# Patient Record
Sex: FEMALE | Race: BLACK OR AFRICAN AMERICAN | NOT HISPANIC OR LATINO | Employment: UNEMPLOYED | URBAN - METROPOLITAN AREA
[De-identification: names, ages, dates, MRNs, and addresses within clinical notes are randomized per-mention and may not be internally consistent; named-entity substitution may affect disease eponyms.]

---

## 2021-06-11 ENCOUNTER — HISTORICAL (OUTPATIENT)
Dept: ADMINISTRATIVE | Facility: HOSPITAL | Age: 81
End: 2021-06-11

## 2021-06-11 LAB
ABS NEUT (OLG): 2.46 X10(3)/MCL (ref 2.1–9.2)
ALBUMIN SERPL-MCNC: 3 GM/DL (ref 3.4–4.8)
ALBUMIN/GLOB SERPL: 0.8 RATIO (ref 1.1–2)
ALP SERPL-CCNC: 140 UNIT/L (ref 40–150)
ALT SERPL-CCNC: 14 UNIT/L (ref 0–55)
ANISOCYTOSIS BLD QL SMEAR: 1
AST SERPL-CCNC: 24 UNIT/L (ref 5–34)
BASOPHILS # BLD AUTO: 0.1 X10(3)/MCL (ref 0–0.2)
BASOPHILS NFR BLD AUTO: 2 %
BILIRUB SERPL-MCNC: 0.2 MG/DL
BILIRUBIN DIRECT+TOT PNL SERPL-MCNC: 0.1 MG/DL (ref 0–0.5)
BILIRUBIN DIRECT+TOT PNL SERPL-MCNC: 0.1 MG/DL (ref 0–0.8)
BUN SERPL-MCNC: 14 MG/DL (ref 9.8–20.1)
BURR CELLS BLD QL SMEAR: 1 (ref 0–0)
CALCIUM SERPL-MCNC: 8.7 MG/DL (ref 8.4–10.2)
CHLORIDE SERPL-SCNC: 106 MMOL/L (ref 98–107)
CHOLEST SERPL-MCNC: 205 MG/DL
CHOLEST/HDLC SERPL: 5 {RATIO} (ref 0–5)
CO2 SERPL-SCNC: 25 MMOL/L (ref 23–31)
CREAT SERPL-MCNC: 0.96 MG/DL (ref 0.55–1.02)
EOSINOPHIL # BLD AUTO: 0.5 X10(3)/MCL (ref 0–0.9)
EOSINOPHIL NFR BLD AUTO: 10 %
ERYTHROCYTE [DISTWIDTH] IN BLOOD BY AUTOMATED COUNT: 16.3 % (ref 11.5–17)
EST. AVERAGE GLUCOSE BLD GHB EST-MCNC: 93.9 MG/DL
GLOBULIN SER-MCNC: 3.8 GM/DL (ref 2.4–3.5)
GLUCOSE SERPL-MCNC: 80 MG/DL (ref 82–115)
HBA1C MFR BLD: 4.9 %
HCT VFR BLD AUTO: 37.1 % (ref 37–47)
HDLC SERPL-MCNC: 45 MG/DL (ref 35–60)
HGB BLD-MCNC: 11 GM/DL (ref 12–16)
LDLC SERPL CALC-MCNC: 125 MG/DL (ref 50–140)
LYMPHOCYTES # BLD AUTO: 1.4 X10(3)/MCL (ref 0.6–4.6)
LYMPHOCYTES NFR BLD AUTO: 28 %
MACROCYTES BLD QL SMEAR: 1 /MCL
MCH RBC QN AUTO: 29.4 PG (ref 27–31)
MCHC RBC AUTO-ENTMCNC: 29.6 GM/DL (ref 33–36)
MCV RBC AUTO: 99.2 FL (ref 80–94)
MONOCYTES # BLD AUTO: 0.5 X10(3)/MCL (ref 0.1–1.3)
MONOCYTES NFR BLD AUTO: 10 %
NEUTROPHILS # BLD AUTO: 2.46 X10(3)/MCL (ref 2.1–9.2)
NEUTROPHILS NFR BLD AUTO: 50 %
PHENYTOIN SERPL-MCNC: <1.8 UG/ML (ref 10–20)
PLATELET # BLD AUTO: 257 X10(3)/MCL (ref 130–400)
PLATELET # BLD EST: NORMAL 10*3/UL
PMV BLD AUTO: 12.2 FL (ref 7.4–10.4)
POIKILOCYTOSIS BLD QL SMEAR: 1
POTASSIUM SERPL-SCNC: 3.2 MMOL/L (ref 3.5–5.1)
PROT SERPL-MCNC: 6.8 GM/DL (ref 5.8–7.6)
RBC # BLD AUTO: 3.74 X10(6)/MCL (ref 4.2–5.4)
RBC MORPH BLD: ABNORMAL
SODIUM SERPL-SCNC: 141 MMOL/L (ref 136–145)
TRIGL SERPL-MCNC: 176 MG/DL (ref 37–140)
TSH SERPL-ACNC: 1.67 UIU/ML (ref 0.35–4.94)
VLDLC SERPL CALC-MCNC: 35 MG/DL
WBC # SPEC AUTO: 4.9 X10(3)/MCL (ref 4.5–11.5)

## 2021-07-14 ENCOUNTER — HISTORICAL (OUTPATIENT)
Dept: ADMINISTRATIVE | Facility: HOSPITAL | Age: 81
End: 2021-07-14

## 2021-07-14 LAB
APPEARANCE, UA: CLEAR
BACTERIA #/AREA URNS AUTO: ABNORMAL /HPF
BILIRUB UR QL STRIP: NEGATIVE
COLOR UR: YELLOW
GLUCOSE (UA): NEGATIVE
HGB UR QL STRIP: NEGATIVE
KETONES UR QL STRIP: NEGATIVE
LEUKOCYTE ESTERASE UR QL STRIP: NEGATIVE
NITRITE UR QL STRIP.AUTO: NEGATIVE
PH UR STRIP: 7.5 [PH] (ref 5–9)
PROT UR QL STRIP: ABNORMAL
RBC #/AREA URNS HPF: ABNORMAL /[HPF]
SP GR UR STRIP: 1.01 (ref 1–1.03)
SQUAMOUS EPITHELIAL, UA: ABNORMAL /HPF (ref 0–4)
UROBILINOGEN UR STRIP-ACNC: 0.2
WBC #/AREA URNS AUTO: ABNORMAL /HPF (ref 0–3)

## 2021-07-17 LAB — FINAL CULTURE: NO GROWTH

## 2021-10-15 ENCOUNTER — HISTORICAL (OUTPATIENT)
Dept: ADMINISTRATIVE | Facility: HOSPITAL | Age: 81
End: 2021-10-15

## 2021-10-15 LAB
ABS NEUT (OLG): 2.78 X10(3)/MCL (ref 2.1–9.2)
ALBUMIN SERPL-MCNC: 3.1 GM/DL (ref 3.4–4.8)
ALBUMIN/GLOB SERPL: 0.6 RATIO (ref 1.1–2)
ALP SERPL-CCNC: 132 UNIT/L (ref 40–150)
ALT SERPL-CCNC: 13 UNIT/L (ref 0–55)
AST SERPL-CCNC: 23 UNIT/L (ref 5–34)
BASOPHILS # BLD AUTO: 0.1 X10(3)/MCL (ref 0–0.2)
BASOPHILS NFR BLD AUTO: 2 %
BILIRUB SERPL-MCNC: 0.2 MG/DL
BILIRUBIN DIRECT+TOT PNL SERPL-MCNC: 0.1 MG/DL (ref 0–0.5)
BILIRUBIN DIRECT+TOT PNL SERPL-MCNC: 0.1 MG/DL (ref 0–0.8)
BUN SERPL-MCNC: 9.7 MG/DL (ref 9.8–20.1)
CALCIUM SERPL-MCNC: 9.3 MG/DL (ref 8.4–10.2)
CHLORIDE SERPL-SCNC: 108 MMOL/L (ref 98–107)
CO2 SERPL-SCNC: 24 MMOL/L (ref 23–31)
CREAT SERPL-MCNC: 1.05 MG/DL (ref 0.55–1.02)
EOSINOPHIL # BLD AUTO: 0.2 X10(3)/MCL (ref 0–0.9)
EOSINOPHIL NFR BLD AUTO: 4 %
ERYTHROCYTE [DISTWIDTH] IN BLOOD BY AUTOMATED COUNT: 15.7 % (ref 11.5–17)
GLOBULIN SER-MCNC: 4.8 GM/DL (ref 2.4–3.5)
GLUCOSE SERPL-MCNC: 88 MG/DL (ref 82–115)
HCT VFR BLD AUTO: 40.1 % (ref 37–47)
HGB BLD-MCNC: 12.4 GM/DL (ref 12–16)
LYMPHOCYTES # BLD AUTO: 1.6 X10(3)/MCL (ref 0.6–4.6)
LYMPHOCYTES NFR BLD AUTO: 30 %
MCH RBC QN AUTO: 28.6 PG (ref 27–31)
MCHC RBC AUTO-ENTMCNC: 30.9 GM/DL (ref 33–36)
MCV RBC AUTO: 92.6 FL (ref 80–94)
MONOCYTES # BLD AUTO: 0.6 X10(3)/MCL (ref 0.1–1.3)
MONOCYTES NFR BLD AUTO: 10 %
NEUTROPHILS # BLD AUTO: 2.78 X10(3)/MCL (ref 2.1–9.2)
NEUTROPHILS NFR BLD AUTO: 53 %
PLATELET # BLD AUTO: 322 X10(3)/MCL (ref 130–400)
PMV BLD AUTO: 10.7 FL (ref 9.4–12.4)
POTASSIUM SERPL-SCNC: 4.6 MMOL/L (ref 3.5–5.1)
PROT SERPL-MCNC: 7.9 GM/DL (ref 5.8–7.6)
RBC # BLD AUTO: 4.33 X10(6)/MCL (ref 4.2–5.4)
SODIUM SERPL-SCNC: 139 MMOL/L (ref 136–145)
WBC # SPEC AUTO: 5.3 X10(3)/MCL (ref 4.5–11.5)

## 2021-10-26 ENCOUNTER — HISTORICAL (OUTPATIENT)
Dept: ADMINISTRATIVE | Facility: HOSPITAL | Age: 81
End: 2021-10-26

## 2021-12-21 ENCOUNTER — HISTORICAL (OUTPATIENT)
Dept: ADMINISTRATIVE | Facility: HOSPITAL | Age: 81
End: 2021-12-21

## 2022-01-03 ENCOUNTER — HISTORICAL (OUTPATIENT)
Dept: RADIOLOGY | Facility: HOSPITAL | Age: 82
End: 2022-01-03

## 2022-04-07 ENCOUNTER — HISTORICAL (OUTPATIENT)
Dept: ADMINISTRATIVE | Facility: HOSPITAL | Age: 82
End: 2022-04-07

## 2022-04-07 LAB
APPEARANCE, UA: NORMAL
BACTERIA SPEC CULT: NORMAL
BILIRUB UR QL STRIP: NEGATIVE
COLOR UR: YELLOW
GLUCOSE (UA): NEGATIVE
HGB UR QL STRIP: NORMAL
KETONES UR QL STRIP: NEGATIVE
LEUKOCYTE ESTERASE UR QL STRIP: NORMAL
NITRITE UR QL STRIP: NEGATIVE
PH UR STRIP: 7 [PH] (ref 5–9)
PROT UR QL STRIP: NORMAL
RBC #/AREA URNS HPF: NORMAL /[HPF] (ref 0–2)
SP GR UR STRIP: 1.01 (ref 1–1.03)
SQUAMOUS EPITHELIAL, UA: NORMAL (ref 0–4)
UA WBC MAN: >200 (ref 0–2)
UROBILINOGEN UR STRIP-ACNC: 0.2

## 2022-04-10 ENCOUNTER — HISTORICAL (OUTPATIENT)
Dept: ADMINISTRATIVE | Facility: HOSPITAL | Age: 82
End: 2022-04-10
Payer: MEDICARE

## 2022-04-24 VITALS
DIASTOLIC BLOOD PRESSURE: 70 MMHG | OXYGEN SATURATION: 98 % | WEIGHT: 83.31 LBS | HEIGHT: 62 IN | SYSTOLIC BLOOD PRESSURE: 103 MMHG | BODY MASS INDEX: 15.33 KG/M2

## 2022-04-30 NOTE — OP NOTE
Patient:   Lisseth Guajardo            MRN: [id number]            FIN: [id number]               Age:   81 years     Sex:  Female     :  1940   Associated Diagnoses:   None   Author:   Wade Acuna MD            DATE OF SURGERY:   10/26/21    SURGEON:  Wade Acuna MD    PREOPERATIVE DIAGNOSIS:  Symptomatic Cholelithiasis    POST OPERATIVE DIAGNOSIS:  Same.    PROCEDURE:  Laparoscopic cholecystectomy.    ANESTHESIA:  General endotracheal anesthesia.    ESTIMATED BLOOD LOSS:  Approximately 20 cc.   Blood administered, none.    LAP AND INSTRUMENT COUNT:  Correct X2 at the end of the case.    SPECIMENS:  Gallbladder.    INDICATION AND SIGNIFICANT HISTORY:  Patient is a 81-year-old female complaining of post prandial right upper quadrant pain associated with fatty meals.  Patient was worked up clinically and found to have symptomatic cholelithiasis.  Risks and benefits of surgery was discussed with the patient who voiced understanding of risks / benefits and elected to proceed with surgery.        PROCEDURE IN DETAIL:  Once informed consents were obtained, patient was taken to the operating room and placed supine on the operating table.  After general endotracheal anesthesia was induced, the abdomen was prepped and draped in the standard sterile surgical fashion.  Periumbilical incision was made with the scalpel and the Veress needle was used to enter the abdominal cavity.  Pneumoperitoneum was then created with insufflation.  After adequate insufflation was obtained, 11 millimeter trocar port were placed through this wound.  Two additional 5 millimeter ports were placed in the right upper quadrant followed by 5 millimeter trocar placed subxiphoid.  The gallbladder was then visualized appeared to have chronic inflammatory changes and retracted both superiorly and laterally to achieve the critical view.  Dissection was carried out in lateral to medial fashion.  The cystic duct were first  visualized followed by cystic artery appropriate.  Two clips were placed twice proximally on each structure and one distally and resected.  The gallbladder was then removed from the liver bed using the hook cautery and passed off the table as specimen through the periumbilical trocar port site.  Attention was then redirected to the gallbladder fossa, no active bleeding was noted.  Good hemostasis was visualized.  All ports were then removed under direct visualization with no active bleeding noted.  Skin was then closed with 4-0 Vicryl suture in interrupted fashion.  Skin was  cleaned and dry sterile dressing was placed on the wound.  Lap and instrument  counts were correct X2 at the end of the case.  Patient tolerated the procedure well and was transferred to recovery room in good condition.

## 2022-05-02 ENCOUNTER — TELEPHONE (OUTPATIENT)
Dept: FAMILY MEDICINE | Facility: CLINIC | Age: 82
End: 2022-05-02
Payer: MEDICARE

## 2022-05-02 NOTE — TELEPHONE ENCOUNTER
----- Message from Jolie Ruvalcaba sent at 5/2/2022  1:34 PM CDT -----  Regarding: Medical Advice  Type:  Needs Medical Advice    Who Called: patient  Symptoms (please be specific): Patient was seen at [facility] Saturday 4/30 for pain and wheezing. She was prescribed medication that is a type of steroid. She is legally blind and does not know what medication but she is out of it.   How long has patient had these symptoms:  na  Ph[facility] and phone #:  [facility]  Would the patient rather a call back or a response via [facility]? call  Best Call Back Number: [phone]  Additional Information: Please call back with information on what she should do regarding this medication.

## 2022-05-03 ENCOUNTER — TELEPHONE (OUTPATIENT)
Dept: FAMILY MEDICINE | Facility: CLINIC | Age: 82
End: 2022-05-03

## 2022-05-03 NOTE — TELEPHONE ENCOUNTER
----- Message from Jolie Ruvalcaba sent at 5/2/2022  1:34 PM CDT -----  Regarding: Medical Advice  Type:  Needs Medical Advice    Who Called: patient  Symptoms (please be specific): Patient was seen at [facility] Saturday 4/30 for pain and wheezing. She was prescribed medication that is a type of steroid. She is legally blind and does not know what medication but she is out of it.   How long has patient had these symptoms:  na  Pharm[facility] and phone #:  [facility]  Would the patient rather a call back or a response via [facility]? call  Best Call Back Number: [phone]  Additional Information: Please call back with information on what she should do regarding this medication.

## 2022-05-06 ENCOUNTER — TELEPHONE (OUTPATIENT)
Dept: FAMILY MEDICINE | Facility: CLINIC | Age: 82
End: 2022-05-06
Payer: MEDICARE

## 2022-05-06 NOTE — TELEPHONE ENCOUNTER
Spoke with De[facility] O2 was 91. Complaining of wheezing  COVID booster last week on 4/30/22  Went to ER was given Prednisone for 5 days, tomorrow is last day. Feels a little better but not too much better.       Patient was instructed to go to ED. Refused ED said she will call if her oxygen drops anymore.   Just wanted to make us aware of situation

## 2022-05-06 NOTE — TELEPHONE ENCOUNTER
Thanks for the update. I agree with recommendation to return to ER for evaluation and treatment for wheezing.

## 2022-05-06 NOTE — TELEPHONE ENCOUNTER
----- Message from Vinny Chatterjee sent at 5/6/2022 10:10 AM CDT -----  .Type:  Needs Medical Advice    Who Called: Deyanira from [facility]  Symptoms (please be specific):    How long has patient had these symptoms:    Pharmacy name and phone #:    Would the patient rather a call back or a response via [facility]?   Best Call Back Number: [phone] or office # [phone]  Additional Information: Need to update the nurse on Ms. Montanez's condition.

## 2022-06-03 RX ORDER — DOXYLAMINE SUCCINATE 25 MG
1 TABLET ORAL 2 TIMES DAILY
Qty: 28 G | Refills: 3 | Status: SHIPPED | OUTPATIENT
Start: 2022-06-03

## 2022-06-03 RX ORDER — DOXYLAMINE SUCCINATE 25 MG
1 TABLET ORAL 2 TIMES DAILY
COMMUNITY
End: 2022-06-03 | Stop reason: SDUPTHER

## 2022-06-03 NOTE — TELEPHONE ENCOUNTER
----- Message from Melody Ren MD sent at 6/3/2022 10:03 AM CDT -----  Regarding: RE: Medical Adivce  Please send refill request or have pharmacy send. Thanks.   ----- Message -----  From: Murtaza Sandoval MA  Sent: 6/3/2022   9:56 AM CDT  To: Melody Ren MD  Subject: FW: Medical Adivce                               Please advise   ----- Message -----  From: Wilbert Longoria  Sent: 6/3/2022   9:56 AM CDT  To: Mikal VAZQUEZ Staff  Subject: Medical Adivce                                   Type:  Needs Medical Advice    Who Called: pt   Symptoms (please be specific):  Private      Would the patient rather a call back or a response via [facility]? Call back   Best Call Back Number: [phone]  Additional Information: pt stated a request was sent for some antifungal cream .. needs it Sent at [place], [place]

## 2022-06-09 ENCOUNTER — TELEPHONE (OUTPATIENT)
Dept: FAMILY MEDICINE | Facility: CLINIC | Age: 82
End: 2022-06-09
Payer: MEDICARE

## 2022-06-09 DIAGNOSIS — Z23 NEED FOR SHINGLES VACCINE: Primary | ICD-10-CM

## 2022-06-09 RX ORDER — ZOSTER VACCINE RECOMBINANT, ADJUVANTED 50 MCG/0.5
0.5 KIT INTRAMUSCULAR ONCE
Qty: 1 EACH | Refills: 1 | Status: SHIPPED | OUTPATIENT
Start: 2022-06-09 | End: 2022-06-09

## 2022-06-09 NOTE — TELEPHONE ENCOUNTER
Can new shingles order be placed for patient to have done with home health. Shingles rx was left out by staff and a new one is needed for home health to give to patient. Please send to [facility]

## 2022-06-09 NOTE — TELEPHONE ENCOUNTER
----- Message from Vinny Chatterjee sent at 6/9/2022 10:28 AM CDT -----  .Type:  Needs Medical Advice    Who Called: Lisseth  Symptoms (please be specific):    How long has patient had these symptoms:    Pharmacy name and phone #:    Would the patient rather a call back or a response via [facility]?   Best Call Back Number: # [phone]  Additional Information: She is calling to speak with nurse about a shingle shot she told me.

## 2022-06-20 RX ORDER — NITROFURANTOIN 25; 75 MG/1; MG/1
100 CAPSULE ORAL DAILY
COMMUNITY
Start: 2022-01-20 | End: 2022-07-28 | Stop reason: SINTOL

## 2022-06-20 RX ORDER — PHENYTOIN SODIUM 100 MG/1
300 CAPSULE, EXTENDED RELEASE ORAL NIGHTLY
COMMUNITY
Start: 2021-07-14 | End: 2022-11-07 | Stop reason: SDUPTHER

## 2022-06-20 RX ORDER — LATANOPROST 0.005 %
1 DROPS OPHTHALMIC (EYE) NIGHTLY
COMMUNITY
Start: 2022-04-25

## 2022-06-20 RX ORDER — PHENOBARBITAL 32.4 MG/1
32.4 TABLET ORAL NIGHTLY
COMMUNITY
Start: 2021-08-11 | End: 2022-11-07 | Stop reason: SDUPTHER

## 2022-06-20 RX ORDER — DORZOLAMIDE HYDROCHLORIDE AND TIMOLOL MALEATE 20; 5 MG/ML; MG/ML
1 SOLUTION/ DROPS OPHTHALMIC 2 TIMES DAILY
COMMUNITY
Start: 2022-04-25

## 2022-06-20 RX ORDER — BRIMONIDINE TARTRATE 1.5 MG/ML
1 SOLUTION/ DROPS OPHTHALMIC 3 TIMES DAILY
COMMUNITY
Start: 2022-04-25

## 2022-06-20 RX ORDER — CARBAMAZEPINE 100 MG/1
100 CAPSULE, EXTENDED RELEASE ORAL 2 TIMES DAILY
COMMUNITY
Start: 2021-07-14 | End: 2022-11-08 | Stop reason: SDUPTHER

## 2022-06-20 RX ORDER — ASPIRIN 81 MG/1
81 TABLET ORAL DAILY
COMMUNITY
Start: 2021-07-14

## 2022-06-20 RX ORDER — GABAPENTIN 100 MG/1
100 CAPSULE ORAL 2 TIMES DAILY PRN
COMMUNITY
Start: 2021-12-01 | End: 2022-08-29

## 2022-06-20 RX ORDER — BRIMONIDINE TARTRATE 2 MG/ML
1 SOLUTION/ DROPS OPHTHALMIC 3 TIMES DAILY
COMMUNITY
Start: 2022-04-25

## 2022-06-21 ENCOUNTER — OFFICE VISIT (OUTPATIENT)
Dept: FAMILY MEDICINE | Facility: CLINIC | Age: 82
End: 2022-06-21
Payer: MEDICARE

## 2022-06-21 VITALS
RESPIRATION RATE: 16 BRPM | HEIGHT: 61 IN | TEMPERATURE: 98 F | BODY MASS INDEX: 18.88 KG/M2 | HEART RATE: 71 BPM | SYSTOLIC BLOOD PRESSURE: 124 MMHG | WEIGHT: 100 LBS | OXYGEN SATURATION: 97 % | DIASTOLIC BLOOD PRESSURE: 76 MMHG

## 2022-06-21 DIAGNOSIS — E78.49 ESSENTIAL FAMILIAL HYPERLIPIDEMIA: ICD-10-CM

## 2022-06-21 DIAGNOSIS — M54.2 CERVICALGIA: ICD-10-CM

## 2022-06-21 DIAGNOSIS — Z23 NEED FOR 23-POLYVALENT PNEUMOCOCCAL POLYSACCHARIDE VACCINE: ICD-10-CM

## 2022-06-21 DIAGNOSIS — R56.9 SEIZURES: ICD-10-CM

## 2022-06-21 DIAGNOSIS — M47.22 OSTEOARTHRITIS OF SPINE WITH RADICULOPATHY, CERVICAL REGION: ICD-10-CM

## 2022-06-21 DIAGNOSIS — J30.2 SEASONAL ALLERGIES: ICD-10-CM

## 2022-06-21 DIAGNOSIS — Z00.00 MEDICARE ANNUAL WELLNESS VISIT, SUBSEQUENT: Primary | ICD-10-CM

## 2022-06-21 PROCEDURE — 90732 PPSV23 VACC 2 YRS+ SUBQ/IM: CPT | Mod: ,,, | Performed by: FAMILY MEDICINE

## 2022-06-21 PROCEDURE — 90732 PR PNEUMOCOCCAL VACCINE,23-VALENT,ADULT: ICD-10-PCS | Mod: ,,, | Performed by: FAMILY MEDICINE

## 2022-06-21 PROCEDURE — G0009 ADMIN PNEUMOCOCCAL VACCINE: HCPCS | Mod: ,,, | Performed by: FAMILY MEDICINE

## 2022-06-21 PROCEDURE — G0439 PPPS, SUBSEQ VISIT: HCPCS | Mod: ,,, | Performed by: FAMILY MEDICINE

## 2022-06-21 PROCEDURE — G0009 PR ADMIN PNEUMOCOCCAL VACCINE: ICD-10-PCS | Mod: ,,, | Performed by: FAMILY MEDICINE

## 2022-06-21 PROCEDURE — 99213 PR OFFICE/OUTPT VISIT, EST, LEVL III, 20-29 MIN: ICD-10-PCS | Mod: 25,,, | Performed by: FAMILY MEDICINE

## 2022-06-21 PROCEDURE — G0439 PR MEDICARE ANNUAL WELLNESS SUBSEQUENT VISIT: ICD-10-PCS | Mod: ,,, | Performed by: FAMILY MEDICINE

## 2022-06-21 PROCEDURE — 99213 OFFICE O/P EST LOW 20 MIN: CPT | Mod: 25,,, | Performed by: FAMILY MEDICINE

## 2022-06-21 RX ORDER — ATORVASTATIN CALCIUM 40 MG/1
40 TABLET, FILM COATED ORAL DAILY
COMMUNITY
End: 2022-11-08 | Stop reason: SDUPTHER

## 2022-06-21 RX ORDER — LORATADINE 10 MG/1
10 TABLET ORAL DAILY
Qty: 90 TABLET | Refills: 3 | Status: SHIPPED | OUTPATIENT
Start: 2022-06-21 | End: 2022-11-08 | Stop reason: SDUPTHER

## 2022-06-21 NOTE — PROGRESS NOTES
Patient ID: [id number]     Chief Complaint: Medicare AWV        HPI:     Lisseth Guajardo is a 81 y.o. female here today for a Medicare Wellness.   Well Adult History   81 y.o. AAF with history of HTN, epilepsy, and depression here for annual medicare wellness. The patient presents for well adult exam. The patient's general health status is described as fair. The patient's diet is described as balanced. Exercise: occasional. Associated symptoms consist of denies weight loss, denies weight gain, denies fatigue, denies headache, denies hearing loss and denies vision changes. Additional pertinent history: last dental exam: UTD, she refuses MMG, risks of refusal discussed with patient and patient voices understanding, last eye exam: she has macular degeneration and she has losing her eye sight, Ophthalmology- Dr. Medellin in 12/21/2021, seat belt use, daily caffeine use (Dr. Pepper), tobacco use: sometimes, but she doesn't have access to cigarettes, she misses her cigarettes, risks of refusal discussed with patient and patient voices understanding, occasional alcohol use (wine) and She is due for annual labs today. She needs Pneumovax 23 today, but she is UTD on vaccines. She has a history of epilepsy, controlled with Rx, she denies a seizure in the past 5 years, does followup with Neurology (Dr. Richardson) as scheduled. She has a history of CAD/HTN/hypercholesterolemia, controlled with Rx, no side effects, she is s/p 4 stents, asymptomatic, she does see Cardiology (Dr. Martinez). She has osteoporosis, not on calcium with D, cannot tolerate any medications for osteoporosis treatment and she refuses treatment, last DEXA scan was 01/04/2022. She reports recurrent UTIs, she wears adult diapers, she takes Macrobid for prophylaxis and she denies side effects. 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 She does stay in an assisted  living facility.   - She has a history of cervical DJD with chronic neck pain x several years, was seeing a doctor in [place], but hasn't had MRI in over 7 years, she reports pain radiates to her jaw.   - Patient is without any other complaints today.    A separate E/M code has been provided to evaluate additional complaints that the patient would like addressed during the dedicated Medicare Wellness Exam.    Patient has no opioid prescriptions on file and screening for potential substance use disorders is negative.    Past Medical History:  Coronary arteriosclerosis  Depression  Epilepsy, unspecified, not intractable, with status epilepticus  HTN (hypertension)  Hypokalemia  Osteoporosis     Past Surgical History:   Procedure Laterality Date    APPENDECTOMY      BACK SURGERY      cardiac stents      CARPAL TUNNEL RELEASE      CATARACT EXTRACTION Left     CHOLECYSTECTOMY      crainotomy      FOOT SURGERY Left     HYSTERECTOMY      LUNG REMOVAL, PARTIAL Right     LYMPH NODE DISSECTION         Review of patient's allergies indicates:   Allergen Reactions    Nitrofurantoin monohyd/m-cryst Hives    Penicillin Hives       Outpatient Medications Marked as Taking for the 6/21/22 encounter (Office Visit) with Melody Ren MD   Medication Sig Dispense Refill    ALPHAGAN P 0.15 % ophthalmic solution Place 1 drop into both eyes 3 (three) times daily.      aspirin (ECOTRIN) 81 MG EC tablet Take 81 mg by mouth once daily.      atorvastatin (LIPITOR) 40 MG tablet Take 40 mg by mouth once daily.      brimonidine 0.2% (ALPHAGAN) 0.2 % Drop Place 1 drop into both eyes 3 (three) times daily.      calcium carbonate/vitamin D3 (CALTRATE 600 + D ORAL) Take 1 tablet by mouth 2 (two) times a day.      carBAMazepine (CARBATROL) 100 MG CM12 Take 100 mg by mouth 2 (two) times a day.      COSOPT 22.3-6.8 mg/mL ophthalmic solution Place 1 drop into both eyes 2 (two) times daily.      gabapentin (NEURONTIN) 100  MG capsule Take 100 mg by mouth 2 (two) times daily as needed.      metoprolol succinate 25 mg CSpX Take 25 mg by mouth once daily.      miconazole (MICOTIN) 2 % cream Apply 1 g topically 2 (two) times daily. 28 g 3    nitrofurantoin, macrocrystal-monohydrate, (MACROBID) 100 MG capsule Take 100 mg by mouth once daily.      PHENobarbitaL (LUMINAL) 32.4 MG tablet Take 32.4 mg by mouth every evening.      phenytoin (DILANTIN) 100 MG ER capsule Take 300 mg by mouth every evening.      XALATAN 0.005 % ophthalmic solution Place 1 drop into both eyes nightly.       Current Facility-Administered Medications for the 6/21/22 encounter (Office Visit) with Melody Ren MD   Medication Dose Route Frequency Provider Last Rate Last Admin    [COMPLETED] pneumococcal vaccine (PNU-IMMUNE 23) injection 0.5 mL  0.5 mL Intramuscular 1 time in Clinic/HOD Melody Ren MD   0.5 mL at 06/21/22 1051       Social History     Socioeconomic History    Marital status:    Tobacco Use    Smoking status: Former Smoker    Smokeless tobacco: Never Used   Substance and Sexual Activity    Alcohol use: Never    Drug use: Never    Sexual activity: Not Currently        Family History   Family history unknown: Yes        Subjective:     ROS      See HPI for details    Constitutional: Denies Change in appetite. Denies Chills. Denies Fever. Denies Night sweats.  Eye: Denies Blurred vision. Denies Discharge. Denies Eye pain.  ENT: Denies Decreased hearing. Denies Sore throat. Denies Swollen glands.  Respiratory: Denies Cough. Denies Shortness of breath. Denies Shortness of breath with exertion. Denies Wheezing.  Cardiovascular: Denies Chest pain at rest. Denies Chest pain with exertion. Denies Irregular heartbeat. Denies Palpitations.  Gastrointestinal: Denies Abdominal pain. Denies Diarrhea. Denies Nausea. Denies Vomiting. Denies Hematemesis or Hematochezia.  Genitourinary: Denies Dysuria. Denies Urinary frequency.  "Denies Urinary urgency. Denies Blood in urine.  Endocrine: Denies Cold intolerance. Denies Excessive thirst. Denies Heat intolerance. Denies Weight loss. Denies Weight gain.  Musculoskeletal: Painful joints. Denies Weakness.  Integumentary: Denies Rash. Denies Itching. Denies Dry skin.  Neurologic: Denies Dizziness. Denies Fainting. Denies Headache.  Psychiatric: Denies Depression. Denies Anxiety. Denies Suicidal/Homicidal ideations.    All Other ROS: Negative except as stated in HPI.       Objective:     /76 (BP Location: Right arm, Patient Position: Sitting, BP Method: Medium (Manual))   Pulse 71   Temp 97.9 °F (36.6 °C) (Oral)   Resp 16   Ht 5' 1" (1.549 m)   Wt 45.4 kg (100 lb)   SpO2 97%   BMI 18.89 kg/m²     Physical Exam    General: Alert and oriented, No acute distress.  Head: Normocephalic, Atraumatic.  Eye: Pupils are equal, round and reactive to light, Extraocular movements are intact, Sclera non-icteric.  Ears/Nose/Throat: Bilateral TMs with clear fluid behind TMs, NL light reflex, no erythema, intact.   Neck/Thyroid: Supple, Non-tender, No carotid bruit, No palpable thyromegaly or thyroid nodule, No lymphadenopathy, No JVD, Full range of motion.  Respiratory: Clear to auscultation bilaterally; No wheezes, rales or rhonchi,Non-labored respirations, Symmetrical chest wall expansion.  Cardiovascular: Regular rate and rhythm, S1/S2 normal, No murmurs, rubs or gallops.  Gastrointestinal: Soft, Non-tender, Non-distended, Normal bowel sounds, No palpable organomegaly.  Musculoskeletal: Normal range of motion. Neck with mild TTP, no erythema, FROM, no edema.   Integumentary: Warm, Dry, Intact, No suspicious lesions or rashes.  Extremities: Wheelchair bound. No clubbing, cyanosis or edema  Neurologic: No focal deficits, Cranial Nerves II-XII are grossly intact, Motor strength normal upper and lower extremities, Sensory exam intact.  Psychiatric: Normal interaction, Coherent speech, Euthymic mood, " Appropriate affect       Assessment:       ICD-10-CM ICD-9-CM   1. Medicare annual wellness visit, subsequent  Z00.00 V70.0   2. Need for 23-polyvalent pneumococcal polysaccharide vaccine  Z23 V03.82   3. Seizures  R56.9 780.39   4. Essential familial hyperlipidemia  E78.49 272.2   5. Osteoarthritis of spine with radiculopathy, cervical region  M47.22 721.0   6. Cervicalgia  M54.2 723.1   7. Seasonal allergies  J30.2 477.9        Plan:       Medicare Annual Wellness and Personalized Prevention Plan:     Fall Risk + Home Safety + Hearing Impairment + Depression Screen + Cognitive Impairment Screen + Health Risk Assessment all reviewed.     No flowsheet data found.  Depression Screeening PHQ2 6/21/2022   Over the last two weeks how often have you been bothered by little interest or pleasure in doing things 0   Over the last two weeks how often have you been bothered by feeling down, depressed or hopeless 0   PHQ-2 Total Score 0     Fall Risk Assessment - Outpatient 6/21/2022   Mobility Status Ambulatory w/ assistance   Number of falls 0   Identified as fall risk 0           What is your age?: 80 or older  Are you male or female?: Female  During the past four weeks, how much have you been bothered by emotional problems such as feeling anxious, depressed, irritable, sad, or downhearted and blue?: Slightly  During the past five weeks, has your physical and/or emotional health limited your social activities with family, friends, neighbors, or groups?: Not at all  During the past four weeks, how much bodily pain have you generally had?: Mild pain  During the past four weeks, was someone available to help if you needed and wanted help?: Yes, as much as I wanted  During the past four weeks, what was the hardest physical activity you could do for at least two minutes?: Moderate  Can you get to places out of walking distance without help?  (For example, can you travel alone on buses or taxis, or drive your own car?): No  Can  you go shopping for groceries or clothes without someone's help?: No  Can you prepare your own meals?: No  Can you do your own housework without help?: No  Because of any health problems, do you need the help of another person with your personal care needs such as eating, bathing, dressing, or getting around the house?: Yes  Can you handle your own money without help?: No  During the past four weeks, how would you rate your health in general?: Good  How have things been going for you during the past four weeks?: Pretty well  Are you having difficulties driving your car?: Not applicable, I do not use a car  Do you always fasten your seat belt when you are in a car?: Yes, usually  How often in the past four weeks have you been bothered by falling or dizzy when standing up?: Never  How often in the past four weeks have you been bothered by sexual problems?: Never  How often in the past four weeks have you been bothered by trouble eating well?: Never  How often in the past four weeks have you been bothered by teeth or denture problems?: Never  How often in the past four weeks have you been bothered with problems using the telephone?: Never  How often in the past four weeks have you been bothered by tiredness or fatigue?: Never  Have you fallen two or more times in the past year?: No  Are you afraid of falling?: No  Are you a smoker?: No  During the past four weeks, how many drinks of wine, beer, or other alcoholic beverages did you have?: No alcohol at all  Do you exercise for about 20 minutes three or more days a week?: No, I usually do not exercise this much  Have you been given any information to help you with hazards in your house that might hurt you?: Yes  Have you been given any information to help you with keeping track of your medications?: Yes  How often do you have trouble taking medicines the way you've been told to take them?: I always take them as prescribed  How confident are you that you can control and  manage most of your health problems?: Very confident  What is your race? (Check all that apply.):                  Alcohol/Tobacco Use - Stressed importance of smoking cessation and limiting alcohol intake.  CVD Risk Factors - Reviewed  Obesity/Physical Activity - Normal BMI. Encouraged daily 30 minute physical activity x 5 days per week.      Health Maintenance Topics with due status: Not Due       Topic Last Completion Date    Lipid Panel 06/11/2021    Influenza Vaccine Not Due        Vaccinations -   Immunization History   Administered Date(s) Administered    COVID-19, MRNA, LN-S, PF (Pfizer) (Gray Cap) 04/25/2022    COVID-19, MRNA, LN-S, PF (Pfizer) (Purple Cap) 01/24/2021, 02/14/2021, 10/29/2021    Pneumococcal Conjugate - 13 Valent 06/11/2021    Pneumococcal Polysaccharide - 23 Valent 06/21/2022        1. Medicare annual wellness visit, subsequent  - CBC Auto Differential; Future  - Comprehensive Metabolic Panel; Future  - Lipid Panel; Future  - TSH; Future  - Urinalysis, Reflex to Urine Culture Urine, Clean Catch; Future  - Will treat pending lab results. Monthly breast self exam encouraged. Health maintenance reviewed and plan discussed with patient. Diet, exercise, and 10% weight loss encouraged. Keep appointment for dental exams x q6 months as scheduled. Keep appointment for annual eye exam as scheduled. Continue followup with specialists as scheduled. Continue PT as scheduled. Notify M.D. or ER if temp greater than 100.4, or any acute illness.     2. Need for 23-polyvalent pneumococcal polysaccharide vaccine  - pneumococcal vaccine (PNU-IMMUNE 23) injection 0.5 mL    3. Seizures  - Phenytoin Level, Total; Future  - Phenobarbital Level; Future  - Continue current treatment plan and seizure precautions. Continue followup with Neurology as scheduled.                4. Essential familial hyperlipidemia  - CBC Auto Differential; Future  - CK; Future  - Lipid Panel; Future  - Continue  current treatment plan for now. Will titrate Rx pending results. Continue followup with Cardiology as scheduled.   Continue  Stressed importance of dietary modifications. Follow a low cholesterol, low saturated fat diet with less that 200mg of cholesterol a day.  Avoid fried foods and high saturated fats (high saturated fats less than 7% of calories).  Add Flax Seed/Fish Oil supplements to diet. Increase dietary fiber.  Regular exercise can reduce LDL and raise HDL. Stressed importance of physical activity 5 times per week for 30 minutes per day.     5. Osteoarthritis of spine with radiculopathy, cervical region  - MRI Cervical Spine Without Contrast; Future  - Will update MRI C-spine and send referral to Neurosurgery versus Spine Ortho pending results.     6. Cervicalgia  - MRI Cervical Spine Without Contrast; Future  - Same as #5.     7. Seasonal allergies  - Rx trial of Claritin. Increase fluid intake. Will proceed with ENT referral if no improvement. Notify M.D. or ER if symptoms persist or worsen, temp >100.4, or any acute illness.       Medication List with Changes/Refills   New Medications    LORATADINE (CLARITIN) 10 MG TABLET    Take 1 tablet (10 mg total) by mouth once daily. For allergies/fluid in ears       Start Date: 6/21/2022 End Date: 6/21/2023   Current Medications    ALPHAGAN P 0.15 % OPHTHALMIC SOLUTION    Place 1 drop into both eyes 3 (three) times daily.       Start Date: 4/25/2022 End Date: --    ASPIRIN (ECOTRIN) 81 MG EC TABLET    Take 81 mg by mouth once daily.       Start Date: 7/14/2021 End Date: --    ATORVASTATIN (LIPITOR) 40 MG TABLET    Take 40 mg by mouth once daily.       Start Date: --        End Date: --    BRIMONIDINE 0.2% (ALPHAGAN) 0.2 % DROP    Place 1 drop into both eyes 3 (three) times daily.       Start Date: 4/25/2022 End Date: --    CALCIUM CARBONATE/VITAMIN D3 (CALTRATE 600 + D ORAL)    Take 1 tablet by mouth 2 (two) times a day.       Start Date: 1/4/2022  End Date: --     CARBAMAZEPINE (CARBATROL) 100 MG CM12    Take 100 mg by mouth 2 (two) times a day.       Start Date: 7/14/2021 End Date: --    COSOPT 22.3-6.8 MG/ML OPHTHALMIC SOLUTION    Place 1 drop into both eyes 2 (two) times daily.       Start Date: 4/25/2022 End Date: --    GABAPENTIN (NEURONTIN) 100 MG CAPSULE    Take 100 mg by mouth 2 (two) times daily as needed.       Start Date: 12/1/2021 End Date: --    METOPROLOL SUCCINATE 25 MG CSPX    Take 25 mg by mouth once daily.       Start Date: 7/14/2021 End Date: --    MICONAZOLE (MICOTIN) 2 % CREAM    Apply 1 g topically 2 (two) times daily.       Start Date: 6/3/2022  End Date: --    NITROFURANTOIN, MACROCRYSTAL-MONOHYDRATE, (MACROBID) 100 MG CAPSULE    Take 100 mg by mouth once daily.       Start Date: 1/20/2022 End Date: --    PHENOBARBITAL (LUMINAL) 32.4 MG TABLET    Take 32.4 mg by mouth every evening.       Start Date: 8/11/2021 End Date: --    PHENYTOIN (DILANTIN) 100 MG ER CAPSULE    Take 300 mg by mouth every evening.       Start Date: 7/14/2021 End Date: --    XALATAN 0.005 % OPHTHALMIC SOLUTION    Place 1 drop into both eyes nightly.       Start Date: 4/25/2022 End Date: --          The patient's Health Maintenance was reviewed and the following appears to be due at this time:   Health Maintenance Due   Topic Date Due    TETANUS VACCINE  Never done    DEXA Scan  Never done    Shingles Vaccine (1 of 2) Never done         Follow up in about 6 months (around 12/21/2022) for Cholesterol Followup- 30 minute appointment.

## 2022-06-21 NOTE — PROGRESS NOTES
"TIME UP & GO (TUG)  Test begins with patient sitting back in standard arm chair.   When "Go" is said, the patient stands up and walks 10 feet at a normal pace before turning, walking back and sitting down.    Observe the patients postural stability, gait, stride length, and sway.  Check all that apply:  ? [] Slow tentative pace  ? [] Loss of balance  ? [] Short strides  ? [] Little or no arm swing  ? [] Steadying self on walls  ? [] Shuffling  ? [] En bloc turning  ? [] Not using assistive device properly    Time in seconds: < 12 Seconds  (Older adults who takes = or > 12 seconds to complete TUG is at risk for falling.      WHISPER TEST  Test begins with patient standing arms length away (2 feet), facing away from examiner.  Patient covers the ear that is NOT being tested with one finger over the tragus.  Whisper a number-letter-number combination.  If a patient gets 3 total letters and/or numbers correct after a second attempt, it is considered a pass.    Right Ear: passed    [] 8-M-3   [] K-5-R   [] 2-K-7   [] S-4-G  Left Ear: passed       [] 8-M-3   [] K-5-R   [] 2-K-7   [] S-4-G      VISION SCREENING  unable to measure      MINI-COGNITIVE  Three Word Registration   []Version 1 []Version 2 []Version 3 []Version 4 []Version 5 []Version 6   [facility] Captain Daughter   [employer] Season Kitchen Nation Garden Heaven   Chair Table Baby Finger Picture Moutain     Word Recall 2 points  Clock Drawing 0      HOME SAFETY QUESTIONNAIRE  Are emergency numbers kept by the phone and regularly updated? Yes  Are all household members aware of the dangers of smoking, especially in bed? Yes  Are working smoke alarm(s) and fire extinguisher(s) available for use? Yes  Do all household members know how to use them? Yes  Are firearms stored unloaded and securely locked? Yes  Have throw rugs been removed or fastened down? Yes  Are non-slip mats in all bathtubs and showers?  Yes  Do all stairways have a railing or " banister?  Yes  Are sidewalks and all outdoor steps clear of tools, toys and other articles?  Yes  Are doorways, halls, and stairs free of clutter?  Yes  Are all electrical cords in working order, easily seen, and not run under rug/carpets or wrapped around nails? Yes

## 2022-06-22 ENCOUNTER — TELEPHONE (OUTPATIENT)
Dept: FAMILY MEDICINE | Facility: CLINIC | Age: 82
End: 2022-06-22
Payer: MEDICARE

## 2022-06-22 DIAGNOSIS — H61.20 IMPACTED CERUMEN, UNSPECIFIED LATERALITY: Primary | ICD-10-CM

## 2022-06-22 NOTE — TELEPHONE ENCOUNTER
----- Message from Vinny Chatterjee sent at 6/22/2022  2:58 PM CDT -----  .Type:  Needs Medical Advice    Who Called: [facility]  Symptoms (please be specific):    How long has patient had these symptoms:    Pharmacy name and phone #:    Would the patient rather a call back or a response via [facility]?   Best Call Back Number: [phone]  Additional Information: She needed to speak with nurse directly.

## 2022-06-23 ENCOUNTER — TELEPHONE (OUTPATIENT)
Dept: ADMINISTRATIVE | Facility: HOSPITAL | Age: 82
End: 2022-06-23
Payer: MEDICARE

## 2022-06-23 NOTE — TELEPHONE ENCOUNTER
Type:  Patient Returning Call    Who Call self   Who Left Message for Patient:kajal  Does the patient know what this is regarding?:no  Would the patient rather a call back or a response via [facility]? Call back   Best Call Back Number:[phone]  Additional Information:

## 2022-06-28 ENCOUNTER — TELEPHONE (OUTPATIENT)
Dept: FAMILY MEDICINE | Facility: CLINIC | Age: 82
End: 2022-06-28
Payer: MEDICARE

## 2022-06-28 NOTE — TELEPHONE ENCOUNTER
----- Message from Jolie Ruvalcaba sent at 6/28/2022  9:16 AM CDT -----  Regarding: Advice  Type:  Needs Medical Advice    Who Called: Patient  Symptoms (please be specific):    How long has patient had these symptoms:    Pharmacy name and phone #:    Would the patient rather a call back or a response via [facility]?   Best Call Back Number: [phone]  Additional Information: The home health nurse could not get her labs this morning because of her rolling viens and he left the urine with her in the fridge when he goes back her other labs he will bring that to the lab as well. She just needs confirmation if these are fasting labs.

## 2022-06-29 ENCOUNTER — DOCUMENTATION ONLY (OUTPATIENT)
Dept: FAMILY MEDICINE | Facility: CLINIC | Age: 82
End: 2022-06-29
Payer: MEDICARE

## 2022-07-05 ENCOUNTER — TELEPHONE (OUTPATIENT)
Dept: FAMILY MEDICINE | Facility: CLINIC | Age: 82
End: 2022-07-05
Payer: MEDICARE

## 2022-07-05 NOTE — TELEPHONE ENCOUNTER
----- Message from Vinny Chatterjee sent at 7/5/2022 11:07 AM CDT -----  .Type:  Needs Medical Advice    Who Called: [facility]  Symptoms (please be specific):    How long has patient had these symptoms:    Pharmacy name and phone #:    Would the patient rather a call back or a response via [facility]?   Best Call Back Number: [phone]  Additional Information: She would like to speak with a nurse.

## 2022-07-06 ENCOUNTER — DOCUMENTATION ONLY (OUTPATIENT)
Dept: FAMILY MEDICINE | Facility: CLINIC | Age: 82
End: 2022-07-06
Payer: MEDICARE

## 2022-07-06 DIAGNOSIS — N28.9 MILD RENAL INSUFFICIENCY: Primary | ICD-10-CM

## 2022-07-12 ENCOUNTER — TELEPHONE (OUTPATIENT)
Dept: ADMINISTRATIVE | Facility: HOSPITAL | Age: 82
End: 2022-07-12
Payer: MEDICARE

## 2022-07-12 NOTE — TELEPHONE ENCOUNTER
Type:  Test Results    Who Called: self  Name of Test (Lab/Mammo/Etc): lab  Date of Test: na  Ordering Provider: na  Where the test was performed: na  Would the patient rather a call back or a response via [facility]? callvback  Best Call Back Number: [phone]  Additional Information:

## 2022-07-14 ENCOUNTER — DOCUMENTATION ONLY (OUTPATIENT)
Dept: FAMILY MEDICINE | Facility: CLINIC | Age: 82
End: 2022-07-14
Payer: MEDICARE

## 2022-07-15 ENCOUNTER — DOCUMENTATION ONLY (OUTPATIENT)
Dept: FAMILY MEDICINE | Facility: CLINIC | Age: 82
End: 2022-07-15
Payer: MEDICARE

## 2022-07-18 ENCOUNTER — APPOINTMENT (OUTPATIENT)
Dept: RADIOLOGY | Facility: HOSPITAL | Age: 82
End: 2022-07-18
Attending: FAMILY MEDICINE
Payer: MEDICARE

## 2022-07-18 DIAGNOSIS — M47.22 OSTEOARTHRITIS OF SPINE WITH RADICULOPATHY, CERVICAL REGION: Primary | ICD-10-CM

## 2022-07-18 DIAGNOSIS — M54.2 CERVICALGIA: ICD-10-CM

## 2022-07-18 DIAGNOSIS — M47.22 OSTEOARTHRITIS OF SPINE WITH RADICULOPATHY, CERVICAL REGION: ICD-10-CM

## 2022-07-18 PROCEDURE — 72141 MRI NECK SPINE W/O DYE: CPT | Mod: TC

## 2022-07-19 ENCOUNTER — DOCUMENTATION ONLY (OUTPATIENT)
Dept: FAMILY MEDICINE | Facility: CLINIC | Age: 82
End: 2022-07-19
Payer: MEDICARE

## 2022-07-28 ENCOUNTER — TELEPHONE (OUTPATIENT)
Dept: FAMILY MEDICINE | Facility: CLINIC | Age: 82
End: 2022-07-28
Payer: MEDICARE

## 2022-07-28 DIAGNOSIS — N39.0 RECURRENT UTI: Primary | ICD-10-CM

## 2022-07-28 NOTE — TELEPHONE ENCOUNTER
----- Message from Murtaza Sandoval MA sent at 7/28/2022 10:17 AM CDT -----  Regarding: FW: medical advice  Yes, takes medication with food  ----- Message -----  From: Melody Ren MD  Sent: 7/28/2022  10:14 AM CDT  To: Murtaza Sandoval MA  Subject: RE: medical advice                               That is a potential side effects, does she take the Rx with food? Please advise. Thanks.   ----- Message -----  From: Murtaza Sandoval MA  Sent: 7/28/2022  10:10 AM CDT  To: Melody Ren MD  Subject: FW: medical advice                                 ----- Message -----  From: Wilbert Longoria  Sent: 7/28/2022  10:08 AM CDT  To: Mikal VAZQUEZ Staff  Subject: medical advice                                   Type:  Needs Medical Advice    Who Called: pt   Symptoms (please be specific): side effects from medication   How long has patient had these symptoms:  couple days  [facility] and [facility] [address]  Would the patient rather a call back or a response via [facility]? Call back   Best Call Back Number: [phone]  Additional Information: patient requesting a call from nurse .. was taking microbid pills and started throwing up afterwards, wants to know if that's a possible side effect

## 2022-08-01 ENCOUNTER — TELEPHONE (OUTPATIENT)
Dept: FAMILY MEDICINE | Facility: CLINIC | Age: 82
End: 2022-08-01
Payer: MEDICARE

## 2022-08-01 NOTE — TELEPHONE ENCOUNTER
----- Message from Vinny Chatterjee sent at 7/29/2022 10:12 AM CDT -----  .Type:  Needs Medical Advice    Who Called: [facility]   Symptoms (please be specific):    How long has patient had these symptoms:    Pharmacy name and phone #:    Would the patient rather a call back or a response via [facility]?   Best Call Back Number: [phone]  Additional Information:  She needs to have patient's labs faxed over # [phone], she will need all urinine analysis and cultures on file.

## 2022-08-16 ENCOUNTER — TELEPHONE (OUTPATIENT)
Dept: FAMILY MEDICINE | Facility: CLINIC | Age: 82
End: 2022-08-16
Payer: MEDICARE

## 2022-08-16 RX ORDER — DOCUSATE SODIUM 100 MG/1
1 CAPSULE, LIQUID FILLED ORAL DAILY
COMMUNITY
Start: 2021-08-08 | End: 2022-08-16 | Stop reason: SDUPTHER

## 2022-08-16 RX ORDER — DOCUSATE SODIUM 100 MG/1
100 CAPSULE, LIQUID FILLED ORAL DAILY
Qty: 90 CAPSULE | Refills: 3 | Status: SHIPPED | OUTPATIENT
Start: 2022-08-16 | End: 2022-11-08 | Stop reason: SDUPTHER

## 2022-08-16 NOTE — TELEPHONE ENCOUNTER
Type:  RX Refill Request    Who Called: self  Refill or New Rx:refill  RX Name and Strength:colace 100mg  How is the patient currently taking it? (ex. 1XDay):1xday  Is this a 30 day or 90 day RX:30day  Preferred Pharmacy with phone number:[facility]  Local or Mail Order:local  Ordering Provider:na  Would the patient rather a call back or a response via [facility]? Call back  Best Call Back Number:[phone]  Additional Information:

## 2022-08-16 NOTE — TELEPHONE ENCOUNTER
Spoke to Priya, no complaints just patient's weight was 116 and there was a note to call if over 110

## 2022-08-16 NOTE — TELEPHONE ENCOUNTER
----- Message from Vinny Chatterjee sent at 8/16/2022 12:48 PM CDT -----  .Type:  Needs Medical Advice    Who Called: [facility]  Symptoms (please be specific):    How long has patient had these symptoms:    Pharmacy name and phone #:    Would the patient rather a call back or a response via [facility]?   Best Call Back Number: [phone]  Additional Information: Her weight was 116 pounds no shortness of breath, no chest pain and no  edema.

## 2022-08-17 ENCOUNTER — TELEPHONE (OUTPATIENT)
Dept: FAMILY MEDICINE | Facility: CLINIC | Age: 82
End: 2022-08-17
Payer: MEDICARE

## 2022-08-17 NOTE — TELEPHONE ENCOUNTER
Spoke to patient, advised patient to call pharmacy or speak to manage at assisted living facility

## 2022-08-17 NOTE — TELEPHONE ENCOUNTER
"----- Message from Judith Sauceda sent at 8/17/2022  3:07 PM CDT -----  Regarding: call back  .Type:  Needs Medical Advice    Who Called: pt   Symptoms (please be specific):    How long has patient had these symptoms:    Pharmacy name and phone #:    Would the patient rather a call back or a response via [facility]? Call back  Best Call Back Number: [phone]  Additional Information: pt states that her medication was delivered to the  of her assisted living home, but the card she had on file was "not authorized" she wants to know what she's supposed to do to be able to get her medication        "

## 2022-08-29 ENCOUNTER — OFFICE VISIT (OUTPATIENT)
Dept: ORTHOPEDICS | Facility: CLINIC | Age: 82
End: 2022-08-29
Payer: MEDICARE

## 2022-08-29 VITALS
DIASTOLIC BLOOD PRESSURE: 86 MMHG | SYSTOLIC BLOOD PRESSURE: 118 MMHG | HEART RATE: 85 BPM | WEIGHT: 100 LBS | BODY MASS INDEX: 18.88 KG/M2 | HEIGHT: 61 IN

## 2022-08-29 DIAGNOSIS — M48.02 CERVICAL SPINAL STENOSIS: ICD-10-CM

## 2022-08-29 DIAGNOSIS — M54.12 CERVICAL RADICULITIS: Primary | ICD-10-CM

## 2022-08-29 DIAGNOSIS — M50.30 DDD (DEGENERATIVE DISC DISEASE), CERVICAL: ICD-10-CM

## 2022-08-29 DIAGNOSIS — M47.22 OSTEOARTHRITIS OF SPINE WITH RADICULOPATHY, CERVICAL REGION: ICD-10-CM

## 2022-08-29 DIAGNOSIS — M54.2 CERVICALGIA: ICD-10-CM

## 2022-08-29 PROCEDURE — 99204 PR OFFICE/OUTPT VISIT, NEW, LEVL IV, 45-59 MIN: ICD-10-PCS | Mod: ,,, | Performed by: ANESTHESIOLOGY

## 2022-08-29 PROCEDURE — 99204 OFFICE O/P NEW MOD 45 MIN: CPT | Mod: ,,, | Performed by: ANESTHESIOLOGY

## 2022-08-29 NOTE — PROGRESS NOTES
"   Whit Fink MD        PATIENT NAME: Lisseth Guajardo  : 1940  DATE: 22  MRN: [id number]      Billing Provider: Whit Fink MD  Level of Service:   Patient PCP Information       Provider PCP Type    Melody Ren MD General            Reason for Visit / Chief Complaint: Neck Pain (NP, referred by Dr Faith for pain. States having intermittent pain in her neck and jaw area. Reports hearing "cracking" when moving head side to side. Denies sx on her neck before. Does report having a back surgery in the past. Denies trying injections in her neck. Tried OT in past with increasing pain.)       Update PCP  Update Chief Complaint         History of Present Illness / Problem Focused Workflow     Lisseth Guajardo presents to the clinic with Neck Pain (NP, referred by Dr Faith for pain. States having intermittent pain in her neck and jaw area. Reports hearing "cracking" when moving head side to side. Denies sx on her neck before. Does report having a back surgery in the past. Denies trying injections in her neck. Tried OT in past with increasing pain.)     This is an 82-year-old female who presents to clinic today for her initial consultation as a referral from her primary care physician.  She complains of pain in the right side of her jaw that radiates up her face and into her neck and right shoulder.  She states that it started a few months ago.  She reports that she has been taking Tegretol for the jaw pain.  She did some occupational therapy a few weeks ago to help with her mobility, but has not undergone any physical therapy for her neck.  She has not undergone any injections or surgery on her cervical spine.  She currently does not have any pain, but it gets "higher than 10" at worst.      Neck Pain   Associated symptoms include headaches and weakness.     Review of Systems     Review of Systems   Eyes:  Positive for visual disturbance.   Musculoskeletal:  Positive for neck pain and " neck stiffness.   Neurological:  Positive for weakness and headaches.        Paralysis of LUE (birth defect)   All other systems reviewed and are negative.     Medical / Social / Family History     Past Medical History:   Diagnosis Date    Coronary arteriosclerosis     Depression     Epilepsy, unspecified, not intractable, with status epilepticus     High cholesterol     Hypokalemia     Myocardial infarction     Osteoporosis        Past Surgical History:   Procedure Laterality Date    APPENDECTOMY      BACK SURGERY      cardiac stents      CARPAL TUNNEL RELEASE      CATARACT EXTRACTION Left     CHOLECYSTECTOMY      crainotomy      FOOT SURGERY Left     HYSTERECTOMY      LUNG REMOVAL, PARTIAL Right     LYMPH NODE DISSECTION         Social History  Ms. Guajardo  reports that she has quit smoking. Her smoking use included cigarettes. She has never used smokeless tobacco. She reports that she does not drink alcohol and does not use drugs.    Family History  Ms.'s Guajardo Family history is unknown by patient.    Medications and Allergies     Medications  Outpatient Medications Marked as Taking for the 8/29/22 encounter (Office Visit) with Whit Fink MD   Medication Sig Dispense Refill    ALPHAGAN P 0.15 % ophthalmic solution Place 1 drop into both eyes 3 (three) times daily.      aspirin (ECOTRIN) 81 MG EC tablet Take 81 mg by mouth once daily.      atorvastatin (LIPITOR) 40 MG tablet Take 40 mg by mouth once daily.      brimonidine 0.2% (ALPHAGAN) 0.2 % Drop Place 1 drop into both eyes 3 (three) times daily.      calcium carbonate/vitamin D3 (CALTRATE 600 + D ORAL) Take 1 tablet by mouth 2 (two) times a day.      carBAMazepine (CARBATROL) 100 MG CM12 Take 100 mg by mouth 2 (two) times a day.      COSOPT 22.3-6.8 mg/mL ophthalmic solution Place 1 drop into both eyes 2 (two) times daily.      docusate sodium (COLACE) 100 MG capsule Take 1 capsule (100 mg total) by mouth once daily. 90 capsule 3    loratadine (CLARITIN)  10 mg tablet Take 1 tablet (10 mg total) by mouth once daily. For allergies/fluid in ears 90 tablet 3    metoprolol succinate 25 mg CSpX Take 25 mg by mouth once daily.      miconazole (MICOTIN) 2 % cream Apply 1 g topically 2 (two) times daily. 28 g 3    PHENobarbitaL (LUMINAL) 32.4 MG tablet Take 32.4 mg by mouth every evening.      phenytoin (DILANTIN) 100 MG ER capsule Take 300 mg by mouth every evening.      XALATAN 0.005 % ophthalmic solution Place 1 drop into both eyes nightly.      [DISCONTINUED] gabapentin (NEURONTIN) 100 MG capsule Take 100 mg by mouth 2 (two) times daily as needed.         Allergies  Review of patient's allergies indicates:   Allergen Reactions    Nitrofurantoin monohyd/m-cryst Hives    Penicillin Hives       Physical Examination     Vitals:    08/29/22 0822   BP: 118/86   Pulse: 85     Spine Musculoskeletal Exam    Gait    Assistive device: wheelchair    Palpation    Cervical Spine    Tenderness: present      Paraspinous: right      Trapezius: right      Periscapular: right    Range of Motion    Cervical Spine        Cervical spine range of motion additional comments: Restricted range of motion in the neck secondary to pain    Strength    Cervical Spine    Cervical spine strength additional comments: Good  strength on the right hand.  Weakness and loss of motion throughout the left upper extremity     Assessment and Plan (including Health Maintenance)      Problem List  Smart Sets  Document Outside HM   :    Plan:   Cervical radiculitis  -     Ambulatory referral/consult to Home Health; Future; Expected date: 08/30/2022    Osteoarthritis of spine with radiculopathy, cervical region  -     Ambulatory referral/consult to Pain Clinic  -     Ambulatory referral/consult to Home Health; Future; Expected date: 08/30/2022    Cervicalgia  -     Ambulatory referral/consult to Home Health; Future; Expected date: 08/30/2022    DDD (degenerative disc disease), cervical    Cervical spinal  stenosis     We discussed the results of her MRI today.  I discussed injections with her, but she complains of a lot of jaw pain, which I explained to the injections would not help with.  She does not wish to undergo injections or surgery anyway.  She also does not wish to take any additional medications.  I am going to place a referral to [facility] for her to have home physical therapy.  The plan was discussed with the patient and she wishes to proceed.    Problem List Items Addressed This Visit          Orthopedic Problems    DDD (degenerative disc disease), cervical       Other    Cervical radiculitis - Primary    Relevant Orders    Ambulatory referral/consult to Home Health    Cervicalgia    Relevant Orders    Ambulatory referral/consult to Home Health    Cervical spinal stenosis     Other Visit Diagnoses       Osteoarthritis of spine with radiculopathy, cervical region        Relevant Orders    Ambulatory referral/consult to Home Health              Future Appointments   Date Time Provider Department Center   1/5/2023  8:30 AM Melody Ren MD [facility] [place]    1/9/2023  8:00 AM [facility] [place]        There are no Patient Instructions on file for this visit.  No follow-ups on file.     Signature:  Whit Fink MD      Date of encounter: 8/29/22

## 2022-11-07 ENCOUNTER — TELEPHONE (OUTPATIENT)
Dept: FAMILY MEDICINE | Facility: CLINIC | Age: 82
End: 2022-11-07
Payer: MEDICARE

## 2022-11-07 RX ORDER — PHENOBARBITAL 32.4 MG/1
32.4 TABLET ORAL NIGHTLY
Qty: 30 TABLET | Refills: 5 | Status: CANCELLED | OUTPATIENT
Start: 2022-11-07

## 2022-11-07 RX ORDER — PHENOBARBITAL 32.4 MG/1
32.4 TABLET ORAL NIGHTLY
Qty: 30 TABLET | Refills: 5 | Status: SHIPPED | OUTPATIENT
Start: 2022-11-07 | End: 2022-11-08 | Stop reason: SDUPTHER

## 2022-11-07 RX ORDER — PHENYTOIN SODIUM 100 MG/1
300 CAPSULE, EXTENDED RELEASE ORAL NIGHTLY
Qty: 90 CAPSULE | Refills: 5 | Status: SHIPPED | OUTPATIENT
Start: 2022-11-07 | End: 2022-11-07 | Stop reason: SDUPTHER

## 2022-11-07 RX ORDER — PHENYTOIN SODIUM 100 MG/1
300 CAPSULE, EXTENDED RELEASE ORAL NIGHTLY
Qty: 90 CAPSULE | Refills: 5 | Status: SHIPPED | OUTPATIENT
Start: 2022-11-07 | End: 2022-11-08 | Stop reason: SDUPTHER

## 2022-11-07 NOTE — TELEPHONE ENCOUNTER
Spoke with patient voiced understanding, will call Dr Richardson for an appointment. Spoke to pharmacy, Rx was verbally given by another nurse in office, cancelled that and it has to stay to original order until she sees Dr Richardson.    Called [facility] to give an update on this, spoke with Demetrius voiced understanding on this.

## 2022-11-07 NOTE — TELEPHONE ENCOUNTER
----- Message from Melody Ren MD sent at 11/7/2022  1:45 PM CST -----  She can schedule a followup appointment with their office and take her medications as prescribed until she sees them. Thanks.   ----- Message -----  From: Murtaza Sandoval MA  Sent: 11/7/2022   1:35 PM CST  To: Melody Ren MD    Pt has not been seen since April 2022 for meds, please advise   ----- Message -----  From: Vinny Chatterjee  Sent: 11/7/2022   1:23 PM CST  To: Mikal VAZQUEZ Staff    .Type:  Needs Medical Advice    Who Called: Aliza from Dr. Moore's office   Symptoms (please be specific):    How long has patient had these symptoms:    Pharmacy name and phone #:    Would the patient rather a call back or a response via [facility]?   Best Call Back Number: [phone]  Additional Information: She wanted to let the nurse know that the doctor had not seen the patient since April 11, 2022.

## 2022-11-07 NOTE — TELEPHONE ENCOUNTER
----- Message from Vinny Chatterjee sent at 11/7/2022  1:13 PM CST -----  .Type:  Needs Medical Advice    Who Called: Lisseth  Symptoms (please be specific):    How long has patient had these symptoms:    Pharmacy name and phone #:  [facility] in [place]  Would the patient rather a call back or a response via [facility]?   Best Call Back Number: [phone]  Additional Information: The patient needs her medication. PHENobarbitaL 32.4 MG tablet, I did tell her that the pharmacy has confirm receipt this morning for 1 tablet however she told me she needs two tablets a day, Please contact pharmacy to make change.

## 2022-11-07 NOTE — TELEPHONE ENCOUNTER
----- Message from Leilani Sher sent at 11/7/2022  3:56 PM CST -----  Regarding: refill transfer  Type:  Needs Medical Advice    Who Called: patient  Symptoms (please be specific):    How long has patient had these symptoms:    Pharmacy name and phone #:    Would the patient rather a call back or a response via [facility]?   Best Call Back Number: [phone]  Additional Information: Please send patient's phenytoin (DILANTIN) 100 MG ER capsule to [facility] no later than Thursday, please. Please call patient back to confirm rx was resent.

## 2022-11-07 NOTE — TELEPHONE ENCOUNTER
----- Message from Melody Ren MD sent at 11/7/2022 11:56 AM CST -----  Regarding: RE: cakll back  She can check with her Neurologist (Dr. Richardson) first, if he agrees, she can decrease her dose. Thanks.   ----- Message -----  From: Murtaza Sandoval MA  Sent: 11/7/2022  11:01 AM CST  To: Melody Ren MD  Subject: FW: cakll back                                     ----- Message -----  From: Chau Hong  Sent: 11/7/2022  10:53 AM CST  To: Mikal VAZQUEZ Staff  Subject: cakll back                                       .Type:  Needs Medical Advice    Who Called: Yoli  Pharmacy [facility] and phone #:  [facility]  Would the patient rather a call back or a response via [facility]?   Best Call Back Number: [phone]  Additional Information: Pt would like a call back from the nurse. phenytoin (DILANTIN) 100 MG she wants to take it 2 x day instead of 3. Please call back pt.

## 2022-11-08 RX ORDER — LORATADINE 10 MG/1
10 TABLET ORAL DAILY
Qty: 90 TABLET | Refills: 3 | Status: SHIPPED | OUTPATIENT
Start: 2022-11-08 | End: 2023-10-16

## 2022-11-08 RX ORDER — PHENYTOIN SODIUM 100 MG/1
300 CAPSULE, EXTENDED RELEASE ORAL NIGHTLY
Qty: 90 CAPSULE | Refills: 5 | Status: SHIPPED | OUTPATIENT
Start: 2022-11-08 | End: 2022-11-11 | Stop reason: SDUPTHER

## 2022-11-08 RX ORDER — CARBAMAZEPINE 100 MG/1
100 CAPSULE, EXTENDED RELEASE ORAL 2 TIMES DAILY
Qty: 180 CAPSULE | Refills: 3 | Status: SHIPPED | OUTPATIENT
Start: 2022-11-08

## 2022-11-08 RX ORDER — DOCUSATE SODIUM 100 MG/1
100 CAPSULE, LIQUID FILLED ORAL DAILY
Qty: 90 CAPSULE | Refills: 3 | Status: SHIPPED | OUTPATIENT
Start: 2022-11-08 | End: 2023-11-08

## 2022-11-08 RX ORDER — PHENOBARBITAL 32.4 MG/1
32.4 TABLET ORAL NIGHTLY
Qty: 30 TABLET | Refills: 5 | Status: SHIPPED | OUTPATIENT
Start: 2022-11-08 | End: 2023-01-09 | Stop reason: SDUPTHER

## 2022-11-08 RX ORDER — ATORVASTATIN CALCIUM 40 MG/1
40 TABLET, FILM COATED ORAL DAILY
Qty: 90 TABLET | Refills: 3 | Status: SHIPPED | OUTPATIENT
Start: 2022-11-08 | End: 2023-10-11

## 2022-11-08 NOTE — TELEPHONE ENCOUNTER
----- Message from Vinny Chatterjee sent at 11/8/2022  2:09 PM CST -----  ,.Type:  Needs Medical Advice    Who Called: Lisseth  Symptoms (please be specific):    How long has patient had these symptoms:    Pharmacy name and phone #:    Would the patient rather a call back or a response via [facility]?   Best Call Back Number: [phone]  Additional Information: She wanted to let the doctor know to be on the lookout for script from [facility] the doctor will need to approve the medication as soon as possible and give her a call back. She wanted to also let the doctor know that she is cancelling her account with [facility].

## 2022-11-11 RX ORDER — PHENYTOIN SODIUM 100 MG/1
300 CAPSULE, EXTENDED RELEASE ORAL NIGHTLY
Qty: 90 CAPSULE | Refills: 5 | Status: SHIPPED | OUTPATIENT
Start: 2022-11-11 | End: 2022-11-14 | Stop reason: SDUPTHER

## 2022-11-14 ENCOUNTER — TELEPHONE (OUTPATIENT)
Dept: FAMILY MEDICINE | Facility: CLINIC | Age: 82
End: 2022-11-14
Payer: MEDICARE

## 2022-11-14 RX ORDER — PHENYTOIN SODIUM 100 MG/1
300 CAPSULE, EXTENDED RELEASE ORAL NIGHTLY
Qty: 270 CAPSULE | Refills: 3 | Status: SHIPPED | OUTPATIENT
Start: 2022-11-14 | End: 2022-11-15 | Stop reason: SDUPTHER

## 2022-11-14 RX ORDER — PHENYTOIN SODIUM 100 MG/1
300 CAPSULE, EXTENDED RELEASE ORAL NIGHTLY
Qty: 90 CAPSULE | Refills: 5 | Status: SHIPPED | OUTPATIENT
Start: 2022-11-14 | End: 2022-11-14 | Stop reason: SDUPTHER

## 2022-11-14 NOTE — TELEPHONE ENCOUNTER
----- Message from Wilbert Von sent at 11/14/2022  3:54 PM CST -----  Regarding: Patient Call  Type:  Patient Returning Call    Who Called: patient   Who Left Message for Patient: Dr. Ren  Does the patient know what this is regarding?: yes  Would the patient rather a call back or a response via [facility]?  mobile  Best Call Back Number: [phone]  Additional Information: patient requesting a call back from Dr. Ren .. wants to know if she can get some phenytoin (DILANTIN) 100 MG ER capsule sent to [facility] in [place] until [facility] get her the medicine she ordered via home delivery -- also requesting a pill you put under your tongue for nausea

## 2022-11-15 RX ORDER — PHENYTOIN SODIUM 100 MG/1
300 CAPSULE, EXTENDED RELEASE ORAL NIGHTLY
Qty: 270 CAPSULE | Refills: 3 | Status: SHIPPED | OUTPATIENT
Start: 2022-11-15 | End: 2023-01-09

## 2022-12-14 RX ORDER — BRIMONIDINE TARTRATE 2 MG/ML
1 SOLUTION/ DROPS OPHTHALMIC 3 TIMES DAILY
Qty: 5 ML | Refills: 0 | OUTPATIENT
Start: 2022-12-14

## 2022-12-14 RX ORDER — LATANOPROST 0.005 %
1 DROPS OPHTHALMIC (EYE) NIGHTLY
Qty: 1 ML | Refills: 0 | OUTPATIENT
Start: 2022-12-14

## 2022-12-14 RX ORDER — DORZOLAMIDE HYDROCHLORIDE AND TIMOLOL MALEATE 20; 5 MG/ML; MG/ML
1 SOLUTION/ DROPS OPHTHALMIC 2 TIMES DAILY
Qty: 10 ML | Refills: 0 | OUTPATIENT
Start: 2022-12-14

## 2022-12-14 RX ORDER — BRIMONIDINE TARTRATE 1.5 MG/ML
1 SOLUTION/ DROPS OPHTHALMIC 3 TIMES DAILY
Qty: 5 ML | Refills: 0 | OUTPATIENT
Start: 2022-12-14

## 2022-12-21 ENCOUNTER — TELEPHONE (OUTPATIENT)
Dept: FAMILY MEDICINE | Facility: CLINIC | Age: 82
End: 2022-12-21
Payer: MEDICARE

## 2022-12-21 NOTE — TELEPHONE ENCOUNTER
----- Message from Armando Bob sent at 12/21/2022  2:51 PM CST -----  .Type:  Needs Medical Advice    [facility]  Would the patient rather a call back or a response via [facility]? Call back  Best Call Back Number: [phone]  Additional Information: calling to discuss medication for pt is taking incorrectly, PHENobarbitaL 32.4 MG tablet, phenytoin (DILANTIN) 100 MG ER capsule, carBAMazepine (CARBATROL) 100 MG CM12, ABL NA

## 2022-12-21 NOTE — TELEPHONE ENCOUNTER
----- Message from Armando Bob sent at 12/21/2022  2:51 PM CST -----  .Type:  Needs Medical Advice    Who Called: [facility] Nurse Priya  Would the patient rather a call back or a response via [facility]? Call back  Best Call Back Number: [phone]  Additional Information: calling to discuss medication for pt is taking incorrectly, PHENobarbitaL 32.4 MG tablet, phenytoin (DILANTIN) 100 MG ER capsule, carBAMazepine (CARBATROL) 100 MG CM12, ABL NA

## 2022-12-21 NOTE — TELEPHONE ENCOUNTER
Talked to Priya, Pt is refusing to take her medication properly. Pt is taking Phenobarbital 1 in the am not evening. Dilantin only 2 in the am not 3 at night. Carbatrol 1 a day instead of 2 times a day. Wanted to let us know they are about to discharge her for lack of no change.    Thanks.

## 2022-12-22 ENCOUNTER — TELEPHONE (OUTPATIENT)
Dept: FAMILY MEDICINE | Facility: CLINIC | Age: 82
End: 2022-12-22
Payer: MEDICARE

## 2022-12-22 NOTE — TELEPHONE ENCOUNTER
----- Message from Vinny Chatterjee sent at 12/20/2022  9:39 AM CST -----  .Type:  RX Refill Request    Who Called: [facility]   Refill or New Rx: Refill   RX Name and Strength: Vitamin D 3 25 micrograms ( 1000 units ) also zofran 4 mg. 1 tab x 4 hours as needed for nausea   How is the patient currently taking it? (ex. 1XDay):  Is this a 30 day or 90 day RX:  Preferred Pharmacy with phone number: [facility]   Local or Mail Order: Mail order   Ordering Provider: Mikal   Would the patient rather a call back or a response via [facility]?   Best Call Back Number: [phone]  Additional Information: Please call her back with any questions.

## 2022-12-28 ENCOUNTER — TELEPHONE (OUTPATIENT)
Dept: PRIMARY CARE CLINIC | Facility: CLINIC | Age: 82
End: 2022-12-28
Payer: MEDICARE

## 2022-12-28 NOTE — TELEPHONE ENCOUNTER
----- Message from Vinny Chatterjee sent at 12/28/2022  9:14 AM CST -----  .Type:  Needs Medical Advice    Who Called: Lisseth  Symptoms (please be specific):    How long has patient had these symptoms:    Pharmacy name and phone #:  [facility] then possible [facility] as a second option.    Would the patient rather a call back or a response via [facility]?   Best Call Back Number: # [phone]  Additional Information: She need to speak with nurse about nausea medicine. She told me she would check with her PCP.

## 2023-01-03 ENCOUNTER — TELEPHONE (OUTPATIENT)
Dept: FAMILY MEDICINE | Facility: CLINIC | Age: 83
End: 2023-01-03
Payer: MEDICARE

## 2023-01-03 DIAGNOSIS — R39.9 UTI SYMPTOMS: Primary | ICD-10-CM

## 2023-01-03 NOTE — TELEPHONE ENCOUNTER
Pt is wanting Labs done for a Urinalysis before her appointment 1/5/23. States she thinks she might have a UTI. Please advise. Thanks.

## 2023-01-03 NOTE — TELEPHONE ENCOUNTER
----- Message from Alize Adan sent at 1/3/2023  9:05 AM CST -----  Regarding: Call back  .Type:  Needs Medical Advice    Who Called: Pt's brother  Symptoms (please be specific):    How long has patient had these symptoms:    Pharmacy name and phone #:    Would the patient rather a call back or a response via [facility]? Call back  Best Call Back Number: [phone]  Additional Information: Pt's brother called in regards to pt, stating she left him a vm stating something about UTI and appt. Pt's brother would like someone from clinic to f/u with pt so that Ms Guajardo can explain into detail what she's needing.

## 2023-01-03 NOTE — TELEPHONE ENCOUNTER
----- Message from Thierry Richter LPN sent at 1/3/2023 11:17 AM CST -----  Regarding: FW: canceled no ride  Pt is not associated with this clinic/provider.      ----- Message -----  From: Ping Gu MA  Sent: 1/3/2023   9:28 AM CST  To: [facility] Staff  Subject: canceled no ride                                 .Type:  Needs Medical Advice    Who Called: pt  Symptoms (please be specific): uti    How long has patient had these symptoms:  last week  Pharmacy name and phone #:  [facility]   Best Call Back Number: [phone]  Additional Information: pt canceled appointment she has no ride but the home health nurse thinks she has a UTI they collected the urine she wonders if they can bring it to the lab since pt has no ride this week is the last time home health will be there then they go every 2 weeks 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

## 2023-01-03 NOTE — TELEPHONE ENCOUNTER
----- Message from Vinny Chatterjee sent at 12/30/2022 10:52 AM CST -----  .Type:  Needs Medical Advice    Who Called: Lisseth  Symptoms (please be specific):    How long has patient had these symptoms:    Pharmacy name and phone #:  [facility] [address] at [address]   Would the patient rather a call back or a response via [facility]?   Best Call Back Number: [phone]  Additional Information: She was calling back about some nausea medicine she needed, she also told me she is seeing the doctor on next Thursday.

## 2023-01-05 ENCOUNTER — TELEPHONE (OUTPATIENT)
Dept: FAMILY MEDICINE | Facility: CLINIC | Age: 83
End: 2023-01-05

## 2023-01-05 NOTE — TELEPHONE ENCOUNTER
----- Message from Vinny Chatterjee sent at 1/5/2023 11:00 AM CST -----  .Type:  Needs Medical Advice    Who Called: Lisseth   Symptoms (please be specific):    How long has patient had these symptoms:    Pharmacy name and phone #:  [facility] on [address]   Would the patient rather a call back or a response via [facility]?   Best Call Back Number: [phone]  Additional Information: She was checking on the status of her nausea medication she needed.

## 2023-01-05 NOTE — TELEPHONE ENCOUNTER
----- Message from Vinny Chatterjee sent at 1/5/2023 11:00 AM CST -----  .Type:  Needs Medical Advice    Who Called: Lisseth   Symptoms (please be specific):    How long has patient had these symptoms:    Pharmacy name and phone #:  [facility] on [address]   Would the patient rather a call back or a response via [facility]?   Best Call Back Number: [phone]  Additional Information: She was checking on the status of her nausea medication she needed.

## 2023-01-09 ENCOUNTER — TELEPHONE (OUTPATIENT)
Dept: PRIMARY CARE CLINIC | Facility: CLINIC | Age: 83
End: 2023-01-09
Payer: MEDICARE

## 2023-01-09 ENCOUNTER — TELEPHONE (OUTPATIENT)
Dept: FAMILY MEDICINE | Facility: CLINIC | Age: 83
End: 2023-01-09
Payer: MEDICARE

## 2023-01-09 ENCOUNTER — DOCUMENTATION ONLY (OUTPATIENT)
Dept: FAMILY MEDICINE | Facility: CLINIC | Age: 83
End: 2023-01-09
Payer: MEDICARE

## 2023-01-09 DIAGNOSIS — R56.9 SEIZURES: Primary | ICD-10-CM

## 2023-01-09 RX ORDER — PHENOBARBITAL 32.4 MG/1
32.4 TABLET ORAL NIGHTLY
Qty: 90 TABLET | Refills: 3 | Status: SHIPPED | OUTPATIENT
Start: 2023-01-09 | End: 2023-05-23 | Stop reason: SDUPTHER

## 2023-01-09 RX ORDER — PHENYTOIN SODIUM 100 MG/1
100 CAPSULE, EXTENDED RELEASE ORAL 2 TIMES DAILY
Qty: 180 CAPSULE | Refills: 3 | Status: SHIPPED | OUTPATIENT
Start: 2023-01-09 | End: 2023-05-23 | Stop reason: SDUPTHER

## 2023-01-09 NOTE — TELEPHONE ENCOUNTER
----- Message from Melody Ren MD sent at 1/9/2023  3:09 PM CST -----  UA is clear, no sign of bacterial urinary tract infection. Thanks.   ----- Message -----  From: Reena Taylor MA  Sent: 1/9/2023   2:51 PM CST  To: Melody Ren MD

## 2023-01-09 NOTE — TELEPHONE ENCOUNTER
----- Message from Melody Ren MD sent at 1/9/2023 11:54 AM CST -----  Please update medication list and send Rx proposal for Rx that are due. Thanks.   ----- Message -----  From: Rosa Horowitz LPN  Sent: 1/9/2023  11:45 AM CST  To: Melody Ren MD    Patient states she is taking phenobarbital 32.4mg BID medication order is for Daily and she is taking Phenytoin 100mg BID and the order is for 3 capsules 300 mg total it doesn't seen her dosage or frequency has changed per Renu (2021) patient does see Dr Bernabe but she states that you refill these meds she has an appt 1/30 please advise   ----- Message -----  From: Vinny Chatterjee  Sent: 1/9/2023  11:05 AM CST  To: Mikal VAZQUEZ Staff    .Type:  Needs Medical Advice    Who Called: Lisseth  Symptoms (please be specific):    How long has patient had these symptoms:    Pharmacy name and phone #:    Would the patient rather a call back or a response via [facility]?   Best Call Back Number: [phone]  Additional Information: She would like a call back from nurse she told me she never gets a call back from the nurse, please give you a call back. Re: her medication

## 2023-01-31 ENCOUNTER — OFFICE VISIT (OUTPATIENT)
Dept: FAMILY MEDICINE | Facility: CLINIC | Age: 83
End: 2023-01-31
Payer: MEDICARE

## 2023-01-31 VITALS
OXYGEN SATURATION: 98 % | TEMPERATURE: 98 F | HEIGHT: 61 IN | RESPIRATION RATE: 17 BRPM | WEIGHT: 100 LBS | SYSTOLIC BLOOD PRESSURE: 118 MMHG | HEART RATE: 87 BPM | BODY MASS INDEX: 18.88 KG/M2 | DIASTOLIC BLOOD PRESSURE: 80 MMHG

## 2023-01-31 DIAGNOSIS — N39.0 RECURRENT UTI: ICD-10-CM

## 2023-01-31 DIAGNOSIS — E78.2 MIXED HYPERLIPIDEMIA: ICD-10-CM

## 2023-01-31 DIAGNOSIS — I25.10 CORONARY ARTERY DISEASE, UNSPECIFIED VESSEL OR LESION TYPE, UNSPECIFIED WHETHER ANGINA PRESENT, UNSPECIFIED WHETHER NATIVE OR TRANSPLANTED HEART: ICD-10-CM

## 2023-01-31 DIAGNOSIS — G40.901 NONINTRACTABLE EPILEPSY WITH STATUS EPILEPTICUS, UNSPECIFIED EPILEPSY TYPE: ICD-10-CM

## 2023-01-31 DIAGNOSIS — I10 PRIMARY HYPERTENSION: Primary | ICD-10-CM

## 2023-01-31 PROCEDURE — 99214 PR OFFICE/OUTPT VISIT, EST, LEVL IV, 30-39 MIN: ICD-10-PCS | Mod: ,,, | Performed by: NURSE PRACTITIONER

## 2023-01-31 PROCEDURE — 99214 OFFICE O/P EST MOD 30 MIN: CPT | Mod: ,,, | Performed by: NURSE PRACTITIONER

## 2023-01-31 NOTE — PROGRESS NOTES
Subjective:      Patient ID: Lisseth Guajardo is a 82 y.o. Black or  female      Chief Complaint: Follow-up       Past Medical History:   Diagnosis Date    Cervical radiculitis 08/29/2022    Cervical spinal stenosis 08/29/2022    Cervicalgia 08/29/2022    Coronary arteriosclerosis     DDD (degenerative disc disease), cervical 08/29/2022    Depression     Epilepsy, unspecified, not intractable, with status epilepticus     High cholesterol     Hypokalemia     Mild renal insufficiency 07/06/2022    Myocardial infarction     Osteoporosis     Primary hypertension 1/31/2023    Recurrent UTI     Unspecified macular degeneration         HPI  Presents to clinic for follow up.    Hx of CAD/HTN/hypercholesterolemia, controlled with Rx, no side effects, she is s/p 4 stents; Cardiologist is (Dr. Martinez).     Hx Osteoporosis, not on calcium with D, cannot tolerate any medications for osteoporosis treatment and she refuses treatment, last DEXA scan was 01/04/2022.     Hx recurrent UTIs; She wears adult diapers, previously taking Macrobid for prophylaxis , but discontinued due to ADR of GI upset.  Recent U/A negative for UTI.    Hx Seizures; stable; no seizures x 3 years; compliant with medications; Neurologist is Dr. Richardson    She is in a wheel chair and has left sided paralysis and deconditioning, she does PT with Home Health for fall precautions, and balance training. She does stay in an assisted living facility.   - She has a history of cervical DJD with chronic neck pain x several years, was seeing a doctor in [place], but hasn't had MRI in over 7 years, she reports pain radiates to her jaw.   - Patient is without any other complaints today.    Review of Systems   Constitutional: Negative.  Negative for appetite change, chills and fever.   HENT: Negative.     Eyes: Negative.  Negative for discharge and redness.   Respiratory: Negative.  Negative for shortness of breath.    Cardiovascular:  Negative.  Negative for chest pain.   Gastrointestinal: Negative.    Endocrine: Negative.    Genitourinary: Negative.    Integumentary:  Negative.   Allergic/Immunologic: Negative.    Neurological: Negative.    Psychiatric/Behavioral: Negative.     All other systems reviewed and are negative.       Objective:      Vitals:    01/31/23 0827   BP: 118/80   Pulse: 87   Resp: 17   Temp: 97.8 °F (36.6 °C)      Body mass index is 18.89 kg/m².     Physical Exam  Vitals reviewed.   Constitutional:       Appearance: Normal appearance.   HENT:      Head: Normocephalic.      Mouth/Throat:      Mouth: Mucous membranes are moist.   Eyes:      Conjunctiva/sclera: Conjunctivae normal.      Pupils: Pupils are equal, round, and reactive to light.   Cardiovascular:      Rate and Rhythm: Normal rate and regular rhythm.   Pulmonary:      Effort: Pulmonary effort is normal. No respiratory distress.      Breath sounds: Normal breath sounds.   Musculoskeletal:         General: Normal range of motion.      Cervical back: Normal range of motion and neck supple.      Comments: Wheelchair bound   Skin:     General: Skin is warm and dry.   Neurological:      Mental Status: She is alert and oriented to person, place, and time.   Psychiatric:         Mood and Affect: Mood normal.          Current Outpatient Medications:     ALPHAGAN P 0.15 % ophthalmic solution, Place 1 drop into both eyes 3 (three) times daily., Disp: , Rfl:     aspirin (ECOTRIN) 81 MG EC tablet, Take 81 mg by mouth once daily., Disp: , Rfl:     atorvastatin (LIPITOR) 40 MG tablet, Take 1 tablet (40 mg total) by mouth once daily., Disp: 90 tablet, Rfl: 3    brimonidine 0.2% (ALPHAGAN) 0.2 % Drop, Place 1 drop into both eyes 3 (three) times daily., Disp: , Rfl:     calcium carbonate/vitamin D3 (CALTRATE 600 + D ORAL), Take 1 tablet by mouth 2 (two) times a day., Disp: , Rfl:     carBAMazepine (CARBATROL) 100 MG CM12, Take 1 capsule (100 mg total) by mouth 2 (two) times a  day., Disp: 180 capsule, Rfl: 3    COSOPT 22.3-6.8 mg/mL ophthalmic solution, Place 1 drop into both eyes 2 (two) times daily., Disp: , Rfl:     docusate sodium (COLACE) 100 MG capsule, Take 1 capsule (100 mg total) by mouth once daily., Disp: 90 capsule, Rfl: 3    loratadine (CLARITIN) 10 mg tablet, Take 1 tablet (10 mg total) by mouth once daily. For allergies/fluid in ears, Disp: 90 tablet, Rfl: 3    metoprolol succinate 25 mg CSpX, Take 25 mg by mouth once daily., Disp: 90 capsule, Rfl: 3    miconazole (MICOTIN) 2 % cream, Apply 1 g topically 2 (two) times daily., Disp: 28 g, Rfl: 3    PHENobarbitaL 32.4 MG tablet, Take 1 tablet (32.4 mg total) by mouth every evening., Disp: 90 tablet, Rfl: 3    phenytoin (DILANTIN) 100 MG ER capsule, Take 1 capsule (100 mg total) by mouth 2 (two) times daily., Disp: 180 capsule, Rfl: 3    XALATAN 0.005 % ophthalmic solution, Place 1 drop into both eyes nightly., Disp: , Rfl:     Assessment & Plan:     Problem List Items Addressed This Visit          Neuro    Epilepsy, unspecified, not intractable, with status epilepticus     Stable  Continue meds as prescribed  Keep appt with Dr. Richardson for follow up            Cardiac/Vascular    Primary hypertension - Primary     BP at goal  Continue current medication  Keep appt with Cardiologist for follow up  Daily BP check; bring log all clinic visits  Instructed to report to ED for any BP greater than 200/100, dizziness, syncope, CP, or SOB  Keep appt. With PCP for follow up  Patient is agreeable to plan and verbalizes understanding             Mixed hyperlipidemia     Stable  Continue Statin         Coronary artery disease     Stable  Continue current medication  Instructed to report to ED for any BP greater than 200/100, dizziness, syncope, CP, or SOB  Keep appt with Cardiologist for follow up  Keep appt. With PCP for follow up  Patient is agreeable to plan and verbalizes understanding                Renal/    Recurrent UTI      Stable  Recent U/A negative for UTI  Denies any urinary symptoms               Prior to the patient's arrival on the same day, I spent (15) minutes reviewing chart. Once in the exam room with the patient, I spent (16 ) minutes in the room with the member performing a history and exam as well as reviewing the test results and recommendations with the patient. After leaving the exam room, I spent an additional ( ) minutes completing the electronic health record. The total time spent that day caring for the member is (31 ) minutes, and this time - including the breakdown - is documented in the medical record.

## 2023-01-31 NOTE — ASSESSMENT & PLAN NOTE
Stable  Continue current medication  Instructed to report to ED for any BP greater than 200/100, dizziness, syncope, CP, or SOB  Keep appt with Cardiologist for follow up  Keep appt. With PCP for follow up  Patient is agreeable to plan and verbalizes understanding

## 2023-01-31 NOTE — ASSESSMENT & PLAN NOTE
BP at goal  Continue current medication  Keep appt with Cardiologist for follow up  Daily BP check; bring log all clinic visits  Instructed to report to ED for any BP greater than 200/100, dizziness, syncope, CP, or SOB  Keep appt. With PCP for follow up  Patient is agreeable to plan and verbalizes understanding

## 2023-02-16 ENCOUNTER — TELEPHONE (OUTPATIENT)
Dept: FAMILY MEDICINE | Facility: CLINIC | Age: 83
End: 2023-02-16
Payer: MEDICARE

## 2023-02-16 NOTE — TELEPHONE ENCOUNTER
----- Message from Margarita Gu sent at 2/16/2023 12:31 PM CST -----  .Type:  Needs Medical Advice    Who Called: pt    Would the patient rather a call back or a response via [facility]? Call back  Best Call Back Number:[phone]  Additional Information: pt calling asking if someone can out to home to do her medicure/pedicure at her home

## 2023-04-24 ENCOUNTER — TELEPHONE (OUTPATIENT)
Dept: FAMILY MEDICINE | Facility: CLINIC | Age: 83
End: 2023-04-24
Payer: MEDICARE

## 2023-04-24 DIAGNOSIS — M54.2 NECK PAIN: Primary | ICD-10-CM

## 2023-04-24 NOTE — TELEPHONE ENCOUNTER
----- Message from Chau Hong sent at 4/24/2023 10:28 AM CDT -----  Regarding: call back  .Type:  Needs Medical Advice    Who Called: soila  Would the patient rather a call back or a response via [facility]?   Best Call Back Number: [phone]  Additional Information: pt states she needs a order for physical therapy & home health pls call back pt

## 2023-05-01 ENCOUNTER — TELEPHONE (OUTPATIENT)
Dept: FAMILY MEDICINE | Facility: CLINIC | Age: 83
End: 2023-05-01
Payer: MEDICARE

## 2023-05-01 NOTE — TELEPHONE ENCOUNTER
----- Message from Yuko Garcia sent at 4/28/2023 11:26 AM CDT -----  .Type:  Needs Medical Advice    Who Called: pt      Would the patient rather a call back or a response via [facility]? Call back   Best Call Back Number: [phone]  Additional Information: pt is wanting a referral for pt  to [facility] [phone].. please call

## 2023-05-02 ENCOUNTER — TELEPHONE (OUTPATIENT)
Dept: FAMILY MEDICINE | Facility: CLINIC | Age: 83
End: 2023-05-02
Payer: MEDICARE

## 2023-05-02 NOTE — TELEPHONE ENCOUNTER
----- Message from Vinny Chatterjee sent at 5/2/2023 10:37 AM CDT -----  .Type:  Needs Medical Advice    Who Called: [facility]    Symptoms (please be specific):    How long has patient had these symptoms:    Pharmacy name and phone #:    Would the patient rather a call back or a response via [facility]?   Best Call Back Number: [phone]  Additional Information: She needed to speak with Ms. Valles about a referral she sent over yesterday.

## 2023-05-08 ENCOUNTER — TELEPHONE (OUTPATIENT)
Dept: FAMILY MEDICINE | Facility: CLINIC | Age: 83
End: 2023-05-08
Payer: MEDICARE

## 2023-05-08 DIAGNOSIS — R39.9 UTI SYMPTOMS: Primary | ICD-10-CM

## 2023-05-08 PROBLEM — N39.0 RECURRENT UTI: Status: RESOLVED | Noted: 2023-01-31 | Resolved: 2023-05-08

## 2023-05-08 NOTE — TELEPHONE ENCOUNTER
Spoke with pt about the UA and she will either come by the office or go to [facility] to leave a urine specimen

## 2023-05-08 NOTE — TELEPHONE ENCOUNTER
Pt called stating she thinks she has a UTI. I did mention to her that we would need a UA. She states it is hard for her to get a ride and she is no longer with Home Health. Please Advise. Thanks

## 2023-05-08 NOTE — TELEPHONE ENCOUNTER
----- Message from Vinny Chatterjee sent at 5/8/2023  2:08 PM CDT -----  .Type:  Needs Medical Advice    Who Called: Lisseth  Symptoms (please be specific):    How long has patient had these symptoms:    Pharmacy name and phone #:    Would the patient rather a call back or a response via [facility]?   Best Call Back Number: [phone]  Additional Information: Patient wants a call back from the nurse, she told me she spoke with someone there this morning and the office was suppose to call her back re: some lab orders she will need.

## 2023-05-08 NOTE — TELEPHONE ENCOUNTER
----- Message from Armando Rojas sent at 5/8/2023  9:24 AM CDT -----  .Type:  Needs Medical Advice    Who Called: pt  Symptoms (please be specific): UTI   How long has patient had these symptoms:    Pharmacy name and phone #:    Would the patient rather a call back or a response via [facility]? Call back  Best Call Back Number: [phone]   Additional Information:

## 2023-05-09 ENCOUNTER — TELEPHONE (OUTPATIENT)
Dept: FAMILY MEDICINE | Facility: CLINIC | Age: 83
End: 2023-05-09
Payer: MEDICARE

## 2023-05-09 NOTE — TELEPHONE ENCOUNTER
Called patient in regards to her wanting for someone to come  her urine sample. Informed patient that we do not  labs at patients' houses. Patient voiced understanding.

## 2023-05-11 ENCOUNTER — TELEPHONE (OUTPATIENT)
Dept: FAMILY MEDICINE | Facility: CLINIC | Age: 83
End: 2023-05-11

## 2023-05-11 ENCOUNTER — TELEPHONE (OUTPATIENT)
Dept: NEUROSURGERY | Facility: CLINIC | Age: 83
End: 2023-05-11
Payer: MEDICARE

## 2023-05-11 ENCOUNTER — OFFICE VISIT (OUTPATIENT)
Dept: FAMILY MEDICINE | Facility: CLINIC | Age: 83
End: 2023-05-11
Payer: MEDICARE

## 2023-05-11 VITALS
HEART RATE: 86 BPM | WEIGHT: 119 LBS | DIASTOLIC BLOOD PRESSURE: 73 MMHG | SYSTOLIC BLOOD PRESSURE: 104 MMHG | OXYGEN SATURATION: 99 % | BODY MASS INDEX: 22.48 KG/M2

## 2023-05-11 DIAGNOSIS — H54.40 BLINDNESS OF LEFT EYE, UNSPECIFIED RIGHT EYE VISUAL IMPAIRMENT CATEGORY: ICD-10-CM

## 2023-05-11 DIAGNOSIS — L29.9 PRURITUS, UNSPECIFIED: ICD-10-CM

## 2023-05-11 DIAGNOSIS — R31.21 ASYMPTOMATIC MICROSCOPIC HEMATURIA: ICD-10-CM

## 2023-05-11 DIAGNOSIS — R44.0 AUDITORY HALLUCINATIONS: Primary | ICD-10-CM

## 2023-05-11 DIAGNOSIS — M47.22 OSTEOARTHRITIS OF SPINE WITH RADICULOPATHY, CERVICAL REGION: ICD-10-CM

## 2023-05-11 DIAGNOSIS — J44.1 CHRONIC OBSTRUCTIVE PULMONARY DISEASE WITH (ACUTE) EXACERBATION: ICD-10-CM

## 2023-05-11 DIAGNOSIS — H35.3231 EXUDATIVE AGE-RELATED MACULAR DEGENERATION, BILATERAL, WITH ACTIVE CHOROIDAL NEOVASCULARIZATION: ICD-10-CM

## 2023-05-11 DIAGNOSIS — F33.9 RECURRENT MAJOR DEPRESSIVE DISORDER, REMISSION STATUS UNSPECIFIED: ICD-10-CM

## 2023-05-11 DIAGNOSIS — I50.9 HEART FAILURE, UNSPECIFIED HF CHRONICITY, UNSPECIFIED HEART FAILURE TYPE: ICD-10-CM

## 2023-05-11 DIAGNOSIS — Z72.0 TOBACCO USE: ICD-10-CM

## 2023-05-11 DIAGNOSIS — M47.22 OSTEOARTHRITIS OF SPINE WITH RADICULOPATHY, CERVICAL REGION: Primary | ICD-10-CM

## 2023-05-11 DIAGNOSIS — I83.93 ASYMPTOMATIC VARICOSE VEINS OF BOTH LOWER EXTREMITIES: ICD-10-CM

## 2023-05-11 DIAGNOSIS — Z12.2 ENCOUNTER FOR SCREENING FOR LUNG CANCER: ICD-10-CM

## 2023-05-11 DIAGNOSIS — I69.854 HEMIPLEGIA AND HEMIPARESIS FOLLOWING OTHER CEREBROVASCULAR DISEASE AFFECTING LEFT NON-DOMINANT SIDE: ICD-10-CM

## 2023-05-11 DIAGNOSIS — L30.9 DERMATITIS: ICD-10-CM

## 2023-05-11 PROCEDURE — 99214 PR OFFICE/OUTPT VISIT, EST, LEVL IV, 30-39 MIN: ICD-10-PCS | Mod: ,,, | Performed by: FAMILY MEDICINE

## 2023-05-11 PROCEDURE — 36415 PR COLLECTION VENOUS BLOOD,VENIPUNCTURE: ICD-10-PCS | Mod: ,,, | Performed by: FAMILY MEDICINE

## 2023-05-11 PROCEDURE — 36415 COLL VENOUS BLD VENIPUNCTURE: CPT | Mod: ,,, | Performed by: FAMILY MEDICINE

## 2023-05-11 PROCEDURE — 99214 OFFICE O/P EST MOD 30 MIN: CPT | Mod: ,,, | Performed by: FAMILY MEDICINE

## 2023-05-11 RX ORDER — BETAMETHASONE VALERATE 1.2 MG/G
CREAM TOPICAL 2 TIMES DAILY PRN
Qty: 45 G | Refills: 1 | Status: SHIPPED | OUTPATIENT
Start: 2023-05-11 | End: 2023-05-25

## 2023-05-11 NOTE — PROGRESS NOTES
Patient ID: [id number]     Chief Complaint: Bumps on Legs        HPI:     Lisseth Guajardo is a 82 y.o. female here today for a follow up.   - She reports auditory hallucinations x several months. She also has major depression, not on meds, not seeing Psych. 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 She was doing well with home health and needs their help with managing her medication and she has transportation issues because she cannot drive and relies on her family for transportation, was with [employer].  - Hx of CAD/HTN/hypercholesterolemia, controlled with Rx, no side effects, she is s/p 4 stents; Cardiologist is (Dr. Martinez).   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   - Hx Seizures; stable; no seizures x 3 years; compliant with medications; Neurologist is Dr. Richardson.  - Patient complains of pruritic rash on bilateral legs x 2-3 weeks. She denies pain or purulent drainage. She is not trying any Rx for resolution of symptoms.  - Patient reports that she smoking 1ppd since she was 16 years old, quit 2 years ago, has COPD, asymptomatic, hasn't had LDCT lung cancer screening in over 6 years, would like scheduled.  - She is in a wheel chair and has left sided paralysis and deconditioning, she does PT with Home Health for fall precautions, and balance training. She does stay in an assisted living facility.  - Patient is without any other complaints today.         ----------------------------  Cervical radiculitis  Cervical spinal stenosis  Cervicalgia  Coronary arteriosclerosis  DDD (degenerative disc disease), cervical  Depression  Epilepsy, unspecified, not intractable, with status epilepticus  High cholesterol  Hypokalemia  Mild renal insufficiency  Myocardial infarction  Osteoporosis  Primary hypertension  Recurrent UTI  Unspecified macular  degeneration     Past Surgical History:   Procedure Laterality Date    APPENDECTOMY      BACK SURGERY      cardiac stents      CARPAL TUNNEL RELEASE      CATARACT EXTRACTION Left     CHOLECYSTECTOMY      crainotomy      FOOT SURGERY Left     HYSTERECTOMY      LUNG REMOVAL, PARTIAL Right     LYMPH NODE DISSECTION         Review of patient's allergies indicates:   Allergen Reactions    Nitrofurantoin monohyd/m-cryst Hives    Penicillin Hives       Outpatient Medications Marked as Taking for the 5/11/23 encounter (Office Visit) with Melody Ren MD   Medication Sig Dispense Refill    ALPHAGAN P 0.15 % ophthalmic solution Place 1 drop into both eyes 3 (three) times daily.      aspirin (ECOTRIN) 81 MG EC tablet Take 81 mg by mouth once daily.      atorvastatin (LIPITOR) 40 MG tablet Take 1 tablet (40 mg total) by mouth once daily. 90 tablet 3    brimonidine 0.2% (ALPHAGAN) 0.2 % Drop Place 1 drop into both eyes 3 (three) times daily.      calcium carbonate/vitamin D3 (CALTRATE 600 + D ORAL) Take 1 tablet by mouth 2 (two) times a day.      carBAMazepine (CARBATROL) 100 MG CM12 Take 1 capsule (100 mg total) by mouth 2 (two) times a day. 180 capsule 3    COSOPT 22.3-6.8 mg/mL ophthalmic solution Place 1 drop into both eyes 2 (two) times daily.      docusate sodium (COLACE) 100 MG capsule Take 1 capsule (100 mg total) by mouth once daily. 90 capsule 3    loratadine (CLARITIN) 10 mg tablet Take 1 tablet (10 mg total) by mouth once daily. For allergies/fluid in ears 90 tablet 3    metoprolol succinate 25 mg CSpX Take 25 mg by mouth once daily. 90 capsule 3    miconazole (MICOTIN) 2 % cream Apply 1 g topically 2 (two) times daily. 28 g 3    PHENobarbitaL 32.4 MG tablet Take 1 tablet (32.4 mg total) by mouth every evening. 90 tablet 3    phenytoin (DILANTIN) 100 MG ER capsule Take 1 capsule (100 mg total) by mouth 2 (two) times daily. 180 capsule 3    XALATAN 0.005 % ophthalmic solution Place 1 drop into both eyes  nightly.         Social History     Socioeconomic History    Marital status:    Tobacco Use    Smoking status: Former     Types: Cigarettes     Passive exposure: Past    Smokeless tobacco: Never   Substance and Sexual Activity    Alcohol use: Never    Drug use: Never    Sexual activity: Not Currently        Family History   Family history unknown: Yes        Subjective:       Review of Systems:    See HPI for details    Constitutional: Denies Change in appetite. Denies Chills. Denies Fever. Denies Night sweats.  Eye: Denies Blurred vision. Denies Discharge. Denies Eye pain.  ENT: Denies Decreased hearing. Denies Sore throat. Denies Swollen glands.  Respiratory: Denies Cough. Denies Shortness of breath. Denies Shortness of breath with exertion. Denies Wheezing.  Cardiovascular: Denies Chest pain at rest. Denies Chest pain with exertion. Denies Irregular heartbeat. Denies Palpitations.  Gastrointestinal: Denies Abdominal pain. Denies Diarrhea. Denies Nausea. Denies Vomiting. Denies Hematemesis or Hematochezia.  Genitourinary: Denies Dysuria. Denies Urinary frequency. Denies Urinary urgency. Denies Blood in urine.  Endocrine: Denies Cold intolerance. Denies Excessive thirst. Denies Heat intolerance. Denies Weight loss. Denies Weight gain.  Musculoskeletal: Reports Painful joints. Denies Weakness.  Integumentary: Reports Rash. Reports Itching. Denies Dry skin.  Neurologic: Denies Dizziness. Denies Fainting. Denies Headache.  Psychiatric: As per HPI. Denies Depression. Denies Anxiety. Denies Suicidal/Homicidal ideations.    All Other ROS: Negative except as stated in HPI.       Objective:     /73 (BP Location: Right arm, Patient Position: Sitting, BP Method: Medium (Automatic))   Pulse 86   Wt 54 kg (119 lb)   SpO2 99%   BMI 22.48 kg/m²     Physical Exam    General: Alert and oriented, No acute distress.  Head: Normocephalic, Atraumatic.  Eye: Blindness. Pupils are equal, round and reactive to light,  Extraocular movements are intact, Sclera non-icteric.  Ears/Nose/Throat: Bilateral TMs with clear fluid behind TMs, NL light reflex, no erythema, intact.   Neck/Thyroid: Supple, Non-tender, No carotid bruit, No palpable thyromegaly or thyroid nodule, No lymphadenopathy, No JVD, Full range of motion.  Respiratory: Clear to auscultation bilaterally; No wheezes, rales or rhonchi, Non-labored respirations, Symmetrical chest wall expansion.  Cardiovascular: Regular rate and rhythm, S1/S2 normal, No murmurs, rubs or gallops. Bilateral LE with 1+ dp's.  Gastrointestinal: Soft, Non-tender, Non-distended, Normal bowel sounds, No palpable organomegaly.  Musculoskeletal: Normal range of motion. Neck with mild TTP, no erythema, FROM, no edema.   Integumentary: Warm, Dry, Intact, Bilateral LE with maculopapular rash with evidence of excoriation, mildly erythematous, non-tender, no warmth, no fluctuance, no purulent drainage, consistent with dermatitis.   Extremities: Wheelchair bound. No clubbing, cyanosis or edema.   Neurologic: No focal deficits, Cranial Nerves II-XII are grossly intact, Motor strength normal upper and lower extremities, Sensory exam intact.  Psychiatric: Normal interaction, Coherent speech, Euthymic mood, Appropriate affect         Assessment:       ICD-10-CM ICD-9-CM   1. Auditory hallucinations  R44.0 780.1   2. Asymptomatic microscopic hematuria  R31.21 599.72   3. Blindness of left eye, unspecified right eye visual impairment category  H54.40 369.60   4. Recurrent major depressive disorder, remission status unspecified  F33.9 296.30   5. Heart failure, unspecified HF chronicity, unspecified heart failure type  I50.9 428.9   6. 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 Chronic obstructive pulmonary disease with (acute) exacerbation  J44.1 491.21   8. Exudative age-related macular degeneration, bilateral, with active choroidal  neovascularization  H35.3231 362.52     362.16   9. Dermatitis  L30.9 692.9   10. Asymptomatic varicose veins of both lower extremities  I83.93 454.9   11. Encounter for screening for lung cancer  Z12.2 V76.0   12. Osteoarthritis of spine with radiculopathy, cervical region  M47.22 721.0   13. Pruritus, unspecified  L29.9 698.9   14. 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    Relevant Orders    Ambulatory referral/consult to Outpatient Case Management       Psychiatric    Recurrent major depressive disorder, remission status unspecified       Ophtho    Exudative age-related macular degeneration, bilateral, with active choroidal neovascularization       Pulmonary    Chronic obstructive pulmonary disease with (acute) exacerbation    Relevant Orders    CT Chest With Contrast       Cardiac/Vascular    Heart failure, unspecified HF chronicity, unspecified heart failure type     Other Visit Diagnoses       Auditory hallucinations    -  Primary    Relevant Orders    Ambulatory referral/consult to Home Health    CBC Auto Differential    Comprehensive Metabolic Panel    TSH    Ambulatory referral/consult to Psychiatry    CT Head Without Contrast    Phenytoin Level, Total    Carbamazepine Level, Total    Ambulatory referral/consult to Outpatient Case Management    Asymptomatic microscopic hematuria        Relevant Orders    Urine Culture High Risk    Blindness of left eye, unspecified right eye visual impairment category        Relevant Orders    Ambulatory referral/consult to Home Health    Ambulatory referral/consult to Outpatient Case Management    Dermatitis        Relevant Medications    betamethasone valerate 0.1% (VALISONE) 0.1 % Crea    Asymptomatic varicose veins of both lower extremities        Relevant Orders    Ambulatory referral/consult to Vascular  Surgery    Encounter for screening for lung cancer        Osteoarthritis of spine with radiculopathy, cervical region        Relevant Orders    Ambulatory referral/consult to Neurosurgery    Pruritus, unspecified        Relevant Orders    TSH    Tobacco use        Relevant Orders    CT Chest With Contrast         1. Auditory hallucinations  - Ambulatory referral/consult to Home Health; Future for skilled nursing for medication management and due to transportation issues  - CBC Auto Differential; Future  - Comprehensive Metabolic Panel; Future  - TSH; Future  - Ambulatory referral/consult to Psychiatry; Future for evaluation and treatment  - CT Head Without Contrast; Future  - Phenytoin Level, Total; Future  - Carbamazepine Level, Total; Future  - Ambulatory referral/consult to Outpatient Case Management for assistance with chronic conditions and due to transportation issues  - Notify M.D. or ER if symptoms persist or worsen, SI/HI, AH/VH, temp >100.4, or any acute illness.      2. Asymptomatic microscopic hematuria  - Urine Culture High Risk; Future for evaluation and treatment; patient is established with Urology (Dr. Gonzales), continue followup as scheduled.     3. Blindness of left eye, unspecified right eye visual impairment category  - Ambulatory referral/consult to Home Health; Future  - Ambulatory referral/consult to Outpatient Case Management  - Continue followup with Ophthalmology (Dr. Santos) as scheduled.     4. Recurrent major depressive disorder, remission status unspecified  - Same as #1.     5. Heart failure, unspecified HF chronicity, unspecified heart failure type  - Asymptomatic, controlled with Rx, continue followup with Cardiology as scheduled.     6. 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  Fall precautions encouraged. Continue PT with home health.    7. Chronic obstructive pulmonary disease with (acute) exacerbation  - CT Chest With Contrast; Future  - Asymptomatic, continue current treatment plan.      8. Exudative age-related macular degeneration, bilateral, with active choroidal neovascularization  - Same as #3.     9. Dermatitis  - Rx trial of betamethasone valerate 0.1% (VALISONE) 0.1 % Crea; Apply topically 2 (two) times daily as needed (rash).  Dispense: 45 g; Refill: 1  - Keep skin moisturized. Avoid contact with irritant.Notify M.D. or ER if symptoms persist or worsen, temp >100.4, or any acute illness.      10. Asymptomatic varicose veins of both lower extremities  - Ambulatory referral/consult to Vascular Surgery; Future for vascular evaluation  - Same as #9.     11. Encounter for screening for lung cancer  - CT Chest With Contrast; Future  - Continue to encourage smoking cessation. Discussed with patient low dose CT chest for lung cancer screening including risks of CT (radiation exposure, false alarms/overdiagnosis, additional testing, etc) and patient would like to proceed with low dose CT chest for lung cancer screening.      12. Osteoarthritis of spine with radiculopathy, cervical region  - Ambulatory referral/consult to Neurosurgery; Future for evaluation and treatment    13. Pruritus, unspecified  - TSH; Future    14. Tobacco use  - CT Chest With Contrast; Future        Lisseth was seen today for bumps on legs.    Diagnoses and all orders for this visit:    Auditory hallucinations  -     Ambulatory referral/consult to Home Health; Future  -     CBC Auto Differential; Future  -     Comprehensive Metabolic Panel; Future  -     TSH; Future  -     Ambulatory referral/consult to Psychiatry; Future  -     CT Head Without Contrast; Future  -     Phenytoin Level, Total; Future  -     Carbamazepine Level, Total; Future  -     Ambulatory referral/consult to Outpatient Case  Management    Asymptomatic microscopic hematuria  -     Urine Culture High Risk; Future    Blindness of left eye, unspecified right eye visual impairment category  -     Ambulatory referral/consult to Home Health; Future  -     Ambulatory referral/consult to Outpatient Case Management    Recurrent major depressive disorder, remission status unspecified    Heart failure, unspecified HF chronicity, unspecified heart failure type    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    Asymptomatic varicose veins of both lower extremities  -     Ambulatory referral/consult to Vascular Surgery; Future    Encounter for screening for lung cancer    Osteoarthritis of spine with radiculopathy, cervical region  -     Ambulatory referral/consult to Neurosurgery; Future    Pruritus, unspecified  -     TSH; Future    Tobacco use  -     CT Chest With Contrast; Future          Medication List with Changes/Refills   New Medications    BETAMETHASONE VALERATE 0.1% (VALISONE) 0.1 % CREA    Apply topically 2 (two) times daily as needed (rash).       Start Date: 5/11/2023 End Date: 5/25/2023   Current Medications    ALPHAGAN P 0.15 % OPHTHALMIC SOLUTION    Place 1 drop into both eyes 3 (three) times daily.       Start Date: 4/25/2022 End Date: --    ASPIRIN (ECOTRIN) 81 MG EC TABLET    Take 81 mg by mouth once daily.       Start Date: 7/14/2021 End Date: --    ATORVASTATIN (LIPITOR) 40 MG TABLET    Take 1 tablet (40 mg total) by mouth once daily.       Start Date: 11/8/2022 End Date: --     BRIMONIDINE 0.2% (ALPHAGAN) 0.2 % DROP    Place 1 drop into both eyes 3 (three) times daily.       Start Date: 4/25/2022 End Date: --    CALCIUM CARBONATE/VITAMIN D3 (CALTRATE 600 + D ORAL)    Take 1 tablet by mouth 2 (two) times a day.       Start Date: 1/4/2022  End Date: --    CARBAMAZEPINE (CARBATROL) 100 MG CM12    Take 1 capsule (100 mg total) by mouth 2 (two) times a day.       Start Date: 11/8/2022 End Date: --    COSOPT 22.3-6.8 MG/ML OPHTHALMIC SOLUTION    Place 1 drop into both eyes 2 (two) times daily.       Start Date: 4/25/2022 End Date: --    DOCUSATE SODIUM (COLACE) 100 MG CAPSULE    Take 1 capsule (100 mg total) by mouth once daily.       Start Date: 11/8/2022 End Date: 11/8/2023    LORATADINE (CLARITIN) 10 MG TABLET    Take 1 tablet (10 mg total) by mouth once daily. For allergies/fluid in ears       Start Date: 11/8/2022 End Date: 11/8/2023    METOPROLOL SUCCINATE 25 MG CSPX    Take 25 mg by mouth once daily.       Start Date: 12/15/2022End Date: --    MICONAZOLE (MICOTIN) 2 % CREAM    Apply 1 g topically 2 (two) times daily.       Start Date: 6/3/2022  End Date: --    PHENOBARBITAL 32.4 MG TABLET    Take 1 tablet (32.4 mg total) by mouth every evening.       Start Date: 1/9/2023  End Date: 1/9/2024    PHENYTOIN (DILANTIN) 100 MG ER CAPSULE    Take 1 capsule (100 mg total) by mouth 2 (two) times daily.       Start Date: 1/9/2023  End Date: 1/9/2024    XALATAN 0.005 % OPHTHALMIC SOLUTION    Place 1 drop into both eyes nightly.       Start Date: 4/25/2022 End Date: --          Follow up Keep appointment for 07/29/2023 as scheduled or sooner if symptoms worsen or fail to improve.

## 2023-05-11 NOTE — TELEPHONE ENCOUNTER
Asked Deborah if it was OK if we send the one from 6/21/2022 before I sent it. She responded that since it has been done more than 6 months ago, they will need a new one. I voiced my understanding.    Please put in new MRI. Once it is put in, I'll call the patient to let her know that it needs to be done so we can push that referral forward. Thanks!

## 2023-05-11 NOTE — TELEPHONE ENCOUNTER
Received a secure chat message from Deborah Posada MA who works at Dr. Xie's office in regards to patient's referral.     They received the referral on this patient and just wanted to know if PCP ordered an updated MRI so they can determine the severity of this referral.     Looked in patient's chart and noticed that patient's last MRI was on her cervical spine back in 6/21/2022.    If necessary, please order patient MRI for patient. If no new MRI is needed, I can just let her know and send them the one from 6/21/2022. Thanks!

## 2023-05-11 NOTE — TELEPHONE ENCOUNTER
Received referral for Dr. Chacon. Messaged Dr. Ren's MA to inquire about ordering an updated MRI so I can determine the severity of the referral.

## 2023-05-12 DIAGNOSIS — N28.9 MILD RENAL INSUFFICIENCY: Primary | ICD-10-CM

## 2023-05-15 ENCOUNTER — TELEPHONE (OUTPATIENT)
Dept: FAMILY MEDICINE | Facility: CLINIC | Age: 83
End: 2023-05-15
Payer: MEDICARE

## 2023-05-15 NOTE — TELEPHONE ENCOUNTER
Left Voicemail explaining the labs and needing to repeat in 4 to 6 weeks. Informed to call clinic with any questions or concerns

## 2023-05-15 NOTE — TELEPHONE ENCOUNTER
----- Message from Melody Ren MD sent at 5/12/2023  1:20 PM CDT -----  Mild renal insufficiency, GFR: 51, normal is >60, could be due to dehydration, needs to increase fluid intake and avoid NSAIDs, recheck CMP in 4-6 weeks. Remaining labs are essentially normal.

## 2023-05-15 NOTE — TELEPHONE ENCOUNTER
Pt does not want to see Dr. Ceballos right now as she is concerned with doing her MRI and seeing the Neurosurgeon at this time

## 2023-05-15 NOTE — TELEPHONE ENCOUNTER
----- Message from April Stewart sent at 5/15/2023  1:59 PM CDT -----  Regarding: med advice  .Type:  Needs Medical Advice    Who Called:  patient  Symptoms (please be specific):    How long has patient had these symptoms:    Pharmacy name and phone #:    Would the patient rather a call back or a response via [facility]? Call back  Best Call Back Number:  [phone]  Additional Information:  patient is concerned as to why she is referred to see Dr. Dmitry Ceballos?

## 2023-05-16 ENCOUNTER — TELEPHONE (OUTPATIENT)
Dept: FAMILY MEDICINE | Facility: CLINIC | Age: 83
End: 2023-05-16
Payer: MEDICARE

## 2023-05-16 NOTE — TELEPHONE ENCOUNTER
Spoke with pt about her labs. Labs were reviewed with her provider and also faxed to her urologist for review

## 2023-05-16 NOTE — TELEPHONE ENCOUNTER
----- Message from Renetta Martinez sent at 5/16/2023  3:23 PM CDT -----  Regarding: lab results  .Type:  Test Results    Who Called: pt  Name of Test (Lab/Mammo/Etc): urinalysis  Date of Test: 5/10/23  Ordering Provider: pillette  Where the test was performed: [facility]  Would the patient rather a call back or a response via [facility]? Call back  Best Call Back Number: [phone]   Additional Information:  pt is requesting a call back about lab results

## 2023-05-17 ENCOUNTER — TELEPHONE (OUTPATIENT)
Dept: FAMILY MEDICINE | Facility: CLINIC | Age: 83
End: 2023-05-17
Payer: MEDICARE

## 2023-05-17 NOTE — TELEPHONE ENCOUNTER
----- Message from Armando Rojas sent at 5/17/2023 10:02 AM CDT -----  .Type:  Test Results    Who Called: pt  Name of Test (Lab/Mammo/Etc): Urinalysis  Date of Test:   Ordering Provider:   Where the test was performed:   Would the patient rather a call back or a response via [facility]? Call back  Best Call Back Number: [phone]  Additional Information:

## 2023-05-18 ENCOUNTER — OUTPATIENT CASE MANAGEMENT (OUTPATIENT)
Dept: ADMINISTRATIVE | Facility: OTHER | Age: 83
End: 2023-05-18
Payer: MEDICARE

## 2023-05-18 ENCOUNTER — TELEPHONE (OUTPATIENT)
Dept: FAMILY MEDICINE | Facility: CLINIC | Age: 83
End: 2023-05-18
Payer: MEDICARE

## 2023-05-18 NOTE — TELEPHONE ENCOUNTER
----- Message from Vinny Chatterjee sent at 5/18/2023  2:13 PM CDT -----  .Type:  Needs Medical Advice    Who Called: Lisseth  Symptoms (please be specific):    How long has patient had these symptoms:    Pharmacy name and phone #:    Would the patient rather a call back or a response via [facility]?   Best Call Back Number: [phone]  Additional Information: She called to speak with the office about a referral, that is all she told me.

## 2023-05-18 NOTE — PROGRESS NOTES
Outpatient Care Management   - Low Risk Patient Assessment    Patient: Lisseth Guajardo  MRN:  [id number]  Date of Service:  5/18/2023  Completed by:  Linda Martins LMSW  Referral Date: 05/11/2023    Reason for Visit   Patient presents with    Social Work Assessment - Low/Mod Risk    Plan Of Care       Brief Summary:  received a referral from patient PCP. Patient reports she and spouse resides in an Assisted Living Facility. Patient reports she pays extra for assistance with ADL's,  and IADL's. Patient reports being blind and rely on others for help. Patient denied resources through [facility] [place] [facility]. Patient reports she has been in contact with coordinator from [facility] but was not interested .  SW discussed transportation with patient. Patient declined resources. Patient reports her brother assist with transportation.  Patient denied needing assistance with food, shelter, medication or medical . Patient main concern today is moving. Patient unhappy with facility and state. Patient requesting to move back to [place], but spouse is not an agreement. Patient reports spouse wanted to move back to [place], and she never agreed with it. Patient reports all her friends reside in [place]. SW ask patient who would assist her if she moved back to [place]. Patient unable to answer. Per chart review. Patient was referred to [facility] for services for mental health treatment. Patient unaware of referral.   Care plan was created in collaboration with patient/caregiver input.    12:55 TORREY attempt to reach  [facility] to verify receipt of referral [phone] , however no answer. TORREY left a message for a return call.

## 2023-05-19 ENCOUNTER — OUTPATIENT CASE MANAGEMENT (OUTPATIENT)
Dept: ADMINISTRATIVE | Facility: OTHER | Age: 83
End: 2023-05-19
Payer: MEDICARE

## 2023-05-19 NOTE — PROGRESS NOTES
Outpatient Care Management   - Care Plan Follow Up    Patient: Lisseth Guajardo  MRN:  [id number]  Date of Service:  5/19/2023  Completed by:  Linda Martins LMSW  Referral Date: 05/11/2023    Reason for Visit   Patient presents with    Update Plan Of Care       Brief Summary:  SW attempt to reach [facility] to verify receipt of referral, however no answer. SW left a message for a return call.     Complex Care Plan     Care plan was discussed and completed today with input from patient and/or caregiver.    Patient Instructions     No follow-ups on file.

## 2023-05-22 ENCOUNTER — OUTPATIENT CASE MANAGEMENT (OUTPATIENT)
Dept: ADMINISTRATIVE | Facility: OTHER | Age: 83
End: 2023-05-22
Payer: MEDICARE

## 2023-05-22 NOTE — PROGRESS NOTES
Outpatient Care Management   - Care Plan Follow Up    Patient: Lisseth Guajardo  MRN:  [id number]  Date of Service:  5/22/2023  Completed by:  Linda Martins LMSW  Referral Date: 05/11/2023    No chief complaint on file.      Brief Summary: 9:57 SW attempt to reach referral specialist  Connie with [facility] [phone], however no answer. SW left a message for a return call.     SW followed up with patient and advised her of the above. Patient requesting referral to an [facility]/therapist or someone closer to patient home. Patient also advised SW she want to relocate back to [facility]. Patient denied having any family that can assist but states she's familiar with the are . Patient reports before relocating to [place] she lived in an Assisted Living Facility .SW will research previous assisted and provide patient with phone number to make contact to verify pricing and availability. .     Complex Care Plan     Care plan was discussed and completed today with input from patient and/or caregiver.    Patient Instructions     No follow-ups on file.

## 2023-05-23 ENCOUNTER — OUTPATIENT CASE MANAGEMENT (OUTPATIENT)
Dept: ADMINISTRATIVE | Facility: OTHER | Age: 83
End: 2023-05-23
Payer: MEDICARE

## 2023-05-23 DIAGNOSIS — R56.9 SEIZURES: ICD-10-CM

## 2023-05-23 RX ORDER — PHENYTOIN SODIUM 100 MG/1
100 CAPSULE, EXTENDED RELEASE ORAL 2 TIMES DAILY
Qty: 180 CAPSULE | Refills: 3 | Status: SHIPPED | OUTPATIENT
Start: 2023-05-23 | End: 2023-09-13 | Stop reason: SDUPTHER

## 2023-05-23 RX ORDER — PHENOBARBITAL 32.4 MG/1
32.4 TABLET ORAL NIGHTLY
Qty: 90 TABLET | Refills: 3 | Status: SHIPPED | OUTPATIENT
Start: 2023-05-23 | End: 2023-09-13 | Stop reason: SDUPTHER

## 2023-05-23 NOTE — PROGRESS NOTES
Outpatient Care Management   - Care Plan Follow Up    Patient: Lisseth Guajardo  MRN:  [id number]  Date of Service:  5/23/2023  Completed by:  Linda Martins LMSW  Referral Date: 05/11/2023    No chief complaint on file.      Brief Summary: TORREY received message on recorder from [facility] [phone] [facility]. SW attempt to reach Ja[facility], however no answer. TORREY left a message for a return call.     Complex Care Plan     Care plan was discussed and completed today with input from patient and/or caregiver.    Patient Instructions     No follow-ups on file.

## 2023-05-26 ENCOUNTER — OUTPATIENT CASE MANAGEMENT (OUTPATIENT)
Dept: ADMINISTRATIVE | Facility: OTHER | Age: 83
End: 2023-05-26
Payer: MEDICARE

## 2023-05-26 NOTE — PROGRESS NOTES
SW attempt to follow-up with [facility] regarding referral, however no answer. According to answer machine facility hours are 8-12 on a Friday

## 2023-06-01 NOTE — PROGRESS NOTES
10:56 SW attempt to reach [facility] to confirm receipt of referral , however no answer. SW left message for a return call [phone]. SW will send a message to PCP to verify if agency received referral.

## 2023-06-02 ENCOUNTER — TELEPHONE (OUTPATIENT)
Dept: FAMILY MEDICINE | Facility: CLINIC | Age: 83
End: 2023-06-02
Payer: MEDICARE

## 2023-06-02 NOTE — TELEPHONE ENCOUNTER
----- Message from Linda Martins LMSW sent at 6/1/2023  5:13 PM CDT -----  Regarding: RE: Lisseth Guajardo  Khanh,     This SW has been unsuccessful with reaching [facility]. Has PCP been successful with reaching agency regarding referral.       ----- Message -----  From: Reena Taylor MA  Sent: 5/22/2023   2:59 PM CDT  To: Linda Martins LMSW  Subject: Lisseth Guajardo                                      Called their office as well. Went straight to voicemail. Left message.   ----- Message -----  From: Linda Martins LMSW  Sent: 5/22/2023   2:44 PM CDT  To: Mikal VAZQUEZ Staff    [facility], Dr. Ren and or Staff    Per chart review patient was referred to [facility], however no one has been in contact with patient. This SW has attempted three times to reach [facility] without success to confirm receipt of referral. Patient  also seeking a psychologist(talk therapy)  within the [facility] that can assist with therapy to discuss life stressors . Dr. Ren and or staff can you confirm referral has been received by [facility]? Please advised on referral for therapy             Thank you,    Linda Martins LMSW

## 2023-06-02 NOTE — TELEPHONE ENCOUNTER
[facility] has received the referral. They are short staffed and a little behind on their referrals. They will give her a call soon.

## 2023-06-08 ENCOUNTER — HOSPITAL ENCOUNTER (OUTPATIENT)
Dept: RADIOLOGY | Facility: HOSPITAL | Age: 83
Discharge: HOME | End: 2023-06-08
Attending: FAMILY MEDICINE
Payer: MEDICARE

## 2023-06-08 ENCOUNTER — TELEPHONE (OUTPATIENT)
Dept: FAMILY MEDICINE | Facility: CLINIC | Age: 83
End: 2023-06-08
Payer: MEDICARE

## 2023-06-08 DIAGNOSIS — R44.0 AUDITORY HALLUCINATIONS: ICD-10-CM

## 2023-06-08 DIAGNOSIS — Z72.0 TOBACCO USE: ICD-10-CM

## 2023-06-08 DIAGNOSIS — J44.1 CHRONIC OBSTRUCTIVE PULMONARY DISEASE WITH (ACUTE) EXACERBATION: Primary | ICD-10-CM

## 2023-06-08 DIAGNOSIS — M47.22 OSTEOARTHRITIS OF SPINE WITH RADICULOPATHY, CERVICAL REGION: Primary | ICD-10-CM

## 2023-06-08 DIAGNOSIS — J44.1 CHRONIC OBSTRUCTIVE PULMONARY DISEASE WITH (ACUTE) EXACERBATION: ICD-10-CM

## 2023-06-08 PROCEDURE — 70450 CT HEAD/BRAIN W/O DYE: CPT | Mod: TC

## 2023-06-08 PROCEDURE — 71250 CT THORAX DX C-: CPT | Mod: TC

## 2023-06-08 NOTE — TELEPHONE ENCOUNTER
----- Message from Melody Ren MD sent at 6/8/2023 12:51 PM CDT -----  CT head shows   1. Old craniotomy in the left parietal region     2. 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 The signs and symptoms of this condition may include developmental delay, seizures, and problems with brain-spinal cord communication.      3. Chronic age related changes

## 2023-06-08 NOTE — TELEPHONE ENCOUNTER
----- Message from Melody Ren MD sent at 6/8/2023 12:47 PM CDT -----  MRI C-spine shows multiple levels of degenerative changes in her neck with several indentations upon the thecal sac by disc pathology and with associated cord volume loss from C3 through C5. Referral to Neurosurgery for evaluation and treatment is in file.

## 2023-06-09 ENCOUNTER — TELEPHONE (OUTPATIENT)
Dept: NEUROSURGERY | Facility: CLINIC | Age: 83
End: 2023-06-09
Payer: MEDICARE

## 2023-06-09 ENCOUNTER — OUTPATIENT CASE MANAGEMENT (OUTPATIENT)
Dept: ADMINISTRATIVE | Facility: OTHER | Age: 83
End: 2023-06-09
Payer: MEDICARE

## 2023-06-09 NOTE — TELEPHONE ENCOUNTER
"----- Message from Deborah Posada MA sent at 6/9/2023 11:33 AM CDT -----  Regarding: REFERRAL PROCESS- cervical  This patient is being referred to Dr. Chacon by Dr. Ren for osteoarthritis of spine with radiculopathy, cervical region. Reviewing MRI report, " At C4-C5, there is osteophyte disc complex which partially effaces ventral subarachnoid space.  This cord hyperintense signal suggestion of myelomalacia without interval difference". Please review and advise on scheduling. Thank you    "

## 2023-06-09 NOTE — TELEPHONE ENCOUNTER
Result are in. Spoke with patient. She is C/O neck pain that radiates down into her right shoulder. She describes this pain as stabbing and has been going on for about a year and a half now. The pain does wake her up at night, especially when she turns her head the wrong way. This is not due to any accidents/falls although she did state she fell a while back and doesn't know if that has anything to do with her pain. She rates the pain a 10/10 and is taking Tegretol to relieve it. Patient has not had any recent testing since MRI in 6/2023. Denies seeing any other doctors for this besides referring MD and Dr. Richardson, her neurologist. She has had back surgery in 2015 but states the doctor retired right after and she cannot recall the name of the doctor who performed it. She is currently doing PT at Home health (in chart). I advised her that I will send this off for review and will be back in touch. She verbalized understanding.

## 2023-06-14 DIAGNOSIS — M54.12 CERVICAL RADICULITIS: Primary | ICD-10-CM

## 2023-06-14 NOTE — TELEPHONE ENCOUNTER
I called the pt to schedule. Due to her needing to be seen sooner rather than later I offered her June 20th toi/ [facility] but she declined she said that was too soon for her due to her already having pending doctors appts for this month. She requested an appt in July. I attempted to schedule her in the beginning but she again declined due to other appts for her . She requested mid July first thing in the morning so I scheduled her on 7/17 @ 830am and her XR will be 1 hr prior @ 730am.

## 2023-06-29 ENCOUNTER — OFFICE VISIT (OUTPATIENT)
Dept: FAMILY MEDICINE | Facility: CLINIC | Age: 83
End: 2023-06-29
Payer: MEDICARE

## 2023-06-29 VITALS
HEART RATE: 74 BPM | RESPIRATION RATE: 17 BRPM | DIASTOLIC BLOOD PRESSURE: 84 MMHG | BODY MASS INDEX: 22.86 KG/M2 | SYSTOLIC BLOOD PRESSURE: 132 MMHG | OXYGEN SATURATION: 99 % | WEIGHT: 121 LBS

## 2023-06-29 DIAGNOSIS — I69.854 HEMIPLEGIA AND HEMIPARESIS FOLLOWING OTHER CEREBROVASCULAR DISEASE AFFECTING LEFT NON-DOMINANT SIDE: ICD-10-CM

## 2023-06-29 DIAGNOSIS — R26.2 UNABLE TO WALK: ICD-10-CM

## 2023-06-29 DIAGNOSIS — R79.9 ABNORMAL FINDING OF BLOOD CHEMISTRY, UNSPECIFIED: ICD-10-CM

## 2023-06-29 DIAGNOSIS — Z00.00 MEDICARE ANNUAL WELLNESS VISIT, SUBSEQUENT: Primary | ICD-10-CM

## 2023-06-29 DIAGNOSIS — I10 PRIMARY HYPERTENSION: ICD-10-CM

## 2023-06-29 DIAGNOSIS — Z87.891 HISTORY OF TOBACCO USE: ICD-10-CM

## 2023-06-29 DIAGNOSIS — Q78.2 BONY SCLEROSIS: ICD-10-CM

## 2023-06-29 DIAGNOSIS — E78.49 ESSENTIAL FAMILIAL HYPERLIPIDEMIA: ICD-10-CM

## 2023-06-29 DIAGNOSIS — N18.31 STAGE 3A CHRONIC KIDNEY DISEASE: ICD-10-CM

## 2023-06-29 PROCEDURE — G0439 PPPS, SUBSEQ VISIT: HCPCS | Mod: ,,, | Performed by: FAMILY MEDICINE

## 2023-06-29 PROCEDURE — 81001 URINALYSIS AUTO W/SCOPE: CPT | Performed by: FAMILY MEDICINE

## 2023-06-29 PROCEDURE — G0439 PR MEDICARE ANNUAL WELLNESS SUBSEQUENT VISIT: ICD-10-PCS | Mod: ,,, | Performed by: FAMILY MEDICINE

## 2023-06-29 NOTE — PROGRESS NOTES
Patient ID: [id number]     Chief Complaint: Medicare AWV        HPI:     Lisseth Guajardo is a 82 y.o. female here today for a Medicare Wellness.   82 y.o. AAF with history of HTN, epilepsy, and depression here for annual medicare wellness. The patient presents for well adult exam. The patient's general health status is described as fair. The patient's diet is described as balanced. Exercise: occasional. Associated symptoms consist of denies weight loss, denies weight gain, denies fatigue, denies headache, denies hearing loss and denies vision changes. Additional pertinent history: last dental exam: UTD, she refuses MMG, risks of refusal discussed with patient and patient voices understanding, last eye exam: she has macular degeneration and she has losing her eye sight, Ophthalmology- Dr. Medellin, seat belt use, daily caffeine use (Dr. Pepper), tobacco use: quit 2 years ago, but she doesn't have access to cigarettes, she misses her cigarettes, risks of refusal discussed with patient and patient voices understanding, used to smoke 1 ppd x 60 years, she is asymptomatic, she is UTD on CT chest from 06/2023. occasional alcohol use (wine) and She is due for annual labs today. She  is UTD on all other vaccines. She has a history of epilepsy, controlled with Rx, she denies a seizure in the past 5 years, does followup with Neurology (Dr. Richardson) as scheduled. She has a history of CAD/HTN/hypercholesterolemia/CHF, controlled with Rx, no side effects, she is s/p 4 stents, asymptomatic, she does see Cardiology (Dr. Martinez). She has osteoporosis, not on calcium with D, cannot tolerate any medications for osteoporosis treatment and she refuses treatment, last DEXA scan was 01/04/2022. She reports recurrent UTIs, she wears adult diapers, she takes Macrobid for prophylaxis and she denies side effects. She is in a wheel chair and has left sided paralysis and deconditioning, she does PT with [facility] for fall precautions,  and balance training. She does stay in an assisted living facility. She has DJD C-spine, stable, awaiting an appointment with Neurosurgery (Dr. Chacon) for evaluation and treatment. She had a CT chest done on 06/08/2023 that showed sclerosis of T3, asymptomatic, reports that she never had vertebroplasty, would like to proceed with bone scan. COPD is stable and asymptomatic. CKD stage 3 is stable and asymptomatic.   - Patient is without any other complaints today.    admits to Urinary leakage, since 2008.  denies Recent falls or balance difficulty.   denies Daily exercise or physical activity.  admits to Depression, stress, anxiety, or emotional lability, has an appointment in 08/28/2023 with Psychiatry.   denies The need for healthcare treatment including a cane/walker, blood pressure monitoring, or regular vision/hearing tests.     A separate E/M code has been provided to evaluate additional complaints that the patient would like addressed during the dedicated Medicare Wellness Exam.    Patient Care Team:  Melody Ren MD as PCP - General (Family Medicine)     Opioid Screening: Patient medication list reviewed, patient is not taking prescription opioids. Patient is not using additional opioids than prescribed. Patient is at low risk of substance abuse based on this opioid use history.      Advance Care Planning     Date: 06/29/2023    Living Will  During this visit, I engaged the patient  in the voluntary advance care planning process.  The patient and I reviewed the role for advance directives and their purpose in directing future healthcare if the patient's unable to speak for him/herself.  At this point in time, the patient does have full decision-making capacity.  We discussed different extreme health states that she could experience, and reviewed what kind of medical care she would want in those situations.  The patient communicated that if she were comatose and had little chance of a meaningful  recovery, she would not want machines/life-sustaining treatments used. In addition to the above preference, other important end-of-life issues for the patient include  she would like to be an organ donor . The patient has completed a living will to reflect these preferences.  I spent a total of 5 minutes engaging the patient in this advance care planning discussion.                ----------------------------  Cervical radiculitis  Cervical spinal stenosis  Cervicalgia  Coronary arteriosclerosis  DDD (degenerative disc disease), cervical  Depression  Epilepsy, unspecified, not intractable, with status epilepticus  High cholesterol  Hypokalemia  Mild renal insufficiency  Myocardial infarction  Osteoporosis  Primary hypertension  Recurrent UTI  Unspecified macular degeneration     Past Surgical History:   Procedure Laterality Date    APPENDECTOMY      BACK SURGERY      cardiac stents      CARPAL TUNNEL RELEASE      CATARACT EXTRACTION Left     CHOLECYSTECTOMY      crainotomy      FOOT SURGERY Left     HYSTERECTOMY      LUNG REMOVAL, PARTIAL Right     LYMPH NODE DISSECTION         Review of patient's allergies indicates:   Allergen Reactions    Nitrofurantoin monohyd/m-cryst Hives    Penicillin Hives       Outpatient Medications Marked as Taking for the 6/29/23 encounter (Office Visit) with Melody Ren MD   Medication Sig Dispense Refill    ALPHAGAN P 0.15 % ophthalmic solution Place 1 drop into both eyes 3 (three) times daily.      aspirin (ECOTRIN) 81 MG EC tablet Take 81 mg by mouth once daily.      atorvastatin (LIPITOR) 40 MG tablet Take 1 tablet (40 mg total) by mouth once daily. 90 tablet 3    brimonidine 0.2% (ALPHAGAN) 0.2 % Drop Place 1 drop into both eyes 3 (three) times daily.      calcium carbonate/vitamin D3 (CALTRATE 600 + D ORAL) Take 1 tablet by mouth 2 (two) times a day.      carBAMazepine (CARBATROL) 100 MG CM12 Take 1 capsule (100 mg total) by mouth 2 (two) times a day. 180 capsule 3     COSOPT 22.3-6.8 mg/mL ophthalmic solution Place 1 drop into both eyes 2 (two) times daily.      docusate sodium (COLACE) 100 MG capsule Take 1 capsule (100 mg total) by mouth once daily. 90 capsule 3    loratadine (CLARITIN) 10 mg tablet Take 1 tablet (10 mg total) by mouth once daily. For allergies/fluid in ears 90 tablet 3    metoprolol succinate 25 mg CSpX Take 25 mg by mouth once daily. 90 capsule 3    miconazole (MICOTIN) 2 % cream Apply 1 g topically 2 (two) times daily. 28 g 3    PHENobarbitaL 32.4 MG tablet Take 1 tablet (32.4 mg total) by mouth every evening. 90 tablet 3    phenytoin (DILANTIN) 100 MG ER capsule Take 1 capsule (100 mg total) by mouth 2 (two) times daily. 180 capsule 3    XALATAN 0.005 % ophthalmic solution Place 1 drop into both eyes nightly.         Social History     Socioeconomic History    Marital status:    Tobacco Use    Smoking status: Former     Types: Cigarettes     Passive exposure: Past    Smokeless tobacco: Never   Substance and Sexual Activity    Alcohol use: Never    Drug use: Never    Sexual activity: Not Currently     Social Determinants of Health     Financial Resource Strain: Low Risk     Difficulty of Paying Living Expenses: Not very hard   Food Insecurity: No Food Insecurity    Worried About Running Out of Food in the Last Year: Never true    Ran Out of Food in the Last Year: Never true   Transportation Needs: Unmet Transportation Needs    Lack of Transportation (Medical): Yes    Lack of Transportation (Non-Medical): Yes   Physical Activity: Inactive    Days of Exercise per Week: 0 days    Minutes of Exercise per Session: 0 min   Stress: No Stress Concern Present    Feeling of Stress : Not at all   Social Connections: Unknown    Frequency of Communication with Friends and Family: Once a week    Attends Gnosticist Services: Never    Active Member of Clubs or Organizations: No    Attends Club or Organization Meetings: Never    Marital Status:    Housing  Stability: Unknown    Unable to Pay for Housing in the Last Year: No    Unstable Housing in the Last Year: No        Family History   Family history unknown: Yes        Subjective:     ROS      See HPI for details    Constitutional: Denies Change in appetite. Denies Chills. Denies Fever. Denies Night sweats.  Eye: Denies Blurred vision. Denies Discharge. Denies Eye pain.  ENT: Denies Decreased hearing. Denies Sore throat. Denies Swollen glands.  Respiratory: Denies Cough. Denies Shortness of breath. Denies Shortness of breath with exertion. Denies Wheezing.  Cardiovascular: Denies Chest pain at rest. Denies Chest pain with exertion. Denies Irregular heartbeat. Denies Palpitations.  Gastrointestinal: Denies Abdominal pain. Denies Diarrhea. Denies Nausea. Denies Vomiting. Denies Hematemesis or Hematochezia.  Genitourinary: Denies Dysuria. Denies Urinary frequency. Denies Urinary urgency. Denies Blood in urine.  Endocrine: Denies Cold intolerance. Denies Excessive thirst. Denies Heat intolerance. Denies Weight loss. Denies Weight gain.  Musculoskeletal: Painful joints. Denies Weakness.  Integumentary: Denies Rash. Denies Itching. Denies Dry skin.  Neurologic: Denies Dizziness. Denies Fainting. Denies Headache.  Psychiatric: Denies Depression. Denies Anxiety. Denies Suicidal/Homicidal ideations.    All Other ROS: Negative except as stated in HPI.       Objective:     /84 (BP Location: Right arm, Patient Position: Sitting, BP Method: Medium (Automatic))   Pulse 74   Resp 17   Wt 54.9 kg (121 lb)   SpO2 99%   BMI 22.86 kg/m²     Physical Exam    General: Alert and oriented, No acute distress.  Head: Normocephalic, Atraumatic.  Eye: Blindness, Extraocular movements are intact, Sclera non-icteric.  Ears/Nose/Throat: Bilateral TMs with clear fluid behind TMs, NL light reflex, no erythema, intact.   Neck/Thyroid: Supple, Non-tender, No carotid bruit, No palpable thyromegaly or thyroid nodule, No lymphadenopathy, No  JVD, Full range of motion.  Respiratory: Clear to auscultation bilaterally; No wheezes, rales or rhonchi,Non-labored respirations, Symmetrical chest wall expansion.  Cardiovascular: Regular rate and rhythm, S1/S2 normal, No murmurs, rubs or gallops.  Gastrointestinal: Soft, Non-tender, Non-distended, Normal bowel sounds, No palpable organomegaly.  Musculoskeletal: Normal range of motion. Neck with mild TTP, no erythema, FROM, no edema.   Integumentary: Warm, Dry, Intact, No suspicious lesions or rashes.  Extremities: Wheelchair bound. No clubbing, cyanosis or edema  Neurologic: No focal deficits, Cranial Nerves II-XII are grossly intact, Motor strength normal upper and lower extremities, Sensory exam intact.  Psychiatric: Normal interaction, Coherent speech, Euthymic mood, Appropriate affect     *CT chest from 06/08/2023 was reviewed and discussed with patient and patient voices understanding.*    Assessment:       ICD-10-CM ICD-9-CM   1. Medicare annual wellness visit, subsequent  Z00.00 V70.0   2. History of tobacco use  Z87.891 V15.82   3. Bony sclerosis  Q78.2 756.52   4. Stage 3a chronic kidney disease  N18.31 585.3   5. Primary hypertension  I10 401.9   6. Essential familial hyperlipidemia  E78.49 272.2   7. Abnormal finding of blood chemistry, unspecified  R79.9 790.6   8. 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 Unable to walk  R26.2 719.7        Plan:       Medicare Annual Wellness and Personalized Prevention Plan:     Fall Risk + Home Safety + Hearing Impairment + Depression Screen + Cognitive Impairment Screen + Health Risk Assessment all reviewed.     No flowsheet data found.  Depression Screeening PHQ2 6/29/2023 5/11/2023 1/31/2023 6/21/2022   Over the last two weeks how often have you been bothered by little interest or pleasure in doing things 1 0 0 0   Over the last two weeks how often have you been bothered by feeling  down, depressed or hopeless 1 0 0 0   PHQ-2 Total Score 2 0 0 0     Fall Risk Assessment - Outpatient 7/17/2023 6/29/2023 5/11/2023 1/31/2023 8/29/2022 6/21/2022   Mobility Status Ambulatory Wheelchair Bound Ambulatory Wheelchair Bound Wheelchair Bound Ambulatory w/ assistance   Number of falls 0 0 0 0 0 0   Identified as fall risk 0 1 0 1 1 0             What is your age?: 80 or older  Are you male or female?: Female  During the past four weeks, how much have you been bothered by emotional problems such as feeling anxious, depressed, irritable, sad, or downhearted and blue?: Slightly  During the past five weeks, has your physical and/or emotional health limited your social activities with family, friends, neighbors, or groups?: Slightly  During the past four weeks, how much bodily pain have you generally had?: Mild pain  During the past four weeks, was someone available to help if you needed and wanted help?: Yes, as much as I wanted  During the past four weeks, what was the hardest physical activity you could do for at least two minutes?: Very light  Can you get to places out of walking distance without help?  (For example, can you travel alone on buses or taxis, or drive your own car?): No  Can you go shopping for groceries or clothes without someone's help?: No  Can you prepare your own meals?: No  Can you do your own housework without help?: No  Because of any health problems, do you need the help of another person with your personal care needs such as eating, bathing, dressing, or getting around the house?: Yes  Can you handle your own money without help?: No  During the past four weeks, how would you rate your health in general?: Fair  How have things been going for you during the past four weeks?: Good and bad parts about equal  Are you having difficulties driving your car?: Not applicable, I do not use a car  Do you always fasten your seat belt when you are in a car?: Yes, usually  How often in the past  four weeks have you been bothered by falling or dizzy when standing up?: Never  How often in the past four weeks have you been bothered by sexual problems?: Never  How often in the past four weeks have you been bothered by trouble eating well?: Never  How often in the past four weeks have you been bothered by teeth or denture problems?: Never  How often in the past four weeks have you been bothered with problems using the telephone?: Never  How often in the past four weeks have you been bothered by tiredness or fatigue?: Sometimes  Have you fallen two or more times in the past year?: No  Are you afraid of falling?: Yes  Are you a smoker?: No  During the past four weeks, how many drinks of wine, beer, or other alcoholic beverages did you have?: No alcohol at all  Do you exercise for about 20 minutes three or more days a week?: No, I usually do not exercise this much  Have you been given any information to help you with hazards in your house that might hurt you?: Yes  Have you been given any information to help you with keeping track of your medications?: Yes  How often do you have trouble taking medicines the way you've been told to take them?: I always take them as prescribed  How confident are you that you can control and manage most of your health problems?: Very confident  What is your race? (Check all that apply.):                  Alcohol/Tobacco Use - Stressed importance of smoking cessation and limiting alcohol intake.  CVD Risk Factors - Reviewed  Obesity/Physical Activity - Normal BMI. Encouraged daily 30 minute physical activity x 5 days per week.      Health Maintenance Topics with due status: Not Due       Topic Last Completion Date    DEXA Scan 01/03/2022    Lipid Panel 10/12/2022    Influenza Vaccine Not Due        Vaccinations -   Immunization History   Administered Date(s) Administered    COVID-19, MRNA, LN-S, PF (Pfizer) (Gray Cap) 04/25/2022    COVID-19, MRNA, LN-S, PF (Pfizer)  (Purple Cap) 01/24/2021, 02/14/2021, 10/29/2021    Pneumococcal Conjugate - 13 Valent 06/11/2021    Pneumococcal Polysaccharide - 23 Valent 06/21/2022          1. Medicare annual wellness visit, subsequent  - Comprehensive Metabolic Panel; Future  - Hemoglobin A1C; Future  - TSH; Future  - Urinalysis, Reflex to Urine Culture; Future  - Will treat pending lab results. Monthly breast self exam encouraged. Diet, exercise, and 10% weight loss encouraged. Keep appointment for dental exams x q6 months as scheduled. Keep appointment for annual eye exam as scheduled. Keep appointment with specialists as scheduled. Notify M.D. or ER if temp greater than 100.4, or any acute illness.      2. History of tobacco use  - Continue to encourage smoking cessation.     3. Bony sclerosis  - NM Bone Scan Whole Body; Future for further evaluation per radiology recommendation.     4. Stage 3a chronic kidney disease  - Increase fluid intake and avoid NSAIDs. If still present, will proceed with US Renal and Nephrology referral.     5. Primary hypertension  - CBC Auto Differential; Future  - BP is well controlled, continue current treatment plan. Continue followup with Cardiology as scheduled. Keep daily BP log. Notify M.D. or ER if BP >170/100 or <90/60, chest pain, palpitations, headache, SOB, temp greater than 100.4, or any acute illness.   Continue  Low Sodium Diet (DASH Diet - Less than 2 grams of sodium per day).  Monitor blood pressure daily and log. Report consistent numbers greater than 140/90.  Smoking cessation encouraged to aid in BP reduction.  Maintain healthy weight with goal BMI <30. Exercise 30 minutes per day, 5 days per week.      6. Essential familial hyperlipidemia  - CK; Future  - Lipid Panel; Future  - Continue current treatment plan, will treat pending results.   Continue  Stressed importance of dietary modifications. Follow a low cholesterol, low saturated fat diet with less that 200mg of cholesterol a day.  Avoid  fried foods and high saturated fats (high saturated fats less than 7% of calories).  Add Flax Seed/Fish Oil supplements to diet. Increase dietary fiber.  Regular exercise can reduce LDL and raise HDL. Stressed importance of physical activity 5 times per week for 30 minutes per day.      7. Abnormal finding of blood chemistry, unspecified  - Hemoglobin A1C; Future    8. 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     The pt has a mobility limitation that significantly impairs his/her ability to participate in one or more mobility related activities of daily living (MRADLS).     The patient's mobility limitation can not be sufficiently resolved by the use of a cane or walker.     The use of a manual wheelchair will significantly improve the patient's ability to participate in MRADLs and th patient will use it on a regular basis at home.     The patient has not expressed unwillingness to use the manual wheelchair that is provided for use in the home.     The patient has a caregiver who is available,willing, and able to provide assistance with the wheelchair.    6.   Patient cannot self propel in a standard manual wheelchair, but can self propel in a lightweight wheelchair in the home.     7.    Recommending a lightweight vida wheelchair to improve patient's daily living with a seat cushion to prevent skin breakdowns.      Patient cannot walk any feet at this time.     9. Patient is not paralyzed and does not require use of arm trough.     10. Patient does not have any weak upper body muscles, upper body instability, or muscle spasticity, and a safety belt/pelvic strap () is not required.    9. Unable to walk  -Same as #8.    Medication List with Changes/Refills   Current Medications    ALPHAGAN P 0.15 % OPHTHALMIC SOLUTION    Place 1 drop into both eyes 3 (three) times daily.        Start Date: 4/25/2022 End Date: --    ASPIRIN (ECOTRIN) 81 MG EC TABLET    Take 81 mg by mouth once daily.       Start Date: 7/14/2021 End Date: --    ATORVASTATIN (LIPITOR) 40 MG TABLET    Take 1 tablet (40 mg total) by mouth once daily.       Start Date: 11/8/2022 End Date: --    BETAMETHASONE VALERATE 0.1% (VALISONE) 0.1 % CREA    Apply topically 2 (two) times daily as needed (rash).       Start Date: 5/11/2023 End Date: 5/25/2023    BRIMONIDINE 0.2% (ALPHAGAN) 0.2 % DROP    Place 1 drop into both eyes 3 (three) times daily.       Start Date: 4/25/2022 End Date: --    CALCIUM CARBONATE/VITAMIN D3 (CALTRATE 600 + D ORAL)    Take 1 tablet by mouth 2 (two) times a day.       Start Date: 1/4/2022  End Date: --    CARBAMAZEPINE (CARBATROL) 100 MG CM12    Take 1 capsule (100 mg total) by mouth 2 (two) times a day.       Start Date: 11/8/2022 End Date: --    COSOPT 22.3-6.8 MG/ML OPHTHALMIC SOLUTION    Place 1 drop into both eyes 2 (two) times daily.       Start Date: 4/25/2022 End Date: --    DOCUSATE SODIUM (COLACE) 100 MG CAPSULE    Take 1 capsule (100 mg total) by mouth once daily.       Start Date: 11/8/2022 End Date: 11/8/2023    LORATADINE (CLARITIN) 10 MG TABLET    Take 1 tablet (10 mg total) by mouth once daily. For allergies/fluid in ears       Start Date: 11/8/2022 End Date: 11/8/2023    METOPROLOL SUCCINATE 25 MG CSPX    Take 25 mg by mouth once daily.       Start Date: 12/15/2022End Date: --    MICONAZOLE (MICOTIN) 2 % CREAM    Apply 1 g topically 2 (two) times daily.       Start Date: 6/3/2022  End Date: --    PHENOBARBITAL 32.4 MG TABLET    Take 1 tablet (32.4 mg total) by mouth every evening.       Start Date: 5/23/2023 End Date: 5/22/2024    PHENYTOIN (DILANTIN) 100 MG ER CAPSULE    Take 1 capsule (100 mg total) by mouth 2 (two) times daily.       Start Date: 5/23/2023 End Date: 5/22/2024    XALATAN 0.005 % OPHTHALMIC SOLUTION    Place 1 drop into both eyes nightly.       Start Date: 4/25/2022 End Date:  --          The patient's Health Maintenance was reviewed and the following appears to be due at this time:   There are no preventive care reminders to display for this patient.      Follow up in about 6 months (around 12/29/2023) for HTN Followup, Cholesterol Followup- 30 minute appointment.

## 2023-07-05 ENCOUNTER — TELEPHONE (OUTPATIENT)
Dept: FAMILY MEDICINE | Facility: CLINIC | Age: 83
End: 2023-07-05
Payer: MEDICARE

## 2023-07-05 DIAGNOSIS — R26.2 UNABLE TO WALK: ICD-10-CM

## 2023-07-05 DIAGNOSIS — H54.40 BLINDNESS OF LEFT EYE, UNSPECIFIED RIGHT EYE VISUAL IMPAIRMENT CATEGORY: Primary | ICD-10-CM

## 2023-07-05 NOTE — TELEPHONE ENCOUNTER
----- Message from Yuko Gracia sent at 7/5/2023  9:43 AM CDT -----  .Type:  Needs Medical Advice    Who Called: pt    Would the patient rather a call back or a response via [facility]? Call back   Best Call Back Number: [phone]  Additional Information: pt is requesting a call back for her labs       
Called pt about her UA. Voiced understanding and thanks!   
Denies tobacco use, denies alcohol use,

## 2023-07-05 NOTE — TELEPHONE ENCOUNTER
Pt called about getting Manual wheel chair , due to loss of site.     Manual wheelchair script needed

## 2023-07-06 ENCOUNTER — TELEPHONE (OUTPATIENT)
Dept: FAMILY MEDICINE | Facility: CLINIC | Age: 83
End: 2023-07-06
Payer: MEDICARE

## 2023-07-06 NOTE — TELEPHONE ENCOUNTER
Called [facility] to let them know her Wheelchair order has been sent to [facility]

## 2023-07-06 NOTE — TELEPHONE ENCOUNTER
----- Message from Luz Chisholm sent at 7/6/2023  9:42 AM CDT -----  Regarding: advice  Type:  Needs Medical Advice    Who Called: pt  Symptoms (please be specific):    How long has patient had these symptoms:    Pharmacy name and phone #:    Would the patient rather a call back or a response via [facility]?   Best Call Back Number: [phone]  Additional Information: pt called about needing to have order for her wheel [facility]. Please advise

## 2023-07-11 NOTE — PROGRESS NOTES
[facility]  History & Physical  Neurosurgery      Lisseth Guajardo   [id number]   1940       SUBJECTIVE:     CHIEF COMPLAINT:  Right neck pain    HPI:  Lisseth Guajardo is a 82 y.o. female who presents for neurosurgical evaluation. The patient presents today describing it neck pain.  The patient reports these symptoms have been present since approximately 1 year ago and have progressively worsened.  She is also describing pain into the right jaw and face.  She denies any weakness, tingling or numbness.  She does have chronic left upper and lower extremity weakness since birth. The patient describes the pain as tight and pulling pain.  The patient rates the pain 10/10 on the pain scale. The patient states with brushing her teeth the mouth and face symptoms are worse.  Tegretol provides relief of symptoms.  She reports she did attempt physical therapy for her cervical symptoms although felt this did aggravate her symptoms.  She reports chronic urinary leakage.  She is denying changes in bowel function.  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   The patient has a significant history of L4-5 lumbar surgery conducted in [place] in 2015.  The patient states she would like to avoid surgery if at all possible.       Past Medical History:   Diagnosis Date    Cervical radiculitis 08/29/2022    Cervical spinal stenosis 08/29/2022    Cervicalgia 08/29/2022    Coronary arteriosclerosis     DDD (degenerative disc disease), cervical 08/29/2022    Depression     Epilepsy, unspecified, not intractable, with status epilepticus     High cholesterol     Hypokalemia     Mild renal insufficiency 07/06/2022    Myocardial infarction     Osteoporosis     Primary hypertension 1/31/2023    Recurrent UTI     Unspecified macular degeneration        Past Surgical History:   Procedure Laterality Date    APPENDECTOMY      BACK SURGERY      cardiac stents      CARPAL  TUNNEL RELEASE      CATARACT EXTRACTION Left     CHOLECYSTECTOMY      crainotomy      FOOT SURGERY Left     HYSTERECTOMY      LUNG REMOVAL, PARTIAL Right     LYMPH NODE DISSECTION         Family History   Family history unknown: Yes       Social History     Socioeconomic History    Marital status:    Tobacco Use    Smoking status: Former     Types: Cigarettes     Passive exposure: Past    Smokeless tobacco: Never   Substance and Sexual Activity    Alcohol use: Never    Drug use: Never    Sexual activity: Not Currently     Social Determinants of Health     Financial Resource Strain: Low Risk     Difficulty of Paying Living Expenses: Not very hard   Food Insecurity: No Food Insecurity    Worried About Running Out of Food in the Last Year: Never true    Ran Out of Food in the Last Year: Never true   Transportation Needs: Unmet Transportation Needs    Lack of Transportation (Medical): Yes    Lack of Transportation (Non-Medical): Yes   Physical Activity: Inactive    Days of Exercise per Week: 0 days    Minutes of Exercise per Session: 0 min   Stress: No Stress Concern Present    Feeling of Stress : Not at all   Social Connections: Unknown    Frequency of Communication with Friends and Family: Once a week    Attends [employer] Services: Never    Active Member of Clubs or Organizations: No    Attends Club or Organization Meetings: Never    Marital Status:    Housing Stability: Unknown    Unable to Pay for Housing in the Last Year: No    Unstable Housing in the Last Year: No        Review of patient's allergies indicates:   Allergen Reactions    Nitrofurantoin monohyd/m-cryst Hives    Penicillin Hives        Current Outpatient Medications   Medication Instructions    ALPHAGAN P 0.15 % ophthalmic solution 1 drop, Both Eyes, 3 times daily    aspirin (ECOTRIN) 81 mg, Oral, Daily    atorvastatin (LIPITOR) 40 mg, Oral, Daily    betamethasone valerate 0.1% (VALISONE) 0.1 % Crea Topical (Top), 2 times daily PRN     "brimonidine 0.2% (ALPHAGAN) 0.2 % Drop 1 drop, Both Eyes, 3 times daily    calcium carbonate/vitamin D3 (CALTRATE 600 + D ORAL) 1 tablet, Oral, 2 times daily    carBAMazepine (CARBATROL) 100 mg, Oral, 2 times daily    COSOPT 22.3-6.8 mg/mL ophthalmic solution 1 drop, Both Eyes, 2 times daily    COSOPT, PF, 2-0.5 % Dpet ophthalmic solution 1 drop, Both Eyes, 2 times daily    docusate sodium (COLACE) 100 mg, Oral, Daily    loratadine (CLARITIN) 10 mg, Oral, Daily, For allergies/fluid in ears    metoprolol succinate 25 mg, Oral, Daily    miconazole (MICOTIN) 1 g, Topical (Top), 2 times daily    PHENobarbitaL 32.4 mg, Oral, Nightly    phenytoin (DILANTIN) 100 mg, Oral, 2 times daily    XALATAN 0.005 % ophthalmic solution 1 drop, Both Eyes, Nightly          Review of Systems   Constitutional:  Negative for chills, fever and weight loss.   HENT:  Negative for congestion, hearing loss, nosebleeds and tinnitus.    Eyes:  Negative for blurred vision, double vision and photophobia.   Respiratory:  Negative for cough, shortness of breath and wheezing.    Cardiovascular:  Negative for chest pain, palpitations and leg swelling.   Gastrointestinal:  Negative for constipation, diarrhea, nausea and vomiting.   Genitourinary:  Negative for dysuria, frequency and urgency.   Musculoskeletal:  Positive for neck pain (right sided neck and jaw pain). Negative for back pain and falls.   Skin:  Negative for itching and rash.   Neurological:  Positive for weakness (Chronic left-sided weakness). Negative for dizziness, tingling, tremors, sensory change, speech change, seizures, loss of consciousness and headaches.   Psychiatric/Behavioral:  Negative for depression, hallucinations and memory loss. The patient is not nervous/anxious.      OBJECTIVE:     Visit Vitals  /88 (BP Location: Left arm, Patient Position: Sitting)   Pulse 79   Resp 16   Ht 5' 1" (1.549 m)   Wt 55.3 kg (122 lb)   BMI 23.05 kg/m²        Physical " Exam    General:  Pleasant, Well-nourished, Well-groomed.    Cardiovascular:  Neck is supple.  There are no carotid bruits.  Heart has regular rate and rhythm.    Lungs:  Breathing is quiet, non-lablored    Abdomen:  Soft, non-tender, non-distended.    Neurological:  Moderately limited range of motion of the cervical spine with rotation to the left and right, mildly limited with extension and flexion.  Muscle strength against resistance:   Right Left   Deltoid (C5) 5/5    Biceps (C5/6) 5/5    Wrist Flexors (C5/6) 5/5    Triceps (C7) 5/5    Wrist extension (C7) 5/5    Finger abduction (C8) 5/5     5/5         Hip abduction 5/5    Hip adduction 5/5    Hip flexion (L2) 5/5    Knee extension (L3) 5/5    Knee flexion (L4) 5/5    Dorsiflexion (L5) 5/5    EHL (L5) 5/5    Plantar flexion (S1) 5/5    Left upper extremity contractures of the elbow and wrist  Left upper and lower extremity strength and reflexes not evaluated due to chronic contractures and weakness  Sensation is intact to primary modalities in bilateral upper and lower extremities.    Reflexes:   Right Left   Triceps (C7) 0    Biceps (C5) 0    Brachioradialis (C6) 0    Patellar (L4) 1+    Achilles (S1) 1+    Negative Babinski, Clonus, Silva, Tinel's, and Phalen's bilaterally.  Gait was not evaluated as the patient is wheelchair-bound  Coordination is normal.  No tremor noted.    Imaging:  All pertinent neuroimaging independently reviewed. Discussed these findings in detail with the patient.  MRI of the cervical spine dated 6/8/2023 reveals chronic T2 hyperintense signal associated with cord volume loss from C3-C5.  Multilevel disc osteophyte complexes and facet hypertrophy creates varying degrees of canal stenosis as well as foraminal stenosis from C3 to T1.  Of note there is severe right foraminal stenosis at C4-5 and C5-6 which I do feel is likely contributing to her symptoms.       ASSESSMENT:       ICD-10-CM ICD-9-CM   1. Osteoarthritis of spine with  radiculopathy, cervical region  M47.22 721.0   2. Trigeminal neuralgia of right side of face  G50.0 350.1       PLAN:     1. Osteoarthritis of spine with radiculopathy, cervical region  - Ambulatory referral/consult to Pain Clinic; Future    2. Trigeminal neuralgia of right side of face    Lisseth Guajardo presents today with a chief complaint of right-sided neck, jaw and face pain.  I did take the time to discuss the patient's MRI and x-ray findings with her in clinic today.  The patient reports she has had known cervical myelomalacia dating back to 2015 when she underwent lumbar surgery.  The patient states she is not interested in surgical intervention at this time.  I did discuss with her that her osteoporotic state as well as age do play a role in her surgical candidacy.  I support her pursuing conservative measures for pain relief at this time.  We will refer her on for consultation to Dr. Fink at this time.  We will plan to see the patient back in clinic on an as-needed basis going forward.  We did discuss potential red flag findings for which I would like the patient to present to the emergency room.  She verbalized understanding.    Follow up if symptoms worsen or fail to improve.      E/M Level Based On Time:   15 minutes spent on reviewing chart, which includes interpreting lab results and diagnostic tests.   20 minutes spent in the room with the patient performing a history and physical exam, counseling or educating the patient/caregiver, prescribing medications, ordering labwork/diagnostic tests, or placing referrals.   5 minutes spent collaborating plan of care with physician.   5 minutes spent documenting all relevant clinical informationin the electronic health record.     Total Time Spent: 45 minutes     MEHRAN Carney    Disclaimer:  This note is prepared using voice recognition software and as such is likely to have errors despite attempts at proofreading. Please contact me for  questions.

## 2023-07-13 ENCOUNTER — TELEPHONE (OUTPATIENT)
Dept: FAMILY MEDICINE | Facility: CLINIC | Age: 83
End: 2023-07-13
Payer: MEDICARE

## 2023-07-13 NOTE — TELEPHONE ENCOUNTER
Pt wanted to hear a list of her medical problems as listed in her chart. She does not agree with the diagnosis of Depression as she has never seen anyone for that. I voiced understanding and let her know that she was referred to [facility] back in May for this diagnosis. She said thanks and she would look into the referral

## 2023-07-13 NOTE — TELEPHONE ENCOUNTER
----- Message from April Stewart sent at 7/13/2023  9:15 AM CDT -----  Regarding: med advice  .Type:  Needs Medical Advice    Who Called: patient  Symptoms (please be specific):    How long has patient had these symptoms:    Pharmacy name and phone #:    Would the patient rather a call back or a response via [facility]? Call back  Best Call Back Number:  [phone]  Additional Information: patient states that she lives in an assisted living facility and it is urgent that she is contacted with her medical issues due to a discrepancy. Please advise.

## 2023-07-17 ENCOUNTER — HOSPITAL ENCOUNTER (OUTPATIENT)
Dept: RADIOLOGY | Facility: HOSPITAL | Age: 83
Discharge: HOME | End: 2023-07-17
Attending: NURSE PRACTITIONER
Payer: MEDICARE

## 2023-07-17 ENCOUNTER — OFFICE VISIT (OUTPATIENT)
Dept: NEUROSURGERY | Facility: CLINIC | Age: 83
End: 2023-07-17
Payer: MEDICARE

## 2023-07-17 VITALS
SYSTOLIC BLOOD PRESSURE: 138 MMHG | HEIGHT: 61 IN | WEIGHT: 122 LBS | DIASTOLIC BLOOD PRESSURE: 88 MMHG | BODY MASS INDEX: 23.03 KG/M2 | RESPIRATION RATE: 16 BRPM | HEART RATE: 79 BPM

## 2023-07-17 DIAGNOSIS — M47.22 OSTEOARTHRITIS OF SPINE WITH RADICULOPATHY, CERVICAL REGION: Primary | ICD-10-CM

## 2023-07-17 DIAGNOSIS — G50.0 TRIGEMINAL NEURALGIA OF RIGHT SIDE OF FACE: ICD-10-CM

## 2023-07-17 DIAGNOSIS — M54.12 CERVICAL RADICULITIS: ICD-10-CM

## 2023-07-17 PROCEDURE — 99214 PR OFFICE/OUTPT VISIT, EST, LEVL IV, 30-39 MIN: ICD-10-PCS | Mod: ,,, | Performed by: NURSE PRACTITIONER

## 2023-07-17 PROCEDURE — 72052 X-RAY EXAM NECK SPINE 6/>VWS: CPT | Mod: TC

## 2023-07-17 PROCEDURE — 99214 OFFICE O/P EST MOD 30 MIN: CPT | Mod: ,,, | Performed by: NURSE PRACTITIONER

## 2023-07-17 RX ORDER — DORZOLAMIDE HYDROCHLORIDE AND TIMOLOL MALEATE 20; 5 MG/ML; MG/ML
1 SOLUTION/ DROPS OPHTHALMIC 2 TIMES DAILY
COMMUNITY
Start: 2023-07-10

## 2023-07-18 ENCOUNTER — TELEPHONE (OUTPATIENT)
Dept: FAMILY MEDICINE | Facility: CLINIC | Age: 83
End: 2023-07-18
Payer: MEDICARE

## 2023-07-18 NOTE — TELEPHONE ENCOUNTER
Called pt to let her know wheelchair order was sent to [facility] and I gave her the phone number to call and follow up.

## 2023-07-18 NOTE — TELEPHONE ENCOUNTER
----- Message from Armando Rojas sent at 7/18/2023  1:23 PM CDT -----  .Type:  Needs Medical Advice    Who Called: pt  Would the patient rather a call back or a response via [facility]? Call back  Best Call Back Number: [phone]  Additional Information: pt called to check on status of wheel chair order

## 2023-07-20 RX ORDER — ALBUTEROL SULFATE 90 UG/1
2 AEROSOL, METERED RESPIRATORY (INHALATION) EVERY 6 HOURS PRN
COMMUNITY
End: 2023-07-20 | Stop reason: SDUPTHER

## 2023-07-20 RX ORDER — ALBUTEROL SULFATE 90 UG/1
2 AEROSOL, METERED RESPIRATORY (INHALATION) EVERY 6 HOURS PRN
Qty: 18 G | Refills: 1 | Status: SHIPPED | OUTPATIENT
Start: 2023-07-20

## 2023-07-24 ENCOUNTER — TELEPHONE (OUTPATIENT)
Dept: FAMILY MEDICINE | Facility: CLINIC | Age: 83
End: 2023-07-24
Payer: MEDICARE

## 2023-07-24 NOTE — TELEPHONE ENCOUNTER
----- Message from Alize Adan sent at 7/24/2023 11:26 AM CDT -----  Regarding: med advice  .Type:  Needs Medical Advice    Who Called: Pt  Symptoms (please be specific):   How long has patient had these symptoms:    Pharmacy name and phone #:    Would the patient rather a call back or a response via [facility]? Call back  Best Call Back Number: [phone]  Additional Information: Pt requesting a call back from nurse regarding a wheelchair.

## 2023-07-24 NOTE — TELEPHONE ENCOUNTER
Called patient to give her an update that we are working on (and have been working on) trying to get her wheelchair. Pt voiced understanding.     Renetta stated that [facility] is asking for the chart note from 6/29 to state patient needing the wheelchair with the ICD 10 code (In the fax sent it mentioned that the note did not mention chart notes stating that she needed wheelchair nor code).

## 2023-07-25 ENCOUNTER — DOCUMENTATION ONLY (OUTPATIENT)
Dept: FAMILY MEDICINE | Facility: CLINIC | Age: 83
End: 2023-07-25
Payer: MEDICARE

## 2023-07-25 ENCOUNTER — TELEPHONE (OUTPATIENT)
Dept: FAMILY MEDICINE | Facility: CLINIC | Age: 83
End: 2023-07-25
Payer: MEDICARE

## 2023-07-25 LAB
LEFT EYE DM RETINOPATHY: NEGATIVE
RIGHT EYE DM RETINOPATHY: NEGATIVE

## 2023-07-25 NOTE — TELEPHONE ENCOUNTER
Once again, [facility] has sent a fax requesting that these questions be answered within the chart notes in regards to the patient's wheelchair:    The pt has a mobility limitation that significantly impairs his/her ability to participate in one or more mobility related activities of daily living (MRADLS).    The patient's mobility limitation can not be sufficiently resolved by the use of a cane or walker.    The use of a manual wheelchair will significantly improve the patient's ability to participate in MRADLs and th patient will use it on a regular basis at home.    The patient has not expressed unwillingness to use the manual wheelchair that is provided for use in the home.    The patient has a caregiver who is available,willing, and able to provide assistance with the wheelchair.    The patient can self propel in a standard manual wheelchair, in the home.    Recommending a manual wheelchair to improve patient's daily living with a seat cushion to prevent skin breakdowns.    Also need to know how many feet patient can/cannot walk.    Insurance requires us to get these specific notes, they must be in the patient's most recent OV notes from within the last 6 months.    Patient is paralyzed on _______ side and requires use of arm trough.    A safety belt/pelvic strap () Is covered if the beneficiary has weak upper body muscles, upper body instability, or muscle spasticity which requires use of this term for proper positioning.

## 2023-07-31 ENCOUNTER — TELEPHONE (OUTPATIENT)
Dept: FAMILY MEDICINE | Facility: CLINIC | Age: 83
End: 2023-07-31
Payer: MEDICARE

## 2023-07-31 DIAGNOSIS — R21 SKIN RASH: Primary | ICD-10-CM

## 2023-07-31 NOTE — TELEPHONE ENCOUNTER
----- Message from [place] sent at 7/31/2023  9:32 AM CDT -----  Regarding: referral  Type:  Patient Requesting Referral    Who Called:pt    Referral to What Specialty:dermatologist  Reason for Referral:started on leg now on arm, back & buttocks     Patient Requesting a Response?:yes  Would the patient rather a call back or a response via [facility]? C/b  Best Call Back Number:[phone]  Additional Information: not sure if its bites or a rash

## 2023-07-31 NOTE — TELEPHONE ENCOUNTER
I asked pt if she went to ER or UC she voiced no. She would like a referral to Dermatology. She doesn't know if its bites or rash she voiced.

## 2023-08-08 ENCOUNTER — TELEPHONE (OUTPATIENT)
Dept: FAMILY MEDICINE | Facility: CLINIC | Age: 83
End: 2023-08-08
Payer: MEDICARE

## 2023-08-08 NOTE — TELEPHONE ENCOUNTER
Received fax for the new lightweight wheelchair. Office note 06/29/2023 where you answered all the questions for the wheelchair. Can you please change #6 to say  The patient can self propel in a lightweight manual wheelchair, in the home.    Thanks!

## 2023-08-08 NOTE — TELEPHONE ENCOUNTER
They are sending a new script. They went to deliver the pt her wheelchair and she refused because she can't function without the detachable arms. They are requesting a new order for Lightweight besides standard wheelchair.   Please advise, thanks!

## 2023-08-08 NOTE — TELEPHONE ENCOUNTER
----- Message from Armando Rojas sent at 8/8/2023  2:07 PM CDT -----  .Type:  Needs Medical Advice    Who Called: [facility]  Symptoms (please be specific):    How long has patient had these symptoms:    Pharmacy name and phone #:    Would the patient rather a call back or a response via [facility]? Call back  Best Call Back Number: [phone]  Additional Information: calling to speak with someone about an order they received for the pt, attempted back line no answer

## 2023-08-21 ENCOUNTER — TELEPHONE (OUTPATIENT)
Dept: FAMILY MEDICINE | Facility: CLINIC | Age: 83
End: 2023-08-21
Payer: MEDICARE

## 2023-08-21 NOTE — TELEPHONE ENCOUNTER
Office Visit from 06/29/2023 please change       #6 Patient cannot self propel in a standard manual wheelchair, but can self propel in a lightweight wheelchair in the home.    #7 Recommending a lightweight vida wheelchair to improve patient's daily living with a seat cushion to prevent skin breakdowns.    Thanks

## 2023-08-21 NOTE — TELEPHONE ENCOUNTER
----- Message from Kristine King sent at 8/21/2023 10:57 AM CDT -----  Regarding: Additional information  .Type:  Patient Returning Call    Who Called:Lisseth  Who Left Message for Patient:Lisseth  Does the patient know what this is regarding?:Wheel Chair  Would the patient rather a call back or a response via [facility]?   Best Call Back Number:[phone]   Additional Information: Patient states that additional information is needed for her insurance to cover the wheel chair. The request need to go to [facility]

## 2023-08-24 ENCOUNTER — TELEPHONE (OUTPATIENT)
Dept: FAMILY MEDICINE | Facility: CLINIC | Age: 83
End: 2023-08-24
Payer: MEDICARE

## 2023-08-24 NOTE — TELEPHONE ENCOUNTER
----- Message from Armando Rojas sent at 8/24/2023  1:21 PM CDT -----  .Type:  Needs Medical Advice    Who Called: pt  Symptoms (please be specific):    How long has patient had these symptoms:    Pharmacy name and phone #:    Would the patient rather a call back or a response via [facility]? Call back  Best Call Back Number: [phone]  Additional Information: calling to discuss current referrals

## 2023-09-05 ENCOUNTER — TELEPHONE (OUTPATIENT)
Dept: FAMILY MEDICINE | Facility: CLINIC | Age: 83
End: 2023-09-05
Payer: MEDICARE

## 2023-09-05 DIAGNOSIS — M47.22 OSTEOARTHRITIS OF SPINE WITH RADICULOPATHY, CERVICAL REGION: Primary | ICD-10-CM

## 2023-09-05 NOTE — TELEPHONE ENCOUNTER
----- Message from April Stewart sent at 9/5/2023 12:29 PM CDT -----  Regarding: med advice  .Type:  Needs Medical Advice    Who Called:  patient  Symptoms (please be specific):    How long has patient had these symptoms:    Pharmacy name and phone #:    Would the patient rather a call back or a response via [facility]? Call back  Best Call Back Number:  [phone]  Additional Information: patient would like to be contacted back regarding not hearing from her dermatology referral for scheduling yet. Please advise.

## 2023-09-05 NOTE — TELEPHONE ENCOUNTER
----- Message from Armando Rojas sent at 9/5/2023  1:17 PM CDT -----  .Type:  Needs Medical Advice    Who Called: [facility]  Symptoms (please be specific): fungal rash   How long has patient had these symptoms:    Pharmacy name and phone #:    Would the patient rather a call back or a response via [facility]? Call back  Best Call Back Number: [phone]  Additional Information: also calling about occupational therapy

## 2023-09-11 ENCOUNTER — TELEPHONE (OUTPATIENT)
Dept: FAMILY MEDICINE | Facility: CLINIC | Age: 83
End: 2023-09-11
Payer: MEDICARE

## 2023-09-11 NOTE — TELEPHONE ENCOUNTER
----- Message from Chau Hong sent at 9/11/2023  9:36 AM CDT -----  Regarding: call back  .Type:  Needs Medical Advice    Who Called: soila  Would the patient rather a call back or a response via [facility]?   Best Call Back Number: [phone]  Additional Information: pt is requesting a call back regarding her referral

## 2023-09-12 ENCOUNTER — TELEPHONE (OUTPATIENT)
Dept: FAMILY MEDICINE | Facility: CLINIC | Age: 83
End: 2023-09-12
Payer: MEDICARE

## 2023-09-13 ENCOUNTER — TELEPHONE (OUTPATIENT)
Dept: FAMILY MEDICINE | Facility: CLINIC | Age: 83
End: 2023-09-13
Payer: MEDICARE

## 2023-09-13 DIAGNOSIS — R56.9 SEIZURES: ICD-10-CM

## 2023-09-13 RX ORDER — PHENOBARBITAL 32.4 MG/1
32.4 TABLET ORAL NIGHTLY
Qty: 90 TABLET | Refills: 3 | Status: SHIPPED | OUTPATIENT
Start: 2023-09-13 | End: 2024-09-12

## 2023-09-13 RX ORDER — PHENOBARBITAL 32.4 MG/1
32.4 TABLET ORAL NIGHTLY
COMMUNITY
End: 2023-09-13 | Stop reason: SDUPTHER

## 2023-09-13 RX ORDER — PHENYTOIN SODIUM 100 MG/1
100 CAPSULE, EXTENDED RELEASE ORAL 2 TIMES DAILY
Qty: 180 CAPSULE | Refills: 3 | Status: SHIPPED | OUTPATIENT
Start: 2023-09-13 | End: 2024-01-30 | Stop reason: SDUPTHER

## 2023-09-13 RX ORDER — PHENOBARBITAL 32.4 MG/1
32.4 TABLET ORAL NIGHTLY
Qty: 14 TABLET | Refills: 0 | Status: SHIPPED | OUTPATIENT
Start: 2023-09-13 | End: 2024-02-19 | Stop reason: SDUPTHER

## 2023-09-13 NOTE — TELEPHONE ENCOUNTER
----- Message from Mariella Cunha sent at 9/13/2023  9:54 AM CDT -----  Regarding: Pt call - Home health nurse call (Ni)  Ni with [facility] called stating the pt is needing a refill on Phenobarbital 32.4mg tablet. The pt has enough pills to last her until Saturday with taking 1 pill a day and she is suppose to take 2 pills a day.    Pt is requesting a 90 day supply to be sent to [facility].   She is needing a short term supply sent to [facility] in [place] to hold her over until the mail order Rx is delivered. I spoke to [facility] he stated pt would have to pay a cash price which is about $13 for a 1 week supply. They do have in house delivery to deliver pt's meds to [place] Informed pt on this. Pt voiced her understanding and said that is fine. Please advise

## 2023-09-13 NOTE — TELEPHONE ENCOUNTER
----- Message from Mariella Cunha sent at 9/13/2023  9:54 AM CDT -----  Regarding: Pt call - Home health nurse call (Ni)  Ni with [facility] called stating the pt is needing a refill on Phenobarbital 32.4mg tablet. The pt has enough pills to last her until Saturday with taking 1 pill a day and she is suppose to take 2 pills a day.    Pt is requesting a 90 day supply to be sent to [facility].   She is needing a short term supply sent to [facility] in [place] to hold her over until the mail order Rx is delivered. I spoke to [facility] he stated pt would have to pay a cash price which is about $13 for a 1 week supply. They do have in house delivery to deliver pt's meds to [place] Informed pt on this. Pt voiced her understanding and said that is fine. Please advise

## 2023-09-13 NOTE — TELEPHONE ENCOUNTER
----- Message from Jesica Leong sent at 9/13/2023  9:36 AM CDT -----  Regarding: refill  .Type:  RX Refill Request    Who Called: pt  Refill or New Rx:refill  RX Name and Strength:PHENobarbitaL 32.4 MG tablet  How is the patient currently taking it? (ex. 1XDay):Take 1 tablet (32.4 mg total) by mouth every evening. - Oral  Is this a 30 day or 90 day RX:90  Preferred Pharmacy with phone number:[facility] - [address]   Phone:  [phone]  Fax:  [phone]  Local or Mail Order: mail  Ordering Provider:  Would the patient rather a call back or a response via [facility]?   Best Call Back Number:[phone]  Additional Information: Pt says tablets should 2x daily

## 2023-09-25 ENCOUNTER — TELEPHONE (OUTPATIENT)
Dept: FAMILY MEDICINE | Facility: CLINIC | Age: 83
End: 2023-09-25
Payer: MEDICARE

## 2023-09-25 NOTE — TELEPHONE ENCOUNTER
----- Message from April Stewart sent at 9/25/2023  1:23 PM CDT -----  Regarding: med advice  .Type:  Needs Medical Advice    Who Called:  patient  Symptoms (please be specific):    How long has patient had these symptoms:    Pharmacy name and phone #:    Would the patient rather a call back or a response via [facility]? Call back  Best Call Back Number:  [phone]  Additional Information: patient called due to symptoms on skin getting worst and no contact from her dermatology referral. Please advise.

## 2023-10-03 NOTE — ASSESSMENT & PLAN NOTE
Patient: Kaden Tolbert    Procedure: Procedure(s):  Bilateral strabismus repair       Diagnosis: Intermittent exotropia of right eye [H50.331]  Diagnosis Additional Information: No value filed.    Anesthesia Type:   No value filed.     Note:    Oropharynx: spontaneously breathing  Level of Consciousness: awake  Oxygen Supplementation: blow-by O2    Independent Airway: airway patency satisfactory and stable  Dentition: dentition unchanged  Vital Signs Stable: post-procedure vital signs reviewed and stable  Report to RN Given: handoff report given  Patient transferred to: PACU    Handoff Report: Identifed the Patient, Identified the Reponsible Provider, Reviewed the pertinent medical history, Discussed the surgical course, Reviewed Intra-OP anesthesia mangement and issues during anesthesia, Set expectations for post-procedure period and Allowed opportunity for questions and acknowledgement of understanding      Vitals:  Vitals Value Taken Time   /61 10/03/23 1032   Temp     Pulse 102 10/03/23 1038   Resp     SpO2 97 % 10/03/23 1038   Vitals shown include unvalidated device data.    Electronically Signed By: MARYAM Timmons CRNA  October 3, 2023  10:40 AM   Stable  Recent U/A negative for UTI  Denies any urinary symptoms

## 2023-10-11 RX ORDER — ATORVASTATIN CALCIUM 40 MG/1
40 TABLET, FILM COATED ORAL
Qty: 90 TABLET | Refills: 3 | Status: SHIPPED | OUTPATIENT
Start: 2023-10-11

## 2023-10-12 ENCOUNTER — OFFICE VISIT (OUTPATIENT)
Dept: PAIN MEDICINE | Facility: CLINIC | Age: 83
End: 2023-10-12
Payer: MEDICARE

## 2023-10-12 VITALS
HEART RATE: 81 BPM | BODY MASS INDEX: 23.41 KG/M2 | SYSTOLIC BLOOD PRESSURE: 94 MMHG | HEIGHT: 61 IN | WEIGHT: 124 LBS | DIASTOLIC BLOOD PRESSURE: 67 MMHG

## 2023-10-12 DIAGNOSIS — M50.30 DDD (DEGENERATIVE DISC DISEASE), CERVICAL: ICD-10-CM

## 2023-10-12 DIAGNOSIS — M48.02 CERVICAL SPINAL STENOSIS: ICD-10-CM

## 2023-10-12 DIAGNOSIS — M54.2 CERVICALGIA: ICD-10-CM

## 2023-10-12 DIAGNOSIS — M47.22 OSTEOARTHRITIS OF SPINE WITH RADICULOPATHY, CERVICAL REGION: ICD-10-CM

## 2023-10-12 DIAGNOSIS — M54.12 CERVICAL RADICULITIS: Primary | ICD-10-CM

## 2023-10-12 PROCEDURE — 99215 OFFICE O/P EST HI 40 MIN: CPT | Mod: ,,, | Performed by: ANESTHESIOLOGY

## 2023-10-12 PROCEDURE — 99215 PR OFFICE/OUTPT VISIT, EST, LEVL V, 40-54 MIN: ICD-10-PCS | Mod: ,,, | Performed by: ANESTHESIOLOGY

## 2023-10-12 RX ORDER — HYDROXYZINE HYDROCHLORIDE 10 MG/1
50 TABLET, FILM COATED ORAL 3 TIMES DAILY PRN
Qty: 50 TABLET | Refills: 2 | Status: SHIPPED | OUTPATIENT
Start: 2023-10-12

## 2023-10-12 RX ORDER — DICLOFENAC EPOLAMINE 0.01 G/1
1 SYSTEM TOPICAL DAILY PRN
Qty: 30 PATCH | Refills: 2 | Status: SHIPPED | OUTPATIENT
Start: 2023-10-12

## 2023-10-12 NOTE — PROGRESS NOTES
Whit Fink MD        PATIENT NAME: Lisseth Guajardo  : 1940  DATE: 10/12/23  MRN: [id number]      Billing Provider: Whit Fink MD  Level of Service:   Patient PCP Information       Provider PCP Type    Melody Ren MD General            Reason for Visit / Chief Complaint: Neck Pain (Osteoarthritis of spine w/radiculopathy, cervical region C/O pain level 3,Taking tylenol Arthritis for pain.)       Update PCP  Update Chief Complaint         History of Present Illness / Problem Focused Workflow     Lisseth Gaujardo presents to the clinic with Neck Pain (Osteoarthritis of spine w/radiculopathy, cervical region C/O pain level 3,Taking tylenol Arthritis for pain.)     This is an 83-year-old female who presents to clinic today for her initial consultation as a referral from the neurosurgery office.  Complains of neck pain as well as pain in the right side of her face.  She underwent lumbar spine surgery in  in [place].  She states that she was supposed to have neck surgery after that, but her surgeon retired.  The next surgeon she saw did not recommend surgery.  She has not undergone any injections in her neck.  She did do occupational therapy, but it did not help.  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      Neck Pain   Associated symptoms include weakness.       Review of Systems     Review of Systems   Eyes:  Positive for visual disturbance (blind d/t AMD).   Musculoskeletal:  Positive for gait problem, neck pain, neck stiffness and joint deformity.   Neurological:  Positive for weakness.   All other systems reviewed and are negative.       Medical / Social / Family History     Past Medical History:   Diagnosis Date    Cervical radiculitis 2022    Cervical spinal stenosis 2022    Cervicalgia 2022    Coronary arteriosclerosis     DDD (degenerative disc disease), cervical 2022    Depression     Epilepsy,  unspecified, not intractable, with status epilepticus     High cholesterol     Hypokalemia     Mild renal insufficiency 07/06/2022    Myocardial infarction     Osteoporosis     Primary hypertension 1/31/2023    Recurrent UTI     Unspecified macular degeneration        Past Surgical History:   Procedure Laterality Date    APPENDECTOMY      BACK SURGERY      cardiac stents      CARPAL TUNNEL RELEASE      CATARACT EXTRACTION Left     CHOLECYSTECTOMY      crainotomy      FOOT SURGERY Left     HYSTERECTOMY      LUNG REMOVAL, PARTIAL Right     LYMPH NODE DISSECTION         Social History  Ms. Guajardo  reports that she has quit smoking. Her smoking use included cigarettes. She has been exposed to tobacco smoke. She has never used smokeless tobacco. She reports that she does not drink alcohol and does not use drugs.    Family History  Ms.'s Guajardo Family history is unknown by patient.    Medications and Allergies     Medications  Outpatient Medications Marked as Taking for the 10/12/23 encounter (Office Visit) with Whit Fink MD   Medication Sig Dispense Refill    albuterol (PROVENTIL/VENTOLIN HFA) 90 mcg/actuation inhaler Inhale 2 puffs into the lungs every 6 (six) hours as needed for Wheezing. Rescue 18 g 1    ALPHAGAN P 0.15 % ophthalmic solution Place 1 drop into both eyes 3 (three) times daily.      aspirin (ECOTRIN) 81 MG EC tablet Take 81 mg by mouth once daily.      atorvastatin (LIPITOR) 40 MG tablet TAKE 1 TABLET DAILY 90 tablet 3    brimonidine 0.2% (ALPHAGAN) 0.2 % Drop Place 1 drop into both eyes 3 (three) times daily.      calcium carbonate/vitamin D3 (CALTRATE 600 + D ORAL) Take 1 tablet by mouth 2 (two) times a day.      carBAMazepine (CARBATROL) 100 MG CM12 Take 1 capsule (100 mg total) by mouth 2 (two) times a day. 180 capsule 3    COSOPT 22.3-6.8 mg/mL ophthalmic solution Place 1 drop into both eyes 2 (two) times daily.      COSOPT, PF, 2-0.5 % Dpet ophthalmic solution Place 1 drop into both eyes 2  (two) times daily.      docusate sodium (COLACE) 100 MG capsule Take 1 capsule (100 mg total) by mouth once daily. 90 capsule 3    loratadine (CLARITIN) 10 mg tablet Take 1 tablet (10 mg total) by mouth once daily. For allergies/fluid in ears 90 tablet 3    metoprolol succinate 25 mg CSpX Take 25 mg by mouth once daily. 90 capsule 3    miconazole (MICOTIN) 2 % cream Apply 1 g topically 2 (two) times daily. 28 g 3    PHENobarbitaL 32.4 MG tablet Take 1 tablet (32.4 mg total) by mouth every evening. 90 tablet 3    PHENobarbitaL 32.4 MG tablet Take 1 tablet (32.4 mg total) by mouth every evening. 14 tablet 0    phenytoin (DILANTIN) 100 MG ER capsule Take 1 capsule (100 mg total) by mouth 2 (two) times daily. 180 capsule 3    XALATAN 0.005 % ophthalmic solution Place 1 drop into both eyes nightly.         Allergies  Review of patient's allergies indicates:   Allergen Reactions    Nitrofurantoin monohyd/m-cryst Hives    Penicillin Hives       Physical Examination     Vitals:    10/12/23 0824   BP: 94/67   Pulse: 81     Pain Disability Index (PDI): 28       Spine Musculoskeletal Exam    Gait    Assistive device: wheelchair    Inspection    Cervical Spine    Cervical spine inspection is normal.    Palpation    Cervical Spine    Tenderness: present      Paraspinous: right      Trapezius: right    Right      Masses: none      Spasms: none      Crepitus: none      Muscle tone: normal    Left      Masses: none      Spasms: none      Crepitus: none      Muscle tone: normal    Range of Motion    Cervical Spine        Cervical spine range of motion additional comments: Limited range of motion in the cervical spine due to pain    Strength    Cervical Spine    Cervical spine strength additional comments: Normal strength in right upper extremity.  Diffusely weak in the left upper extremity.    General      Constitutional: appears stated age, well-developed and well-nourished    Scleral icterus: no    Labored breathing: no     Psychiatric: normal mood and affect and no acute distress    Neurological: alert and oriented x3    Skin: intact    Lymphadenopathy: none       Assessment and Plan (including Health Maintenance)      Problem List  Smart Sets  Document Outside HM   :    Plan:   Cervical radiculitis  -     hydrOXYzine (ATARAX) 10 MG Tab; Take 5 tablets (50 mg total) by mouth 3 (three) times daily as needed for Itching.  Dispense: 50 tablet; Refill: 2  -     diclofenac (FLECTOR) 1.3 % PT12; Apply 1 patch topically daily as needed (pain).  Dispense: 30 patch; Refill: 2    Osteoarthritis of spine with radiculopathy, cervical region  -     Ambulatory referral/consult to Pain Clinic  -     hydrOXYzine (ATARAX) 10 MG Tab; Take 5 tablets (50 mg total) by mouth 3 (three) times daily as needed for Itching.  Dispense: 50 tablet; Refill: 2  -     diclofenac (FLECTOR) 1.3 % PT12; Apply 1 patch topically daily as needed (pain).  Dispense: 30 patch; Refill: 2    DDD (degenerative disc disease), cervical  -     hydrOXYzine (ATARAX) 10 MG Tab; Take 5 tablets (50 mg total) by mouth 3 (three) times daily as needed for Itching.  Dispense: 50 tablet; Refill: 2  -     diclofenac (FLECTOR) 1.3 % PT12; Apply 1 patch topically daily as needed (pain).  Dispense: 30 patch; Refill: 2    Cervical spinal stenosis  -     hydrOXYzine (ATARAX) 10 MG Tab; Take 5 tablets (50 mg total) by mouth 3 (three) times daily as needed for Itching.  Dispense: 50 tablet; Refill: 2  -     diclofenac (FLECTOR) 1.3 % PT12; Apply 1 patch topically daily as needed (pain).  Dispense: 30 patch; Refill: 2    Cervicalgia  -     hydrOXYzine (ATARAX) 10 MG Tab; Take 5 tablets (50 mg total) by mouth 3 (three) times daily as needed for Itching.  Dispense: 50 tablet; Refill: 2  -     diclofenac (FLECTOR) 1.3 % PT12; Apply 1 patch topically daily as needed (pain).  Dispense: 30 patch; Refill: 2       I am prescribing her Flector patches for her chronic neck pain today.  I am also giving her a  prescription for hydroxyzine for her complaints of itching.  She is scheduled to see a dermatologist next month.  I will follow up with her again in 2 months.    Problem List Items Addressed This Visit       Cervical radiculitis - Primary    Relevant Medications    hydrOXYzine (ATARAX) 10 MG Tab    diclofenac (FLECTOR) 1.3 % PT12    Cervicalgia    Relevant Medications    hydrOXYzine (ATARAX) 10 MG Tab    diclofenac (FLECTOR) 1.3 % PT12    DDD (degenerative disc disease), cervical    Relevant Medications    hydrOXYzine (ATARAX) 10 MG Tab    diclofenac (FLECTOR) 1.3 % PT12    Cervical spinal stenosis    Relevant Medications    hydrOXYzine (ATARAX) 10 MG Tab    diclofenac (FLECTOR) 1.3 % PT12     Other Visit Diagnoses       Osteoarthritis of spine with radiculopathy, cervical region        Relevant Medications    hydrOXYzine (ATARAX) 10 MG Tab    diclofenac (FLECTOR) 1.3 % PT12              Future Appointments   Date Time Provider Department Center   12/14/2023  8:45 AM Whit Fink MD [facility] [place]   1/8/2024  8:00 AM Melody Ren MD [facility] [place]         There are no Patient Instructions on file for this visit.  No follow-ups on file.     Signature:  Whit Fink MD      Date of encounter: 10/12/23

## 2023-10-16 RX ORDER — LORATADINE 10 MG/1
TABLET ORAL
Qty: 90 TABLET | Refills: 3 | Status: SHIPPED | OUTPATIENT
Start: 2023-10-16

## 2023-10-30 ENCOUNTER — TELEPHONE (OUTPATIENT)
Dept: FAMILY MEDICINE | Facility: CLINIC | Age: 83
End: 2023-10-30
Payer: MEDICARE

## 2023-10-30 NOTE — TELEPHONE ENCOUNTER
Patient was inquiring about medication prescribed by her pain doctor. Informed that she needed to call Dr. Fink in regards to this.

## 2023-10-30 NOTE — TELEPHONE ENCOUNTER
----- Message from Vinny Chatterjee sent at 10/30/2023  1:15 PM CDT -----  .Type:  Needs Medical Advice    Who Called: Lisseth  Symptoms (please be specific):    How long has patient had these symptoms:    Pharmacy name and phone #:    Would the patient rather a call back or a response via [facility]?   Best Call Back Number: [phone]  Additional Information: Requested a call back from the nurse she has a problem with her prescription.

## 2023-10-31 ENCOUNTER — TELEPHONE (OUTPATIENT)
Dept: PAIN MEDICINE | Facility: CLINIC | Age: 83
End: 2023-10-31
Payer: MEDICARE

## 2023-10-31 NOTE — TELEPHONE ENCOUNTER
----- Message from Mary Davis sent at 10/30/2023  2:16 PM CDT -----  Regarding: Flector patches & Atarax 10mg  Ms. Guajardo phoned the office today because she says that [facility] told her they do not carry the Flector patches but they do have Lidocaine 5% patches with a 90 supply and the Atarax 10mg she can only take no more than 2 tablets a day, you had her taking 5 @ at time. Please send the new scripts for her.

## 2023-11-03 ENCOUNTER — TELEPHONE (OUTPATIENT)
Dept: FAMILY MEDICINE | Facility: CLINIC | Age: 83
End: 2023-11-03
Payer: MEDICARE

## 2023-11-03 DIAGNOSIS — J06.9 UPPER RESPIRATORY TRACT INFECTION, UNSPECIFIED TYPE: ICD-10-CM

## 2023-11-03 DIAGNOSIS — J06.9 UPPER RESPIRATORY TRACT INFECTION, UNSPECIFIED TYPE: Primary | ICD-10-CM

## 2023-11-03 RX ORDER — BENZONATATE 100 MG/1
100 CAPSULE ORAL 3 TIMES DAILY PRN
Qty: 21 CAPSULE | Refills: 0 | Status: SHIPPED | OUTPATIENT
Start: 2023-11-03 | End: 2023-11-03 | Stop reason: SDUPTHER

## 2023-11-03 RX ORDER — DOXYCYCLINE 100 MG/1
100 CAPSULE ORAL EVERY 12 HOURS
Qty: 14 CAPSULE | Refills: 0 | Status: SHIPPED | OUTPATIENT
Start: 2023-11-03 | End: 2023-11-10

## 2023-11-03 RX ORDER — BENZONATATE 100 MG/1
100 CAPSULE ORAL 3 TIMES DAILY PRN
Qty: 21 CAPSULE | Refills: 0 | Status: SHIPPED | OUTPATIENT
Start: 2023-11-03

## 2023-11-03 RX ORDER — DOXYCYCLINE 100 MG/1
100 CAPSULE ORAL EVERY 12 HOURS
Qty: 14 CAPSULE | Refills: 0 | Status: SHIPPED | OUTPATIENT
Start: 2023-11-03 | End: 2023-11-03 | Stop reason: SDUPTHER

## 2023-11-03 NOTE — TELEPHONE ENCOUNTER
----- Message from Renetta Proctor LPN sent at 11/3/2023  9:08 AM CDT -----  Regarding: Please advise  Pt unable to do E Visit. Please advise Thanks  ----- Message -----  From: Armando Rojas  Sent: 11/3/2023   8:59 AM CDT  To: Mikal VAZQUEZ Staff    .Type:  Needs Medical Advice    Who Called: [facility]- [facility]ya  Symptoms (please be specific): slight productive cough   How long has patient had these symptoms:  10/28/23  Pharmacy name and phone #:    Would the patient rather a call back or a response via [facility]? Call back  Best Call Back Number: [phone]  Additional Information: oxygen is 96%, no fever, pt has been taking over the counter children's robitussin,

## 2023-11-03 NOTE — TELEPHONE ENCOUNTER
----- Message from Vinny Chatterjee sent at 11/3/2023 10:19 AM CDT -----  .Type:  Needs Medical Advice    Who Called: [facility]    Symptoms (please be specific):    How long has patient had these symptoms:    Pharmacy name and phone #:    Would the patient rather a call back or a response via [facility]?   Best Call Back Number: [phone]  Additional Information: Requested to speak with the nurse about this patient. She wanted to let Renetta know the pharmacy is called [facility]. It is in [place], [place] and phone number is [phone]. Please call her back with any questions.

## 2023-12-01 ENCOUNTER — TELEPHONE (OUTPATIENT)
Dept: FAMILY MEDICINE | Facility: CLINIC | Age: 83
End: 2023-12-01
Payer: MEDICARE

## 2023-12-01 DIAGNOSIS — T74.11XS PHYSICAL ABUSE OF ADULT, SEQUELA: Primary | ICD-10-CM

## 2023-12-01 NOTE — TELEPHONE ENCOUNTER
----- Message from Armando Rojas sent at 11/30/2023  3:46 PM CST -----  .Type:  Needs Medical Advice    Who Called: [facility]  Symptoms (please be specific):    How long has patient had these symptoms:    Pharmacy name and phone #:    Would the patient rather a call back or a response via [facility]? Call back  Best Call Back Number: [phone]  Additional Information:  evaluation

## 2024-01-02 ENCOUNTER — TELEPHONE (OUTPATIENT)
Dept: FAMILY MEDICINE | Facility: CLINIC | Age: 84
End: 2024-01-02
Payer: MEDICARE

## 2024-01-02 NOTE — TELEPHONE ENCOUNTER
----- Message from Margarita Gu sent at 1/2/2024  9:40 AM CST -----  .Type:  Needs Medical Advice    Who Called: [facility]  Symptoms (please be specific):    How long has patient had these symptoms:    Pharmacy name and phone #:    Would the patient rather a call back or a response via [facility]? cb  Best Call Back Number: [phone]  Additional Information: pt calling about burning when urinating nurse asking for UA to be done nurse at home

## 2024-01-03 ENCOUNTER — TELEPHONE (OUTPATIENT)
Dept: FAMILY MEDICINE | Facility: CLINIC | Age: 84
End: 2024-01-03
Payer: MEDICARE

## 2024-01-03 NOTE — TELEPHONE ENCOUNTER
----- Message from Vinny Chatterjee sent at 1/3/2024  3:02 PM CST -----  .Type:  Needs Medical Advice    Who Called: [facility]   Symptoms (please be specific):    How long has patient had these symptoms:    Pharmacy name and phone #:    Would the patient rather a call back or a response via [facility]?   Best Call Back Number: [phone]  Additional Information: Requested a call back from the nurse need to speak with her directly.

## 2024-01-03 NOTE — TELEPHONE ENCOUNTER
----- Message from Chevy Driscoll sent at 1/3/2024 10:28 AM CST -----  Type:  Needs Medical Advice    Who Called: pt  Would the patient rather a call back or a response via [facility]? Call back  Best Call Back Number:  [phone]  Additional Information: pt called to inform Dr Ren that she completed a Covid test and it was negative , but she is now waiting on UTI results.

## 2024-01-03 NOTE — TELEPHONE ENCOUNTER
----- Message from Sanna Henley sent at 1/3/2024  9:53 AM CST -----  Regarding: medical advice  Type:  Needs Medical Advice    Who Called: Priya with home health    Symptoms (please be specific): dizzy and hallucinations     Would the patient rather a call back or a response via [facility]? Call back     Best Call Back Number: [phone]    Additional Information: Priya called to report patient symptoms. She advised the patient if problem continues to go to Er.  She wanted to clinic to be aware and to possible call back.

## 2024-01-04 ENCOUNTER — TELEPHONE (OUTPATIENT)
Dept: FAMILY MEDICINE | Facility: CLINIC | Age: 84
End: 2024-01-04
Payer: MEDICARE

## 2024-01-04 ENCOUNTER — DOCUMENTATION ONLY (OUTPATIENT)
Dept: FAMILY MEDICINE | Facility: CLINIC | Age: 84
End: 2024-01-04
Payer: MEDICARE

## 2024-01-04 DIAGNOSIS — R82.71 BACTERIA IN URINE: Primary | ICD-10-CM

## 2024-01-04 DIAGNOSIS — R82.71 BACTERIA IN URINE: ICD-10-CM

## 2024-01-04 RX ORDER — CIPROFLOXACIN 500 MG/1
500 TABLET ORAL EVERY 12 HOURS
Qty: 14 TABLET | Refills: 0 | Status: SHIPPED | OUTPATIENT
Start: 2024-01-04 | End: 2024-01-04 | Stop reason: SDUPTHER

## 2024-01-04 RX ORDER — CIPROFLOXACIN 500 MG/1
500 TABLET ORAL EVERY 12 HOURS
Qty: 14 TABLET | Refills: 0 | Status: SHIPPED | OUTPATIENT
Start: 2024-01-04 | End: 2024-01-11

## 2024-01-04 NOTE — TELEPHONE ENCOUNTER
----- Message from Vinny Chatterjee sent at 1/4/2024  9:22 AM CST -----  .Type:  Needs Medical Advice    Who Called: Dionna [facility]   Symptoms (please be specific):    How long has patient had these symptoms:    Pharmacy name and phone #:    Would the patient rather a call back or a response via [facility]?   Best Call Back Number: [phone]  Additional Information: Needs to speak with Ms. Payne re: this patient. Wanted to report urine results

## 2024-01-04 NOTE — TELEPHONE ENCOUNTER
----- Message from Melody Ren MD sent at 1/4/2024 10:37 AM CST -----  Urinalysis is suggestive of bacterial urinary tract infection, urine culture is still pending, Rx Cipro sent to take as directed while awaiting results of pending urine culture.

## 2024-01-05 ENCOUNTER — DOCUMENTATION ONLY (OUTPATIENT)
Dept: FAMILY MEDICINE | Facility: CLINIC | Age: 84
End: 2024-01-05
Payer: MEDICARE

## 2024-01-08 ENCOUNTER — TELEPHONE (OUTPATIENT)
Dept: FAMILY MEDICINE | Facility: CLINIC | Age: 84
End: 2024-01-08

## 2024-01-08 ENCOUNTER — OFFICE VISIT (OUTPATIENT)
Dept: FAMILY MEDICINE | Facility: CLINIC | Age: 84
End: 2024-01-08
Payer: MEDICARE

## 2024-01-08 ENCOUNTER — DOCUMENTATION ONLY (OUTPATIENT)
Dept: FAMILY MEDICINE | Facility: CLINIC | Age: 84
End: 2024-01-08

## 2024-01-08 VITALS
SYSTOLIC BLOOD PRESSURE: 96 MMHG | OXYGEN SATURATION: 99 % | HEART RATE: 89 BPM | HEIGHT: 61 IN | BODY MASS INDEX: 23.41 KG/M2 | DIASTOLIC BLOOD PRESSURE: 67 MMHG | WEIGHT: 124 LBS

## 2024-01-08 DIAGNOSIS — I69.854 HEMIPLEGIA AND HEMIPARESIS FOLLOWING OTHER CEREBROVASCULAR DISEASE AFFECTING LEFT NON-DOMINANT SIDE: ICD-10-CM

## 2024-01-08 DIAGNOSIS — G40.901 NONINTRACTABLE EPILEPSY WITH STATUS EPILEPTICUS, UNSPECIFIED EPILEPSY TYPE: ICD-10-CM

## 2024-01-08 DIAGNOSIS — J44.1 CHRONIC OBSTRUCTIVE PULMONARY DISEASE WITH (ACUTE) EXACERBATION: ICD-10-CM

## 2024-01-08 DIAGNOSIS — N18.31 STAGE 3A CHRONIC KIDNEY DISEASE: ICD-10-CM

## 2024-01-08 DIAGNOSIS — H35.3231 EXUDATIVE AGE-RELATED MACULAR DEGENERATION, BILATERAL, WITH ACTIVE CHOROIDAL NEOVASCULARIZATION: ICD-10-CM

## 2024-01-08 DIAGNOSIS — Q04.4 SEPTO-OPTIC DYSPLASIA OF BRAIN: ICD-10-CM

## 2024-01-08 DIAGNOSIS — E78.49 ESSENTIAL FAMILIAL HYPERLIPIDEMIA: ICD-10-CM

## 2024-01-08 DIAGNOSIS — I10 PRIMARY HYPERTENSION: Primary | ICD-10-CM

## 2024-01-08 DIAGNOSIS — F50.00 ANOREXIA NERVOSA, UNSPECIFIED: ICD-10-CM

## 2024-01-08 DIAGNOSIS — R11.0 CHRONIC NAUSEA: ICD-10-CM

## 2024-01-08 DIAGNOSIS — N39.0 RECURRENT UTI: ICD-10-CM

## 2024-01-08 DIAGNOSIS — F33.9 RECURRENT MAJOR DEPRESSIVE DISORDER, REMISSION STATUS UNSPECIFIED: ICD-10-CM

## 2024-01-08 DIAGNOSIS — I50.9 HEART FAILURE, UNSPECIFIED HF CHRONICITY, UNSPECIFIED HEART FAILURE TYPE: ICD-10-CM

## 2024-01-08 PROCEDURE — 99214 OFFICE O/P EST MOD 30 MIN: CPT | Mod: ,,, | Performed by: FAMILY MEDICINE

## 2024-01-08 RX ORDER — ONDANSETRON 4 MG/1
4 TABLET, ORALLY DISINTEGRATING ORAL EVERY 8 HOURS PRN
Qty: 30 TABLET | Refills: 0 | Status: SHIPPED | OUTPATIENT
Start: 2024-01-08

## 2024-01-08 NOTE — PATIENT INSTRUCTIONS
Clarence Montanez,     If you are due for any health screening(s) below please notify me so we can arrange them to be ordered and scheduled. Most healthy patients at your age complete them, but you are free to accept or refuse.     If you can't do it, I'll definitely understand. If you can, I'd certainly appreciate it!    All of your core healthy metrics are met.

## 2024-01-08 NOTE — PROGRESS NOTES
Patient ID: [id number]     Chief Complaint: Follow-up, Hypertension, and Hyperlipidemia        HPI:     Lisseth Guajardo is a 83 y.o. female here today for a follow up HTN, HLD.  - She has a history of epilepsy/septo- optic dysplasia of the brain-, controlled with Rx, she denies a seizure in the past 5 years, does followup with Neurology (Dr. Richardson) as scheduled.  - She has a history of CAD/HTN/hypercholesterolemia/CHF/CVA with left-sided hemiparalysis, controlled with Rx, no side effects, she is s/p 4 stents, asymptomatic, she does see Cardiology (Dr. Martinez).  - She is in a wheel chair and has left sided paralysis and deconditioning, she does PT with [facility] for fall precautions, and balance training. She does stay in an assisted living facility.   - COPD is stable and asymptomatic.   - CKD stage 3 is stable and asymptomatic.   - She has chronic nausea, controlled with Zofran p.r.n., no side effects, she needs Rx refill today.   - She is taking Cipro for UTI, wears adult diapers, she has recurrent UTIs, she has never had Urology evaluation, doing well, asymptomatic, no side effects.   - She is seeing Dermatology for skin care, doing well.   - Macular degeneration with bilateral eye blindness, stable, followed by Ophthalmology.   - Major depression is in remission, doing well with relaxation techniques, denies SI/HI or AH/VH.   - Anorexia is stable, asymptomatic, doing well.   - She would like Care management program.   - Patient is without any other complaints today.        ----------------------------  Cervical radiculitis  Cervical spinal stenosis  Cervicalgia  Coronary arteriosclerosis  DDD (degenerative disc disease), cervical  Depression  Epilepsy, unspecified, not intractable, with status epilepticus  High cholesterol  Hypokalemia  Mild renal insufficiency  Myocardial infarction  Osteoporosis  Primary hypertension  Recurrent UTI  Unspecified macular degeneration     Past Surgical History:    Procedure Laterality Date    APPENDECTOMY      BACK SURGERY      cardiac stents      CARPAL TUNNEL RELEASE      CATARACT EXTRACTION Left     CHOLECYSTECTOMY      crainotomy      FOOT SURGERY Left     HYSTERECTOMY      LUNG REMOVAL, PARTIAL Right     LYMPH NODE DISSECTION         Review of patient's allergies indicates:   Allergen Reactions    Nitrofurantoin monohyd/m-cryst Hives    Penicillin Hives       No outpatient medications have been marked as taking for the 1/8/24 encounter (Office Visit) with Melody Ren MD.       Social History     Socioeconomic History    Marital status:    Tobacco Use    Smoking status: Former     Types: Cigarettes     Passive exposure: Past    Smokeless tobacco: Never   Substance and Sexual Activity    Alcohol use: Never    Drug use: Never    Sexual activity: Not Currently     Social Determinants of Health     Financial Resource Strain: Low Risk  (5/18/2023)    Overall Financial Resource Strain (CARDIA)     Difficulty of Paying Living Expenses: Not very hard   Food Insecurity: No Food Insecurity (5/18/2023)    Hunger Vital Sign     Worried About Running Out of Food in the Last Year: Never true     Ran Out of Food in the Last Year: Never true   Transportation Needs: Unmet Transportation Needs (5/18/2023)    PRAPARE - Transportation     Lack of Transportation (Medical): Yes     Lack of Transportation (Non-Medical): Yes   Physical Activity: Inactive (5/18/2023)    Exercise Vital Sign     Days of Exercise per Week: 0 days     Minutes of Exercise per Session: 0 min   Stress: No Stress Concern Present (5/18/2023)    [employer] [place] [employer] - Occupational Stress Questionnaire     Feeling of Stress : Not at all   Social Connections: Unknown (5/18/2023)    Social Connection and Isolation Panel [NHANES]     Frequency of Communication with Friends and Family: Once a week     Attends [employer] Services: Never     Active Member of Clubs or Organizations: No      "Attends Club or Organization Meetings: Never     Marital Status:    Housing Stability: Unknown (5/18/2023)    Housing Stability Vital Sign     Unable to Pay for Housing in the Last Year: No     Unstable Housing in the Last Year: No        Family History   Family history unknown: Yes        Subjective:       Review of Systems:    See HPI for details    Constitutional: Denies Change in appetite. Denies Chills. Denies Fever. Denies Night sweats.  Eye:  Denies Discharge. Denies Eye pain.  ENT: Denies Decreased hearing. Denies Sore throat. Denies Swollen glands.  Respiratory: Denies Cough. Denies Shortness of breath. Denies Shortness of breath with exertion. Denies Wheezing.  Cardiovascular: Denies Chest pain at rest. Denies Chest pain with exertion. Denies Irregular heartbeat. Denies Palpitations.  Gastrointestinal: Denies Abdominal pain. Denies Diarrhea. Reports Nausea. Denies Vomiting. Denies Hematemesis or Hematochezia.  Genitourinary: Denies Dysuria. Denies Urinary frequency. Denies Urinary urgency. Denies Blood in urine.  Endocrine: Denies Cold intolerance. Denies Excessive thirst. Denies Heat intolerance. Denies Weight loss. Denies Weight gain.  Musculoskeletal: Painful joints. Denies Weakness.  Integumentary: Denies Rash. Denies Itching. Denies Dry skin.  Neurologic: Denies Dizziness. Denies Fainting. Denies Headache.  Psychiatric: Denies Depression. Denies Anxiety. Denies Suicidal/Homicidal ideations.    All Other ROS: Negative except as stated in HPI.       Objective:     BP 96/67 (BP Location: Left arm, Patient Position: Sitting, BP Method: X-Large (Automatic))   Pulse 89   Ht 5' 1" (1.549 m)   Wt 56.2 kg (124 lb)   SpO2 99%   BMI 23.43 kg/m²     Physical Exam    General: Alert and oriented, No acute distress.  Head: Normocephalic, Atraumatic.  Eye: Blindness, Extraocular movements are intact, Sclera non-icteric.  Ears/Nose/Throat: Bilateral TMs with clear fluid behind TMs, NL light reflex, no " erythema, intact.   Neck/Thyroid: Supple, Non-tender, No carotid bruit, No palpable thyromegaly or thyroid nodule, No lymphadenopathy, No JVD, Full range of motion.  Respiratory: Clear to auscultation bilaterally; No wheezes, rales or rhonchi,Non-labored respirations, Symmetrical chest wall expansion.  Cardiovascular: Regular rate and rhythm, S1/S2 normal, No murmurs, rubs or gallops.  Gastrointestinal: Soft, Non-tender, Non-distended, Normal bowel sounds, No palpable organomegaly.  Musculoskeletal: Normal range of motion. Neck with mild TTP, no erythema, FROM, no edema.   Integumentary: Warm, Dry, Intact, No suspicious lesions or rashes.  Extremities: Wheelchair bound. No clubbing, cyanosis or edema  Neurologic: No focal deficits, Cranial Nerves II-XII are grossly intact, Motor strength normal upper and lower extremities, Sensory exam intact.  Psychiatric: Normal interaction, Coherent speech, Euthymic mood, Appropriate affect         Assessment:       ICD-10-CM ICD-9-CM   1. Primary hypertension  I10 401.9   2. Essential familial hyperlipidemia  E78.49 272.2   3. Anorexia nervosa, unspecified  F50.00 307.1   4. Septo-optic dysplasia of brain  Q04.4 742.4   5. Stage 3a chronic kidney disease  N18.31 585.3   6. Chronic obstructive pulmonary disease with (acute) exacerbation  J44.1 491.21   7. Nonintractable epilepsy with status epilepticus, unspecified epilepsy type  G40.901 345.90   8. Heart failure, unspecified HF chronicity, unspecified heart failure type  I50.9 428.9   9. 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 Exudative age-related macular degeneration, bilateral, with active choroidal neovascularization  H35.3231 362.52     362.16   11. Recurrent major depressive disorder, remission status unspecified  F33.9 296.30   12. Chronic nausea  R11.0 787.02   13. 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       Psychiatric    Recurrent major depressive disorder, remission status unspecified    Anorexia nervosa, unspecified       Ophtho    Exudative age-related macular degeneration, bilateral, with active choroidal neovascularization    Relevant Orders    Ambulatory referral/consult to Outpatient Case Management    Septo-optic dysplasia of brain       Pulmonary    Chronic obstructive pulmonary disease with (acute) exacerbation       Cardiac/Vascular    Primary hypertension - Primary    Relevant Orders    Ambulatory referral/consult to Outpatient Case Management    CBC Auto Differential    Comprehensive Metabolic Panel    Heart failure, unspecified HF chronicity, unspecified heart failure type       Renal/    Stage 3a chronic kidney disease     Other Visit Diagnoses       Essential familial hyperlipidemia        Relevant Orders    Ambulatory referral/consult to Outpatient Case Management    CK    Comprehensive Metabolic Panel    Lipid Panel    Chronic nausea        Relevant Medications    ondansetron (ZOFRAN-ODT) 4 MG TbDL    Recurrent UTI        Relevant Orders    Urinalysis, Reflex to Urine Culture    Ambulatory referral/consult to Urology         1. Primary hypertension  - Ambulatory referral/consult to Outpatient Case Management  - CBC Auto Differential; Future  - Comprehensive Metabolic Panel; Future  - BP is well controlled, continue current treatment plan. Continue followup with Cardiology as scheduled. Keep daily BP log. Notify M.D. or ER if BP >170/100 or <90/60, chest pain, palpitations, headache, SOB, temp greater than 100.4, or any acute illness.   Continue  Low Sodium Diet (DASH Diet - Less than 2 grams of sodium per day).  Monitor blood pressure daily and log. Report consistent numbers greater than  140/90.  Smoking cessation encouraged to aid in BP reduction.  Maintain healthy weight with goal BMI <30. Exercise 30 minutes per day, 5 days per week.      2. Essential familial hyperlipidemia  - Ambulatory referral/consult to Outpatient Case Management  - CK; Future  - Comprehensive Metabolic Panel; Future  - Lipid Panel; Future  - Continue current treatment plan, will treat pending results.   Continue  Stressed importance of dietary modifications. Follow a low cholesterol, low saturated fat diet with less that 200mg of cholesterol a day.  Avoid fried foods and high saturated fats (high saturated fats less than 7% of calories).  Add Flax Seed/Fish Oil supplements to diet. Increase dietary fiber.  Regular exercise can reduce LDL and raise HDL. Stressed importance of physical activity 5 times per week for 30 minutes per day.       3. Anorexia nervosa, unspecified  - Stable, continue current treatment plan.     4. Septo-optic dysplasia of brain  - Stable, continue current treatment plan, continue followup with Neurology as scheduled.     5. Stage 3a chronic kidney disease  - Stable, increase fluid intake, avoid NSAID's.     6. Chronic obstructive pulmonary disease with (acute) exacerbation  - Stable, continue current treatment plan.     7. Nonintractable epilepsy with status epilepticus, unspecified epilepsy type  - Stable, continue current treatment plan, continue followup with Neurology as scheduled.     8. Heart failure, unspecified HF chronicity, unspecified heart failure type  - Stable, continue current treatment plan, continue followup with Cardiology as scheduled.     9. 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     10. Exudative age-related macular degeneration, bilateral, with active choroidal neovascularization  - Ambulatory referral/consult to  Outpatient Case Management  - Stable, continue current treatment plan, continue home health.    11. Recurrent major depressive disorder, remission status unspecified  - Stable, continue current treatment plan.  Continue  Read positive daily meditations, avoid negative media, set healthy boundaries.  Exercise daily, keep consistent sleep pattern, eat a healthy diet.  Establish good social support, make changes to reduce stress.  Reports any symptoms of suicidal/homicidal ideations or self harm immediately, if clinic is closed go to nearest emergency room.    12. Chronic nausea  - Rx ondansetron (ZOFRAN-ODT) 4 MG TbDL; Take 1 tablet (4 mg total) by mouth every 8 (eight) hours as needed (nausea/vomiting).  Dispense: 30 tablet; Refill: 0 refilled today.   - Notify M.D. or ER if symptoms persist or worsen, abdominal pain, bloody stools, vomiting, temp >100.4, or any acute illness.      13. Recurrent UTI  - Urinalysis, Reflex to Urine Culture; Future  - Ambulatory referral/consult to Urology; Future for evaluation and treatment.  - Continue Cipro as prescribed until course is completed. UTI precautions encouraged. Notify M.D. or ER if symptoms persist or worsen, abdominal pain, hematuria, vomiting, temp >100.4, or any acute illness.          Lisseth was seen today for follow-up, hypertension and hyperlipidemia.    Diagnoses and all orders for this visit:    Primary hypertension  -     Ambulatory referral/consult to Outpatient Case Management  -     CBC Auto Differential; Future  -     Comprehensive Metabolic Panel; Future    Essential familial hyperlipidemia  -     Ambulatory referral/consult to Outpatient Case Management  -     CK; Future  -     Comprehensive Metabolic Panel; Future  -     Lipid Panel; Future    Anorexia nervosa, unspecified    Septo-optic dysplasia of brain    Stage 3a chronic kidney disease    Chronic obstructive pulmonary disease with (acute) exacerbation    Nonintractable epilepsy with status  epilepticus, unspecified epilepsy type    Heart failure, unspecified HF chronicity, unspecified heart failure type    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    Recurrent UTI  -     Urinalysis, Reflex to Urine Culture; Future  -     Ambulatory referral/consult to Urology; Future          Medication List with Changes/Refills   New Medications    ONDANSETRON (ZOFRAN-ODT) 4 MG TBDL    Take 1 tablet (4 mg total) by mouth every 8 (eight) hours as needed (nausea/vomiting).       Start Date: 1/8/2024  End Date: --   Current Medications    ALBUTEROL (PROVENTIL/VENTOLIN HFA) 90 MCG/ACTUATION INHALER    Inhale 2 puffs into the lungs every 6 (six) hours as needed for Wheezing. Rescue       Start Date: 7/20/2023 End Date: --    ALPHAGAN P 0.15 % OPHTHALMIC SOLUTION    Place 1 drop into both eyes 3 (three) times daily.       Start Date: 4/25/2022 End Date: --    ASPIRIN (ECOTRIN) 81 MG EC TABLET    Take 81 mg by mouth once daily.       Start Date: 7/14/2021 End Date: --    ATORVASTATIN (LIPITOR) 40 MG TABLET    TAKE 1 TABLET DAILY       Start Date: 10/11/2023End Date: --    BENZONATATE (TESSALON) 100 MG CAPSULE    Take 1 capsule (100 mg total) by mouth 3 (three) times daily as needed for Cough.       Start Date: 11/3/2023 End Date: --    BETAMETHASONE VALERATE 0.1% (VALISONE) 0.1 % CREA    Apply topically 2 (two) times daily as needed (rash).       Start Date: 5/11/2023 End Date: 5/25/2023    BRIMONIDINE 0.2% (ALPHAGAN) 0.2 % DROP    Place 1 drop into both eyes 3 (three) times daily.       Start  Date: 4/25/2022 End Date: --    CALCIUM CARBONATE/VITAMIN D3 (CALTRATE 600 + D ORAL)    Take 1 tablet by mouth 2 (two) times a day.       Start Date: 1/4/2022  End Date: --    CARBAMAZEPINE (CARBATROL) 100 MG CM12    Take 1 capsule (100 mg total) by mouth 2 (two) times a day.       Start Date: 11/8/2022 End Date: --    CIPROFLOXACIN HCL (CIPRO) 500 MG TABLET    Take 1 tablet (500 mg total) by mouth every 12 (twelve) hours. for 7 days       Start Date: 1/4/2024  End Date: 1/11/2024    COSOPT 22.3-6.8 MG/ML OPHTHALMIC SOLUTION    Place 1 drop into both eyes 2 (two) times daily.       Start Date: 4/25/2022 End Date: --    COSOPT, PF, 2-0.5 % DPET OPHTHALMIC SOLUTION    Place 1 drop into both eyes 2 (two) times daily.       Start Date: 7/10/2023 End Date: --    DICLOFENAC (FLECTOR) 1.3 % PT12    Apply 1 patch topically daily as needed (pain).       Start Date: 10/12/2023End Date: --    HYDROXYZINE (ATARAX) 10 MG TAB    Take 5 tablets (50 mg total) by mouth 3 (three) times daily as needed for Itching.       Start Date: 10/12/2023End Date: --    LORATADINE (CLARITIN) 10 MG TABLET    TAKE 1 TABLET ONCE DAILY FOR ALLERGIES/FLUID IN THE EARS       Start Date: 10/16/2023End Date: --    METOPROLOL SUCCINATE 25 MG CSPX    Take 25 mg by mouth once daily.       Start Date: 12/15/2022End Date: --    MICONAZOLE (MICOTIN) 2 % CREAM    Apply 1 g topically 2 (two) times daily.       Start Date: 6/3/2022  End Date: --    PHENOBARBITAL 32.4 MG TABLET    Take 1 tablet (32.4 mg total) by mouth every evening.       Start Date: 9/13/2023 End Date: 9/12/2024    PHENOBARBITAL 32.4 MG TABLET    Take 1 tablet (32.4 mg total) by mouth every evening.       Start Date: 9/13/2023 End Date: --    PHENYTOIN (DILANTIN) 100 MG ER CAPSULE    Take 1 capsule (100 mg total) by mouth 2 (two) times daily.       Start Date: 9/13/2023 End Date: 9/12/2024    XALATAN 0.005 % OPHTHALMIC SOLUTION    Place 1 drop into both eyes nightly.       Start Date: 4/25/2022  End Date: --          No follow-ups on file.

## 2024-01-08 NOTE — TELEPHONE ENCOUNTER
----- Message from Melody Ren MD sent at 1/8/2024 12:00 PM CST -----  Final urine culture confirms bacterial urinary tract infection, susceptible to Cipro, continue Cipro as prescribed until course is completed.

## 2024-01-09 ENCOUNTER — OUTPATIENT CASE MANAGEMENT (OUTPATIENT)
Dept: ADMINISTRATIVE | Facility: OTHER | Age: 84
End: 2024-01-09
Payer: MEDICARE

## 2024-01-09 ENCOUNTER — TELEPHONE (OUTPATIENT)
Dept: FAMILY MEDICINE | Facility: CLINIC | Age: 84
End: 2024-01-09
Payer: MEDICARE

## 2024-01-09 DIAGNOSIS — N39.0 RECURRENT UTI: Primary | ICD-10-CM

## 2024-01-09 NOTE — TELEPHONE ENCOUNTER
----- Message from Chevy Driscoll sent at 1/9/2024  4:25 PM CST -----  Type:  Needs Medical Advice    Who Called: Graciela at [facility]    Would the patient rather a call back or a response via [facility]? Call back   Best Call Back Number: [phone]  Additional Information: clarify date labs  are needed

## 2024-01-09 NOTE — TELEPHONE ENCOUNTER
----- Message from Chevy Driscoll sent at 1/9/2024  1:14 PM CST -----  Type:  Needs Medical Advice    Who Called: pt     Best Call Back Number:  [phone]  Additional Information: pt gets light headed when she's laying or sitting down , would like to know if that has any connection to UTI, asked for a call back.

## 2024-01-09 NOTE — PROGRESS NOTES
"Outpatient Care Management  Initial Patient Assessment    Patient: Lisseth Guajardo  MRN: [id number]  Date of Service: 01/09/2024  Completed by: Ana Bocanegra RN  Referral Date: 01/08/2024  Date of Eligibility: 1/8/2024  Program: High Risk  Status: Ongoing  Effective Dates: 1/9/2024 - present  Responsible Staff: Ana Bocanegra RN        Reason for Visit   Patient presents with    OPCM Enrollment Call    Nursing Assessment    OPCM Chart Review       Brief Summary:  Lisseth Guajardo was referred by PCP Dr. Ren for resource assistance for transportation, caregiver support and prescription assistance/ medicaiton. Patient qualifies for program based on 72.5%.   Active problem list, medical, surgical and social history reviewed. Active comorbidities include HTN, HLD, COPD, CAD, Epilepsy, CHF/CAD, majory depression, AMD. Areas of need identified by patient include assistance with independence due to blindness, RN identified depression and social isolation. Pt denies "depression" stating "I have dealt with disabilities my whole life and I just deal with it.  I'm not depressed- I'm angry".     Care plan created with pt to address coping skills related to adjustment to blindness; address pt's anger/depression re: her move to [place] and discontent with living situation.    .   Next steps: Pt agrees to follow up call on or around 1/16/2024 to assess pt's needs.   Mail ACP handouts.  Outreach to [facility] in [place] for resources       Disability Status  Is the patient alert and oriented (person, place, time, and situation)?: Alert and oriented x 4  Hearing Difficulty or Deaf: no  Visual Difficulty or Blind: yes  Visual and Hearing Needs Conclusion: Pt is legally blind- AMD: denies hearing deficits  Vision Management: Other (pt is legally blind)  Difficulty Concentrating, Remembering or Making Decisions: no  Communication Difficulty: no  Eating/Swallowing Difficulty: no  Walking or Climbing " Stairs Difficulty: yes  Walking or Climbing Stairs: ambulation difficulty, dependent; stair climbing difficulty, dependent  Mobility Management: pt in wheelchair at all times; left side paralysis since birth  Dressing/Bathing Difficulty: yes  Dressing/Bathing: bathing difficulty, assistance 1 person; dressing difficulty, assistance 1 person  Dressing/Bathing Management: pt's  assists with dressing; [facility] staff bathes 3 times weekly  Toileting : Assistance Required  Continence : Incontience - Bladder  Difficulty Managing Errands Independently: yes  Errands Management: Pt blind and left side paralysis- unable to drive - brother goes grocery shopping for them.  Equipment Currently Used at Home: wheelchair  ADL Conclusion Statement: Pt  able to dress herself; [facility] provides meals. transporation to dr appts and bathes 3 times weekly as well as housekeeping weekly. Pt's  assists with pt needs as well.  Change in Functional Status Since Onset of Current Illness/Injury: no        Spiritual Beliefs  Spiritual, Cultural Beliefs, Mosque Practices, Values that Affect Care: no      Social History     Socioeconomic History    Marital status:    Tobacco Use    Smoking status: Former     Types: Cigarettes     Passive exposure: Past    Smokeless tobacco: Never   Substance and Sexual Activity    Alcohol use: Never    Drug use: Never    Sexual activity: Not Currently     Social Determinants of Health     Financial Resource Strain: Low Risk  (1/9/2024)    Overall Financial Resource Strain (CARDIA)     Difficulty of Paying Living Expenses: Not very hard   Food Insecurity: No Food Insecurity (1/9/2024)    Hunger Vital Sign     Worried About Running Out of Food in the Last Year: Never true     Ran Out of Food in the Last Year: Never true   Transportation Needs: Unmet Transportation Needs (1/9/2024)    PRAPARE - Transportation     Lack of Transportation (Medical): No     Lack of Transportation (Non-Medical): Yes    Physical Activity: Inactive (1/9/2024)    Exercise Vital Sign     Days of Exercise per Week: 0 days     Minutes of Exercise per Session: 0 min   Stress: No Stress Concern Present (1/9/2024)    [employer] [place] [employer] - Occupational Stress Questionnaire     Feeling of Stress : Only a little   Social Connections: Socially Isolated (1/9/2024)    Social Connection and Isolation Panel [NHANES]     Frequency of Communication with Friends and Family: Once a week     Frequency of Social Gatherings with Friends and Family: Once a week     Attends Lutheran Services: Never     Active Member of Clubs or Organizations: No     Attends Club or Organization Meetings: Never     Marital Status:    Housing Stability: Low Risk  (1/9/2024)    Housing Stability Vital Sign     Unable to Pay for Housing in the Last Year: No     Number of Places Lived in the Last Year: 1     Unstable Housing in the Last Year: No       Roles and Relationships  Primary Source of Support/Comfort: spouse  Name of Support/Comfort Primary Source: Jluian Guajardo-       Advance Directives (For Healthcare)  Advance Directive  (If Adv Dir status is received, view document under Adv Dir in header or Chart Review Media tab): Patient does not have Advance Directive, requests information.  Patient Requests Assistance: Handouts provided        Patient Reported Insurance  Verified current insurance plan:: Medicare; Other (see comment) ( for life)            1/9/2024    11:51 AM 1/8/2024     7:57 AM 6/29/2023     8:06 AM 5/11/2023     8:17 AM 1/31/2023     8:31 AM 6/21/2022     9:55 AM   Depression Patient Health Questionnaire   Over the last two weeks how often have you been bothered by little interest or pleasure in doing things Not at all Not at all Several days Not at all Not at all Not at all   Over the last two weeks how often have you been bothered by feeling down, depressed or hopeless Not at all Not at all Several days Not  at all Not at all Not at all   PHQ-2 Total Score 0 0 2 0 0 0       Learning Assessment       01/09/2024 1401 [facility] (1/9/2024 - Present)   Created by Ana Bocanegra RN -  (Nurse) Status: Complete                 PRIMARY LEARNER     Primary Learner Name:  Lisseth Guajardo TD - 01/09/2024 1401    Relationship:  Patient TD - 01/09/2024 1401    Does the primary learner have any barriers to learning?:  Visual TD - 01/09/2024 1401    Comment: pt is blind     What is the preferred language of the primary learner?:  English TD - 01/09/2024 1401    Is an  required?:  No TD - 01/09/2024 1401    How does the primary learner prefer to learn new concepts?:  Listening TD - 01/09/2024 1401    How often do you need to have someone help you read instructions, pamphlets, or written material from your doctor or pharmacy?:  Always TD - 01/09/2024 1401        CO-LEARNER #1     No question answered        CO-LEARNER #2     No question answered        SPECIAL TOPICS     No question answered        ANSWERED BY:     -:  Patient TD - 01/09/2024 1401        Comments     Pt states  will assist with reading materials when needed         Edit History       Ana Bocanegra, RN -  (Nurse)   01/09/2024 1401

## 2024-01-09 NOTE — LETTER
January 9, 2024             Dear Mrs. Guajardo,    Welcome to [facility] Complex Care Management Program.  It was a pleasure talking with you today.  My name is Ana Bocanegra, and I look forward to being your Care Manager.  My goal is to help you function at the healthiest and highest level possible.  You can contact me directly at [phone].    As an [facility] patient, some of the services we may be able to provide include:     Development of an individualized care plan with a Registered Nurse   Connection with a   Connection with available resources and services    Coordinate communication among your care team members   Provide coaching and education   Help you understand your doctors treatment plan  Help you obtain information about your insurance coverage.     All services provided by [facility] Complex Care Managers and other care team members are coordinated with and communicated to your primary care team.      As part of your enrollment, you will be receiving education materials and more information about these services in your [facility] account, by phone or through the mail.  If you do not wish to participate or receive information, please contact our office at [phone].      Sincerely,        Ana Bocanegra, RN  [facility]   Out-patient RN Complex Care Manager

## 2024-01-10 ENCOUNTER — TELEPHONE (OUTPATIENT)
Dept: FAMILY MEDICINE | Facility: CLINIC | Age: 84
End: 2024-01-10
Payer: MEDICARE

## 2024-01-10 NOTE — TELEPHONE ENCOUNTER
----- Message from Armando Rojas sent at 1/10/2024 11:48 AM CST -----  .Type:  Needs Medical Advice    Who Called: pt  Symptoms (please be specific): light headedness   How long has patient had these symptoms:    Pharmacy name and phone #:    Would the patient rather a call back or a response via [facility]? Call back  Best Call Back Number: [phone]  Additional Information: pt calling ab pout message from yesterday about lightheadedness

## 2024-01-11 ENCOUNTER — TELEPHONE (OUTPATIENT)
Dept: FAMILY MEDICINE | Facility: CLINIC | Age: 84
End: 2024-01-11
Payer: MEDICARE

## 2024-01-11 NOTE — TELEPHONE ENCOUNTER
----- Message from Vinny Chatterjee sent at 1/11/2024  1:03 PM CST -----  .Type:  Needs Medical Advice    Who Called: Lisseth  Symptoms (please be specific):    How long has patient had these symptoms:    Pharmacy name and phone #:    Would the patient rather a call back or a response via [facility]?   Best Call Back Number: [phone]  Additional Information: She needs to know the results of her UTI she cannot get anyone to call her back she told me.

## 2024-01-23 ENCOUNTER — LAB REQUISITION (OUTPATIENT)
Dept: LAB | Facility: HOSPITAL | Age: 84
End: 2024-01-23
Payer: MEDICARE

## 2024-01-23 ENCOUNTER — OUTPATIENT CASE MANAGEMENT (OUTPATIENT)
Dept: ADMINISTRATIVE | Facility: OTHER | Age: 84
End: 2024-01-23
Payer: MEDICARE

## 2024-01-23 DIAGNOSIS — L30.9 DERMATITIS, UNSPECIFIED: ICD-10-CM

## 2024-01-23 LAB
BASOPHILS # BLD AUTO: 0.09 X10(3)/MCL
BASOPHILS NFR BLD AUTO: 1.5 %
EOSINOPHIL # BLD AUTO: 0.17 X10(3)/MCL (ref 0–0.9)
EOSINOPHIL NFR BLD AUTO: 2.9 %
ERYTHROCYTE [DISTWIDTH] IN BLOOD BY AUTOMATED COUNT: 14.3 % (ref 11.5–17)
HCT VFR BLD AUTO: 42.5 % (ref 37–47)
HGB BLD-MCNC: 13.8 G/DL (ref 12–16)
IMM GRANULOCYTES # BLD AUTO: 0.02 X10(3)/MCL (ref 0–0.04)
IMM GRANULOCYTES NFR BLD AUTO: 0.3 %
LYMPHOCYTES # BLD AUTO: 1.44 X10(3)/MCL (ref 0.6–4.6)
LYMPHOCYTES NFR BLD AUTO: 24.7 %
MCH RBC QN AUTO: 30.5 PG (ref 27–31)
MCHC RBC AUTO-ENTMCNC: 32.5 G/DL (ref 33–36)
MCV RBC AUTO: 94 FL (ref 80–94)
MONOCYTES # BLD AUTO: 0.62 X10(3)/MCL (ref 0.1–1.3)
MONOCYTES NFR BLD AUTO: 10.6 %
NEUTROPHILS # BLD AUTO: 3.5 X10(3)/MCL (ref 2.1–9.2)
NEUTROPHILS NFR BLD AUTO: 60 %
NRBC BLD AUTO-RTO: 0 %
PLATELET # BLD AUTO: 161 X10(3)/MCL (ref 130–400)
PMV BLD AUTO: 12.8 FL (ref 7.4–10.4)
RBC # BLD AUTO: 4.52 X10(6)/MCL (ref 4.2–5.4)
WBC # SPEC AUTO: 5.84 X10(3)/MCL (ref 4.5–11.5)

## 2024-01-23 PROCEDURE — 85025 COMPLETE CBC W/AUTO DIFF WBC: CPT | Performed by: FAMILY MEDICINE

## 2024-01-23 PROCEDURE — 85025 COMPLETE CBC W/AUTO DIFF WBC: CPT

## 2024-01-23 NOTE — PROGRESS NOTES
1/23/2024- 1st attempt to complete follow-up for Outpatient Care Management: Left message for patient requesting a return call. Will attempt to contact patient again at a later date. Unable to reach letter mailed to patient.

## 2024-01-23 NOTE — LETTER
Lisseth Guajardo  [address]  [place] [address]      Dear Lisseth Guajardo,     I am your nurse with [facility] Outpatient Care Management Department. I have been unsuccessful at reaching you since we spoke on 1/9/2024.  At your earliest convenience, I would like to discuss your healthcare progress.      Please contact me at [phone] from 8:00AM to 4:30 PM on Monday thru Friday.     As you know, [facility] is a program offered to you through [facility] where a nurse is available 24/7 to answer questions or provide medical advice, their number is [phone].    Thanks,      Ana Bocanegra, RN  Outpatient Care Management

## 2024-01-24 ENCOUNTER — TELEPHONE (OUTPATIENT)
Dept: FAMILY MEDICINE | Facility: CLINIC | Age: 84
End: 2024-01-24
Payer: MEDICARE

## 2024-01-24 DIAGNOSIS — E78.49 ESSENTIAL FAMILIAL HYPERLIPIDEMIA: Primary | ICD-10-CM

## 2024-01-24 NOTE — TELEPHONE ENCOUNTER
----- Message from Kristine King sent at 1/23/2024  4:54 PM CST -----  .Type:  Patient Returning Call    Who Called:Diana [facility]  Who Left Message for Patient:Diana  Does the patient know what this is regarding?:Labs  Would the patient rather a call back or a response via [facility]?   Best Call Back Number:[phone]  Additional Information: Please call back about labs

## 2024-01-24 NOTE — TELEPHONE ENCOUNTER
Spoke with lab about blood draw, CMP and Lipid panel need to be redrawn due to hemolysis.  Labs reordered and faxed to [facility]

## 2024-01-30 DIAGNOSIS — R56.9 SEIZURES: ICD-10-CM

## 2024-01-30 RX ORDER — PHENYTOIN SODIUM 100 MG/1
100 CAPSULE, EXTENDED RELEASE ORAL 2 TIMES DAILY
Qty: 180 CAPSULE | Refills: 3 | Status: SHIPPED | OUTPATIENT
Start: 2024-01-30 | End: 2024-02-06 | Stop reason: SDUPTHER

## 2024-01-30 NOTE — TELEPHONE ENCOUNTER
----- Message from Vinny Chatterjee sent at 1/30/2024  1:55 PM CST -----  .Type:  RX Refill Request    Who Called: Lisseth  Refill or New Rx: Refill   RX Name and Strength: phenytoin (DILANTIN) 100 MG ER capsule  How is the patient currently taking it? (ex. 1XDay):  Is this a 30 day or 90 day RX:  Preferred Pharmacy with phone number: [facility]   Local or Mail Order: Local   Ordering Provider: Mikal  Would the patient rather a call back or a response via [facility]?   Best Call Back Number: [phone]  Additional Information: Please call her back with any questions.

## 2024-01-31 ENCOUNTER — LAB REQUISITION (OUTPATIENT)
Dept: LAB | Facility: HOSPITAL | Age: 84
End: 2024-01-31
Payer: MEDICARE

## 2024-01-31 ENCOUNTER — TELEPHONE (OUTPATIENT)
Dept: FAMILY MEDICINE | Facility: CLINIC | Age: 84
End: 2024-01-31
Payer: MEDICARE

## 2024-01-31 DIAGNOSIS — L30.9 DERMATITIS, UNSPECIFIED: ICD-10-CM

## 2024-01-31 LAB
AMORPH URATE CRY URNS QL MICRO: ABNORMAL /UL
APPEARANCE UR: ABNORMAL
BACTERIA #/AREA URNS AUTO: ABNORMAL /HPF
BILIRUB UR QL STRIP.AUTO: ABNORMAL
CAOX CRY URNS QL MICRO: ABNORMAL /HPF
COLOR UR AUTO: YELLOW
GLUCOSE UR QL STRIP.AUTO: NEGATIVE
KETONES UR QL STRIP.AUTO: NEGATIVE
LEUKOCYTE ESTERASE UR QL STRIP.AUTO: ABNORMAL
MUCOUS THREADS URNS QL MICRO: ABNORMAL /LPF
NITRITE UR QL STRIP.AUTO: NEGATIVE
PH UR STRIP.AUTO: 6.5 [PH]
PROT UR QL STRIP.AUTO: ABNORMAL
RBC #/AREA URNS AUTO: ABNORMAL /HPF
RBC UR QL AUTO: ABNORMAL
SP GR UR STRIP.AUTO: 1.02 (ref 1–1.03)
SQUAMOUS #/AREA URNS LPF: ABNORMAL /HPF
UROBILINOGEN UR STRIP-ACNC: 0.2
WBC #/AREA URNS AUTO: ABNORMAL /HPF

## 2024-01-31 PROCEDURE — 81001 URINALYSIS AUTO W/SCOPE: CPT | Performed by: FAMILY MEDICINE

## 2024-01-31 PROCEDURE — 87086 URINE CULTURE/COLONY COUNT: CPT | Performed by: FAMILY MEDICINE

## 2024-01-31 NOTE — TELEPHONE ENCOUNTER
Digital Medicine: Clinician Follow-Up    Patient returned my call for follow up. He is doing well with no complaints. He confirms taking losartan/HCTZ 100/12.5 mg daily.     The history is provided by the patient.   Follow-up reason(s): routine follow up.     Patient is not experiencing signs/symptoms of hypotension.        Last 5 Patient Entered Readings                                      Current 30 Day Average: 122/65     Recent Readings 7/11/2020 7/11/2020 7/4/2020 7/4/2020 6/30/2020    SBP (mmHg) 118 118 117 117 120    DBP (mmHg) 60 77 59 59 68    Pulse 77 74 74 74 66               Depression Screening  Did not address depression screening.    Sleep Apnea Screening    Did not address sleep apnea screening.     Medication Affordability Screening  Did not address medication affordability screening.     Medication Adherence-Medication adherence was assessed.          ASSESSMENT(S)  Patients BP average is 122/65 mmHg, which is at goal. Patient's BP goal is less than or equal to 130/80 per 2017 ACC/AHA Hypertension Guidelines.      PLAN  Labs ordered: Na+ on last labs 134. Reviewed s/sx of hyponatremia and told patient to report Expected Lab Date: 1/13/2021  Continue current therapy:  Continue current diet/physical activity routine:    Patient verbalizes understanding. Patient did not express questions or concerns and patient has contact information if needed.          There are no preventive care reminders to display for this patient.      Hypertension Medications     losartan-hydrochlorothiazide 100-12.5 mg (HYZAAR) 100-12.5 mg Tab Take 1 tablet by mouth every morning.                 Priya with [facility] called to inform that [place] was going to the facility to evaluate patient.

## 2024-01-31 NOTE — TELEPHONE ENCOUNTER
----- Message from Vinny Chatterjee sent at 1/31/2024  8:58 AM CST -----  .Type:  Needs Medical Advice    Who Called: [facility]   Symptoms (please be specific):    How long has patient had these symptoms:    Pharmacy name and phone #:    Would the patient rather a call back or a response via [facility]?   Best Call Back Number: [phone]  Additional Information: Requested a call back today she wanted to let her know how Ms. Montanez was acting today.

## 2024-01-31 NOTE — TELEPHONE ENCOUNTER
----- Message from Vinny Chatterjee sent at 1/31/2024 10:52 AM CST -----  .Type:  Needs Medical Advice    [facility]   Symptoms (please be specific):    How long has patient had these symptoms:    Pharmacy name and phone #:    Would the patient rather a call back or a response via [facility]?   Best Call Back Number: [phone]  Additional Information: Requested a call back form the nurse she wanted to give her an update on Ms. Montanez.

## 2024-01-31 NOTE — TELEPHONE ENCOUNTER
Home health nurse called to report that she visited patient this morning and states that patient is very paranoid, agitated and as reported by staff has been acting out this way for 3 weeks, more since her spouse has been hospitalized. Is asking for advise. She did collect urine and took to lab. Please advise.

## 2024-02-02 ENCOUNTER — DOCUMENTATION ONLY (OUTPATIENT)
Dept: FAMILY MEDICINE | Facility: CLINIC | Age: 84
End: 2024-02-02
Payer: MEDICARE

## 2024-02-02 LAB — BACTERIA UR CULT: NORMAL

## 2024-02-05 ENCOUNTER — OUTPATIENT CASE MANAGEMENT (OUTPATIENT)
Dept: ADMINISTRATIVE | Facility: OTHER | Age: 84
End: 2024-02-05

## 2024-02-05 NOTE — PROGRESS NOTES
Outpatient Care Management  Plan of Care Follow Up Visit    Patient: Lisseth Guajardo  MRN: [id number]  Date of Service: 02/05/2024  Completed by: Ana Bocanegra RN  Referral Date: 01/08/2024    Reason for Visit   Patient presents with    OPCM RN Follow Up Call       Brief Summary: OPCM follow up call completed with pt. Continued education on management of depression.     Next Steps: Pt agrees to follow up call on or around 2/19/2024 to assess needs and continue education.

## 2024-02-06 DIAGNOSIS — R56.9 SEIZURES: ICD-10-CM

## 2024-02-06 RX ORDER — PHENYTOIN SODIUM 100 MG/1
100 CAPSULE, EXTENDED RELEASE ORAL 2 TIMES DAILY
Qty: 16 CAPSULE | Refills: 0 | Status: SHIPPED | OUTPATIENT
Start: 2024-02-06 | End: 2024-05-22 | Stop reason: SDUPTHER

## 2024-02-06 NOTE — TELEPHONE ENCOUNTER
----- Message from Yuko Garcia sent at 2/6/2024 12:40 PM CST -----  .Type:  Needs Medical Advice    Who Called: pt  Symptoms (please be specific):    How long has patient had these symptoms:    Pharmacy name and phone #:  [facility] [address]    Would the patient rather a call back or a response via [facility]?   Best Call Back Number: [phone]  Additional Information: pt would like her phenytoin (DILANTIN) 100 MG ER capsule sent to [facility] - [address] [place] [address]  A weeks supply until her meds are delivered

## 2024-02-12 ENCOUNTER — TELEPHONE (OUTPATIENT)
Dept: FAMILY MEDICINE | Facility: CLINIC | Age: 84
End: 2024-02-12
Payer: MEDICARE

## 2024-02-12 NOTE — TELEPHONE ENCOUNTER
----- Message from Sanna Henley sent at 2/12/2024  1:02 PM CST -----  Regarding: [facility]  Soledad with [facility] called to provide an update on patient and ask some other questions. She is requesting a call back at [phone].

## 2024-02-12 NOTE — TELEPHONE ENCOUNTER
Soledad with [facility] called reporting that last week the patient was acting up again being paranoid about her medications.  Rowan went there to evaluate her again but did not feel that she qualified for inpatient.  She also reported that they tried several time to do a [place] puncture without success but will try to take her to lab tasha.

## 2024-02-19 DIAGNOSIS — R56.9 SEIZURES: ICD-10-CM

## 2024-02-19 DIAGNOSIS — R56.9 UNSPECIFIED CONVULSIONS: ICD-10-CM

## 2024-02-19 RX ORDER — PHENOBARBITAL 32.4 MG/1
32.4 TABLET ORAL NIGHTLY
Qty: 90 TABLET | Refills: 3 | Status: SHIPPED | OUTPATIENT
Start: 2024-02-19 | End: 2024-02-21 | Stop reason: SDUPTHER

## 2024-02-19 RX ORDER — PHENOBARBITAL 32.4 MG/1
32.4 TABLET ORAL NIGHTLY
Qty: 90 TABLET | Refills: 3 | Status: SHIPPED | OUTPATIENT
Start: 2024-02-19 | End: 2024-02-19 | Stop reason: SDUPTHER

## 2024-02-19 NOTE — TELEPHONE ENCOUNTER
----- Message from Pita Alex sent at 2/19/2024 12:23 PM CST -----  Regarding: REFILL REQUESTS  .Type:  RX Refill Request    Who Called: PT    Refill or New Rx:REFILL    RX Name and Strength:PHENobarbitaL 32.4 MG tablet 90 tablet 3 9/13/2023 9/12/2024 No  Sig - Route: Take 1 tablet (32.4 mg total) by mouth every evening. - Oral    metoprolol succinate 25 mg CSpX 90 capsule 3 12/15/2022 - No  Sig - Route: Take 25 mg by mouth once daily. - Oral      How is the patient currently taking it? (ex. 1XDay):N/A    Is this a 30 day or 90 day RX:N/A    Preferred Pharmacy with phone number:[address] [place] [address]   Phone: [phone]  Fax: [phone]        Local or Mail Order:LOCAL    Ordering Provider:DR. YA    Would the patient rather a call back or a response via [facility]? CB TO CONFIRM    Best Call Back Number:[phone]    Additional Information: PT STATES THAT SHE IS OUT OF MEDICATIONS; SHE HAS NOT TAKEN ANY FOR THE PAST 2-3 DAYS. PLEASE SEND IN AS SOON AS POSSIBLE & CONFIRM W/ PT. THANKS.

## 2024-02-21 DIAGNOSIS — R56.9 SEIZURES: ICD-10-CM

## 2024-02-21 RX ORDER — PHENOBARBITAL 32.4 MG/1
32.4 TABLET ORAL NIGHTLY
Qty: 90 TABLET | Refills: 3 | Status: SHIPPED | OUTPATIENT
Start: 2024-02-21 | End: 2024-04-26

## 2024-02-21 NOTE — TELEPHONE ENCOUNTER
----- Message from Pita Johnson sent at 2/20/2024  3:42 PM CST -----  Regarding: PHARMACY CALL  .Type:  Pharmacy Call    Who Called:[facility]      Does the patient know what this is regarding?:Prescription    Would the patient rather a call back or a response via [facility]? TO    Best Call Back Number:[phone]    Additional Information: Pharmacy called stating that the Home Health company informed that the prescription was sent over but they have not received the prescription; medication was sent to wrong pharmacy; please reroute; thanks.        PHENobarbitaL 32.4 MG tablet 90 tablet 3 2/19/2024 - No  Sig - Route: Take 1 tablet (32.4 mg total) by mouth every evening. - Oral  Sent to pharmacy as: PHENobarbitaL 32.4 MG tablet  Class: Normal  Notes to Pharmacy: Pt out of medication and needs refill until meds come in from [email or web address]  Order: [id number]  Date/Time Signed: 2/19/2024 13:18      E-Prescribing Status: Receipt confirmed by pharmacy (2/19/2024  1:18 PM CST)    Pharmacy      [facility] - [address]

## 2024-02-22 ENCOUNTER — TELEPHONE (OUTPATIENT)
Dept: PRIMARY CARE CLINIC | Facility: CLINIC | Age: 84
End: 2024-02-22
Payer: MEDICARE

## 2024-02-22 RX ORDER — PHENYTOIN SODIUM 100 MG/1
100 CAPSULE, EXTENDED RELEASE ORAL 2 TIMES DAILY
Qty: 16 CAPSULE | Refills: 0 | Status: SHIPPED | OUTPATIENT
Start: 2024-02-22

## 2024-02-22 NOTE — TELEPHONE ENCOUNTER
----- Message from Pita Johnson sent at 2/21/2024  2:37 PM CST -----  Regarding: Medication needed  .Type:  Patient Request    Who Called: Graciela cm/ [facility]      Pharmacy name and phone #:  [facility] [address] [place] [address]   Phone: [phone]  Fax: [phone]        Would the patient rather a call back or a response via [facility]?     Best Call Back Number: [phone]    Additional Information: Graciela is calling to check on the status of the prescription refill; she states that pt is completely out of the medication and needs it as soon as possible; she states that the pharmacy has called, but no refill has been sent in. [facility] 10 day to arrive in mail. Please advise.        PHENobarbitaL 32.4 MG tablet 90 tablet 3 2/19/2024 - No  Sig - Route: Take 1 tablet (32.4 mg total) by mouth every evening. - Oral

## 2024-02-24 LAB
CHOLESTEROL (LIPID PANEL): 163
HDLC SERPL-MCNC: 38 MG/DL
LDLC SERPL CALC-MCNC: 71 MG/DL
TRIGL SERPL-MCNC: 270 MG/DL

## 2024-02-26 ENCOUNTER — TELEPHONE (OUTPATIENT)
Dept: FAMILY MEDICINE | Facility: CLINIC | Age: 84
End: 2024-02-26
Payer: MEDICARE

## 2024-02-26 NOTE — TELEPHONE ENCOUNTER
Patient was slurring stating something about bruises on her arm and she spoke to the Home Health Nurse about it. Not able to understand rest of conversation.

## 2024-02-26 NOTE — TELEPHONE ENCOUNTER
----- Message from Chevy Driscoll sent at 2/21/2024 11:24 AM CST -----  Type:  Needs Medical Advice    Who Called: pt   Best Call Back Number:  [phone]  Additional Information: pt called to speak to nurse about assisted living arrangements , asked for a call back .

## 2024-02-27 ENCOUNTER — TELEPHONE (OUTPATIENT)
Dept: FAMILY MEDICINE | Facility: CLINIC | Age: 84
End: 2024-02-27
Payer: MEDICARE

## 2024-02-27 NOTE — TELEPHONE ENCOUNTER
----- Message from Yuko Garcia sent at 2/27/2024  2:33 PM CST -----  .Type:  Needs Medical Advice    Who Called: pt sister  Symptoms (please be specific): acting out    How long has patient had these symptoms:  month   Pharmacy name and phone #:  [facility] - [place] [address] [place] [address]  Would the patient rather a call back or a response via [facility]? Call back   Best Call Back Number: [phone]  Additional Information: pt needs meds she's freaking out please call sister

## 2024-02-28 ENCOUNTER — OUTPATIENT CASE MANAGEMENT (OUTPATIENT)
Dept: ADMINISTRATIVE | Facility: OTHER | Age: 84
End: 2024-02-28

## 2024-02-29 ENCOUNTER — TELEPHONE (OUTPATIENT)
Dept: FAMILY MEDICINE | Facility: CLINIC | Age: 84
End: 2024-02-29
Payer: MEDICARE

## 2024-02-29 ENCOUNTER — DOCUMENTATION ONLY (OUTPATIENT)
Dept: FAMILY MEDICINE | Facility: CLINIC | Age: 84
End: 2024-02-29
Payer: MEDICARE

## 2024-03-10 NOTE — TELEPHONE ENCOUNTER
----- Message from Murtaza Sandoval MA sent at 6/22/2022  1:49 PM CDT -----  Regarding: FW: med advice    ----- Message -----  From: Alize Adan  Sent: 6/22/2022   1:40 PM CDT  To: Mikal VAZQUEZ Staff  Subject: med advice                                       .Type:  Needs Medical Advice    Who Called: Pt  Symptoms (please be specific): Wax build up   How long has patient had these symptoms:  NA  [facility] [place] [place] [facility] [address] [place] [address] [address]  Would the patient rather a call back or a response via [facility]?Call back  Best Call Back Number: [phone]  Additional Information: Calling in regards to wax build up in ears, wants something called in..           500

## 2024-03-15 NOTE — PROGRESS NOTES
Outpatient Care Management  Plan of Care Follow Up Visit    Patient: Lisseth Guajardo  MRN: [id number]  Date of Service: 02/28/2024  Completed by: Ana Bocanegra RN  Referral Date: 01/08/2024    Reason for Visit   Patient presents with    OPCM RN Follow Up Call       Brief Summary: OPCM follow up call completed with pt. Continued education on management of depression and medication adherence.     Next Steps: Pt agrees to follow up call to discuss depression symptoms on or around 3/13/2024

## 2024-03-20 ENCOUNTER — TELEPHONE (OUTPATIENT)
Dept: FAMILY MEDICINE | Facility: CLINIC | Age: 84
End: 2024-03-20
Payer: MEDICARE

## 2024-03-20 NOTE — TELEPHONE ENCOUNTER
----- Message from Vinny Chatterjee sent at 3/20/2024 11:15 AM CDT -----  .Type:  Needs Medical Advice    Who Called: Lisseth   Symptoms (please be specific):    How long has patient had these symptoms:    Pharmacy name and phone #:    Would the patient rather a call back or a response via [facility]?   Best Call Back Number: [phone]  Additional Information: She needs to speak with the nurse she was in the ER last Sunday severe jaw pain and she has brittle bone along with severe constipation.  Please call her back.

## 2024-03-20 NOTE — TELEPHONE ENCOUNTER
Pt was asking if she needed a hospital follow up being she was seen at the ER. I stated if she was feeling better and not having any complication then we would not need to see her at this time. She voiced understanding and thanks

## 2024-04-02 ENCOUNTER — TELEPHONE (OUTPATIENT)
Dept: FAMILY MEDICINE | Facility: CLINIC | Age: 84
End: 2024-04-02
Payer: MEDICARE

## 2024-04-02 ENCOUNTER — LAB REQUISITION (OUTPATIENT)
Dept: LAB | Facility: HOSPITAL | Age: 84
End: 2024-04-02
Payer: MEDICARE

## 2024-04-02 DIAGNOSIS — N39.0 URINARY TRACT INFECTION, SITE NOT SPECIFIED: ICD-10-CM

## 2024-04-02 DIAGNOSIS — R39.9 UTI SYMPTOMS: Primary | ICD-10-CM

## 2024-04-02 LAB
APPEARANCE UR: ABNORMAL
BACTERIA #/AREA URNS AUTO: ABNORMAL /HPF
BILIRUB UR QL STRIP.AUTO: ABNORMAL
COLOR UR AUTO: YELLOW
GLUCOSE UR QL STRIP.AUTO: NEGATIVE
KETONES UR QL STRIP.AUTO: ABNORMAL
LEUKOCYTE ESTERASE UR QL STRIP.AUTO: ABNORMAL
NITRITE UR QL STRIP.AUTO: NEGATIVE
PH UR STRIP.AUTO: 6.5 [PH]
PROT UR QL STRIP.AUTO: >=300
RBC #/AREA URNS AUTO: ABNORMAL /HPF
RBC UR QL AUTO: ABNORMAL
SP GR UR STRIP.AUTO: 1.02 (ref 1–1.03)
SQUAMOUS #/AREA URNS AUTO: ABNORMAL /HPF
UROBILINOGEN UR STRIP-ACNC: 1
WBC #/AREA URNS AUTO: >100 /HPF

## 2024-04-02 PROCEDURE — 87086 URINE CULTURE/COLONY COUNT: CPT | Performed by: FAMILY MEDICINE

## 2024-04-02 PROCEDURE — 87077 CULTURE AEROBIC IDENTIFY: CPT | Performed by: FAMILY MEDICINE

## 2024-04-02 PROCEDURE — 81003 URINALYSIS AUTO W/O SCOPE: CPT | Performed by: FAMILY MEDICINE

## 2024-04-02 NOTE — TELEPHONE ENCOUNTER
----- Message from Yuko Garcia sent at 4/2/2024  8:28 AM CDT -----  .Type:  Needs Medical Advice    Who Called: Janelle Hartmann  Symptoms (please be specific): burning with urination    How long has patient had these symptoms:  4 days  Pharmacy name and phone #:  [facility] - [address] [place] [address]  Would the patient rather a call back or a response via [facility]? Call back   Best Call Back Number: [phone]  Additional Information: Ashley would like orders to do a urine test at home fax [phone]

## 2024-04-03 ENCOUNTER — TELEPHONE (OUTPATIENT)
Dept: FAMILY MEDICINE | Facility: CLINIC | Age: 84
End: 2024-04-03
Payer: MEDICARE

## 2024-04-03 ENCOUNTER — OUTPATIENT CASE MANAGEMENT (OUTPATIENT)
Dept: ADMINISTRATIVE | Facility: OTHER | Age: 84
End: 2024-04-03
Payer: MEDICARE

## 2024-04-03 ENCOUNTER — DOCUMENTATION ONLY (OUTPATIENT)
Dept: FAMILY MEDICINE | Facility: CLINIC | Age: 84
End: 2024-04-03
Payer: MEDICARE

## 2024-04-03 NOTE — TELEPHONE ENCOUNTER
----- Message from Vinny Chatterjee sent at 4/3/2024 10:26 AM CDT -----  .Type:  Needs Medical Advice    Who Called: Lisseth   Symptoms (please be specific):    How long has patient had these symptoms:    Pharmacy name and phone #:    Would the patient rather a call back or a response via [facility]?   Best Call Back Number: [phone]  Additional Information: Requested to have the nurse call her back.  This is regards to some medication she is in assisted living, she told me that they giving her medication there to treat depression and she does not thinks she needs to take it without Dr. Ren's approval.

## 2024-04-03 NOTE — TELEPHONE ENCOUNTER
----- Message from [facility] sent at 4/3/2024 12:56 PM CDT -----  .Type:  Needs Medical Advice    Who Called:  [facility] Ney Villareal)    Symptoms (please be specific):  no     How long has patient had these symptoms:   no    Pharmacy name and phone #:   no    Would the patient rather a call back or a response via [facility]?      Best Call Back Number:  [phone]    Additional Information:  abnormal labs need to report please advise thanks

## 2024-04-03 NOTE — TELEPHONE ENCOUNTER
Pt was calling to confirm that she is not taking depression medication and that she does not need to go to [facility]. I informed pt that if she was not feeling well due to he situation with her  that she can go to [facility] to get help with her medications or she can schedule with us. She confirmed that she understood and thanked us for our time

## 2024-04-03 NOTE — PROGRESS NOTES
Outpatient Care Management  Plan of Care Follow Up Visit    Patient: Lisseth Guajardo  MRN: [id number]  Date of Service: 04/03/2024  Completed by: Ana Bocanegra RN  Referral Date: 01/08/2024    Reason for Visit   Patient presents with    OPCM Chart Review    OPCM RN Follow Up Call       Brief Summary: OPCM follow up complete. Continued education on depression management. All goals within care plan have been met. Discussed case closure with patient, and patient is in agreement. Encouraged patient to contact this RN in the future should any needs or concerns arise. Patient verbalized understanding. Closing case.

## 2024-04-04 ENCOUNTER — DOCUMENTATION ONLY (OUTPATIENT)
Dept: FAMILY MEDICINE | Facility: CLINIC | Age: 84
End: 2024-04-04
Payer: MEDICARE

## 2024-04-04 DIAGNOSIS — N39.0 RECURRENT UTI: ICD-10-CM

## 2024-04-04 DIAGNOSIS — N39.0 RECURRENT UTI: Primary | ICD-10-CM

## 2024-04-04 LAB — BACTERIA UR CULT: ABNORMAL

## 2024-04-04 RX ORDER — CIPROFLOXACIN 500 MG/1
500 TABLET ORAL EVERY 12 HOURS
Qty: 14 TABLET | Refills: 0 | Status: SHIPPED | OUTPATIENT
Start: 2024-04-04 | End: 2024-04-04 | Stop reason: SDUPTHER

## 2024-04-04 RX ORDER — CIPROFLOXACIN 500 MG/1
500 TABLET ORAL EVERY 12 HOURS
Qty: 14 TABLET | Refills: 0 | Status: SHIPPED | OUTPATIENT
Start: 2024-04-04 | End: 2024-04-08 | Stop reason: SDUPTHER

## 2024-04-05 ENCOUNTER — TELEPHONE (OUTPATIENT)
Dept: FAMILY MEDICINE | Facility: CLINIC | Age: 84
End: 2024-04-05
Payer: MEDICARE

## 2024-04-05 NOTE — TELEPHONE ENCOUNTER
----- Message from Chevy Driscoll sent at 4/5/2024 10:46 AM CDT -----  Type:  Needs Medical Advice    Who Called: pt     Best Call Back Number:  [phone]  Additional Information: pt would like to speak to provider, declined to give information

## 2024-04-08 ENCOUNTER — TELEPHONE (OUTPATIENT)
Dept: FAMILY MEDICINE | Facility: CLINIC | Age: 84
End: 2024-04-08
Payer: MEDICARE

## 2024-04-08 DIAGNOSIS — N39.0 RECURRENT UTI: ICD-10-CM

## 2024-04-08 RX ORDER — CIPROFLOXACIN 500 MG/1
500 TABLET ORAL EVERY 12 HOURS
Qty: 6 TABLET | Refills: 0 | Status: SHIPPED | OUTPATIENT
Start: 2024-04-08 | End: 2024-04-11

## 2024-04-08 NOTE — TELEPHONE ENCOUNTER
Patient called to inform that she did not get her antibiotic (Cipro) last evening and was told she had completed the 7 day course which was prescribed for 7 days on 4/4/24. Spoke with pharmacy who stated 7 days was delivered to the patient's facility. Spoke with charge nurse at the facility who states that they did not have the medication and do not administer patient's medication. Patient states she never had it in her possession.

## 2024-04-08 NOTE — TELEPHONE ENCOUNTER
----- Message from Aaron Paris sent at 4/8/2024 10:52 AM CDT -----  .Type:  Patient Returning Call    Who Called:pt  Who Left Message for Patient:  Does the patient know what this is regarding?:question about medication   Would the patient rather a call back or a response via [facility]? Call back   Best Call Back Number [phone]  Additional Information: called the back line:no answer

## 2024-04-22 ENCOUNTER — TELEPHONE (OUTPATIENT)
Dept: FAMILY MEDICINE | Facility: CLINIC | Age: 84
End: 2024-04-22
Payer: MEDICARE

## 2024-04-22 NOTE — TELEPHONE ENCOUNTER
Called Danica back. She voiced pt is not taking her medication as prescribed and she's afraid of Lisseth being kicked out of the Assisted Living. I voiced we can keep encouraging the pt. She voiced her understanding and she will try the same.    SW:  D:  Call placed to [facility] to arrange for stretcher transport at 11:30 tomorrow as patient is on 2 liters of oxygen that he can not manage himself.  Patient also has a lot of pain therefore he is unable to comfortable sit in a chair.  Patient is also hospice and needs closer monitoring in the back of the rig.  Faxed the facesheet and PCS to [facility].  Call placed and message left for Malia at [facility] to update her as to the transport time for tomorrow.  Updated patient's MD.   Call placed and message left for Cynthia, patient's [facility] Care Coordinator to update her as to the plan.  P:  Will continue to follow.      NATASHA Jack, [facility]  Lead   [phone]  [facility]

## 2024-04-22 NOTE — TELEPHONE ENCOUNTER
----- Message from Vinny Chatterjee sent at 4/19/2024 11:03 AM CDT -----  .Type:  Needs Medical Advice    Who Called: Danica patient's sister   Symptoms (please be specific):    How long has patient had these symptoms:    Pharmacy name and phone #:    Would the patient rather a call back or a response via [facility]?   Best Call Back Number: [phone]  Additional Information: Requested to speak with the nurse directly about patient's medical status.

## 2024-04-22 NOTE — TELEPHONE ENCOUNTER
----- Message from Aaron Paris sent at 4/22/2024  2:13 PM CDT -----  .Type:  Patient Returning Call    Who Called:pt   Who Left Message for Patient:  Does the patient know what this is regarding?:needs a sooner appt for jaw pain   Would the patient rather a call back or a response via [facility]? Call back   Best Call Back Number:[phone]  Additional Information:

## 2024-04-26 DIAGNOSIS — R56.9 SEIZURES: ICD-10-CM

## 2024-04-26 RX ORDER — PHENOBARBITAL 32.4 MG/1
32.4 TABLET ORAL NIGHTLY
Qty: 90 TABLET | Refills: 3 | Status: SHIPPED | OUTPATIENT
Start: 2024-04-26

## 2024-04-26 NOTE — TELEPHONE ENCOUNTER
----- Message from Beatris Nuñez sent at 4/26/2024 10:30 AM CDT -----  Regarding: refill  Type:  RX Refill Request    Who Called: pt   Refill or New Rx:refill   RX Name and Strength:PHENobarbitaL 32.4 MG tablet s  How is the patient currently taking it? (ex. 1XDay):1  Is this a 30 day or 90 day RX:90  Preferred Pharmacy with phone number:[facility]   Local or Mail Order:local   Ordering Provider:delia  Would the patient rather a call back or a response via [facility]? C/b   Best Call Back Number:[phone]  Additional Information: pt is calling to get to a refill on PHENobarbitaL 32.4 MG tablet sent to [facility] in [place] instead.

## 2024-05-07 ENCOUNTER — TELEPHONE (OUTPATIENT)
Dept: FAMILY MEDICINE | Facility: CLINIC | Age: 84
End: 2024-05-07
Payer: MEDICARE

## 2024-05-07 DIAGNOSIS — M25.552 LEFT HIP PAIN: Primary | ICD-10-CM

## 2024-05-07 NOTE — TELEPHONE ENCOUNTER
----- Message from Cookie Santana LPN sent at 5/7/2024  3:58 PM CDT -----  Patient called requesting PT for left hip pain. Please advise  ----- Message -----  From: Armando Rojas  Sent: 5/7/2024   2:26 PM CDT  To: Mikal VAZQUEZ Staff    .Who Called: Lisseth Guajardo    Caller is requesting assistance/information from provider's office.    Preferred Method of Contact: Phone Call  Patient's Preferred Phone Number on File: [phone]   Best Call Back Number, if different:  Additional Information: pt called inquiring about PT referral request nothing listed in chart

## 2024-05-16 ENCOUNTER — TELEPHONE (OUTPATIENT)
Dept: FAMILY MEDICINE | Facility: CLINIC | Age: 84
End: 2024-05-16
Payer: MEDICARE

## 2024-05-16 NOTE — TELEPHONE ENCOUNTER
----- Message from Renetta Martinez sent at 5/15/2024 10:30 AM CDT -----  Regarding: call back  .Who Called: Lisseth Guajardo    Caller is requesting assistance/information from provider's office.    Symptoms (please be specific):    How long has patient had these symptoms:    List of preferred pharmacies on file (remove unneeded): [unfilled]  If different, enter pharmacy into here including location and phone number:       Preferred Method of Contact: Phone Call  Patient's Preferred Phone Number on File: [phone]   Best Call Back Number, if different:  Additional Information: pt requesting a call back about a medical question ABL no answer

## 2024-05-20 ENCOUNTER — OFFICE VISIT (OUTPATIENT)
Dept: UROLOGY | Facility: CLINIC | Age: 84
End: 2024-05-20
Payer: MEDICARE

## 2024-05-20 VITALS
RESPIRATION RATE: 18 BRPM | TEMPERATURE: 98 F | BODY MASS INDEX: 23.39 KG/M2 | OXYGEN SATURATION: 97 % | HEIGHT: 61 IN | WEIGHT: 123.88 LBS | SYSTOLIC BLOOD PRESSURE: 100 MMHG | HEART RATE: 91 BPM | DIASTOLIC BLOOD PRESSURE: 72 MMHG

## 2024-05-20 DIAGNOSIS — N21.0 BLADDER CALCULUS: ICD-10-CM

## 2024-05-20 DIAGNOSIS — N39.0 RECURRENT UTI: Primary | ICD-10-CM

## 2024-05-20 DIAGNOSIS — B37.31 VAGINAL CANDIDIASIS: ICD-10-CM

## 2024-05-20 LAB
BILIRUB SERPL-MCNC: NORMAL MG/DL
BLOOD URINE, POC: NORMAL
COLOR, POC UA: YELLOW
GLUCOSE UR QL STRIP: NEGATIVE
KETONES UR QL STRIP: NORMAL
LEUKOCYTE ESTERASE URINE, POC: NORMAL
NITRITE, POC UA: NEGATIVE
PH, POC UA: 6
PROTEIN, POC: 100
SPECIFIC GRAVITY, POC UA: 1.03
UROBILINOGEN, POC UA: 0.2

## 2024-05-20 PROCEDURE — 99215 OFFICE O/P EST HI 40 MIN: CPT | Mod: PBBFAC | Performed by: UROLOGY

## 2024-05-20 PROCEDURE — 81001 URINALYSIS AUTO W/SCOPE: CPT | Mod: PBBFAC | Performed by: UROLOGY

## 2024-05-20 PROCEDURE — 99204 OFFICE O/P NEW MOD 45 MIN: CPT | Mod: S$PBB,,, | Performed by: UROLOGY

## 2024-05-20 RX ORDER — FLUCONAZOLE 150 MG/1
TABLET ORAL
Qty: 2 TABLET | Refills: 0 | Status: SHIPPED | OUTPATIENT
Start: 2024-05-20

## 2024-05-20 NOTE — PROGRESS NOTES
Patient seen by Dr. Forte. Pt will RTC next available cysto. Pt education given both written and verbal.

## 2024-05-21 NOTE — PROGRESS NOTES
CC:  Recurrent urinary tract infections    HPI:  Lisseth Guajardo is a 83 y.o. female seen in consultation for recurrent urinary tract infections.  She states that she has been having recurrent urinary tract infections.  She is incontinent and wears depends and has been doing this since 2018.  I reviewed three urine cultures, two from January, both of which were no growth.  She did have a positive urine culture on 2 April 2024 which grew out Enterococcus.  A urinalysis on 24 March 2024 was negative.  She had a CT scan which showed some possible calcifications in the bladder.  See the results below.  She states she has not had a cystoscopy.  She is complaining of vaginal itching and burning.  She recently completed antibiotics.    Urinalysis:  Results for orders placed or performed in visit on 05/20/24   POCT URINE DIPSTICK WITH MICROSCOPE, AUTOMATED   Result Value Ref Range    Color, UA Yellow     Spec Grav UA 1.030     pH, UA 6.0     WBC, UA Small     Nitrite, UA Negative     Protein,      Glucose, UA Negative     Ketones, UA Trace     Urobilinogen, UA 0.2     Bilirubin, POC Small     Blood, UA Moderate      Microscopic Urinalysis:  WBC:   3-4 per HPF     RBC:    3-6 per HPF    Bacteria:    Rare per HPF     Squamous epithelial cells:    3-4 per HPF       Crystals:   None     Lab Results:  See HPI.    Imaging:  CT - 11 November 2023:  Layering calculus versus calcification of the posterior bladder wall on the right side of the bladder.      Data Review:  Note from referring provider, Melody Ren MD dated 24 February 2024.  Urine cultures.  Urinalyses.  CT.     ROS:  All systems reviewed and are negative except as documented in HPI and/or Assessment and Plan.     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    Chronic obstructive pulmonary disease with (acute) exacerbation    Exudative age-related macular degeneration, bilateral, with active choroidal neovascularization    Anorexia nervosa, unspecified    Septo-optic dysplasia of brain    Stage 3a chronic kidney disease        Past Medical History:  Past Medical History:   Diagnosis Date    Cervical radiculitis 08/29/2022    Cervical spinal stenosis 08/29/2022    Cervicalgia 08/29/2022    Coronary arteriosclerosis     DDD (degenerative disc disease), cervical 08/29/2022    Depression     Epilepsy, unspecified, not intractable, with status epilepticus     High cholesterol     Hypokalemia     Mild renal insufficiency 07/06/2022    Myocardial infarction     Osteoporosis     Primary hypertension 1/31/2023    Recurrent UTI     Unspecified macular degeneration         Past Surgical History:  Past Surgical History:   Procedure Laterality Date    APPENDECTOMY      BACK SURGERY      cardiac stents      CARPAL TUNNEL RELEASE      CATARACT EXTRACTION Left     CHOLECYSTECTOMY      crainotomy      FOOT SURGERY Left     HYSTERECTOMY      LUNG REMOVAL, PARTIAL Right     LYMPH NODE DISSECTION          Family History:  Family History   Family history unknown: Yes        Social History:  Social History     Socioeconomic History    Marital status:    Tobacco Use    Smoking status: Former     Types: Cigarettes     Passive exposure: Past    Smokeless tobacco: Never   Substance and Sexual Activity    Alcohol use: Never    Drug use: Never    Sexual activity: Not Currently   Social History Narrative    ** Merged History  Encounter **          Social Determinants of Health     Financial Resource Strain: Low Risk  (1/9/2024)    Overall Financial Resource Strain (CARDIA)     Difficulty of Paying Living Expenses: Not very hard   Food Insecurity: No Food Insecurity (1/9/2024)    Hunger Vital Sign     Worried About Running Out of Food in the Last Year: Never true     Ran Out of Food in the Last Year: Never true   Transportation Needs: Unmet Transportation Needs (1/9/2024)    PRAPARE - Transportation     Lack of Transportation (Medical): No     Lack of Transportation (Non-Medical): Yes   Physical Activity: Inactive (1/9/2024)    Exercise Vital Sign     Days of Exercise per Week: 0 days     Minutes of Exercise per Session: 0 min   Stress: No Stress Concern Present (1/9/2024)    [employer] [place] [employer] - Occupational Stress Questionnaire     Feeling of Stress : Only a little   Housing Stability: Low Risk  (1/9/2024)    Housing Stability Vital Sign     Unable to Pay for Housing in the Last Year: No     Number of Places Lived in the Last Year: 1     Unstable Housing in the Last Year: No        Allergies:  Review of patient's allergies indicates:   Allergen Reactions    Nitrofurantoin monohyd/m-cryst Hives    Penicillin Hives        Objective:  Vitals:    05/20/24 1133   BP: 100/72   Pulse: 91   Resp: 18   Temp: 97.9 °F (36.6 °C)     General:  Well developed, well nourished adult female in no acute distress  Abdomen: Soft, nontender, no masses  Extremities:  No clubbing, cyanosis, or edema  Neurologic:  Grossly intact  Musculoskeletal:  Normal tone    Assessment:  1. Recurrent UTI  - Ambulatory referral/consult to Urology  - Ambulatory referral/consult to Urology  - POCT URINE DIPSTICK WITH MICROSCOPE, AUTOMATED    2. Vaginal candidiasis  - fluconazole (DIFLUCAN) 150 MG Tab; Take one tablet now and one in three days.  Dispense: 2 tablet; Refill: 0    3. Bladder calculus     Plan:  I do not really have the lab results to confirm  that she is having recurrent urinary tract infections.  Certainly wearing a depends can contribute to infections.  She was given Diflucan for a probable vaginal candidiasis.  She needs a cystoscopy with the question of some bladder calculus or bladder wall calcification.    Follow-up:    For cystoscopy

## 2024-05-22 DIAGNOSIS — R56.9 SEIZURES: ICD-10-CM

## 2024-05-22 RX ORDER — PHENYTOIN SODIUM 100 MG/1
100 CAPSULE, EXTENDED RELEASE ORAL 2 TIMES DAILY
Qty: 60 CAPSULE | Refills: 0 | Status: SHIPPED | OUTPATIENT
Start: 2024-05-22 | End: 2024-06-21

## 2024-05-22 NOTE — TELEPHONE ENCOUNTER
Pt called stating Dilantin is on back order with [facility]. She is asking if she can take something else due to the back order. Please advise thanks

## 2024-05-22 NOTE — TELEPHONE ENCOUNTER
----- Message from Vinny Chatterjee sent at 5/22/2024  1:38 PM CDT -----  .Type:  Needs Medical Advice    Who Called: Lisseth   Symptoms (please be specific):    How long has patient had these symptoms:    Pharmacy name and phone #:    Would the patient rather a call back or a response via [facility]?   Best Call Back Number: [phone]  Additional Information: Patient requested to speak with someone in the office re: questions about her medication for seizures, she told me [facility] does not have it. Please call her back.

## 2024-05-24 ENCOUNTER — TELEPHONE (OUTPATIENT)
Dept: FAMILY MEDICINE | Facility: CLINIC | Age: 84
End: 2024-05-24
Payer: MEDICARE

## 2024-05-24 DIAGNOSIS — I69.854 HEMIPLEGIA AND HEMIPARESIS FOLLOWING OTHER CEREBROVASCULAR DISEASE AFFECTING LEFT NON-DOMINANT SIDE: Primary | ICD-10-CM

## 2024-05-24 DIAGNOSIS — R26.2 UNABLE TO WALK: ICD-10-CM

## 2024-05-24 NOTE — TELEPHONE ENCOUNTER
----- Message from Iram Darnell sent at 5/24/2024  9:04 AM CDT -----  Who Called: Lisseth Guajardo    Patient is returning phone call    Who Left Message for Patient:office staff  Does the patient know what this is regarding?: pt is requesting to speak w/ nurse       Preferred Method of Contact: Phone Call  Patient's Preferred Phone Number on File: [phone]   Best Call Back Number, if different:  Additional Information:

## 2024-07-16 ENCOUNTER — TELEPHONE (OUTPATIENT)
Dept: FAMILY MEDICINE | Facility: CLINIC | Age: 84
End: 2024-07-16
Payer: MEDICARE

## 2024-07-16 DIAGNOSIS — R39.9 UTI SYMPTOMS: Primary | ICD-10-CM

## 2024-07-16 NOTE — TELEPHONE ENCOUNTER
----- Message from Vinny Chatterjee sent at 7/16/2024 10:33 AM CDT -----  .Type:  Needs Medical Advice    Who Called: Lisseth   Symptoms (please be specific):    How long has patient had these symptoms:    Pharmacy name and phone #:    Would the patient rather a call back or a response via [facility]?   Best Call Back Number: [phone]  Additional Information: Patient needs to speak with the nurse about getting a urine analysis as she may have another UTI, please call her back.

## 2024-07-22 ENCOUNTER — DOCUMENTATION ONLY (OUTPATIENT)
Dept: FAMILY MEDICINE | Facility: CLINIC | Age: 84
End: 2024-07-22
Payer: MEDICARE

## 2024-07-22 DIAGNOSIS — R11.0 CHRONIC NAUSEA: ICD-10-CM

## 2024-07-22 DIAGNOSIS — N39.0 RECURRENT UTI: Primary | ICD-10-CM

## 2024-07-22 RX ORDER — ONDANSETRON 4 MG/1
TABLET, ORALLY DISINTEGRATING ORAL
Qty: 30 TABLET | Refills: 5 | Status: SHIPPED | OUTPATIENT
Start: 2024-07-22

## 2024-07-22 RX ORDER — CLINDAMYCIN HYDROCHLORIDE 300 MG/1
300 CAPSULE ORAL EVERY 8 HOURS
Qty: 21 CAPSULE | Refills: 0 | Status: SHIPPED | OUTPATIENT
Start: 2024-07-22 | End: 2024-07-23 | Stop reason: SDUPTHER

## 2024-07-22 RX ORDER — ALBUTEROL SULFATE 90 UG/1
AEROSOL, METERED RESPIRATORY (INHALATION)
Qty: 17 G | Refills: 6 | Status: SHIPPED | OUTPATIENT
Start: 2024-07-22 | End: 2024-07-23 | Stop reason: SDUPTHER

## 2024-07-23 ENCOUNTER — TELEPHONE (OUTPATIENT)
Dept: FAMILY MEDICINE | Facility: CLINIC | Age: 84
End: 2024-07-23
Payer: MEDICARE

## 2024-07-23 DIAGNOSIS — R06.2 WHEEZING: Primary | ICD-10-CM

## 2024-07-23 DIAGNOSIS — N39.0 RECURRENT UTI: ICD-10-CM

## 2024-07-23 RX ORDER — ALBUTEROL SULFATE 90 UG/1
2 AEROSOL, METERED RESPIRATORY (INHALATION) EVERY 6 HOURS PRN
Qty: 17 G | Refills: 6 | Status: SHIPPED | OUTPATIENT
Start: 2024-07-23

## 2024-07-23 RX ORDER — CLINDAMYCIN HYDROCHLORIDE 300 MG/1
300 CAPSULE ORAL EVERY 8 HOURS
Qty: 21 CAPSULE | Refills: 0 | Status: SHIPPED | OUTPATIENT
Start: 2024-07-23 | End: 2024-07-30

## 2024-07-23 NOTE — TELEPHONE ENCOUNTER
----- Message from Melody Ren MD sent at 7/22/2024  4:00 PM CDT -----  Final urine culture confirms Group B bacterial urinary tract infection, being that patient is allergic to penicillin, I sent a Rx for Clindamycin for patient to take as prescribed until course is completed.

## 2024-07-28 DIAGNOSIS — R56.9 UNSPECIFIED CONVULSIONS: ICD-10-CM

## 2024-07-29 RX ORDER — PHENYTOIN SODIUM 100 MG/1
100 CAPSULE, EXTENDED RELEASE ORAL 2 TIMES DAILY
Qty: 180 CAPSULE | Refills: 3 | Status: SHIPPED | OUTPATIENT
Start: 2024-07-29

## 2024-07-29 NOTE — TELEPHONE ENCOUNTER
Health Maintenance Summary     Topic Due On Due Status Completed On    IMMUNIZATION - HPV Aug 21, 2008 Overdue     Immunization - TDAP Pregnancy  Hidden     IMMUNIZATION - DTaP/Tdap/Td Oct 7, 2018 Not Due Oct 7, 2008    Immunization-Influenza  Completed Oct 26, 2017    Depression Screening Aug 21, 2009 Overdue           Patient is due for topics as listed above, she wishes to discuss with provider .           Please see the attached refill request.

## 2024-08-06 ENCOUNTER — TELEPHONE (OUTPATIENT)
Dept: FAMILY MEDICINE | Facility: CLINIC | Age: 84
End: 2024-08-06
Payer: MEDICARE

## 2024-08-12 ENCOUNTER — TELEPHONE (OUTPATIENT)
Dept: FAMILY MEDICINE | Facility: CLINIC | Age: 84
End: 2024-08-12

## 2024-08-12 ENCOUNTER — PATIENT OUTREACH (OUTPATIENT)
Facility: CLINIC | Age: 84
End: 2024-08-12
Payer: MEDICARE

## 2024-08-12 ENCOUNTER — OFFICE VISIT (OUTPATIENT)
Dept: FAMILY MEDICINE | Facility: CLINIC | Age: 84
End: 2024-08-12
Payer: MEDICARE

## 2024-08-12 VITALS
DIASTOLIC BLOOD PRESSURE: 83 MMHG | RESPIRATION RATE: 16 BRPM | SYSTOLIC BLOOD PRESSURE: 139 MMHG | WEIGHT: 109 LBS | OXYGEN SATURATION: 98 % | HEIGHT: 61 IN | HEART RATE: 93 BPM | BODY MASS INDEX: 20.58 KG/M2

## 2024-08-12 DIAGNOSIS — I50.9 HEART FAILURE, UNSPECIFIED HF CHRONICITY, UNSPECIFIED HEART FAILURE TYPE: ICD-10-CM

## 2024-08-12 DIAGNOSIS — Z12.2 ENCOUNTER FOR SCREENING FOR LUNG CANCER: ICD-10-CM

## 2024-08-12 DIAGNOSIS — F33.9 RECURRENT MAJOR DEPRESSIVE DISORDER, REMISSION STATUS UNSPECIFIED: ICD-10-CM

## 2024-08-12 DIAGNOSIS — H35.3231 EXUDATIVE AGE-RELATED MACULAR DEGENERATION, BILATERAL, WITH ACTIVE CHOROIDAL NEOVASCULARIZATION: ICD-10-CM

## 2024-08-12 DIAGNOSIS — Z13.820 SCREENING FOR OSTEOPOROSIS: ICD-10-CM

## 2024-08-12 DIAGNOSIS — F17.211 CIGARETTE NICOTINE DEPENDENCE IN REMISSION: ICD-10-CM

## 2024-08-12 DIAGNOSIS — I70.0 AORTIC ATHEROSCLEROSIS: ICD-10-CM

## 2024-08-12 DIAGNOSIS — R56.9 SEIZURES: ICD-10-CM

## 2024-08-12 DIAGNOSIS — Z00.00 MEDICARE ANNUAL WELLNESS VISIT, SUBSEQUENT: Primary | ICD-10-CM

## 2024-08-12 DIAGNOSIS — Z78.0 POSTMENOPAUSAL: ICD-10-CM

## 2024-08-12 DIAGNOSIS — N18.31 STAGE 3A CHRONIC KIDNEY DISEASE: ICD-10-CM

## 2024-08-12 DIAGNOSIS — E78.49 ESSENTIAL FAMILIAL HYPERLIPIDEMIA: ICD-10-CM

## 2024-08-12 DIAGNOSIS — R79.9 ABNORMAL FINDING OF BLOOD CHEMISTRY, UNSPECIFIED: ICD-10-CM

## 2024-08-12 DIAGNOSIS — R26.2 UNABLE TO WALK: ICD-10-CM

## 2024-08-12 DIAGNOSIS — G50.0 TRIGEMINAL NEURALGIA OF RIGHT SIDE OF FACE: ICD-10-CM

## 2024-08-12 DIAGNOSIS — J44.1 CHRONIC OBSTRUCTIVE PULMONARY DISEASE WITH (ACUTE) EXACERBATION: ICD-10-CM

## 2024-08-12 DIAGNOSIS — I69.854 HEMIPLEGIA AND HEMIPARESIS FOLLOWING OTHER CEREBROVASCULAR DISEASE AFFECTING LEFT NON-DOMINANT SIDE: ICD-10-CM

## 2024-08-12 DIAGNOSIS — I10 PRIMARY HYPERTENSION: ICD-10-CM

## 2024-08-12 PROCEDURE — G0439 PPPS, SUBSEQ VISIT: HCPCS | Mod: ,,, | Performed by: FAMILY MEDICINE

## 2024-08-12 NOTE — PROGRESS NOTES
Health Maintenance Topic(s) Outreach Outcomes & Actions Taken:  Chart review       Additional Notes:           Care Management, Digital Medicine, and/or Education Referrals      OPCM, Digital Med. DM, HTN

## 2024-08-12 NOTE — TELEPHONE ENCOUNTER
----- Message from Sanna Henley sent at 8/12/2024 12:57 PM CDT -----  [facility]  [facility]    Caller is requesting assistance/information from provider's office.    Symptoms (please be specific):    How long has patient had these symptoms:    List of preferred pharmacies on file (remove unneeded): [unfilled]  If different, enter pharmacy into here including location and phone number:     Preferred Method of Contact: Phone Call    Patient's Preferred Phone Number on File:     Best Call Back Number, if different: [phone]    Additional Information: Need last office visit notes to be faxed over(fax # [phone])

## 2024-08-12 NOTE — PROGRESS NOTES
Patient ID: [id number]     Chief Complaint: Medicare AWV        HPI:     Lisseth Guajardo is a 84 y.o. female here today for a Medicare Wellness.   - She has history of HTN, epilepsy, and depression here for annual medicare wellness. The patient presents for well adult exam. The patient's general health status is described as fair. The patient's diet is described as balanced. Exercise: occasional. Associated symptoms consist of denies weight loss, denies weight gain, denies fatigue, denies headache, denies hearing loss and denies vision changes. Additional pertinent history: last dental exam: UTD, she refuses MMG, risks of refusal discussed with patient and patient voices understanding, last eye exam: she has macular degeneration and she has losing her eye sight, Ophthalmology- Dr. Medellin, seat belt use, daily caffeine use (Dr. Pepper), tobacco use: quit 2 years ago, but she doesn't have access to cigarettes, she misses her cigarettes, risks of refusal discussed with patient and patient voices understanding, used to smoke 1 ppd x 60 years, she is asymptomatic, her last CT chest was 06/08/2023, she is due for repeat CT chest, occasional alcohol use (wine) and She is due for annual labs today. She  is UTD on all other vaccines. She has a history of epilepsy, controlled with Rx, she denies a seizure in the past 5 years, does followup with Neurology (Dr. Richardson) as scheduled. She has a history of CAD/HTN/hypercholesterolemia/CHF/aortic atherosclerosis, controlled with Rx, no side effects, she is s/p 4 stents, asymptomatic, she does see Cardiology (Dr. Martinez). She has osteoporosis, not on calcium with D, cannot tolerate any medications for osteoporosis treatment and she refuses treatment, last DEXA scan was 01/04/2022, she need scheduled. She reports recurrent UTIs, she wears adult diapers, she does followup with Urology (Dr Forte) as scheduled. She is in a wheel chair and has left sided paralysis and deconditioning,  she does PT with [facility] for fall precautions, and balance training, she needs new referral. She does stay in an assisted living facility. She has DJD C-spine, stable, awaiting an appointment with Neurosurgery (Dr. Chacon) for evaluation and treatment. She had a CT chest done on 06/08/2023 that showed sclerosis of T3, asymptomatic, reports that she never had vertebroplasty. COPD is stable and asymptomatic. CKD stage 3 is stable and asymptomatic. She has right jaw pain due to trigeminal neuralgia, stable with Tegretol, no side effects, followed by Neurology. Major depression is controlled with relaxation techniques and followed by Psychiatry.   - Patient is without any other complaints today.     admits to Urinary leakage, since 2008.   denies Recent falls or balance difficulty.   admits to Daily exercise or physical activity.  admits to Depression, stress, anxiety, or emotional lability followed by Psychiatry .   denies The need for healthcare treatment including a cane/walker, blood pressure monitoring, or regular vision/hearing tests.     A separate E/M code has been provided to evaluate additional complaints that the patient would like addressed during the dedicated Medicare Wellness Exam.    Patient Care Team:  Melody Ren MD as PCP - General (Family Medicine)  Melody Ren MD (Family Medicine)     Opioid Screening: Patient medication list reviewed, patient is not taking prescription opioids. Patient is not using additional opioids than prescribed. Patient is at low risk of substance abuse based on this opioid use history.      Advance Care Planning     Date: 08/12/2024    Living Will  During this visit, I engaged the patient  in the voluntary advance care planning process.  The patient and I reviewed the role for advance directives and their purpose in directing future healthcare if the patient's unable to speak for him/herself.  At this point in time, the patient does have full  decision-making capacity.  We discussed different extreme health states that she could experience, and reviewed what kind of medical care she would want in those situations.  The patient communicated that if she were comatose and had little chance of a meaningful recovery, she would not want machines/life-sustaining treatments used. In addition to the above preference, other important end-of-life issues for the patient include  she is an organ donor . The patient has completed a living will to reflect these preferences.  I spent a total of 5 minutes engaging the patient in this advance care planning discussion.                -------------------------------------    Cervical radiculitis    Cervical spinal stenosis    Cervicalgia    Coronary arteriosclerosis    DDD (degenerative disc disease), cervical    Depression    Epilepsy, unspecified, not intractable, with status epilepticus    High cholesterol    Hypokalemia    Mild renal insufficiency    Myocardial infarction    Osteoporosis    Primary hypertension    Recurrent UTI    Unspecified macular degeneration        Past Surgical History:   Procedure Laterality Date    APPENDECTOMY      BACK SURGERY      cardiac stents      CARPAL TUNNEL RELEASE      CATARACT EXTRACTION Left     CHOLECYSTECTOMY      crainotomy      FOOT SURGERY Left     HYSTERECTOMY      LUNG REMOVAL, PARTIAL Right     LYMPH NODE DISSECTION         Review of patient's allergies indicates:   Allergen Reactions    Nitrofurantoin monohyd/m-cryst Hives    Penicillin Hives       Outpatient Medications Marked as Taking for the 8/12/24 encounter (Office Visit) with Melody Ren MD   Medication Sig Dispense Refill    albuterol (PROVENTIL/VENTOLIN HFA) 90 mcg/actuation inhaler Inhale 2 puffs into the lungs every 6 (six) hours as needed for Wheezing. Rescue 17 g 6    ALPHAGAN P 0.15 % ophthalmic solution Place 1 drop into both eyes 3 (three) times daily. Duplicate- taking generic      aspirin  (ECOTRIN) 81 MG EC tablet Take 81 mg by mouth once daily.      atorvastatin (LIPITOR) 40 MG tablet TAKE 1 TABLET DAILY 90 tablet 3    brimonidine 0.2% (ALPHAGAN) 0.2 % Drop Place 1 drop into both eyes 3 (three) times daily.      calcium carbonate/vitamin D3 (CALTRATE 600 + D ORAL) Take 1 tablet by mouth 2 (two) times a day.      COSOPT, PF, 2-0.5 % Dpet ophthalmic solution Place 1 drop into both eyes 2 (two) times daily.      fluconazole (DIFLUCAN) 150 MG Tab Take one tablet now and one in three days. 2 tablet 0    metoprolol succinate 25 mg CSpX Take 25 mg by mouth once daily. 90 capsule 3    miconazole (MICOTIN) 2 % cream Apply 1 g topically 2 (two) times daily. (Patient taking differently: Apply 1 g topically 2 (two) times daily. PRN) 28 g 3    ondansetron (ZOFRAN-ODT) 4 MG TbDL PLACE 1 TABLET ON TONGUE EVERY 8 HOURS AS NEEDED FOR NAUSEA/VOMITING 30 tablet 5    phenytoin (DILANTIN) 100 MG ER capsule TAKE 1 CAPSULE TWICE A  capsule 3    XALATAN 0.005 % ophthalmic solution Place 1 drop into both eyes nightly.         Social History     Socioeconomic History    Marital status:    Tobacco Use    Smoking status: Former     Types: Cigarettes     Passive exposure: Past    Smokeless tobacco: Never   Substance and Sexual Activity    Alcohol use: Never    Drug use: Never    Sexual activity: Not Currently   Social History Narrative    ** Merged History Encounter **          Social Determinants of Health     Financial Resource Strain: Low Risk  (1/9/2024)    Overall Financial Resource Strain (CARDIA)     Difficulty of Paying Living Expenses: Not very hard   Food Insecurity: No Food Insecurity (1/9/2024)    Hunger Vital Sign     Worried About Running Out of Food in the Last Year: Never true     Ran Out of Food in the Last Year: Never true   Transportation Needs: Unmet Transportation Needs (1/9/2024)    PRAPARE - Transportation     Lack of Transportation (Medical): No     Lack of Transportation (Non-Medical): Yes  "  Physical Activity: Inactive (1/9/2024)    Exercise Vital Sign     Days of Exercise per Week: 0 days     Minutes of Exercise per Session: 0 min   Stress: No Stress Concern Present (1/9/2024)    [employer] - Occupational Stress Questionnaire     Feeling of Stress : Only a little   Housing Stability: Low Risk  (1/9/2024)    Housing Stability Vital Sign     Unable to Pay for Housing in the Last Year: No     Number of Places Lived in the Last Year: 1     Unstable Housing in the Last Year: No        Family History   Family history unknown: Yes        Subjective:     ROS      See HPI for details    Constitutional: Denies Change in appetite. Denies Chills. Denies Fever. Denies Night sweats.  Eye: Denies Blurred vision. Denies Discharge. Denies Eye pain.  ENT: Denies Decreased hearing. Denies Sore throat. Denies Swollen glands.  Respiratory: Denies Cough. Denies Shortness of breath. Denies Shortness of breath with exertion. Denies Wheezing.  Cardiovascular: Denies Chest pain at rest. Denies Chest pain with exertion. Denies Irregular heartbeat. Denies Palpitations.  Gastrointestinal: Denies Abdominal pain. Denies Diarrhea. Denies Nausea. Denies Vomiting. Denies Hematemesis or Hematochezia.  Genitourinary: Denies Dysuria. Denies Urinary frequency. Denies Urinary urgency. Denies Blood in urine.  Endocrine: Denies Cold intolerance. Denies Excessive thirst. Denies Heat intolerance. Denies Weight loss. Denies Weight gain.  Musculoskeletal: Denies Painful joints. Denies Weakness.  Integumentary: Denies Rash. Denies Itching. Denies Dry skin.  Neurologic: Denies Dizziness. Denies Fainting. Denies Headache.  Psychiatric: Denies Depression. Denies Anxiety. Denies Suicidal/Homicidal ideations.    All Other ROS: Negative except as stated in HPI.       Objective:     /83 (BP Location: Right arm, Patient Position: Sitting, BP Method: Large (Automatic))   Pulse 93   Resp 16   Ht 5' 1" (1.549 m)   Wt " 49.4 kg (109 lb)   SpO2 98%   BMI 20.60 kg/m²     Physical Exam    General: Alert and oriented, No acute distress.  Head: Normocephalic, Atraumatic.  Eye: Blindness, Extraocular movements are intact, Sclera non-icteric.  Ears/Nose/Throat: Bilateral TMs with clear fluid behind TMs, NL light reflex, no erythema, intact.   Neck/Thyroid: Supple, Non-tender, No carotid bruit, No palpable thyromegaly or thyroid nodule, No lymphadenopathy, No JVD, Full range of motion.  Respiratory: Clear to auscultation bilaterally; No wheezes, rales or rhonchi,Non-labored respirations, Symmetrical chest wall expansion.  Cardiovascular: Regular rate and rhythm, S1/S2 normal, No murmurs, rubs or gallops.  Gastrointestinal: Soft, Non-tender, Non-distended, Normal bowel sounds, No palpable organomegaly.  Musculoskeletal: Normal range of motion.   Integumentary: Warm, Dry, Intact, No suspicious lesions or rashes.  Extremities: Wheelchair bound. No clubbing, cyanosis or edema  Neurologic: No focal deficits, Cranial Nerves II-XII are grossly intact, Motor strength normal upper and lower extremities, Sensory exam intact.  Psychiatric: Normal interaction, Coherent speech, Euthymic mood, Appropriate affect.      Assessment:       ICD-10-CM ICD-9-CM   1. Medicare annual wellness visit, subsequent  Z00.00 V70.0   2. Primary hypertension  I10 401.9   3. Essential familial hyperlipidemia  E78.49 272.2   4. Seizures  R56.9 780.39   5. Trigeminal neuralgia of right side of face  G50.0 350.1   6. Aortic atherosclerosis  I70.0 440.0   7. Unable to walk  R26.2 719.7   8. 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 Stage 3a chronic kidney disease  N18.31 585.3   10. Chronic obstructive pulmonary disease with (acute) exacerbation  J44.1 491.21   11. Heart failure, unspecified HF chronicity, unspecified heart failure type  I50.9 428.9   12. Exudative age-related macular  degeneration, bilateral, with active choroidal neovascularization  H35.3231 362.52     362.16   13. Recurrent major depressive disorder, remission status unspecified  F33.9 296.30   14. Postmenopausal  Z78.0 V49.81   15. Screening for osteoporosis  Z13.820 V82.81   16. Encounter for screening for lung cancer  Z12.2 V76.0   17. Cigarette nicotine dependence in remission  F17.211 V15.82   18. Abnormal finding of blood chemistry, unspecified  R79.9 790.6        Plan:       Medicare Annual Wellness and Personalized Prevention Plan:     Fall Risk + Home Safety + Hearing Impairment + Depression Screen + Cognitive Impairment Screen + Health Risk Assessment all reviewed.          No data to display                  8/12/2024     9:17 AM 1/9/2024    11:51 AM 1/8/2024     7:57 AM 6/29/2023     8:06 AM 5/11/2023     8:17 AM 1/31/2023     8:31 AM 6/21/2022     9:55 AM   Depression Screeening PHQ2   Over the last two weeks how often have you been bothered by little interest or pleasure in doing things 1 0 0 1 0 0 0   Over the last two weeks how often have you been bothered by feeling down, depressed or hopeless 1 0 0 1 0 0 0   PHQ-2 Total Score 2 0 0 2 0 0 0         8/12/2024     9:15 AM 5/20/2024    11:30 AM 1/8/2024     8:00 AM 10/12/2023     8:30 AM 7/17/2023     8:30 AM 6/29/2023     8:00 AM 5/11/2023     8:30 AM   Fall Risk Assessment - Outpatient   Mobility Status Wheelchair Bound Wheelchair Bound Ambulatory Wheelchair Bound Ambulatory Wheelchair Bound Ambulatory   Number of falls 0 1 1 0 0 0 0   Identified as fall risk True True False True False True False             What is your age?: 80 or older  Are you male or female?: Female  During the past four weeks, how much have you been bothered by emotional problems such as feeling anxious, depressed, irritable, sad, or downhearted and blue?: Not at all  During the past five weeks, has your physical and/or emotional health limited your social activities with family, friends,  neighbors, or groups?: Extremely  During the past four weeks, how much bodily pain have you generally had?: Mild pain  During the past four weeks, was someone available to help if you needed and wanted help?: Yes, as much as I wanted  During the past four weeks, what was the hardest physical activity you could do for at least two minutes?: Very light  Can you get to places out of walking distance without help?  (For example, can you travel alone on buses or taxis, or drive your own car?): No  Can you go shopping for groceries or clothes without someone's help?: No  Can you prepare your own meals?: No  Can you do your own housework without help?: No  Because of any health problems, do you need the help of another person with your personal care needs such as eating, bathing, dressing, or getting around the house?: Yes  Can you handle your own money without help?: No  During the past four weeks, how would you rate your health in general?: Fair  How have things been going for you during the past four weeks?: Pretty well  Are you having difficulties driving your car?: Not applicable, I do not use a car  Do you always fasten your seat belt when you are in a car?: Yes, usually  How often in the past four weeks have you been bothered by falling or dizzy when standing up?: Never  How often in the past four weeks have you been bothered by sexual problems?: Never  How often in the past four weeks have you been bothered by trouble eating well?: Never  How often in the past four weeks have you been bothered by teeth or denture problems?: Never  How often in the past four weeks have you been bothered with problems using the telephone?: Never  How often in the past four weeks have you been bothered by tiredness or fatigue?: Never  Have you fallen two or more times in the past year?: No  Are you afraid of falling?: Yes  Are you a smoker?: No  During the past four weeks, how many drinks of wine, beer, or other alcoholic beverages  did you have?: No alcohol at all  Do you exercise for about 20 minutes three or more days a week?: No, I usually do not exercise this much  Have you been given any information to help you with hazards in your house that might hurt you?: Yes  Have you been given any information to help you with keeping track of your medications?: Yes  How often do you have trouble taking medicines the way you've been told to take them?: I always take them as prescribed  How confident are you that you can control and manage most of your health problems?: Very confident  What is your race? (Check all that apply.):                  Alcohol/Tobacco Use - Stressed importance of smoking cessation and limiting alcohol intake.  CVD Risk Factors - Reviewed  Obesity/Physical Activity - Normal BMI. Encouraged daily 30 minute physical activity x 5 days per week.      Health Maintenance Topics with due status: Not Due       Topic Last Completion Date    DEXA Scan 01/03/2022    Influenza Vaccine 10/04/2023    Lipid Panel 02/20/2024        Vaccinations -   Immunization History   Administered Date(s) Administered    COVID-19, MRNA, LN-S, PF (Pfizer) (Gray Cap) 04/25/2022, 05/17/2022    COVID-19, MRNA, LN-S, PF (Pfizer) (Purple Cap) 01/14/2021, 01/24/2021, 02/04/2021, 02/14/2021, 10/29/2021, 01/10/2022    COVID-19, mRNA, LNP-S, bivalent booster, PF (PFIZER OMICRON) 03/25/2023    Influenza (FLUAD) - Quadrivalent - Adjuvanted - PF *Preferred* (65+) 11/08/2022, 10/04/2023    Influenza - High Dose - PF (65 years and older) 11/04/2019    Influenza - Trivalent - MDCK - PF 11/23/2015    Pneumococcal Conjugate - 13 Valent 06/11/2021    Pneumococcal Polysaccharide - 23 Valent 06/21/2022    Zoster Recombinant 02/15/2022, 05/31/2022          1. Medicare annual wellness visit, subsequent  - Comprehensive Metabolic Panel; Future  - Hemoglobin A1C; Future  - TSH; Future  - Urinalysis, Reflex to Urine Culture; Future  - Will treat pending lab  results. Monthly breast self exam encouraged. Diet, exercise, and 10% weight loss encouraged. Keep appointment for dental exams x q6 months as scheduled. Keep appointment for annual eye exam as scheduled. Keep appointment with specialists as scheduled. Notify M.D. or ER if temp greater than 100.4, or any acute illness.      2. Primary hypertension  - CBC Auto Differential; Future  - BP is well controlled, continue current treatment plan with Metoprolol as prescribed. Continue followup with Cardiology as scheduled. Keep daily BP log. Notify M.D. or ER if BP >170/100 or <90/60, chest pain, palpitations, headache, SOB, temp greater than 100.4, or any acute illness.   Continue  Low Sodium Diet (DASH Diet - Less than 2 grams of sodium per day).  Monitor blood pressure daily and log. Report consistent numbers greater than 140/90.  Smoking cessation encouraged to aid in BP reduction.  Maintain healthy weight with goal BMI <30. Exercise 30 minutes per day, 5 days per week.      3. Essential familial hyperlipidemia  - CK; Future  - Lipid Panel; Future  - Continue Lipitor as prescribed, will treat pending results.   Continue  Stressed importance of dietary modifications. Follow a low cholesterol, low saturated fat diet with less that 200mg of cholesterol a day.  Avoid fried foods and high saturated fats (high saturated fats less than 7% of calories).  Add Flax Seed/Fish Oil supplements to diet. Increase dietary fiber.  Regular exercise can reduce LDL and raise HDL. Stressed importance of physical activity 5 times per week for 30 minutes per day.      4. Seizures  - Well controlled, continue current treatment plan and continue followup with Neurology as scheduled. Notify M.D. or ER if symptoms persist or worsen, seizure, temp >100.4, or any acute illness.      5. Trigeminal neuralgia of right side of face  - Well controlled, continue current treatment plan and continue followup with Neurology as scheduled. Notify M.D. or ER if  symptoms persist or worsen, seizure, temp >100.4, or any acute illness.      6. Aortic atherosclerosis  - Asymptomatic, continue followup with Cardiology as scheduled.     7. Unable to walk  - Ambulatory referral/consult to Home Health; Future for skilled nursing, PT/OT; fall precautions encouraged.     8. 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     9. Stage 3a chronic kidney disease  - Microalbumin/Creatinine Ratio, Urine; Future  - Vitamin D; Future  - Increase fluid intake and avoid NSAIDs. If still present, will proceed with US Renal and Nephrology referral.     10. Chronic obstructive pulmonary disease with (acute) exacerbation  - Asymptomatic, continue to monitor.     11. Heart failure, unspecified HF chronicity, unspecified heart failure type  - Ambulatory referral/consult to Home Health; Future  - 1.5 liter fluid restriction, continue followup with Cardiology as scheduled..     12. Exudative age-related macular degeneration, bilateral, with active choroidal neovascularization  - Stable, continue followup with Ophthalmology as scheduled.     13. Recurrent major depressive disorder, remission status unspecified  - Stable with relaxation techniques, continue current treatment plan, continue followup with Psychiatry as scheduled.  Continue  Read positive daily meditations, avoid negative media, set healthy boundaries.  Exercise daily, keep consistent sleep pattern, eat a healthy diet.  Establish good social support, make changes to reduce stress.  Reports any symptoms of suicidal/homicidal ideations or self harm immediately, if clinic is closed go to nearest emergency room.    14. Postmenopausal  - DXA Bone Density Axial Skeleton 1 or more sites; Future    15. Screening for osteoporosis  - DXA Bone Density Axial Skeleton 1 or more sites; Future    16. Encounter for screening for lung  cancer  - CT Chest Without Contrast; Future  - Discussed with patient low dose CT chest for lung cancer screening including risks of CT (radiation exposure, false alarms/overdiagnosis, additional testing, etc) and patient would like to proceed with low dose CT chest for lung cancer screening.      17. Cigarette nicotine dependence in remission  - CT Chest Without Contrast; Future    18. Abnormal finding of blood chemistry, unspecified  - Hemoglobin A1C; Future        Medication List with Changes/Refills   Current Medications    ALBUTEROL (PROVENTIL/VENTOLIN HFA) 90 MCG/ACTUATION INHALER    Inhale 2 puffs into the lungs every 6 (six) hours as needed for Wheezing. Rescue       Start Date: 7/23/2024 End Date: --    ALPHAGAN P 0.15 % OPHTHALMIC SOLUTION    Place 1 drop into both eyes 3 (three) times daily. Duplicate- taking generic       Start Date: 4/25/2022 End Date: --    ASPIRIN (ECOTRIN) 81 MG EC TABLET    Take 81 mg by mouth once daily.       Start Date: 7/14/2021 End Date: --    ATORVASTATIN (LIPITOR) 40 MG TABLET    TAKE 1 TABLET DAILY       Start Date: 10/11/2023End Date: --    BENZONATATE (TESSALON) 100 MG CAPSULE    Take 1 capsule (100 mg total) by mouth 3 (three) times daily as needed for Cough.       Start Date: 11/3/2023 End Date: --    BETAMETHASONE VALERATE 0.1% (VALISONE) 0.1 % CREA    Apply topically 2 (two) times daily as needed (rash).       Start Date: 5/11/2023 End Date: 5/25/2023    BRIMONIDINE 0.2% (ALPHAGAN) 0.2 % DROP    Place 1 drop into both eyes 3 (three) times daily.       Start Date: 4/25/2022 End Date: --    CALCIUM CARBONATE/VITAMIN D3 (CALTRATE 600 + D ORAL)    Take 1 tablet by mouth 2 (two) times a day.       Start Date: 1/4/2022  End Date: --    CARBAMAZEPINE (CARBATROL) 100 MG CM12    Take 1 capsule (100 mg total) by mouth 2 (two) times a day.       Start Date: 11/8/2022 End Date: --    COSOPT 22.3-6.8 MG/ML OPHTHALMIC SOLUTION    Place 1 drop into both eyes 2 (two) times daily.        Start Date: 4/25/2022 End Date: --    COSOPT, PF, 2-0.5 % DPET OPHTHALMIC SOLUTION    Place 1 drop into both eyes 2 (two) times daily.       Start Date: 7/10/2023 End Date: --    DICLOFENAC (FLECTOR) 1.3 % PT12    Apply 1 patch topically daily as needed (pain).       Start Date: 10/12/2023End Date: --    FLUCONAZOLE (DIFLUCAN) 150 MG TAB    Take one tablet now and one in three days.       Start Date: 5/20/2024 End Date: --    HYDROXYZINE (ATARAX) 10 MG TAB    Take 5 tablets (50 mg total) by mouth 3 (three) times daily as needed for Itching.       Start Date: 10/12/2023End Date: --    LORATADINE (CLARITIN) 10 MG TABLET    TAKE 1 TABLET ONCE DAILY FOR ALLERGIES/FLUID IN THE EARS       Start Date: 10/16/2023End Date: --    METOPROLOL SUCCINATE 25 MG CSPX    Take 25 mg by mouth once daily.       Start Date: 2/19/2024 End Date: --    MICONAZOLE (MICOTIN) 2 % CREAM    Apply 1 g topically 2 (two) times daily.       Start Date: 6/3/2022  End Date: --    ONDANSETRON (ZOFRAN-ODT) 4 MG TBDL    PLACE 1 TABLET ON TONGUE EVERY 8 HOURS AS NEEDED FOR NAUSEA/VOMITING       Start Date: 7/22/2024 End Date: --    PHENOBARBITAL 32.4 MG TABLET    Take 1 tablet (32.4 mg total) by mouth every evening.       Start Date: 4/26/2024 End Date: --    PHENYTOIN (DILANTIN) 100 MG ER CAPSULE    TAKE 1 CAPSULE TWICE A DAY       Start Date: 7/29/2024 End Date: --    XALATAN 0.005 % OPHTHALMIC SOLUTION    Place 1 drop into both eyes nightly.       Start Date: 4/25/2022 End Date: --          The patient's Health Maintenance was reviewed and the following appears to be due at this time:   There are no preventive care reminders to display for this patient.        Follow up in about 6 months (around 2/12/2025) for HTN Followup, Cholesterol Followup- 30 minute appointment.

## 2024-08-13 ENCOUNTER — TELEPHONE (OUTPATIENT)
Dept: FAMILY MEDICINE | Facility: CLINIC | Age: 84
End: 2024-08-13

## 2024-08-13 NOTE — TELEPHONE ENCOUNTER
----- Message from Vinny Chatterjee sent at 8/13/2024 11:59 AM CDT -----  .Type:  Needs Medical Advice    Who Called: Lisseth   Symptoms (please be specific):    How long has patient had these symptoms:    Pharmacy name and phone #:    Would the patient rather a call back or a response via [facility]?   Best Call Back Number: [phone]  Additional Information: Patient requested a call back from nurse re: her visit yesterday and also about some lab work that [facility] was suppose to do for her.

## 2024-08-14 ENCOUNTER — LAB REQUISITION (OUTPATIENT)
Dept: LAB | Facility: HOSPITAL | Age: 84
End: 2024-08-14
Payer: MEDICARE

## 2024-08-14 ENCOUNTER — TELEPHONE (OUTPATIENT)
Dept: FAMILY MEDICINE | Facility: CLINIC | Age: 84
End: 2024-08-14
Payer: MEDICARE

## 2024-08-14 DIAGNOSIS — N18.31 CHRONIC KIDNEY DISEASE, STAGE 3A: ICD-10-CM

## 2024-08-14 DIAGNOSIS — Z00.00 ENCOUNTER FOR GENERAL ADULT MEDICAL EXAMINATION WITHOUT ABNORMAL FINDINGS: ICD-10-CM

## 2024-08-14 DIAGNOSIS — G47.00 INSOMNIA, UNSPECIFIED TYPE: Primary | ICD-10-CM

## 2024-08-14 DIAGNOSIS — E78.49 OTHER HYPERLIPIDEMIA: ICD-10-CM

## 2024-08-14 DIAGNOSIS — I10 ESSENTIAL (PRIMARY) HYPERTENSION: ICD-10-CM

## 2024-08-14 DIAGNOSIS — R79.9 ABNORMAL FINDING OF BLOOD CHEMISTRY, UNSPECIFIED: ICD-10-CM

## 2024-08-14 LAB
25(OH)D3+25(OH)D2 SERPL-MCNC: 37 NG/ML (ref 30–80)
ALBUMIN SERPL-MCNC: 3.4 G/DL (ref 3.4–4.8)
ALBUMIN/GLOB SERPL: 0.9 RATIO (ref 1.1–2)
ALP SERPL-CCNC: 130 UNIT/L (ref 40–150)
ALT SERPL-CCNC: 14 UNIT/L (ref 0–55)
ANION GAP SERPL CALC-SCNC: 10 MEQ/L
AST SERPL-CCNC: 28 UNIT/L (ref 5–34)
BASOPHILS # BLD AUTO: 0.08 X10(3)/MCL
BASOPHILS NFR BLD AUTO: 1.6 %
BILIRUB SERPL-MCNC: 0.2 MG/DL
BUN SERPL-MCNC: 18.8 MG/DL (ref 9.8–20.1)
CALCIUM SERPL-MCNC: 8.9 MG/DL (ref 8.4–10.2)
CHLORIDE SERPL-SCNC: 110 MMOL/L (ref 98–107)
CHOLEST SERPL-MCNC: 174 MG/DL
CHOLEST/HDLC SERPL: 3 {RATIO} (ref 0–5)
CK SERPL-CCNC: 57 U/L (ref 29–168)
CO2 SERPL-SCNC: 24 MMOL/L (ref 23–31)
CREAT SERPL-MCNC: 0.88 MG/DL (ref 0.55–1.02)
CREAT/UREA NIT SERPL: 21
EOSINOPHIL # BLD AUTO: 0.27 X10(3)/MCL (ref 0–0.9)
EOSINOPHIL NFR BLD AUTO: 5.4 %
ERYTHROCYTE [DISTWIDTH] IN BLOOD BY AUTOMATED COUNT: 13.8 % (ref 11.5–17)
EST. AVERAGE GLUCOSE BLD GHB EST-MCNC: 105.4 MG/DL
GFR SERPLBLD CREATININE-BSD FMLA CKD-EPI: >60 ML/MIN/1.73/M2
GLOBULIN SER-MCNC: 3.6 GM/DL (ref 2.4–3.5)
GLUCOSE SERPL-MCNC: 97 MG/DL (ref 82–115)
HBA1C MFR BLD: 5.3 %
HCT VFR BLD AUTO: 38.1 % (ref 37–47)
HDLC SERPL-MCNC: 53 MG/DL (ref 35–60)
HGB BLD-MCNC: 12.4 G/DL (ref 12–16)
IMM GRANULOCYTES # BLD AUTO: 0.02 X10(3)/MCL (ref 0–0.04)
IMM GRANULOCYTES NFR BLD AUTO: 0.4 %
LDLC SERPL CALC-MCNC: 95 MG/DL (ref 50–140)
LYMPHOCYTES # BLD AUTO: 1.35 X10(3)/MCL (ref 0.6–4.6)
LYMPHOCYTES NFR BLD AUTO: 26.8 %
MCH RBC QN AUTO: 30.9 PG (ref 27–31)
MCHC RBC AUTO-ENTMCNC: 32.5 G/DL (ref 33–36)
MCV RBC AUTO: 95 FL (ref 80–94)
MONOCYTES # BLD AUTO: 0.38 X10(3)/MCL (ref 0.1–1.3)
MONOCYTES NFR BLD AUTO: 7.6 %
NEUTROPHILS # BLD AUTO: 2.93 X10(3)/MCL (ref 2.1–9.2)
NEUTROPHILS NFR BLD AUTO: 58.2 %
NRBC BLD AUTO-RTO: 0 %
PLATELET # BLD AUTO: 254 X10(3)/MCL (ref 130–400)
PLATELETS.RETICULATED NFR BLD AUTO: 2.1 % (ref 0.9–11.2)
PMV BLD AUTO: 11 FL (ref 7.4–10.4)
POTASSIUM SERPL-SCNC: 3.8 MMOL/L (ref 3.5–5.1)
PROT SERPL-MCNC: 7 GM/DL (ref 5.8–7.6)
RBC # BLD AUTO: 4.01 X10(6)/MCL (ref 4.2–5.4)
SODIUM SERPL-SCNC: 144 MMOL/L (ref 136–145)
TRIGL SERPL-MCNC: 131 MG/DL (ref 37–140)
TSH SERPL-ACNC: 1.95 UIU/ML (ref 0.35–4.94)
VLDLC SERPL CALC-MCNC: 26 MG/DL
WBC # BLD AUTO: 5.03 X10(3)/MCL (ref 4.5–11.5)

## 2024-08-14 PROCEDURE — 80061 LIPID PANEL: CPT | Performed by: FAMILY MEDICINE

## 2024-08-14 PROCEDURE — 82306 VITAMIN D 25 HYDROXY: CPT | Performed by: FAMILY MEDICINE

## 2024-08-14 PROCEDURE — 82550 ASSAY OF CK (CPK): CPT | Performed by: FAMILY MEDICINE

## 2024-08-14 PROCEDURE — 85025 COMPLETE CBC W/AUTO DIFF WBC: CPT | Performed by: FAMILY MEDICINE

## 2024-08-14 PROCEDURE — 83036 HEMOGLOBIN GLYCOSYLATED A1C: CPT | Performed by: FAMILY MEDICINE

## 2024-08-14 PROCEDURE — 80053 COMPREHEN METABOLIC PANEL: CPT | Performed by: FAMILY MEDICINE

## 2024-08-14 PROCEDURE — 84443 ASSAY THYROID STIM HORMONE: CPT | Performed by: FAMILY MEDICINE

## 2024-08-14 RX ORDER — TALC
3 POWDER (GRAM) TOPICAL NIGHTLY PRN
Qty: 90 TABLET | Refills: 3 | Status: SHIPPED | OUTPATIENT
Start: 2024-08-14 | End: 2025-08-14

## 2024-08-14 NOTE — TELEPHONE ENCOUNTER
----- Message from Cookie Santana LPN sent at 8/14/2024 11:10 AM CDT -----    ----- Message -----  From: Paul Cramer  Sent: 8/14/2024  11:03 AM CDT  To: Mikal VAZQUEZ Staff    Who Called: Lisseth Guajardo    Caller is requesting assistance/information from provider's office.    Symptoms (please be specific): trouble sleeping    How long has patient had these symptoms:    List of preferred pharmacies on file (remove unneeded): [id number] - [address]   Phone: [phone]  Fax: [phone]        If different, enter pharmacy into here including location and phone number:       Preferred Method of Contact: Phone Call  Patient's Preferred Phone Number on File: [phone]   Best Call Back Number, if different:  Additional Information: Pt states she has been having trouble sleeping since her  passed and would like a prescription for sleeping medication. #stated she does not want anything addictive

## 2024-08-21 DIAGNOSIS — M47.22 OSTEOARTHRITIS OF SPINE WITH RADICULOPATHY, CERVICAL REGION: ICD-10-CM

## 2024-08-21 DIAGNOSIS — M48.02 CERVICAL SPINAL STENOSIS: ICD-10-CM

## 2024-08-21 DIAGNOSIS — M54.2 CERVICALGIA: ICD-10-CM

## 2024-08-21 DIAGNOSIS — M54.12 CERVICAL RADICULITIS: ICD-10-CM

## 2024-08-21 DIAGNOSIS — M50.30 DDD (DEGENERATIVE DISC DISEASE), CERVICAL: ICD-10-CM

## 2024-08-21 RX ORDER — HYDROXYZINE HYDROCHLORIDE 10 MG/1
50 TABLET, FILM COATED ORAL 3 TIMES DAILY PRN
Qty: 50 TABLET | Refills: 2 | Status: SHIPPED | OUTPATIENT
Start: 2024-08-21

## 2024-08-21 NOTE — TELEPHONE ENCOUNTER
----- Message from Kera Meza sent at 8/21/2024  2:57 PM CDT -----  Regarding: Refill Request  .Who Called: Lisseth Guajardo    Refill or New Rx:Refill  RX Name and Strength:hydrOXYzine (ATARAX) 10 MG Tab  How is the patient currently taking it? (ex. 1XDay):1x  Is this a 30 day or 90 day RX:30  Local or Mail Order:Mail  List of preferred pharmacies on file (remove unneeded): [unfilled]  If different Pharmacy is requested, enter Pharmacy information here including location and phone number: [facility] [address]   Ordering Provider:NIECY Ren      Preferred Method of Contact: Phone Call  Patient's Preferred Phone Number on File: [phone]   Best Call Back Number, if different:  Additional Information: refill request

## 2024-08-22 ENCOUNTER — TELEPHONE (OUTPATIENT)
Dept: FAMILY MEDICINE | Facility: CLINIC | Age: 84
End: 2024-08-22
Payer: MEDICARE

## 2024-08-22 DIAGNOSIS — M50.30 DDD (DEGENERATIVE DISC DISEASE), CERVICAL: ICD-10-CM

## 2024-08-22 DIAGNOSIS — M48.02 CERVICAL SPINAL STENOSIS: ICD-10-CM

## 2024-08-22 DIAGNOSIS — M54.12 CERVICAL RADICULITIS: ICD-10-CM

## 2024-08-22 DIAGNOSIS — M54.2 CERVICALGIA: ICD-10-CM

## 2024-08-22 DIAGNOSIS — M47.22 OSTEOARTHRITIS OF SPINE WITH RADICULOPATHY, CERVICAL REGION: Primary | ICD-10-CM

## 2024-08-22 RX ORDER — HYDROXYZINE HYDROCHLORIDE 50 MG/1
50 TABLET, FILM COATED ORAL 3 TIMES DAILY PRN
Qty: 180 TABLET | Refills: 2 | Status: SHIPPED | OUTPATIENT
Start: 2024-08-22

## 2024-08-22 RX ORDER — HYDROXYZINE HYDROCHLORIDE 50 MG/1
50 TABLET, FILM COATED ORAL 3 TIMES DAILY PRN
COMMUNITY
End: 2024-08-22 | Stop reason: SDUPTHER

## 2024-08-22 NOTE — TELEPHONE ENCOUNTER
----- Message from Aaron Paris sent at 8/22/2024  2:46 PM CDT -----  .Type:  Pharmacy Calling to Clarify an RX    Name of Caller:gabe metz   [facility]   Prescription Name:hydrOXYzine HCL (ATARAX) 10 MG Tab  What do they need to clarify?:needs the instructions clarified   Best Call Back Number:[phone] reference number [id number]  Additional Information:

## 2024-08-26 DIAGNOSIS — M54.2 CERVICALGIA: ICD-10-CM

## 2024-08-26 DIAGNOSIS — R06.2 WHEEZING: ICD-10-CM

## 2024-08-26 DIAGNOSIS — M47.22 OSTEOARTHRITIS OF SPINE WITH RADICULOPATHY, CERVICAL REGION: ICD-10-CM

## 2024-08-26 DIAGNOSIS — M48.02 CERVICAL SPINAL STENOSIS: ICD-10-CM

## 2024-08-26 DIAGNOSIS — M54.12 CERVICAL RADICULITIS: ICD-10-CM

## 2024-08-26 DIAGNOSIS — M50.30 DDD (DEGENERATIVE DISC DISEASE), CERVICAL: ICD-10-CM

## 2024-08-26 RX ORDER — HYDROXYZINE HYDROCHLORIDE 10 MG/1
10 TABLET, FILM COATED ORAL 3 TIMES DAILY PRN
Qty: 90 TABLET | Refills: 3 | Status: SHIPPED | OUTPATIENT
Start: 2024-08-26

## 2024-08-26 RX ORDER — ALBUTEROL SULFATE 90 UG/1
1-2 INHALANT RESPIRATORY (INHALATION) EVERY 6 HOURS PRN
Qty: 18 G | Refills: 5 | Status: SHIPPED | OUTPATIENT
Start: 2024-08-26

## 2024-08-26 NOTE — TELEPHONE ENCOUNTER
----- Message from Renetta Martinez sent at 8/23/2024  1:02 PM CDT -----  Regarding: med refill  .Who Called: Lisseth Guajardo    Refill or New Rx:Refill  RX Name and Strength:hydrOXYzine HCL (ATARAX) 10 MG Tab  How is the patient currently taking it? (ex. 1XDay):3xday  Is this a 30 day or 90 day RX:50  Local or Mail Order:mail  List of preferred pharmacies on file (remove unneeded): [facility] [address]   Phone: [phone]  Fax: [phone]        Ordering Provider:Mikal      Preferred Method of Contact: Phone Call  Patient's Preferred Phone Number on File: [phone]   Best Call Back Number, if different:  Additional Information: pt needs authorization from  to refill prescription

## 2024-08-29 ENCOUNTER — TELEPHONE (OUTPATIENT)
Dept: FAMILY MEDICINE | Facility: CLINIC | Age: 84
End: 2024-08-29
Payer: MEDICARE

## 2024-08-29 NOTE — TELEPHONE ENCOUNTER
----- Message from Vinny Chatterjee sent at 8/29/2024  2:27 PM CDT -----  .Type:  Needs Medical Advice    Who Called: Lisseth   Symptoms (please be specific):    How long has patient had these symptoms:    Pharmacy name and phone #:    Would the patient rather a call back or a response via [facility]?   Best Call Back Number: [phone]  Additional Information: Patient needs a call back she needed to know if it is safe to use Voltaren.

## 2024-08-30 ENCOUNTER — TELEPHONE (OUTPATIENT)
Dept: FAMILY MEDICINE | Facility: CLINIC | Age: 84
End: 2024-08-30
Payer: MEDICARE

## 2024-08-30 DIAGNOSIS — D64.9 ANEMIA, UNSPECIFIED TYPE: ICD-10-CM

## 2024-08-30 DIAGNOSIS — R77.1 ELEVATED SERUM GLOBULIN LEVEL: ICD-10-CM

## 2024-08-30 NOTE — TELEPHONE ENCOUNTER
----- Message from Vinny Chatterjee sent at 8/30/2024 11:15 AM CDT -----  .Type:  Needs Medical Advice    Who Called: Lisseth   Symptoms (please be specific):    How long has patient had these symptoms:    Pharmacy name and phone #:    Would the patient rather a call back or a response via [facility]?   Best Call Back Number:  [phone]  Additional Information: Requested a call back from the nurse re: she needs results of her test she did a couple of weeks ago.

## 2024-08-30 NOTE — TELEPHONE ENCOUNTER
Pt called asking about her lab results from 08/14/2024. I did not see a note attached, Please advise thanks

## 2024-09-03 ENCOUNTER — TELEPHONE (OUTPATIENT)
Dept: FAMILY MEDICINE | Facility: CLINIC | Age: 84
End: 2024-09-03
Payer: MEDICARE

## 2024-09-03 NOTE — TELEPHONE ENCOUNTER
----- Message from Vinny Chatterjee sent at 9/3/2024 10:18 AM CDT -----  .Type:  Needs Medical Advice    Who Called: Lisseth  Symptoms (please be specific):    How long has patient had these symptoms:    Pharmacy name and phone #:    Would the patient rather a call back or a response via [facility]?   Best Call Back Number: [phone]  Additional Information: Patient requested to speak with the nurse about her upcoming apt. Thanks.

## 2024-09-03 NOTE — TELEPHONE ENCOUNTER
----- Message from Aaron Paris sent at 9/3/2024 12:39 PM CDT -----  .Type:  Patient Returning Call    Who Called:pt   Who Left Message for Patient:  Does the patient know what this is regarding?:states that the medication melatonin (MELATIN) 3 mg tablet is not covered by the insurance and needs something to replace it with   Would the patient rather a call back or a response via [facility]? Call back   Best Call Back Number:[phone]  Additional Information: pharmacy [facility] - [facility], [place]  [address] [facility]

## 2024-09-04 ENCOUNTER — TELEPHONE (OUTPATIENT)
Dept: FAMILY MEDICINE | Facility: CLINIC | Age: 84
End: 2024-09-04
Payer: MEDICARE

## 2024-09-04 DIAGNOSIS — G47.00 INSOMNIA, UNSPECIFIED TYPE: ICD-10-CM

## 2024-09-04 RX ORDER — TALC
3 POWDER (GRAM) TOPICAL NIGHTLY PRN
Qty: 90 TABLET | Refills: 3 | Status: SHIPPED | OUTPATIENT
Start: 2024-09-04 | End: 2025-09-04

## 2024-09-04 NOTE — TELEPHONE ENCOUNTER
----- Message from Kristine King sent at 9/4/2024 10:49 AM CDT -----  .Type:  Patient Returning Call    Who Called:pt  Who Left Message for Patient:pt  Does the patient know what this is regarding?:(MELATIN) 3 mg tablet  Would the patient rather a call back or a response via [facility]? (MELATIN) 3 mg tablet  Best Call Back Number:[phone]   Additional Information: Please call back about (MELATIN) 3 mg tablet would like this sent TO [facility]

## 2024-09-13 ENCOUNTER — DOCUMENTATION ONLY (OUTPATIENT)
Dept: FAMILY MEDICINE | Facility: CLINIC | Age: 84
End: 2024-09-13
Payer: MEDICARE

## 2024-09-13 ENCOUNTER — TELEPHONE (OUTPATIENT)
Dept: FAMILY MEDICINE | Facility: CLINIC | Age: 84
End: 2024-09-13
Payer: MEDICARE

## 2024-09-13 NOTE — TELEPHONE ENCOUNTER
----- Message from Melody Ren MD sent at 9/13/2024 11:18 AM CDT -----  Serum protein electrophoresis is negative for M spike. Testing is negative for auto-immune disease.

## 2024-09-16 RX ORDER — CARBAMAZEPINE 100 MG/1
100 CAPSULE, EXTENDED RELEASE ORAL 2 TIMES DAILY
Qty: 180 CAPSULE | Refills: 3 | Status: SHIPPED | OUTPATIENT
Start: 2024-09-16

## 2024-09-17 ENCOUNTER — TELEPHONE (OUTPATIENT)
Dept: FAMILY MEDICINE | Facility: CLINIC | Age: 84
End: 2024-09-17
Payer: MEDICARE

## 2024-09-17 NOTE — TELEPHONE ENCOUNTER
----- Message from Aaron Paris sent at 9/17/2024 10:48 AM CDT -----  .Type:  Patient Returning Call    Who Called:pt   Who Left Message for Patient:  Does the patient know what this is regarding?:States that she was told that the COSOPT 22.3-6.8 mg/mL ophthalmic solution was canceled   Would the patient rather a call back or a response via [facility]? Call back   Best Call Back Number:[phone]  Additional Information:

## 2024-09-19 ENCOUNTER — TELEPHONE (OUTPATIENT)
Dept: FAMILY MEDICINE | Facility: CLINIC | Age: 84
End: 2024-09-19
Payer: MEDICARE

## 2024-09-19 ENCOUNTER — PROCEDURE VISIT (OUTPATIENT)
Dept: UROLOGY | Facility: CLINIC | Age: 84
End: 2024-09-19
Payer: MEDICARE

## 2024-09-19 VITALS
WEIGHT: 108 LBS | HEIGHT: 61 IN | DIASTOLIC BLOOD PRESSURE: 74 MMHG | BODY MASS INDEX: 20.39 KG/M2 | RESPIRATION RATE: 20 BRPM | OXYGEN SATURATION: 97 % | TEMPERATURE: 98 F | HEART RATE: 83 BPM | SYSTOLIC BLOOD PRESSURE: 110 MMHG

## 2024-09-19 DIAGNOSIS — N39.0 RECURRENT UTI: ICD-10-CM

## 2024-09-19 DIAGNOSIS — R93.41 ABNORMAL RADIOLOGIC FINDINGS ON DIAGNOSTIC IMAGING OF RENAL PELVIS, URETER, OR BLADDER: Primary | ICD-10-CM

## 2024-09-19 LAB
BILIRUB SERPL-MCNC: NEGATIVE MG/DL
BLOOD URINE, POC: NORMAL
COLOR, POC UA: YELLOW
GLUCOSE UR QL STRIP: NEGATIVE
KETONES UR QL STRIP: NEGATIVE
LEUKOCYTE ESTERASE URINE, POC: NORMAL
NITRITE, POC UA: NEGATIVE
PH, POC UA: 6.5
PROTEIN, POC: 100
SPECIFIC GRAVITY, POC UA: 1.02
UROBILINOGEN, POC UA: 0.2

## 2024-09-19 PROCEDURE — 52000 CYSTOURETHROSCOPY: CPT | Mod: PBBFAC | Performed by: UROLOGY

## 2024-09-19 PROCEDURE — 99213 OFFICE O/P EST LOW 20 MIN: CPT | Mod: 25,S$PBB,, | Performed by: UROLOGY

## 2024-09-19 PROCEDURE — 52000 CYSTOURETHROSCOPY: CPT | Mod: S$PBB,,, | Performed by: UROLOGY

## 2024-09-19 PROCEDURE — 81001 URINALYSIS AUTO W/SCOPE: CPT | Mod: PBBFAC | Performed by: UROLOGY

## 2024-09-19 RX ORDER — CIPROFLOXACIN 500 MG/1
500 TABLET ORAL
Status: COMPLETED | OUTPATIENT
Start: 2024-09-19 | End: 2024-09-19

## 2024-09-19 RX ORDER — LIDOCAINE HYDROCHLORIDE 20 MG/ML
JELLY TOPICAL
Status: COMPLETED | OUTPATIENT
Start: 2024-09-19 | End: 2024-09-19

## 2024-09-19 RX ADMIN — LIDOCAINE HYDROCHLORIDE: 20 JELLY TOPICAL at 12:09

## 2024-09-19 RX ADMIN — CIPROFLOXACIN 500 MG: 500 TABLET ORAL at 12:09

## 2024-09-19 NOTE — TELEPHONE ENCOUNTER
How Severe Is Your Rash?: moderate Returned pts call letting her know her pain management  is Dr Fink   Is This A New Presentation, Or A Follow-Up?: Rash

## 2024-09-19 NOTE — PROGRESS NOTES
Pt seen by Dr. Perez. Cysto performed in clinic. Consents signed and obtained by staff. Pt received medication per procedure protocol. Ciprofloxacin HCl tablet 500 mg & LIDOcaine HCl 2% urojet administered and tolerated ok. RTC 6 mnths. Pt education given both written and verbal.   
family member

## 2024-09-19 NOTE — TELEPHONE ENCOUNTER
----- Message from Jesica Leong sent at 9/18/2024  1:29 PM CDT -----  Regarding: pain mgm  .Type:  Needs Medical Advice    Who Called: pt  Symptoms (please be specific):    How long has patient had these symptoms:    Pharmacy name and phone #:    Would the patient rather a call back or a response via [facility]?   Best Call Back Number:  [phone]  Additional Information: pt pain mgm doctor info

## 2024-09-23 RX ORDER — SULFAMETHOXAZOLE AND TRIMETHOPRIM 400; 80 MG/1; MG/1
1 TABLET ORAL DAILY
Qty: 90 TABLET | Refills: 3 | Status: SHIPPED | OUTPATIENT
Start: 2024-09-23

## 2024-09-24 DIAGNOSIS — R56.9 SEIZURES: Primary | ICD-10-CM

## 2024-09-24 RX ORDER — CARBAMAZEPINE 100 MG/1
100 CAPSULE, EXTENDED RELEASE ORAL 2 TIMES DAILY
Qty: 180 CAPSULE | Refills: 3 | Status: SHIPPED | OUTPATIENT
Start: 2024-09-24 | End: 2024-09-25 | Stop reason: SDUPTHER

## 2024-09-24 NOTE — TELEPHONE ENCOUNTER
----- Message from Pita Johnson sent at 9/24/2024 11:01 AM CDT -----  Regarding: request  Who Called: Lisseth Guajardo    Refill or New Rx:Refill  RX Name and Strength:Tegretol 100 mg    How is the patient currently taking it? (ex. 1XDay):    Is this a 30 day or 90 day RX:    Local or Mail Order:local    List of preferred pharmacies on file (remove unneeded): [facility] [place] [address] [facility] [address]   Phone: [phone]  Fax: [phone]        Ordering Provider:        Preferred Method of Contact: Phone Call  Patient's Preferred Phone Number on File: [phone]   Best Call Back Number, if different:  Additional Information: Pt states that she was informed by [facility] that they do not have this prescription in stock; she's requesting that it be sent over to [facility] pharmacy; please advise.

## 2024-09-24 NOTE — PROCEDURES
CC:  Cystoscopy    HPI:  Lisseth Guajardo is a 84 y.o. female here for cystoscopy for hematuria.  She had a CT scan which showed some possible calcifications in the bladder versus calcification of the posterior bladder wall.  This was done in November 2023.  She has not had evaluation for this.  She states that she has been having recurrent urinary tract infections. She is incontinent and wears depends and has been doing this since 2018. I reviewed three urine cultures, two from January, both of which were no growth. She did have a positive urine culture on 2 April 2024 which grew out Enterococcus. A urinalysis on 24 March 2024 was negative     Urinalysis:  Results for orders placed or performed in visit on 09/19/24   POCT URINE DIPSTICK WITH MICROSCOPE, AUTOMATED   Result Value Ref Range    Color, UA Yellow     Spec Grav UA 1.025     pH, UA 6.5     WBC, UA Small     Nitrite, UA Negative     Protein,      Glucose, UA Negative     Ketones, UA Negative     Urobilinogen, UA 0.2     Bilirubin, POC Negative     Blood, UA Moderate        Microscopic Urinalysis:  WBC:   2-4 per HPF     RBC:    4-6 per HPF     Bacteria:    None per HPF     Squamous epithelial cells:  None per HPF  Crystals:   None    Imaging:  CT - 11 November 2023:  Layering calculus versus calcification of the posterior bladder wall on the right side of the bladder.      ROS:  All systems reviewed and are negative except as documented in HPI and/or Assessment and Plan.     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    Chronic obstructive pulmonary disease with (acute) exacerbation    Exudative age-related macular degeneration, bilateral, with active choroidal neovascularization    Anorexia nervosa, unspecified    Septo-optic dysplasia of brain    Stage 3a chronic kidney disease    Aortic atherosclerosis        Past Medical History:  Past Medical History:   Diagnosis Date    Cervical radiculitis 08/29/2022    Cervical spinal stenosis 08/29/2022    Cervicalgia 08/29/2022    Coronary arteriosclerosis     DDD (degenerative disc disease), cervical 08/29/2022    Depression     Epilepsy, unspecified, not intractable, with status epilepticus     High cholesterol     Hypokalemia     Mild renal insufficiency 07/06/2022    Myocardial infarction     Osteoporosis     Primary hypertension 1/31/2023    Recurrent UTI     Unspecified macular degeneration         Past Surgical History:  Past Surgical History:   Procedure Laterality Date    APPENDECTOMY      BACK SURGERY      cardiac stents      CARPAL TUNNEL RELEASE      CATARACT EXTRACTION Left     CHOLECYSTECTOMY      crainotomy      FOOT SURGERY Left     HYSTERECTOMY      LUNG REMOVAL, PARTIAL Right     LYMPH NODE DISSECTION          Family History:  Family History   Family history unknown: Yes        Social History:  Social History     Socioeconomic History    Marital status:    Tobacco Use    Smoking status: Former     Types: Cigarettes     Passive exposure: Past    Smokeless tobacco: Never   Substance and Sexual Activity    Alcohol use: Never    Drug use: Never    Sexual activity: Not Currently   Social History Narrative    ** Merged History Encounter **          Social Determinants of Health     Financial Resource Strain: Low Risk  (1/9/2024)    Overall Financial Resource Strain (CARDIA)     Difficulty of Paying Living Expenses: Not very hard   Food Insecurity:  No Food Insecurity (1/9/2024)    Hunger Vital Sign     Worried About Running Out of Food in the Last Year: Never true     Ran Out of Food in the Last Year: Never true   Transportation Needs: Unmet Transportation Needs (1/9/2024)    PRAPARE - Transportation     Lack of Transportation (Medical): No     Lack of Transportation (Non-Medical): Yes   Physical Activity: Inactive (1/9/2024)    Exercise Vital Sign     Days of Exercise per Week: 0 days     Minutes of Exercise per Session: 0 min   Stress: No Stress Concern Present (1/9/2024)    [place] citizen [employer] - Occupational Stress Questionnaire     Feeling of Stress : Only a little   Housing Stability: Low Risk  (1/9/2024)    Housing Stability Vital Sign     Unable to Pay for Housing in the Last Year: No     Number of Places Lived in the Last Year: 1     Unstable Housing in the Last Year: No        Allergies:  Review of patient's allergies indicates:   Allergen Reactions    Nitrofurantoin monohyd/m-cryst Hives    Penicillin Hives        Objective:  Vitals:    09/19/24 1151   BP: 110/74   Pulse: 83   Resp: 20   Temp: 97.9 °F (36.6 °C)     General:  Well developed, well nourished adult female in no acute distress  Abdomen: Soft, nontender, no masses  Extremities:  No clubbing, cyanosis, or edema  Neurologic:  Grossly intact  Musculoskeletal:  Normal tone  :  External genitalia is normal without lesions.  Vagina is normal.      Cystoscopy:         - Urethral meatus:  No strictures        - Urethra:  Normal without strictures or lesions        - Bladder neck:  Normal        - Bladder:  No mucosal abnormalities.  There are no calculi present.  There is some debris in the bladder.        - Ureteral orifices:  On the trigone with clear efflux bilaterally    The patient tolerated the procedure well without complications.  She was given Cipro 500mg, one tablet in the clinic.   The urethra was anesthetized with 2% Lidocaine Jelly, Urojet.      Assessment:  1.  Abnormal radiologic findings on diagnostic imaging of renal pelvis, ureter, or bladder    2. Recurrent UTI  - POCT URINE DIPSTICK WITH MICROSCOPE, AUTOMATED  - sulfamethoxazole-trimethoprim 400-80mg (BACTRIM,SEPTRA) 400-80 mg per tablet; Take 1 tablet by mouth once daily.  Dispense: 90 tablet; Refill: 3     Plan:  The abnormalities seen on CT scan are not appreciated today on cystoscopy.  Going to place her on Bactrim prophylaxis for the recurrent urinary tract infections.  This was sent to the pharmacy.    Follow-up:    Six months.

## 2024-09-25 ENCOUNTER — LAB REQUISITION (OUTPATIENT)
Dept: LAB | Facility: HOSPITAL | Age: 84
End: 2024-09-25
Payer: MEDICARE

## 2024-09-25 ENCOUNTER — TELEPHONE (OUTPATIENT)
Dept: FAMILY MEDICINE | Facility: CLINIC | Age: 84
End: 2024-09-25
Payer: MEDICARE

## 2024-09-25 DIAGNOSIS — Z78.9 OTHER SPECIFIED HEALTH STATUS: ICD-10-CM

## 2024-09-25 DIAGNOSIS — R56.9 SEIZURES: ICD-10-CM

## 2024-09-25 LAB
ALBUMIN SERPL-MCNC: 3.7 G/DL (ref 3.4–4.8)
ALBUMIN/GLOB SERPL: 1.1 RATIO (ref 1.1–2)
ALP SERPL-CCNC: 129 UNIT/L (ref 40–150)
ALT SERPL-CCNC: 17 UNIT/L (ref 0–55)
ANION GAP SERPL CALC-SCNC: 8 MEQ/L
AST SERPL-CCNC: 34 UNIT/L (ref 5–34)
BILIRUB SERPL-MCNC: 0.3 MG/DL
BUN SERPL-MCNC: 19 MG/DL (ref 9.8–20.1)
CALCIUM SERPL-MCNC: 9 MG/DL (ref 8.4–10.2)
CHLORIDE SERPL-SCNC: 110 MMOL/L (ref 98–107)
CHOLEST SERPL-MCNC: 174 MG/DL
CHOLEST/HDLC SERPL: 3 {RATIO} (ref 0–5)
CO2 SERPL-SCNC: 25 MMOL/L (ref 23–31)
CREAT SERPL-MCNC: 1.08 MG/DL (ref 0.55–1.02)
CREAT/UREA NIT SERPL: 18
GFR SERPLBLD CREATININE-BSD FMLA CKD-EPI: 50 ML/MIN/1.73/M2
GLOBULIN SER-MCNC: 3.5 GM/DL (ref 2.4–3.5)
GLUCOSE SERPL-MCNC: 92 MG/DL (ref 82–115)
HDLC SERPL-MCNC: 55 MG/DL (ref 35–60)
LDLC SERPL CALC-MCNC: 86 MG/DL (ref 50–140)
POTASSIUM SERPL-SCNC: 4.1 MMOL/L (ref 3.5–5.1)
PROT SERPL-MCNC: 7.2 GM/DL (ref 5.8–7.6)
SODIUM SERPL-SCNC: 143 MMOL/L (ref 136–145)
TRIGL SERPL-MCNC: 165 MG/DL (ref 37–140)
VLDLC SERPL CALC-MCNC: 33 MG/DL

## 2024-09-25 PROCEDURE — 80061 LIPID PANEL: CPT | Performed by: INTERNAL MEDICINE

## 2024-09-25 PROCEDURE — 80053 COMPREHEN METABOLIC PANEL: CPT | Performed by: INTERNAL MEDICINE

## 2024-09-25 RX ORDER — CARBAMAZEPINE 100 MG/1
100 CAPSULE, EXTENDED RELEASE ORAL 2 TIMES DAILY
Qty: 180 CAPSULE | Refills: 3 | Status: SHIPPED | OUTPATIENT
Start: 2024-09-25

## 2024-09-25 RX ORDER — CARBAMAZEPINE 100 MG/1
100 CAPSULE, EXTENDED RELEASE ORAL 2 TIMES DAILY
Qty: 180 CAPSULE | Refills: 3 | Status: CANCELLED | OUTPATIENT
Start: 2024-09-25

## 2024-09-25 NOTE — TELEPHONE ENCOUNTER
----- Message from Gustabo Whitaker sent at 9/25/2024 12:36 PM CDT -----  .Who Called: Lisseth Guajardo    Refill or New Rx:New Rx    RX Name and Strength: carBAMazepine (CARBATROL) 100 MG CM12    How is the patient currently taking it? (ex. 1XDay): Sig - Route: Take 1 capsule (100 mg total) by mouth 2 (two) times a day. - Oral    Is this a 30 day or 90 day RX: n/a    Local or Mail Order: Mail    List of preferred pharmacies on file (remove unneeded):   [facility] - [address]   Phone: [phone]  Fax: [phone]    Ordering Provider:Dr. Ren      Preferred Method of Contact: Phone Call    Patient's Preferred Phone Number on File: [phone]     Best Call Back Number, if different:    Additional Information: Pt states medication went to incorrect pharmacy. Please resend medication to pharmacy above. Please advise, thank you.

## 2024-09-25 NOTE — TELEPHONE ENCOUNTER
----- Message from Jesica Leong sent at 9/25/2024 10:22 AM CDT -----  Regarding: labs  .Type:  Needs Medical Advice    Who Called: pt  Symptoms (please be specific):    How long has patient had these symptoms:    Pharmacy name and phone #:    Would the patient rather a call back or a response via [facility]?   Best Call Back Number:  [phone]  Additional Information: pt has questions

## 2024-09-25 NOTE — TELEPHONE ENCOUNTER
----- Message from Yossi Comer sent at 9/25/2024  1:36 PM CDT -----  Regarding: refill  Who Called: Lisseth Guajardo    Refill or New Rx:Refill    RX Name and Strength:carBAMazepine (CARBATROL) 100 MG CM12     How is the patient currently taking it? (ex. 1XDay): 2x    Is this a 30 day or 90 day RX:    Local or Mail Order  List of preferred pharmacies on file (remove unneeded): [facility] - [address]      If different Pharmacy is requested, enter Pharmacy information here including location and phone number:   Ordering Provider:      Preferred Method of Contact: Phone Call  Patient's Preferred Phone Number on File: [phone]   Best Call Back Number, if different:  Additional Information:  Pt refill medication was sent to wrong pharmacy, [facility] Please advise.

## 2024-10-10 ENCOUNTER — TELEPHONE (OUTPATIENT)
Dept: UROLOGY | Facility: CLINIC | Age: 84
End: 2024-10-10
Payer: MEDICARE

## 2024-10-10 DIAGNOSIS — R56.9 SEIZURES: ICD-10-CM

## 2024-10-10 RX ORDER — CARBAMAZEPINE 100 MG/1
100 CAPSULE, EXTENDED RELEASE ORAL 2 TIMES DAILY
Qty: 180 CAPSULE | Refills: 3 | Status: SHIPPED | OUTPATIENT
Start: 2024-10-10

## 2024-10-10 RX ORDER — CEPHALEXIN 250 MG/1
250 CAPSULE ORAL DAILY
Qty: 90 CAPSULE | Refills: 3 | Status: SHIPPED | OUTPATIENT
Start: 2024-10-10

## 2024-10-10 NOTE — TELEPHONE ENCOUNTER
She is allergic to two of the main antibiotics that we use for this so I have given her cephalexin.  She is allergic to penicillin so please warn her that if she develops hives to stop and take Benadryl.  There is a ten% chance that she may be also allergic to the cephalexin but this is really the only other choice I have based on her allergies.

## 2024-10-10 NOTE — TELEPHONE ENCOUNTER
----- Message from WAYNE Leggett sent at 10/10/2024 10:38 AM CDT -----  Regarding: FW: Patient called - Meds  Pt stated she is having dry mouth, headache & dizziness from the Bactrim that she was started on as a prophylaxis & was asking for another antibiotic to take daily. Please advise. Thanks!  ----- Message -----  From: Lisa Fournier  Sent: 10/9/2024  10:11 AM CDT  To: Betsey Whitt RN  Subject: Patient called - Meds                            Anh,    Ms. Guajardo called with concerns about her Bactrim. She said she is experiencing too many adverse side effects. When you can, please call her back to discuss.    ThanksPrachi

## 2024-10-11 NOTE — TELEPHONE ENCOUNTER
Called pt & explained medication instructions & to watch out for allergic reaction, to stop taking medication immediately if she develops hives & to contact me regarding any reactions w/pt stating understanding

## 2024-10-21 ENCOUNTER — TELEPHONE (OUTPATIENT)
Dept: FAMILY MEDICINE | Facility: CLINIC | Age: 84
End: 2024-10-21
Payer: MEDICARE

## 2024-10-21 DIAGNOSIS — R56.9 SEIZURES: ICD-10-CM

## 2024-10-21 RX ORDER — CARBAMAZEPINE 100 MG/1
100 CAPSULE, EXTENDED RELEASE ORAL 2 TIMES DAILY
Qty: 180 CAPSULE | Refills: 3 | Status: SHIPPED | OUTPATIENT
Start: 2024-10-21

## 2024-10-21 NOTE — TELEPHONE ENCOUNTER
----- Message from Melody Ren MD sent at 10/18/2024 12:16 PM CDT -----  Please check with [facility] to see if they have this Rx in stock. Thank you.  ----- Message -----  From: Renetta Proctor LPN  Sent: 10/18/2024  11:45 AM CDT  To: Melody Ren MD    Please advise Thanks  ----- Message -----  From: Beronica Haynes  Sent: 10/18/2024  11:03 AM CDT  To: Mikal VAZQUEZ Staff    Who Called: Lisseth Guajardo    Patient is returning phone call    Who Left Message for Patient:  Does the patient know what this is regarding?:Tegritol      Preferred Method of Contact: Phone Call  Patient's Preferred Phone Number on File: [phone]   Best Call Back Number, if different:  Additional Information:  pt called to speak with nurse asking what can she take besides tegritol due to can't find the meds anywhere pls advise

## 2024-10-29 ENCOUNTER — TELEPHONE (OUTPATIENT)
Dept: FAMILY MEDICINE | Facility: CLINIC | Age: 84
End: 2024-10-29
Payer: MEDICARE

## 2024-11-06 ENCOUNTER — TELEPHONE (OUTPATIENT)
Dept: FAMILY MEDICINE | Facility: CLINIC | Age: 84
End: 2024-11-06
Payer: MEDICARE

## 2024-11-06 DIAGNOSIS — R56.9 UNSPECIFIED CONVULSIONS: ICD-10-CM

## 2024-11-06 RX ORDER — PHENYTOIN SODIUM 100 MG/1
100 CAPSULE, EXTENDED RELEASE ORAL 2 TIMES DAILY
Qty: 180 CAPSULE | Refills: 3 | Status: SHIPPED | OUTPATIENT
Start: 2024-11-06

## 2024-11-06 NOTE — TELEPHONE ENCOUNTER
----- Message from Kristine sent at 11/6/2024 11:59 AM CST -----  .Type:  Patient Returning Call    Who Called:pt  Who Left Message for Patient:pt  Does the patient know what this is regarding?:Dilantin  Would the patient rather a call back or a response via [facility]?   Best Call Back Number:[phone]  Additional Information: Please send in Dilantin. Patient stats [facility]

## 2024-11-08 ENCOUNTER — TELEPHONE (OUTPATIENT)
Dept: FAMILY MEDICINE | Facility: CLINIC | Age: 84
End: 2024-11-08
Payer: MEDICARE

## 2024-11-08 ENCOUNTER — TELEPHONE (OUTPATIENT)
Dept: UROLOGY | Facility: CLINIC | Age: 84
End: 2024-11-08
Payer: MEDICARE

## 2024-11-08 DIAGNOSIS — R11.0 CHRONIC NAUSEA: Primary | ICD-10-CM

## 2024-11-08 DIAGNOSIS — R39.9 UTI SYMPTOMS: ICD-10-CM

## 2024-11-08 NOTE — TELEPHONE ENCOUNTER
"Called pt & gave message that no other antibiotics are available for daily prophylaxis. Instructed pt that if she feels she has a UTI that she would have to come into clinic to give a urine sample. Pt stated she does not have transportation for that. Instructed pt to go to nearest urgent care or ER if needed. Pt stated understanding    ----- Message from Leilani Forte DO sent at 11/7/2024 11:47 AM CST -----  Regarding: RE: allergic to antibiotic  Please inform her that there are no other antibiotics we can try since she is allergic to penicillin, Bactrim, Macrobid, and now cephalosporins.  ----- Message -----  From: Betsey Whitt RN  Sent: 11/7/2024  10:56 AM CST  To: Leilani Forte DO  Subject: FW: allergic to antibiotic                       Pt called stating she is having "dry mouth & hallucinations" with the Keflex, which is the same symptoms she said she was having w/the Bactrim. I told her that Keflex was a last ditch effort for prophylaxis due to her allergies but she is still requesting a new antibiotic. Please advise. Thank you!  ----- Message -----  From: Alee Munguia  Sent: 11/5/2024  11:02 AM CST  To: Betsey Whitt RN  Subject: allergic to antibiotic                           Pt called stating that she is allergic to the Keflex that Dr Forte prescribed for her. Please call her to see if there is anything else that she can take. Thanks  "

## 2024-11-08 NOTE — TELEPHONE ENCOUNTER
----- Message from Perpetuall sent at 11/8/2024 10:22 AM CST -----  .Who Called: Lisseth Guajardo    Caller is requesting assistance/information from provider's office.    Symptoms (please be specific): Possible UTI   How long has patient had these symptoms:  ongoing   List of preferred pharmacies on file (remove unneeded): [unfilled]  If different, enter pharmacy into here including location and phone number: n/a      Preferred Method of Contact: Phone Call  Patient's Preferred Phone Number on File: [phone]   Best Call Back Number, if different:  Additional Information: Pt was advised that Home health will be coming to collect her urine. Pt stated that she was advised that she was discharged from home health and would like to know will they still be able to come out to complete the orders please call to advise

## 2024-11-12 ENCOUNTER — TELEPHONE (OUTPATIENT)
Dept: FAMILY MEDICINE | Facility: CLINIC | Age: 84
End: 2024-11-12
Payer: MEDICARE

## 2024-11-12 NOTE — TELEPHONE ENCOUNTER
----- Message from Vinny sent at 11/12/2024  1:53 PM CST -----  .Type:  Needs Medical Advice    Who Called: Lisseth   Symptoms (please be specific):    How long has patient had these symptoms:    Pharmacy name and phone #:    Would the patient rather a call back or a response via [facility]?   Best Call Back Number: [phone]  Additional Information: She needs a call back from a nurse she has a question about a medication, she told me she has called before, please call her back, thanks.

## 2024-11-12 NOTE — TELEPHONE ENCOUNTER
Pts states she can not afford to pay the 150$ copay for the Carbamazepine, she is asking if there is anything else she can take. She has been out of this medication and is in a lot of pain. Please advise Thanks

## 2024-11-12 NOTE — TELEPHONE ENCOUNTER
Called pt and gave the number to [facility] to check in the Rx that was sent

## 2024-11-12 NOTE — TELEPHONE ENCOUNTER
----- Message from Armando sent at 11/12/2024  2:38 PM CST -----  .Who Called: Lisseth Guajardo    Caller is requesting assistance/information from provider's office.    Symptoms (please be specific):    How long has patient had these symptoms:    List of preferred pharmacies on file (remove unneeded):       Preferred Method of Contact: Phone Call  Patient's Preferred Phone Number on File: [phone]   Best Call Back Number, if different:  Additional Information: pt called requesting to speak with nurse

## 2024-11-12 NOTE — TELEPHONE ENCOUNTER
----- Message from Vinny sent at 11/12/2024  2:10 PM CST -----  .Type:  Needs Medical Advice    Who Called: Lisseth   Symptoms (please be specific):    How long has patient had these symptoms:    Pharmacy name and phone #:    Would the patient rather a call back or a response via [facility]?   Best Call Back Number: [phone]  Additional Information: Patient calling again, she told me she was returning a call I attempted backline three times someone please call her back. Thanks,

## 2024-11-14 ENCOUNTER — TELEPHONE (OUTPATIENT)
Dept: FAMILY MEDICINE | Facility: CLINIC | Age: 84
End: 2024-11-14
Payer: MEDICARE

## 2024-11-14 NOTE — TELEPHONE ENCOUNTER
----- Message from Jesica sent at 11/14/2024  1:58 PM CST -----  Regarding: test  .Type:  Test Results    Who Called: pt  Name of Test (Lab/Mammo/Etc): urine test  Date of Test: 11/8  Ordering Provider:   Where the test was performed:   Would the patient rather a call back or a response via [facility]?   Best Call Back Number: [phone]  Additional Information:

## 2024-11-14 NOTE — TELEPHONE ENCOUNTER
----- Message from Eduar sent at 11/13/2024  2:50 PM CST -----  Who Called: Lisseth Guajardo    Caller is requesting assistance/information from provider's office.    Symptoms (please be specific):    How long has patient had these symptoms:    List of preferred pharmacies on file (remove unneeded): [unfilled]  If different, enter pharmacy into here including location and phone number:       Preferred Method of Contact: Phone Call  Patient's Preferred Phone Number on File: [phone]   Best Call Back Number, if different:  Additional Information: pt would like to speak with nurse about results

## 2024-11-18 ENCOUNTER — TELEPHONE (OUTPATIENT)
Dept: FAMILY MEDICINE | Facility: CLINIC | Age: 84
End: 2024-11-18
Payer: MEDICARE

## 2024-11-18 ENCOUNTER — DOCUMENTATION ONLY (OUTPATIENT)
Dept: FAMILY MEDICINE | Facility: CLINIC | Age: 84
End: 2024-11-18
Payer: MEDICARE

## 2024-11-18 NOTE — TELEPHONE ENCOUNTER
----- Message from Melody Ren MD sent at 11/18/2024  1:14 PM CST -----  Final urine culture is negative for bacterial urinary tract infection.

## 2024-11-18 NOTE — TELEPHONE ENCOUNTER
----- Message from Jesica sent at 11/18/2024  1:25 PM CST -----  Regarding: test  .Type:  Test Results    Who Called: pt  Name of Test (Lab/Mammo/Etc): urine test   Date of Test:   Ordering Provider:   Where the test was performed:   Would the patient rather a call back or a response via [facility]?   Best Call Back Number:  [phone]  Additional Information:

## 2024-11-19 ENCOUNTER — TELEPHONE (OUTPATIENT)
Dept: FAMILY MEDICINE | Facility: CLINIC | Age: 84
End: 2024-11-19
Payer: MEDICARE

## 2024-11-19 NOTE — TELEPHONE ENCOUNTER
----- Message from Shannon sent at 11/19/2024  1:10 PM CST -----  Who Called: Lisseth Guajardo    Patient is returning phone call    Who Left Message for Patient:stone  Does the patient know what this is regarding?:urine      Preferred Method of Contact: Phone Call  Patient's Preferred Phone Number on File: [phone]   Best Call Back Number, if different:  Additional Information: return call, pt stated she had a urine sample taken  last week and was concern if urine sample was okay, please advise, thanks

## 2024-11-19 NOTE — TELEPHONE ENCOUNTER
Pt was concerned about her UA, she states it was collected on 11/08 but not receieved by the lab until 11/13. Pt was concerned if it was even her urine. I asked if she wanted to recollect and she refused

## 2024-11-25 ENCOUNTER — TELEPHONE (OUTPATIENT)
Dept: FAMILY MEDICINE | Facility: CLINIC | Age: 84
End: 2024-11-25
Payer: MEDICARE

## 2024-11-25 NOTE — TELEPHONE ENCOUNTER
----- Message from Gustabo sent at 11/22/2024  3:14 PM CST -----  .Who Called: Anastasiya Mccoy (friend)    Caller is requesting assistance/information from provider's office.    Symptoms (please be specific): n/a   How long has patient had these symptoms:  n/a  List of preferred pharmacies on file (remove unneeded): [unfilled]  If different, enter pharmacy into here including location and phone number: n/a      Preferred Method of Contact: Phone Call    Patient's Preferred Phone Number on File: [phone]     Best Call Back Number, if different: [phone] (Anastasiya)    Additional Information: Called stating police came to  pt and took her to [facility] or [facility] to be evaluated, stating pt put in her 30 day notice and she's been having problems with [facility]. Requesting assistance from Dr. Ren. Please advise, thank you.

## 2024-12-11 PROBLEM — Z09 HOSPITAL DISCHARGE FOLLOW-UP: Status: ACTIVE | Noted: 2024-12-11

## 2025-01-06 ENCOUNTER — OFFICE VISIT (OUTPATIENT)
Dept: FAMILY MEDICINE | Facility: CLINIC | Age: 85
End: 2025-01-06
Payer: MEDICARE

## 2025-01-06 VITALS
SYSTOLIC BLOOD PRESSURE: 102 MMHG | BODY MASS INDEX: 20.39 KG/M2 | WEIGHT: 108 LBS | OXYGEN SATURATION: 96 % | DIASTOLIC BLOOD PRESSURE: 70 MMHG | HEIGHT: 61 IN | HEART RATE: 67 BPM

## 2025-01-06 DIAGNOSIS — R26.2 UNABLE TO WALK: ICD-10-CM

## 2025-01-06 DIAGNOSIS — R56.9 SEIZURES: ICD-10-CM
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
## 2025-01-06 DIAGNOSIS — Z09 HOSPITAL DISCHARGE FOLLOW-UP: Primary | ICD-10-CM

## 2025-01-06 PROCEDURE — 99214 OFFICE O/P EST MOD 30 MIN: CPT | Mod: ,,,

## 2025-01-06 PROCEDURE — G2211 COMPLEX E/M VISIT ADD ON: HCPCS | Mod: ,,,

## 2025-01-06 RX ORDER — PHENOBARBITAL 32.4 MG/1
32.4 TABLET ORAL NIGHTLY
Qty: 90 TABLET | Refills: 3 | Status: SHIPPED | OUTPATIENT
Start: 2025-01-06

## 2025-01-06 NOTE — PROGRESS NOTES
Transitional Care Note  Subjective:         Patient ID: Lisseth Guajardo is a 84 y.o. female.  Chief Complaint: Hospital follow up acute cystitis without hematuria, seizure, altered mental status  Admit: 12/27/2024     Discharge: 12/30/2024           Family and/or Caretaker present at visit?  Yes. Lien   Diagnostic tests reviewed/disposition: I have reviewed all completed as well as pending diagnostic tests at the time of discharge.  Disease/illness education: Medication compliance  Home health/community services discussion/referrals: Patient does not have home health established from hospital visit. Reports recent discharge from [facility] and would like to re-establish home health services.     Establishment or re-establishment of referral orders for community resources: No other necessary community resources.   Discussion with other health care providers: No discussion with other health care providers necessary.     HPI  Lisseth Guajardo is a 84 y.o. female who presents today for a transitional care visit. She was transported to [facility] ED for an evaluation of seizure-like activity. The patient has a history of seizures and is on tegretol, phenobarbital and dilantin. CT head was negative for acute intracranial disease. CXR negative. CT sinus/facial bone no acute findings.   The patient was admitted for treatment of seizure and UTI. The patient had no seizures while in patient    Today she denies seizure activity since discharge. She confirms completion of discharge antibiotic and daily compliance with seizure medication. She has a caregiver who assist her with preparing her medications. She is scheduled to follow up with Dr. Richardson with dilantin and phenobarbital levels. The patient was evaluated by PT while in patient and it was recommended that she would benefit from additional home health service of physical/occupational therapy to maximize functional abilities. She denies  headaches, weakness,  dizziness, chest pain, or shortness of breath.    Review of Systems    Constitutional: Denies Change in appetite. Denies Chills. Denies Fever. Denies Night sweats.  Eye: Denies Blurred vision. Denies Discharge. Denies Eye pain.  ENT: Denies Decreased hearing. Denies Sore throat. Denies Swollen glands.  Respiratory: Denies Cough. Denies Shortness of breath. Denies Shortness of breath with exertion. Denies Wheezing.  Cardiovascular: Denies Chest pain at rest. Denies Chest pain with exertion. Denies Irregular heartbeat. Denies Palpitations.  Gastrointestinal: Denies Abdominal pain. Denies Diarrhea. Denies Nausea. Denies Vomiting. Denies Hematemesis or Hematochezia.  Genitourinary: Denies Dysuria. Denies Urinary frequency. Denies Urinary urgency. Denies Blood in urine.  Endocrine: Denies Cold intolerance. Denies Excessive thirst. Denies Heat intolerance. Denies Weight loss. Denies Weight gain.  Musculoskeletal: Denies Painful joints. Reports weakness.  Integumentary: Denies Rash. Denies Itching. Denies Dry skin.  Neurologic: Denies Dizziness. Denies Fainting. Denies Headache.  Psychiatric: Denies Depression. Denies Anxiety. Denies Suicidal/Homicidal ideations.     All Other ROS: Negative except as stated in HPI.    Current Outpatient Medications:     albuterol (PROVENTIL/VENTOLIN HFA) 90 mcg/actuation inhaler, Inhale 1-2 puffs into the lungs every 6 (six) hours as needed for Wheezing or Shortness of Breath., Disp: 18 g, Rfl: 5    ALPHAGAN P 0.15 % ophthalmic solution, Place 1 drop into both eyes 3 (three) times daily. Duplicate- taking generic, Disp: , Rfl:     aspirin (ECOTRIN) 81 MG EC tablet, Take 81 mg by mouth once daily., Disp: , Rfl:     atorvastatin (LIPITOR) 40 MG tablet, TAKE 1 TABLET DAILY, Disp: 90 tablet, Rfl: 3    benzonatate (TESSALON) 100 MG capsule, Take 1 capsule (100 mg total) by mouth 3 (three) times daily as needed for Cough., Disp: 21 capsule, Rfl: 0    brimonidine 0.2% (ALPHAGAN) 0.2 % Drop, Place  1 drop into both eyes 3 (three) times daily., Disp: , Rfl:     calcium carbonate/vitamin D3 (CALTRATE 600 + D ORAL), Take 1 tablet by mouth 2 (two) times a day., Disp: , Rfl:     carBAMazepine (CARBATROL) 100 MG CM12, Take 1 capsule (100 mg total) by mouth 2 (two) times a day., Disp: 180 capsule, Rfl: 3    cephALEXin (KEFLEX) 250 MG capsule, Take 1 capsule (250 mg total) by mouth once daily., Disp: 90 capsule, Rfl: 3    COSOPT 22.3-6.8 mg/mL ophthalmic solution, Place 1 drop into both eyes 2 (two) times daily., Disp: , Rfl:     COSOPT, PF, 2-0.5 % Dpet ophthalmic solution, Place 1 drop into both eyes 2 (two) times daily., Disp: , Rfl:     diclofenac (FLECTOR) 1.3 % PT12, Apply 1 patch topically daily as needed (pain)., Disp: 30 patch, Rfl: 2    fluconazole (DIFLUCAN) 150 MG Tab, Take one tablet now and one in three days., Disp: 2 tablet, Rfl: 0    hydrOXYzine (ATARAX) 50 MG tablet, Take 1 tablet (50 mg total) by mouth 3 (three) times daily as needed for Itching., Disp: 180 tablet, Rfl: 2    hydrOXYzine HCL (ATARAX) 10 MG Tab, Take 1 tablet (10 mg total) by mouth 3 (three) times daily as needed (itching/anxiety)., Disp: 90 tablet, Rfl: 3    loratadine (CLARITIN) 10 mg tablet, TAKE 1 TABLET ONCE DAILY FOR ALLERGIES/FLUID IN THE EARS, Disp: 90 tablet, Rfl: 3    melatonin (MELATIN) 3 mg tablet, Take 1 tablet (3 mg total) by mouth nightly as needed for Insomnia., Disp: 90 tablet, Rfl: 3    metoprolol succinate 25 mg CSpX, Take 25 mg by mouth once daily., Disp: 90 capsule, Rfl: 3    miconazole (MICOTIN) 2 % cream, Apply 1 g topically 2 (two) times daily. (Patient taking differently: Apply 1 g topically 2 (two) times daily. PRN), Disp: 28 g, Rfl: 3    ondansetron (ZOFRAN-ODT) 4 MG TbDL, PLACE 1 TABLET ON TONGUE EVERY 8 HOURS AS NEEDED FOR NAUSEA/VOMITING, Disp: 30 tablet, Rfl: 5    phenytoin (DILANTIN) 100 MG ER capsule, Take 1 capsule (100 mg total) by mouth 2 (two) times daily., Disp: 180 capsule, Rfl: 3     sulfamethoxazole-trimethoprim 400-80mg (BACTRIM,SEPTRA) 400-80 mg per tablet, Take 1 tablet by mouth once daily., Disp: 90 tablet, Rfl: 3    XALATAN 0.005 % ophthalmic solution, Place 1 drop into both eyes nightly., Disp: , Rfl:     betamethasone valerate 0.1% (VALISONE) 0.1 % Crea, Apply topically 2 (two) times daily as needed (rash)., Disp: 45 g, Rfl: 1    PHENobarbitaL 32.4 MG tablet, Take 1 tablet (32.4 mg total) by mouth every evening., Disp: 90 tablet, Rfl: 3    Objective:      Physical Exam    General: Alert, No acute distress.  Head: Normocephalic  Eye: Extraocular movements are intact, Sclera non-icteric.  Neck/Thyroid: Supple, Non-tender, No palpable thyroid nodule, No lymphadenopathy, No JVD, Full range of motion.  Respiratory: Clear to auscultation bilaterally; Non-labored respirations, Symmetrical chest wall expansion.  Cardiovascular: Regular rate and rhythm, S1/S2 normal, No murmurs, rubs or gallops.  GI: Soft, Non-tender, Non-distended, Normal bowel sounds, No palpable organomegaly.    : No supra pubic tenderness.   Musculoskeletal: Normal range of motion.   Integumentary: Warm, Dry, Intact, No suspicious lesions or rashes.  Extremities: Wheelchair bound. No clubbing, cyanosis or edema  Neurologic: No focal deficits, Right upper/lower extremity normal tone, strength 4/5; left upper/lower extremity contracture.  Psychiatric: Normal interaction, Coherent speech, Euthymic mood, Appropriate affect.      Assessment:       1. Hospital discharge follow-up    2. Seizures    3. Unable to walk    4. Fall, subsequent encounter          Plan:     1. Hospital discharge follow-up  Assessment & Plan:  UTI resolved.  Fall precautions discussed.  Go to the emergency department if symptoms of chest pain/tightness, shortness of breath, increased pain, fall, change in level of consciousness, fever/chills, temp >100.4 or any acute illness.     2. Seizures  Assessment & Plan:  Discussed the importance of daily  compliance with seizure/epilepsy medication plan. Keep appointment with Dr. Richardson as scheduled. ED precautions discussed.    Orders:  -     PHENobarbitaL 32.4 MG tablet; Take 1 tablet (32.4 mg total) by mouth every evening.  Dispense: 90 tablet; Refill: 3  -     Ambulatory referral/consult to Home Health; Future    3. Unable to walk  -     Ambulatory referral/consult to Home Health; Future    4. Fall, subsequent encounter  -     Ambulatory referral/consult to Home Health; Future         Keep future appointments as scheduled.   In addition to their scheduled follow up, the patient has also been instructed to follow up on as needed basis.

## 2025-01-13 PROBLEM — R56.9 SEIZURES: Status: ACTIVE | Noted: 2025-01-13

## 2025-01-13 PROBLEM — R26.2 UNABLE TO WALK: Status: ACTIVE | Noted: 2025-01-13
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
## 2025-01-13 NOTE — ASSESSMENT & PLAN NOTE
UTI resolved.  Fall precautions discussed.  Go to the emergency department if symptoms of chest pain/tightness, shortness of breath, increased pain, fall, change in level of consciousness, fever/chills, temp >100.4 or any acute illness.

## 2025-01-14 NOTE — ASSESSMENT & PLAN NOTE
Discussed the importance of daily compliance with seizure/epilepsy medication plan. Keep appointment with Dr. Richardson as scheduled. ED precautions discussed.

## 2025-02-04 ENCOUNTER — TELEPHONE (OUTPATIENT)
Dept: FAMILY MEDICINE | Facility: CLINIC | Age: 85
End: 2025-02-04
Payer: MEDICARE

## 2025-02-04 DIAGNOSIS — R39.9 UTI SYMPTOMS: Primary | ICD-10-CM

## 2025-02-04 NOTE — TELEPHONE ENCOUNTER
----- Message from Nurse Gary sent at 2/4/2025 11:24 AM CST -----  She doesn't have the portal, but Mariaelena with [facility] is seeing her tomorrow and says if we send them an order they can collect. Please advise. Thanks  ----- Message -----  From: Melody Ren MD  Sent: 2/4/2025  11:12 AM CST  To: Cookie Santana LPN    Can she do an evisit? Thanks.  ----- Message -----  From: Cookie Santana LPN  Sent: 2/4/2025  10:59 AM CST  To: Melody Ren MD    Do you want to put in an order for Urinalysis  ----- Message -----  From: Lexi Gaxiola  Sent: 2/4/2025  10:23 AM CST  To: Mikal VAZQUEZ Staff    Who Called: Lisseth Guajardo    Caller is requesting assistance/information from provider's office.    Symptoms (please be specific): N/A   How long has patient had these symptoms:  N/A  List of preferred pharmacies on file (remove unneeded): [unfilled]  If different, enter pharmacy into here including location and phone number: N/A      Preferred Method of Contact: Phone Call  Patient's Preferred Phone Number on File: [phone]   Best Call Back Number, if different:  Additional Information: Pt wants to speak w/ nurse about possible UTI

## 2025-02-05 ENCOUNTER — LAB REQUISITION (OUTPATIENT)
Dept: LAB | Facility: HOSPITAL | Age: 85
End: 2025-02-05
Payer: MEDICARE

## 2025-02-05 DIAGNOSIS — N39.0 URINARY TRACT INFECTION, SITE NOT SPECIFIED: ICD-10-CM

## 2025-02-05 LAB
BACTERIA #/AREA URNS AUTO: ABNORMAL /HPF
BILIRUB UR QL STRIP.AUTO: NEGATIVE
CLARITY UR: CLEAR
COLOR UR AUTO: ABNORMAL
GLUCOSE UR QL STRIP: NEGATIVE
HGB UR QL STRIP: ABNORMAL
KETONES UR QL STRIP: NEGATIVE
LEUKOCYTE ESTERASE UR QL STRIP: ABNORMAL
MUCOUS THREADS URNS QL MICRO: ABNORMAL /LPF
NITRITE UR QL STRIP: NEGATIVE
PH UR STRIP: 6 [PH]
PROT UR QL STRIP: ABNORMAL
RBC #/AREA URNS AUTO: ABNORMAL /HPF
SP GR UR STRIP.AUTO: 1.01 (ref 1–1.03)
SQUAMOUS #/AREA URNS AUTO: ABNORMAL /HPF
UROBILINOGEN UR STRIP-ACNC: 0.2
WBC #/AREA URNS AUTO: ABNORMAL /HPF

## 2025-02-05 PROCEDURE — 81003 URINALYSIS AUTO W/O SCOPE: CPT | Performed by: FAMILY MEDICINE

## 2025-02-05 PROCEDURE — 87086 URINE CULTURE/COLONY COUNT: CPT | Performed by: FAMILY MEDICINE

## 2025-02-07 LAB — BACTERIA UR CULT: NORMAL

## 2025-02-13 ENCOUNTER — OFFICE VISIT (OUTPATIENT)
Dept: FAMILY MEDICINE | Facility: CLINIC | Age: 85
End: 2025-02-13
Payer: MEDICARE

## 2025-02-13 VITALS
WEIGHT: 108 LBS | RESPIRATION RATE: 16 BRPM | SYSTOLIC BLOOD PRESSURE: 100 MMHG | TEMPERATURE: 98 F | BODY MASS INDEX: 20.39 KG/M2 | HEIGHT: 61 IN | HEART RATE: 86 BPM | OXYGEN SATURATION: 98 % | DIASTOLIC BLOOD PRESSURE: 70 MMHG

## 2025-02-13 DIAGNOSIS — I69.854 HEMIPLEGIA AND HEMIPARESIS FOLLOWING OTHER CEREBROVASCULAR DISEASE AFFECTING LEFT NON-DOMINANT SIDE: ICD-10-CM

## 2025-02-13 DIAGNOSIS — Z78.0 POSTMENOPAUSAL: ICD-10-CM

## 2025-02-13 DIAGNOSIS — Q04.4 SEPTO-OPTIC DYSPLASIA OF BRAIN: ICD-10-CM

## 2025-02-13 DIAGNOSIS — R56.9 SEIZURES: ICD-10-CM

## 2025-02-13 DIAGNOSIS — R31.21 ASYMPTOMATIC MICROSCOPIC HEMATURIA: Primary | ICD-10-CM

## 2025-02-13 DIAGNOSIS — L30.4 INTERTRIGO: ICD-10-CM

## 2025-02-13 DIAGNOSIS — I50.9 HEART FAILURE, UNSPECIFIED HF CHRONICITY, UNSPECIFIED HEART FAILURE TYPE: ICD-10-CM

## 2025-02-13 DIAGNOSIS — N18.31 STAGE 3A CHRONIC KIDNEY DISEASE: ICD-10-CM

## 2025-02-13 DIAGNOSIS — H35.3231 EXUDATIVE AGE-RELATED MACULAR DEGENERATION, BILATERAL, WITH ACTIVE CHOROIDAL NEOVASCULARIZATION: ICD-10-CM

## 2025-02-13 DIAGNOSIS — J44.1 CHRONIC OBSTRUCTIVE PULMONARY DISEASE WITH (ACUTE) EXACERBATION: ICD-10-CM

## 2025-02-13 DIAGNOSIS — G50.0 TRIGEMINAL NEURALGIA OF RIGHT SIDE OF FACE: ICD-10-CM

## 2025-02-13 DIAGNOSIS — I10 PRIMARY HYPERTENSION: ICD-10-CM

## 2025-02-13 DIAGNOSIS — F50.00 ANOREXIA NERVOSA, UNSPECIFIED: ICD-10-CM

## 2025-02-13 DIAGNOSIS — Z13.820 SCREENING FOR OSTEOPOROSIS: ICD-10-CM

## 2025-02-13 RX ORDER — CLOTRIMAZOLE AND BETAMETHASONE DIPROPIONATE 10; .64 MG/G; MG/G
CREAM TOPICAL 2 TIMES DAILY
Qty: 45 G | Refills: 5 | Status: SHIPPED | OUTPATIENT
Start: 2025-02-13 | End: 2025-02-27

## 2025-02-13 RX ORDER — CARBAMAZEPINE 100 MG/1
100 CAPSULE, EXTENDED RELEASE ORAL 2 TIMES DAILY
Qty: 180 CAPSULE | Refills: 3 | Status: SHIPPED | OUTPATIENT
Start: 2025-02-13

## 2025-02-13 NOTE — PROGRESS NOTES
Patient ID: [id number]     Chief Complaint: Follow-up (Jaw pain)        HPI:     Lisseth Guajardo is a 84 y.o. female here today for a follow up HTN.   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 Richardson) as scheduled. She needs Rx refill of Tegretol today.   - She has a history of CAD/HTN/hypercholesterolemia/CHF/CVA with left-sided hemiparalysis, controlled with Rx, no side effects, she is s/p 4 stents, asymptomatic, she does see Cardiology (Dr. Martinez).  - She is in a wheel chair and has left sided paralysis and deconditioning, she does PT with [facility] for fall precautions, and balance training. She does stay in an assisted living facility.   - COPD is stable and asymptomatic.   - CKD stage 3 is stable and asymptomatic.   - She has chronic nausea, controlled with Zofran p.r.n., no side effects,  - She is taking Cipro for UTI, wears adult diapers, she has recurrent UTIs, she does see Urology (Dr. Forte) as scheduled, doing well, asymptomatic, no side effects. She had labs done on 02/05/2025, here to discuss the results today.   - She complains of itching rash under her breasts for several weeks, hasn't tried any OTC Rx, she is seeing Dermatology for skin care.   - Macular degeneration with bilateral eye blindness, stable, followed by Ophthalmology.   - Major depression is in remission, doing well with relaxation techniques, denies SI/HI or AH/VH.   - Anorexia is stable, asymptomatic, doing well.   - She would like DEXA scan ordered.   - Patient is without any other complaints today.        -------------------------------------    Cervical radiculitis    Cervical spinal stenosis    Cervicalgia    Coronary arteriosclerosis    DDD (degenerative disc disease), cervical    Depression    Epilepsy, unspecified, not intractable, with status epilepticus    High  cholesterol    Hypokalemia    Mild renal insufficiency    Myocardial infarction    Osteoporosis    Primary hypertension    Recurrent UTI    Unspecified macular degeneration        Past Surgical History:   Procedure Laterality Date    APPENDECTOMY      BACK SURGERY      cardiac stents      CARPAL TUNNEL RELEASE      CATARACT EXTRACTION Left     CHOLECYSTECTOMY      crainotomy      FOOT SURGERY Left     HYSTERECTOMY      LUNG REMOVAL, PARTIAL Right     LYMPH NODE DISSECTION         Review of patient's allergies indicates:   Allergen Reactions    Nitrofurantoin monohyd/m-cryst Hives    Penicillin Hives       Outpatient Medications Marked as Taking for the 2/13/25 encounter (Office Visit) with Melody Ren MD   Medication Sig Dispense Refill    albuterol (PROVENTIL/VENTOLIN HFA) 90 mcg/actuation inhaler Inhale 1-2 puffs into the lungs every 6 (six) hours as needed for Wheezing or Shortness of Breath. 18 g 5    ALPHAGAN P 0.15 % ophthalmic solution Place 1 drop into both eyes 3 (three) times daily. Duplicate- taking generic      aspirin (ECOTRIN) 81 MG EC tablet Take 81 mg by mouth once daily.      atorvastatin (LIPITOR) 40 MG tablet TAKE 1 TABLET DAILY 90 tablet 3    benzonatate (TESSALON) 100 MG capsule Take 1 capsule (100 mg total) by mouth 3 (three) times daily as needed for Cough. 21 capsule 0    brimonidine 0.2% (ALPHAGAN) 0.2 % Drop Place 1 drop into both eyes 3 (three) times daily.      calcium carbonate/vitamin D3 (CALTRATE 600 + D ORAL) Take 1 tablet by mouth 2 (two) times a day.      COSOPT 22.3-6.8 mg/mL ophthalmic solution Place 1 drop into both eyes 2 (two) times daily.      COSOPT, PF, 2-0.5 % Dpet ophthalmic solution Place 1 drop into both eyes 2 (two) times daily.      diclofenac (FLECTOR) 1.3 % PT12 Apply 1 patch topically daily as needed (pain). 30 patch 2    hydrOXYzine (ATARAX) 50 MG tablet Take 1 tablet (50 mg total) by mouth 3 (three) times daily as needed for Itching. 180 tablet 2     hydrOXYzine HCL (ATARAX) 10 MG Tab Take 1 tablet (10 mg total) by mouth 3 (three) times daily as needed (itching/anxiety). 90 tablet 3    loratadine (CLARITIN) 10 mg tablet TAKE 1 TABLET ONCE DAILY FOR ALLERGIES/FLUID IN THE EARS 90 tablet 3    melatonin (MELATIN) 3 mg tablet Take 1 tablet (3 mg total) by mouth nightly as needed for Insomnia. 90 tablet 3    metoprolol succinate 25 mg CSpX Take 25 mg by mouth once daily. 90 capsule 3    miconazole (MICOTIN) 2 % cream Apply 1 g topically 2 (two) times daily. (Patient taking differently: Apply 1 g topically 2 (two) times daily. PRN) 28 g 3    ondansetron (ZOFRAN-ODT) 4 MG TbDL PLACE 1 TABLET ON TONGUE EVERY 8 HOURS AS NEEDED FOR NAUSEA/VOMITING 30 tablet 5    PHENobarbitaL 32.4 MG tablet Take 1 tablet (32.4 mg total) by mouth every evening. 90 tablet 3    phenytoin (DILANTIN) 100 MG ER capsule Take 1 capsule (100 mg total) by mouth 2 (two) times daily. 180 capsule 3    XALATAN 0.005 % ophthalmic solution Place 1 drop into both eyes nightly.      [DISCONTINUED] carBAMazepine (CARBATROL) 100 MG CM12 Take 1 capsule (100 mg total) by mouth 2 (two) times a day. 180 capsule 3       Social History     Socioeconomic History    Marital status:    Tobacco Use    Smoking status: Former     Types: Cigarettes     Passive exposure: Past    Smokeless tobacco: Never   Substance and Sexual Activity    Alcohol use: Never    Drug use: Never    Sexual activity: Not Currently   Social History Narrative    ** Merged History Encounter **          Social Drivers of Health     Financial Resource Strain: Low Risk  (1/9/2024)    Overall Financial Resource Strain (CARDIA)     Difficulty of Paying Living Expenses: Not very hard   Food Insecurity: No Food Insecurity (1/9/2024)    Hunger Vital Sign     Worried About Running Out of Food in the Last Year: Never true     Ran Out of Food in the Last Year: Never true   Transportation Needs: Unmet Transportation Needs (1/9/2024)    PRAPARE -  Transportation     Lack of Transportation (Medical): No     Lack of Transportation (Non-Medical): Yes   Physical Activity: Inactive (1/9/2024)    Exercise Vital Sign     Days of Exercise per Week: 0 days     Minutes of Exercise per Session: 0 min   Stress: No Stress Concern Present (1/9/2024)    [employer] - Occupational Stress Questionnaire     Feeling of Stress : Only a little   Housing Stability: Low Risk  (1/9/2024)    Housing Stability Vital Sign     Unable to Pay for Housing in the Last Year: No     Number of Places Lived in the Last Year: 1     Unstable Housing in the Last Year: No        Family History   Family history unknown: Yes        Subjective:       Review of Systems:    See HPI for details    Constitutional: Denies Change in appetite. Denies Chills. Denies Fever. Denies Night sweats.  Eye: Denies Blurred vision. Denies Discharge. Denies Eye pain.  ENT: Denies Decreased hearing. Denies Sore throat. Denies Swollen glands.  Respiratory: Denies Cough. Denies Shortness of breath. Denies Shortness of breath with exertion. Denies Wheezing.  Cardiovascular: Denies Chest pain at rest. Denies Chest pain with exertion. Denies Irregular heartbeat. Denies Palpitations.  Gastrointestinal: Denies Abdominal pain. Denies Diarrhea. Denies Nausea. Denies Vomiting. Denies Hematemesis or Hematochezia.  Genitourinary: Denies Dysuria. Denies Urinary frequency. Denies Urinary urgency. Denies Blood in urine.  Endocrine: Denies Cold intolerance. Denies Excessive thirst. Denies Heat intolerance. Denies Weight loss. Denies Weight gain.  Musculoskeletal: Denies Painful joints. Denies Weakness.  Integumentary: Reports Rash. Reports Itching. Denies Dry skin.  Neurologic: Denies Dizziness. Denies Fainting. Denies Headache.  Psychiatric: Denies Depression. Denies Anxiety. Denies Suicidal/Homicidal ideations.    All Other ROS: Negative except as stated in HPI.       Objective:     /70 (BP Location:  "Right arm, Patient Position: Sitting)   Pulse 86   Temp 97.8 °F (36.6 °C) (Oral)   Resp 16   Ht 5' 1" (1.549 m)   Wt 49 kg (108 lb)   SpO2 98%   BMI 20.41 kg/m²     Physical Exam    General: Alert and oriented, No acute distress.  Head: Normocephalic, Atraumatic.  Eye: Blindness, Extraocular movements are intact, Sclera non-icteric.  Ears/Nose/Throat: Bilateral TMs with clear fluid behind TMs, NL light reflex, no erythema, intact.   Neck/Thyroid: Supple, Non-tender, No carotid bruit, No palpable thyromegaly or thyroid nodule, No lymphadenopathy, No JVD, Full range of motion.  Respiratory: Clear to auscultation bilaterally; No wheezes, rales or rhonchi,Non-labored respirations, Symmetrical chest wall expansion.  Cardiovascular: Regular rate and rhythm, S1/S2 normal, No murmurs, rubs or gallops.  Gastrointestinal: Soft, Non-tender, Non-distended, Normal bowel sounds, No palpable organomegaly.  Musculoskeletal: Normal range of motion.   Integumentary: Warm, Dry, Intact, No suspicious lesions. Bilateral breasts with intertrigo, no purulent drainage, non-tender.   Extremities: Wheelchair bound. No clubbing, cyanosis or edema  Neurologic: No focal deficits, Cranial Nerves II-XII are grossly intact, Motor strength normal upper and lower extremities, Sensory exam intact.  Psychiatric: Normal interaction, Coherent speech, Euthymic mood, Appropriate affect.    *Lab results from 02/05/2025 were reviewed and discussed with patient and patient voices understanding.*        Assessment:       ICD-10-CM ICD-9-CM   1. Asymptomatic microscopic hematuria  R31.21 599.72   2. Screening for osteoporosis  Z13.820 V82.81   3. Postmenopausal  Z78.0 V49.81   4. Primary hypertension  I10 401.9   5. Trigeminal neuralgia of right side of face  G50.0 350.1   6. Intertrigo  L30.4 695.89   7. Seizures  R56.9 780.39   8. Anorexia nervosa, unspecified  F50.00 307.1   9. Septo-optic dysplasia of brain  Q04.4 742.4   10. Heart failure, " unspecified HF chronicity, unspecified heart failure type  I50.9 428.9   11. 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 Exudative age-related macular degeneration, bilateral, with active choroidal neovascularization  H35.3231 362.52     362.16   13. Stage 3a chronic kidney disease  N18.31 585.3   14. Chronic obstructive pulmonary disease with (acute) exacerbation  J44.1 491.21        Plan:     Problem List Items Addressed This Visit          Neuro    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    Seizures    Relevant Medications    carBAMazepine (CARBATROL) 100 MG CM12       Psychiatric    Anorexia nervosa, unspecified       Ophtho    Exudative age-related macular degeneration, bilateral, with active choroidal neovascularization    Septo-optic dysplasia of brain       Pulmonary    Chronic obstructive pulmonary disease with (acute) exacerbation       Cardiac/Vascular    Primary hypertension    Heart failure, unspecified HF chronicity, unspecified heart failure type       Renal/    Stage 3a chronic kidney disease    Asymptomatic microscopic hematuria - Primary    Relevant Orders    CT Abdomen Pelvis  Without Contrast     Other Visit Diagnoses       Screening for osteoporosis        Relevant Orders    DXA Bone Density Axial Skeleton 1 or more sites    Postmenopausal        Relevant Orders    DXA Bone Density Axial Skeleton 1 or more sites    Trigeminal neuralgia of right side of face        Relevant Medications    carBAMazepine (CARBATROL) 100 MG CM12    Intertrigo        Relevant Medications    clotrimazole-betamethasone 1-0.05% (LOTRISONE) cream         1. Asymptomatic microscopic hematuria  - CT Abdomen Pelvis  Without Contrast; Future  - CT Abdomen Pelvis  Without Contrast  - Continue followup with Urology as scheduled. Notify M.D. or ER if symptoms persist or worsen, temp >100.4,  or any acute illness.      2. Screening for osteoporosis  - DXA Bone Density Axial Skeleton 1 or more sites; Future  - DXA Bone Density Axial Skeleton 1 or more sites    3. Postmenopausal  - DXA Bone Density Axial Skeleton 1 or more sites; Future  - DXA Bone Density Axial Skeleton 1 or more sites    4. Primary hypertension  - BP is well controlled, continue current treatment plan with Metoprolol as prescribed. Continue followup with Cardiology as scheduled. Keep daily BP log. Notify M.D. or ER if BP >170/100 or <90/60, chest pain, palpitations, headache, SOB, temp greater than 100.4, or any acute illness.   Continue  Low Sodium Diet (DASH Diet - Less than 2 grams of sodium per day).  Monitor blood pressure daily and log. Report consistent numbers greater than 140/90.  Smoking cessation encouraged to aid in BP reduction.  Maintain healthy weight with goal BMI <30. Exercise 30 minutes per day, 5 days per week.    5. Trigeminal neuralgia of right side of face  - Rx carBAMazepine (CARBATROL) 100 MG CM12; Take 1 capsule (100 mg total) by mouth 2 (two) times a day.  Dispense: 180 capsule; Refill: 3 refilled today; continue followup with Neurology as scheduled.     6. Intertrigo  - Rx trial of clotrimazole-betamethasone 1-0.05% (LOTRISONE) cream; Apply topically 2 (two) times daily. for 14 days  Dispense: 45 g; Refill: 5; keep skin dry; continue followup with Dermatology as scheduled.     7. Seizures  - carBAMazepine (CARBATROL) 100 MG CM12; Take 1 capsule (100 mg total) by mouth 2 (two) times a day.  Dispense: 180 capsule; Refill: 3  - Well controlled, continue current treatment plan and continue followup with Neurology as scheduled. Notify M.D. or ER if symptoms persist or worsen, seizure, temp >100.4, or any acute illness.      8. Anorexia nervosa, unspecified  - Stable, continue to monitor.     9. Septo-optic dysplasia of brain  - Stable, continue followup with Neurology as scheduled.     10. Heart failure, unspecified  HF chronicity, unspecified heart failure type  - 1.5 liter fluid restriction, continue followup with Cardiology as scheduled..     11. 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  - Fall precautions encouraged, continue to monitor.     12. Exudative age-related macular degeneration, bilateral, with active choroidal neovascularization  - Stable, continue followup with Ophthalmology as scheduled.     13. Stage 3a chronic kidney disease  Stable from renal standpoint.  Continue  Follow renoprotective measures including Renal Diet (reduce intake of nuts, peanut butter, milk, cheese, dried beans, peas) and Low Sodium Diet (less than 2 grams per day).  Avoid NSAIDs (Aleve, Mobic, Celebrex, Ibuprofen, Advil, Toradol and Diclofenac). May take Tylenol as needed for headache/pain.  Control DM with goal A1C <7. BP goal <130/80. LDL goal < 100.  Stay well hydrated. Avoid alcohol and soda. Limit tea and coffee.  Smoking Cessation recommended.     14. Chronic obstructive pulmonary disease with (acute) exacerbation  - Asymptomatic, continue to monitor.       Lisseth was seen today for follow-up.    Diagnoses and all orders for this visit:    Asymptomatic microscopic hematuria  -     CT Abdomen Pelvis  Without Contrast; Future  -     CT Abdomen Pelvis  Without Contrast    Screening for osteoporosis  -     DXA Bone Density Axial Skeleton 1 or more sites; Future  -     DXA Bone Density Axial Skeleton 1 or more sites    Postmenopausal  -     DXA Bone Density Axial Skeleton 1 or more sites; Future  -     DXA Bone Density Axial Skeleton 1 or more sites    Primary hypertension    Trigeminal neuralgia of right side of face  -     carBAMazepine (CARBATROL) 100 MG CM12; Take 1 capsule (100 mg total) by mouth 2 (two) times a day.    Intertrigo  -     clotrimazole-betamethasone 1-0.05% (LOTRISONE) cream; Apply topically 2 (two) times daily. for 14 days    Seizures  -     carBAMazepine  (CARBATROL) 100 MG CM12; Take 1 capsule (100 mg total) by mouth 2 (two) times a day.    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 for 14 days       Start Date: 2/13/2025 End Date: 2/27/2025   Current Medications    ALBUTEROL (PROVENTIL/VENTOLIN HFA) 90 MCG/ACTUATION INHALER    Inhale 1-2 puffs into the lungs every 6 (six) hours as needed for Wheezing or Shortness of Breath.       Start Date: 8/26/2024 End Date: --    ALPHAGAN P 0.15 % OPHTHALMIC SOLUTION    Place 1 drop into both eyes 3 (three) times daily. Duplicate- taking generic       Start Date: 4/25/2022 End Date: --    ASPIRIN (ECOTRIN) 81 MG EC TABLET    Take 81 mg by mouth once daily.       Start Date: 7/14/2021 End Date: --    ATORVASTATIN (LIPITOR) 40 MG TABLET    TAKE 1 TABLET DAILY       Start Date: 10/11/2023End Date: --    BENZONATATE (TESSALON) 100 MG CAPSULE    Take 1 capsule (100 mg total) by mouth 3 (three) times daily as needed for Cough.       Start Date: 11/3/2023 End Date: --    BRIMONIDINE 0.2% (ALPHAGAN) 0.2 % DROP    Place 1 drop into both eyes 3 (three) times daily.       Start Date: 4/25/2022 End Date: --    CALCIUM CARBONATE/VITAMIN D3 (CALTRATE 600 + D ORAL)    Take 1 tablet by mouth 2 (two) times a day.       Start Date: 1/4/2022  End Date: --    CEPHALEXIN (KEFLEX) 250 MG CAPSULE    Take 1 capsule (250 mg total) by mouth once daily.       Start Date: 10/10/2024End Date: --    COSOPT  22.3-6.8 MG/ML OPHTHALMIC SOLUTION    Place 1 drop into both eyes 2 (two) times daily.       Start Date: 4/25/2022 End Date: --    COSOPT, PF, 2-0.5 % DPET OPHTHALMIC SOLUTION    Place 1 drop into both eyes 2 (two) times daily.       Start Date: 7/10/2023 End Date: --    DICLOFENAC (FLECTOR) 1.3 % PT12    Apply 1 patch topically daily as needed (pain).       Start Date: 10/12/2023End Date: --    FLUCONAZOLE (DIFLUCAN) 150 MG TAB    Take one tablet now and one in three days.       Start Date: 5/20/2024 End Date: --    HYDROXYZINE (ATARAX) 50 MG TABLET    Take 1 tablet (50 mg total) by mouth 3 (three) times daily as needed for Itching.       Start Date: 8/22/2024 End Date: --    HYDROXYZINE HCL (ATARAX) 10 MG TAB    Take 1 tablet (10 mg total) by mouth 3 (three) times daily as needed (itching/anxiety).       Start Date: 8/26/2024 End Date: --    LORATADINE (CLARITIN) 10 MG TABLET    TAKE 1 TABLET ONCE DAILY FOR ALLERGIES/FLUID IN THE EARS       Start Date: 10/16/2023End Date: --    MELATONIN (MELATIN) 3 MG TABLET    Take 1 tablet (3 mg total) by mouth nightly as needed for Insomnia.       Start Date: 9/4/2024  End Date: 9/4/2025    METOPROLOL SUCCINATE 25 MG CSPX    Take 25 mg by mouth once daily.       Start Date: 2/19/2024 End Date: --    MICONAZOLE (MICOTIN) 2 % CREAM    Apply 1 g topically 2 (two) times daily.       Start Date: 6/3/2022  End Date: --    ONDANSETRON (ZOFRAN-ODT) 4 MG TBDL    PLACE 1 TABLET ON TONGUE EVERY 8 HOURS AS NEEDED FOR NAUSEA/VOMITING       Start Date: 7/22/2024 End Date: --    PHENOBARBITAL 32.4 MG TABLET    Take 1 tablet (32.4 mg total) by mouth every evening.       Start Date: 1/6/2025  End Date: --    PHENYTOIN (DILANTIN) 100 MG ER CAPSULE    Take 1 capsule (100 mg total) by mouth 2 (two) times daily.       Start Date: 11/6/2024 End Date: --    SULFAMETHOXAZOLE-TRIMETHOPRIM 400-80MG (BACTRIM,SEPTRA) 400-80 MG PER TABLET    Take 1 tablet by mouth once daily.       Start Date: 9/23/2024  End Date: --    XALATAN 0.005 % OPHTHALMIC SOLUTION    Place 1 drop into both eyes nightly.       Start Date: 4/25/2022 End Date: --   Changed and/or Refilled Medications    Modified Medication Previous Medication    CARBAMAZEPINE (CARBATROL) 100 MG CM12 carBAMazepine (CARBATROL) 100 MG CM12       Take 1 capsule (100 mg total) by mouth 2 (two) times a day.    Take 1 capsule (100 mg total) by mouth 2 (two) times a day.       Start Date: 2/13/2025 End Date: --    Start Date: 10/21/2024End Date: 2/13/2025   Discontinued Medications    BETAMETHASONE VALERATE 0.1% (VALISONE) 0.1 % CREA    Apply topically 2 (two) times daily as needed (rash).       Start Date: 5/11/2023 End Date: 2/13/2025          Follow up in about 6 months (around 8/13/2025) for Wellness.

## 2025-02-27 ENCOUNTER — TELEPHONE (OUTPATIENT)
Dept: FAMILY MEDICINE | Facility: CLINIC | Age: 85
End: 2025-02-27
Payer: MEDICARE

## 2025-02-27 ENCOUNTER — DOCUMENTATION ONLY (OUTPATIENT)
Dept: FAMILY MEDICINE | Facility: CLINIC | Age: 85
End: 2025-02-27
Payer: MEDICARE

## 2025-02-27 NOTE — TELEPHONE ENCOUNTER
----- Message from Veronica sent at 2/26/2025 12:30 PM CST -----  Who Called: Bridgett- pre certification (Liza)Patient is returning phone callWho Left Message for Patient:Does the patient know what this is regarding?: Preferred Method of Contact: Phone CallPatient's Preferred Phone Number on File: [phone] Best Call Back Number, if different:Additional Information: Missing insurance info

## 2025-02-28 ENCOUNTER — DOCUMENTATION ONLY (OUTPATIENT)
Dept: FAMILY MEDICINE | Facility: CLINIC | Age: 85
End: 2025-02-28
Payer: MEDICARE

## 2025-03-13 ENCOUNTER — TELEPHONE (OUTPATIENT)
Dept: FAMILY MEDICINE | Facility: CLINIC | Age: 85
End: 2025-03-13
Payer: MEDICARE

## 2025-03-13 ENCOUNTER — NURSE TRIAGE (OUTPATIENT)
Dept: ADMINISTRATIVE | Facility: CLINIC | Age: 85
End: 2025-03-13
Payer: MEDICARE

## 2025-03-13 NOTE — TELEPHONE ENCOUNTER
Pt homehealth nurse calling said when EMS got there BP 86/51 and wants to see if she still needs to be admitted. Per protocol instructed pt call 911 now.   Reason for Disposition   Systolic BP < 90 and feeling weak or lightheaded (e.g., woozy, feeling like they might faint)    Protocols used: Blood Pressure - Low-A-OH

## 2025-03-13 NOTE — TELEPHONE ENCOUNTER
Good afternoon   MsYany Milind with [facility] called to inform us that the patients BP was 72/57 and she was having a hard time getting any peripheral pulses. States that they are going to the ER. Please advise thanks

## 2025-03-13 NOTE — TELEPHONE ENCOUNTER
Attempted to call patient to help schedule her hospital follow up appointment patient did not answer voicemail was left.

## 2025-03-14 ENCOUNTER — NURSE TRIAGE (OUTPATIENT)
Dept: ADMINISTRATIVE | Facility: CLINIC | Age: 85
End: 2025-03-14
Payer: MEDICARE

## 2025-03-14 NOTE — TELEPHONE ENCOUNTER
[place]    PCP:  Melody Ren MD    Spoke with HH RN, Milind, on pts behalf.  HH RN states that she is trying to contact Dr. Ren but no one is in the office nor is there an OCP.  C/O new-onset confusion today, memory loss, hallucinations, and hypotension (102/74 today lying on her Rt side - yesterday 72/57).  She was discharged from the hospital for UTI.  Per protocol, care advised is call  now.  HH RN VU.  NT did attempt to contact PCP or OCP but there is no OCP and Opr was unable to contact PCP's office.  Message routed high priority to PCP.  Advised to call for worsening/questions/concerns.  VU.    Reason for Disposition   [1] Difficult to awaken or acting confused (e.g., disoriented, slurred speech) AND [2] present now AND [3] new-onset    Additional Information   Negative: [1] Difficult to awaken or acting confused (e.g., disoriented, slurred speech) AND [2] present now AND [3] diabetes mellitus    Protocols used: Confusion - Delirium-A-AH

## 2025-03-17 ENCOUNTER — TELEPHONE (OUTPATIENT)
Dept: FAMILY MEDICINE | Facility: CLINIC | Age: 85
End: 2025-03-17
Payer: MEDICARE

## 2025-03-17 DIAGNOSIS — R39.9 UTI SYMPTOMS: Primary | ICD-10-CM

## 2025-03-17 NOTE — TELEPHONE ENCOUNTER
Goodafternoon the patients' home health nurse Milind called with an update on patient, her vitals signs are stable. Still experiencing confusion and hallucinations. Patient refusing behavioral nurse to evaluate or to be sent to [facility] for evaluation. . Patient is still refusing meds occasionally. Please advise thanks.

## 2025-03-18 ENCOUNTER — HOSPITAL ENCOUNTER (INPATIENT)
Facility: HOSPITAL | Age: 85
LOS: 2 days | Discharge: HOME HEALTH | DRG: 101 | End: 2025-03-20
Attending: STUDENT IN AN ORGANIZED HEALTH CARE EDUCATION/TRAINING PROGRAM | Admitting: INTERNAL MEDICINE
Payer: MEDICARE

## 2025-03-18 DIAGNOSIS — R41.82 ALTERED MENTAL STATUS, UNSPECIFIED ALTERED MENTAL STATUS TYPE: ICD-10-CM

## 2025-03-18 DIAGNOSIS — N30.00 ACUTE CYSTITIS WITHOUT HEMATURIA: ICD-10-CM

## 2025-03-18 DIAGNOSIS — R41.82 AMS (ALTERED MENTAL STATUS): ICD-10-CM

## 2025-03-18 DIAGNOSIS — E86.0 DEHYDRATION: ICD-10-CM

## 2025-03-18 DIAGNOSIS — T14.90XA TRAUMA: Primary | ICD-10-CM

## 2025-03-18 PROBLEM — G40.909 SEIZURE DISORDER: Status: ACTIVE | Noted: 2025-03-18

## 2025-03-18 LAB
ABORH RETYPE: NORMAL
ACCEPTIBLE SP GR UR QL: 1.02 (ref 1–1.03)
ALBUMIN SERPL-MCNC: 3.4 G/DL (ref 3.4–4.8)
ALBUMIN SERPL-MCNC: 3.5 G/DL (ref 3.4–4.8)
ALBUMIN/GLOB SERPL: 0.8 RATIO (ref 1.1–2)
ALBUMIN/GLOB SERPL: 0.8 RATIO (ref 1.1–2)
ALP SERPL-CCNC: 144 UNIT/L (ref 40–150)
ALP SERPL-CCNC: 145 UNIT/L (ref 40–150)
ALT SERPL-CCNC: 20 UNIT/L (ref 0–55)
ALT SERPL-CCNC: 21 UNIT/L (ref 0–55)
AMPHET UR QL SCN: NEGATIVE
ANION GAP SERPL CALC-SCNC: 16 MEQ/L
ANION GAP SERPL CALC-SCNC: 17 MEQ/L
APTT PPP: 24.5 SECONDS (ref 23.2–33.7)
AST SERPL-CCNC: 43 UNIT/L (ref 5–34)
AST SERPL-CCNC: 45 UNIT/L (ref 5–34)
BACTERIA #/AREA URNS AUTO: ABNORMAL /HPF
BARBITURATE SCN PRESENT UR: POSITIVE
BASOPHILS # BLD AUTO: 0.06 X10(3)/MCL
BASOPHILS # BLD AUTO: 0.09 X10(3)/MCL
BASOPHILS NFR BLD AUTO: 0.6 %
BASOPHILS NFR BLD AUTO: 1.3 %
BENZODIAZ UR QL SCN: NEGATIVE
BILIRUB SERPL-MCNC: 0.3 MG/DL
BILIRUB SERPL-MCNC: 0.4 MG/DL
BILIRUB UR QL STRIP.AUTO: NEGATIVE
BUN SERPL-MCNC: 22.6 MG/DL (ref 9.8–20.1)
BUN SERPL-MCNC: 23.9 MG/DL (ref 9.8–20.1)
CALCIUM SERPL-MCNC: 8.9 MG/DL (ref 8.4–10.2)
CALCIUM SERPL-MCNC: 9.1 MG/DL (ref 8.4–10.2)
CANNABINOIDS UR QL SCN: NEGATIVE
CAOX CRY UR QL COMP ASSIST: ABNORMAL
CARBAMAZEPINE FREE SERPL-MCNC: <1.9 UG/ML (ref 4–12)
CHLORIDE SERPL-SCNC: 107 MMOL/L (ref 98–107)
CHLORIDE SERPL-SCNC: 107 MMOL/L (ref 98–107)
CK SERPL-CCNC: 101 U/L (ref 29–168)
CLARITY UR: CLEAR
CO2 SERPL-SCNC: 17 MMOL/L (ref 23–31)
CO2 SERPL-SCNC: 17 MMOL/L (ref 23–31)
COCAINE UR QL SCN: NEGATIVE
COLOR UR AUTO: ABNORMAL
CREAT SERPL-MCNC: 1.32 MG/DL (ref 0.55–1.02)
CREAT SERPL-MCNC: 1.38 MG/DL (ref 0.55–1.02)
CREAT/UREA NIT SERPL: 16
CREAT/UREA NIT SERPL: 18
EOSINOPHIL # BLD AUTO: 0.01 X10(3)/MCL (ref 0–0.9)
EOSINOPHIL # BLD AUTO: 0.09 X10(3)/MCL (ref 0–0.9)
EOSINOPHIL NFR BLD AUTO: 0.1 %
EOSINOPHIL NFR BLD AUTO: 1.3 %
ERYTHROCYTE [DISTWIDTH] IN BLOOD BY AUTOMATED COUNT: 14.2 % (ref 11.5–17)
ERYTHROCYTE [DISTWIDTH] IN BLOOD BY AUTOMATED COUNT: 14.2 % (ref 11.5–17)
ETHANOL SERPL-MCNC: <10 MG/DL
FENTANYL UR QL SCN: NEGATIVE
GFR SERPLBLD CREATININE-BSD FMLA CKD-EPI: 38 ML/MIN/1.73/M2
GFR SERPLBLD CREATININE-BSD FMLA CKD-EPI: 40 ML/MIN/1.73/M2
GLOBULIN SER-MCNC: 4.2 GM/DL (ref 2.4–3.5)
GLOBULIN SER-MCNC: 4.2 GM/DL (ref 2.4–3.5)
GLUCOSE SERPL-MCNC: 104 MG/DL (ref 82–115)
GLUCOSE SERPL-MCNC: 99 MG/DL (ref 82–115)
GLUCOSE UR QL STRIP: NORMAL
GROUP & RH: NORMAL
HCT VFR BLD AUTO: 34.9 % (ref 37–47)
HCT VFR BLD AUTO: 42 % (ref 37–47)
HGB BLD-MCNC: 11.3 G/DL (ref 12–16)
HGB BLD-MCNC: 13.5 G/DL (ref 12–16)
HGB UR QL STRIP: ABNORMAL
HYALINE CASTS #/AREA URNS LPF: ABNORMAL /LPF
IMM GRANULOCYTES # BLD AUTO: 0.03 X10(3)/MCL (ref 0–0.04)
IMM GRANULOCYTES # BLD AUTO: 0.06 X10(3)/MCL (ref 0–0.04)
IMM GRANULOCYTES NFR BLD AUTO: 0.4 %
IMM GRANULOCYTES NFR BLD AUTO: 0.6 %
INDIRECT COOMBS: NORMAL
INR PPP: 1.1
KETONES UR QL STRIP: ABNORMAL
LACTATE SERPL-SCNC: 2.5 MMOL/L (ref 0.5–2.2)
LACTATE SERPL-SCNC: 3 MMOL/L (ref 0.5–2.2)
LACTATE SERPL-SCNC: 3.1 MMOL/L (ref 0.5–2.2)
LEUKOCYTE ESTERASE UR QL STRIP: NEGATIVE
LYMPHOCYTES # BLD AUTO: 1.08 X10(3)/MCL (ref 0.6–4.6)
LYMPHOCYTES # BLD AUTO: 2.27 X10(3)/MCL (ref 0.6–4.6)
LYMPHOCYTES NFR BLD AUTO: 10.3 %
LYMPHOCYTES NFR BLD AUTO: 32.3 %
MAGNESIUM SERPL-MCNC: 2.2 MG/DL (ref 1.6–2.6)
MCH RBC QN AUTO: 30.8 PG (ref 27–31)
MCH RBC QN AUTO: 31 PG (ref 27–31)
MCHC RBC AUTO-ENTMCNC: 32.1 G/DL (ref 33–36)
MCHC RBC AUTO-ENTMCNC: 32.4 G/DL (ref 33–36)
MCV RBC AUTO: 95.9 FL (ref 80–94)
MCV RBC AUTO: 95.9 FL (ref 80–94)
MDMA UR QL SCN: NEGATIVE
MONOCYTES # BLD AUTO: 0.38 X10(3)/MCL (ref 0.1–1.3)
MONOCYTES # BLD AUTO: 0.64 X10(3)/MCL (ref 0.1–1.3)
MONOCYTES NFR BLD AUTO: 3.6 %
MONOCYTES NFR BLD AUTO: 9.1 %
MUCOUS THREADS URNS QL MICRO: ABNORMAL /LPF
NEUTROPHILS # BLD AUTO: 3.9 X10(3)/MCL (ref 2.1–9.2)
NEUTROPHILS # BLD AUTO: 8.93 X10(3)/MCL (ref 2.1–9.2)
NEUTROPHILS NFR BLD AUTO: 55.6 %
NEUTROPHILS NFR BLD AUTO: 84.8 %
NITRITE UR QL STRIP: NEGATIVE
NRBC BLD AUTO-RTO: 0 %
NRBC BLD AUTO-RTO: 0 %
OPIATES UR QL SCN: NEGATIVE
PCP UR QL: NEGATIVE
PH UR STRIP: 6.5 [PH]
PH UR: 6.5 [PH] (ref 3–11)
PHENYTOIN SERPL-MCNC: <1.8 UG/ML (ref 10–20)
PLATELET # BLD AUTO: 263 X10(3)/MCL (ref 130–400)
PLATELET # BLD AUTO: 295 X10(3)/MCL (ref 130–400)
PMV BLD AUTO: 10.8 FL (ref 7.4–10.4)
PMV BLD AUTO: 11.2 FL (ref 7.4–10.4)
POCT GLUCOSE: 105 MG/DL (ref 70–110)
POTASSIUM SERPL-SCNC: 5.2 MMOL/L (ref 3.5–5.1)
POTASSIUM SERPL-SCNC: 5.5 MMOL/L (ref 3.5–5.1)
PROT SERPL-MCNC: 7.6 GM/DL (ref 5.8–7.6)
PROT SERPL-MCNC: 7.7 GM/DL (ref 5.8–7.6)
PROT UR QL STRIP: ABNORMAL
PROTHROMBIN TIME: 14.2 SECONDS (ref 12.5–14.5)
RBC # BLD AUTO: 3.64 X10(6)/MCL (ref 4.2–5.4)
RBC # BLD AUTO: 4.38 X10(6)/MCL (ref 4.2–5.4)
RBC #/AREA URNS AUTO: ABNORMAL /HPF
SODIUM SERPL-SCNC: 140 MMOL/L (ref 136–145)
SODIUM SERPL-SCNC: 141 MMOL/L (ref 136–145)
SP GR UR STRIP.AUTO: 1.02 (ref 1–1.03)
SPECIMEN OUTDATE: NORMAL
SQUAMOUS #/AREA URNS LPF: ABNORMAL /HPF
UROBILINOGEN UR STRIP-ACNC: NORMAL
WBC # BLD AUTO: 10.52 X10(3)/MCL (ref 4.5–11.5)
WBC # BLD AUTO: 7.02 X10(3)/MCL (ref 4.5–11.5)
WBC #/AREA URNS AUTO: ABNORMAL /HPF

## 2025-03-18 PROCEDURE — 63600175 PHARM REV CODE 636 W HCPCS

## 2025-03-18 PROCEDURE — 96360 HYDRATION IV INFUSION INIT: CPT | Mod: 59

## 2025-03-18 PROCEDURE — 85025 COMPLETE CBC W/AUTO DIFF WBC: CPT | Performed by: SURGERY

## 2025-03-18 PROCEDURE — G0390 TRAUMA RESPONS W/HOSP CRITI: HCPCS

## 2025-03-18 PROCEDURE — 80053 COMPREHEN METABOLIC PANEL: CPT | Performed by: NURSE PRACTITIONER

## 2025-03-18 PROCEDURE — 11000001 HC ACUTE MED/SURG PRIVATE ROOM

## 2025-03-18 PROCEDURE — 63600175 PHARM REV CODE 636 W HCPCS: Performed by: NURSE PRACTITIONER

## 2025-03-18 PROCEDURE — 86901 BLOOD TYPING SEROLOGIC RH(D): CPT | Performed by: SURGERY

## 2025-03-18 PROCEDURE — 25500020 PHARM REV CODE 255: Performed by: STUDENT IN AN ORGANIZED HEALTH CARE EDUCATION/TRAINING PROGRAM

## 2025-03-18 PROCEDURE — 87086 URINE CULTURE/COLONY COUNT: CPT | Performed by: SURGERY

## 2025-03-18 PROCEDURE — 25000003 PHARM REV CODE 250: Performed by: NURSE PRACTITIONER

## 2025-03-18 PROCEDURE — 80185 ASSAY OF PHENYTOIN TOTAL: CPT | Performed by: STUDENT IN AN ORGANIZED HEALTH CARE EDUCATION/TRAINING PROGRAM

## 2025-03-18 PROCEDURE — 80307 DRUG TEST PRSMV CHEM ANLYZR: CPT | Performed by: SURGERY

## 2025-03-18 PROCEDURE — 83735 ASSAY OF MAGNESIUM: CPT | Performed by: NURSE PRACTITIONER

## 2025-03-18 PROCEDURE — 25000003 PHARM REV CODE 250

## 2025-03-18 PROCEDURE — 80161 ASY CARBAMAZEPIN 10,11-EPXID: CPT | Performed by: STUDENT IN AN ORGANIZED HEALTH CARE EDUCATION/TRAINING PROGRAM

## 2025-03-18 PROCEDURE — 63600175 PHARM REV CODE 636 W HCPCS: Performed by: STUDENT IN AN ORGANIZED HEALTH CARE EDUCATION/TRAINING PROGRAM

## 2025-03-18 PROCEDURE — 82550 ASSAY OF CK (CPK): CPT | Performed by: NURSE PRACTITIONER

## 2025-03-18 PROCEDURE — 85025 COMPLETE CBC W/AUTO DIFF WBC: CPT | Performed by: NURSE PRACTITIONER

## 2025-03-18 PROCEDURE — 99223 1ST HOSP IP/OBS HIGH 75: CPT | Mod: ,,, | Performed by: PSYCHIATRY & NEUROLOGY

## 2025-03-18 PROCEDURE — 96361 HYDRATE IV INFUSION ADD-ON: CPT

## 2025-03-18 PROCEDURE — 80053 COMPREHEN METABOLIC PANEL: CPT | Performed by: SURGERY

## 2025-03-18 PROCEDURE — 85610 PROTHROMBIN TIME: CPT | Performed by: SURGERY

## 2025-03-18 PROCEDURE — 83605 ASSAY OF LACTIC ACID: CPT | Performed by: INTERNAL MEDICINE

## 2025-03-18 PROCEDURE — 85730 THROMBOPLASTIN TIME PARTIAL: CPT | Performed by: SURGERY

## 2025-03-18 PROCEDURE — 25000003 PHARM REV CODE 250: Performed by: INTERNAL MEDICINE

## 2025-03-18 PROCEDURE — 99285 EMERGENCY DEPT VISIT HI MDM: CPT | Mod: 25

## 2025-03-18 PROCEDURE — 83605 ASSAY OF LACTIC ACID: CPT | Performed by: SURGERY

## 2025-03-18 PROCEDURE — 82077 ASSAY SPEC XCP UR&BREATH IA: CPT | Performed by: SURGERY

## 2025-03-18 PROCEDURE — 96374 THER/PROPH/DIAG INJ IV PUSH: CPT

## 2025-03-18 PROCEDURE — 36415 COLL VENOUS BLD VENIPUNCTURE: CPT | Performed by: INTERNAL MEDICINE

## 2025-03-18 PROCEDURE — 81001 URINALYSIS AUTO W/SCOPE: CPT | Mod: XB | Performed by: SURGERY

## 2025-03-18 PROCEDURE — A4216 STERILE WATER/SALINE, 10 ML: HCPCS

## 2025-03-18 PROCEDURE — 25000003 PHARM REV CODE 250: Performed by: STUDENT IN AN ORGANIZED HEALTH CARE EDUCATION/TRAINING PROGRAM

## 2025-03-18 RX ORDER — DORZOLAMIDE HYDROCHLORIDE AND TIMOLOL MALEATE 20; 5 MG/ML; MG/ML
1 SOLUTION/ DROPS OPHTHALMIC DAILY
Status: DISCONTINUED | OUTPATIENT
Start: 2025-03-19 | End: 2025-03-18

## 2025-03-18 RX ORDER — SODIUM CHLORIDE 9 MG/ML
INJECTION, SOLUTION INTRAVENOUS CONTINUOUS
Status: DISCONTINUED | OUTPATIENT
Start: 2025-03-18 | End: 2025-03-19

## 2025-03-18 RX ORDER — CEFTRIAXONE 1 G/1
1 INJECTION, POWDER, FOR SOLUTION INTRAMUSCULAR; INTRAVENOUS
Status: COMPLETED | OUTPATIENT
Start: 2025-03-18 | End: 2025-03-18

## 2025-03-18 RX ORDER — DORZOLAMIDE HCL 20 MG/ML
1 SOLUTION/ DROPS OPHTHALMIC 2 TIMES DAILY
Status: DISCONTINUED | OUTPATIENT
Start: 2025-03-18 | End: 2025-03-20 | Stop reason: HOSPADM

## 2025-03-18 RX ORDER — SODIUM CHLORIDE 9 MG/ML
INJECTION, SOLUTION INTRAVENOUS
Status: COMPLETED | OUTPATIENT
Start: 2025-03-18 | End: 2025-03-18

## 2025-03-18 RX ORDER — ACETAMINOPHEN 325 MG/1
650 TABLET ORAL EVERY 4 HOURS PRN
Status: DISCONTINUED | OUTPATIENT
Start: 2025-03-18 | End: 2025-03-20 | Stop reason: HOSPADM

## 2025-03-18 RX ORDER — PHENYTOIN SODIUM 100 MG/1
100 CAPSULE, EXTENDED RELEASE ORAL EVERY MORNING
COMMUNITY

## 2025-03-18 RX ORDER — MICONAZOLE NITRATE 2 G/100G
POWDER TOPICAL 2 TIMES DAILY
Status: DISCONTINUED | OUTPATIENT
Start: 2025-03-18 | End: 2025-03-20 | Stop reason: HOSPADM

## 2025-03-18 RX ORDER — TIMOLOL MALEATE 5 MG/ML
1 SOLUTION/ DROPS OPHTHALMIC 2 TIMES DAILY
Status: DISCONTINUED | OUTPATIENT
Start: 2025-03-18 | End: 2025-03-20 | Stop reason: HOSPADM

## 2025-03-18 RX ORDER — BRIMONIDINE TARTRATE 1.5 MG/ML
1 SOLUTION/ DROPS OPHTHALMIC 3 TIMES DAILY
COMMUNITY

## 2025-03-18 RX ORDER — POTASSIUM CHLORIDE 1125 MG/1
20 TABLET, EXTENDED RELEASE ORAL DAILY
COMMUNITY

## 2025-03-18 RX ORDER — ATORVASTATIN CALCIUM 40 MG/1
40 TABLET, FILM COATED ORAL DAILY
COMMUNITY

## 2025-03-18 RX ORDER — LATANOPROST 50 UG/ML
1 SOLUTION/ DROPS OPHTHALMIC NIGHTLY
Status: DISCONTINUED | OUTPATIENT
Start: 2025-03-18 | End: 2025-03-20 | Stop reason: HOSPADM

## 2025-03-18 RX ORDER — NAPROXEN SODIUM 220 MG/1
81 TABLET, FILM COATED ORAL DAILY
COMMUNITY

## 2025-03-18 RX ORDER — WATER FOR INJECTION,STERILE
VIAL (ML) INJECTION
Status: COMPLETED
Start: 2025-03-18 | End: 2025-03-18

## 2025-03-18 RX ORDER — BRIMONIDINE TARTRATE 1.5 MG/ML
1 SOLUTION/ DROPS OPHTHALMIC 3 TIMES DAILY
Status: DISCONTINUED | OUTPATIENT
Start: 2025-03-18 | End: 2025-03-20 | Stop reason: HOSPADM

## 2025-03-18 RX ORDER — PHENOBARBITAL 32.4 MG/1
32.4 TABLET ORAL NIGHTLY
Status: DISCONTINUED | OUTPATIENT
Start: 2025-03-18 | End: 2025-03-20 | Stop reason: HOSPADM

## 2025-03-18 RX ORDER — LEVETIRACETAM 500 MG/5ML
500 INJECTION, SOLUTION, CONCENTRATE INTRAVENOUS EVERY 12 HOURS
Status: DISCONTINUED | OUTPATIENT
Start: 2025-03-18 | End: 2025-03-19

## 2025-03-18 RX ORDER — PHENYTOIN SODIUM 100 MG/1
100 CAPSULE, EXTENDED RELEASE ORAL EVERY MORNING
Status: DISCONTINUED | OUTPATIENT
Start: 2025-03-19 | End: 2025-03-20 | Stop reason: HOSPADM

## 2025-03-18 RX ORDER — LEVETIRACETAM 500 MG/5ML
1000 INJECTION, SOLUTION, CONCENTRATE INTRAVENOUS
Status: COMPLETED | OUTPATIENT
Start: 2025-03-18 | End: 2025-03-18

## 2025-03-18 RX ORDER — ACETAMINOPHEN 325 MG/1
650 TABLET ORAL EVERY 8 HOURS PRN
Status: DISCONTINUED | OUTPATIENT
Start: 2025-03-18 | End: 2025-03-20 | Stop reason: HOSPADM

## 2025-03-18 RX ORDER — LEVETIRACETAM 10 MG/ML
INJECTION INTRAVASCULAR
Status: DISPENSED
Start: 2025-03-18 | End: 2025-03-18

## 2025-03-18 RX ORDER — DORZOLAMIDE HYDROCHLORIDE AND TIMOLOL MALEATE 20; 5 MG/ML; MG/ML
1 SOLUTION/ DROPS OPHTHALMIC 3 TIMES DAILY
COMMUNITY

## 2025-03-18 RX ORDER — LATANOPROST 50 UG/ML
1 SOLUTION/ DROPS OPHTHALMIC NIGHTLY
COMMUNITY

## 2025-03-18 RX ORDER — METOPROLOL SUCCINATE 25 MG/1
25 TABLET, EXTENDED RELEASE ORAL DAILY
COMMUNITY

## 2025-03-18 RX ORDER — ONDANSETRON HYDROCHLORIDE 2 MG/ML
4 INJECTION, SOLUTION INTRAVENOUS EVERY 8 HOURS PRN
Status: DISCONTINUED | OUTPATIENT
Start: 2025-03-18 | End: 2025-03-20 | Stop reason: HOSPADM

## 2025-03-18 RX ORDER — LORAZEPAM 2 MG/ML
2 INJECTION INTRAMUSCULAR EVERY 4 HOURS PRN
Status: DISCONTINUED | OUTPATIENT
Start: 2025-03-18 | End: 2025-03-20 | Stop reason: HOSPADM

## 2025-03-18 RX ORDER — BENZONATATE 100 MG/1
100 CAPSULE ORAL 3 TIMES DAILY PRN
COMMUNITY

## 2025-03-18 RX ORDER — CEFTRIAXONE 1 G/1
1 INJECTION, POWDER, FOR SOLUTION INTRAMUSCULAR; INTRAVENOUS
Status: DISCONTINUED | OUTPATIENT
Start: 2025-03-19 | End: 2025-03-19

## 2025-03-18 RX ORDER — PHENOBARBITAL 32.4 MG/1
32.4 TABLET ORAL NIGHTLY
COMMUNITY

## 2025-03-18 RX ADMIN — SODIUM CHLORIDE 1000 ML: 9 INJECTION, SOLUTION INTRAVENOUS at 03:03

## 2025-03-18 RX ADMIN — LEVETIRACETAM 1000 MG: 100 INJECTION, SOLUTION INTRAVENOUS at 02:03

## 2025-03-18 RX ADMIN — DORZOLAMIDE HCL 1 DROP: 20 SOLUTION/ DROPS OPHTHALMIC at 09:03

## 2025-03-18 RX ADMIN — MICONAZOLE NITRATE: 20 POWDER TOPICAL at 09:03

## 2025-03-18 RX ADMIN — TIMOLOL MALEATE 1 DROP: 5 SOLUTION OPHTHALMIC at 09:03

## 2025-03-18 RX ADMIN — SODIUM CHLORIDE 1000 ML: 9 INJECTION, SOLUTION INTRAVENOUS at 02:03

## 2025-03-18 RX ADMIN — IOHEXOL 100 ML: 350 INJECTION, SOLUTION INTRAVENOUS at 02:03

## 2025-03-18 RX ADMIN — WATER 10 ML: 1 INJECTION INTRAMUSCULAR; INTRAVENOUS; SUBCUTANEOUS at 04:03

## 2025-03-18 RX ADMIN — LEVETIRACETAM 500 MG: 100 INJECTION, SOLUTION INTRAVENOUS at 08:03

## 2025-03-18 RX ADMIN — CEFTRIAXONE SODIUM 1 G: 1 INJECTION, POWDER, FOR SOLUTION INTRAMUSCULAR; INTRAVENOUS at 04:03

## 2025-03-18 RX ADMIN — SODIUM CHLORIDE: 9 INJECTION, SOLUTION INTRAVENOUS at 09:03

## 2025-03-18 RX ADMIN — PHENOBARBITAL 32.4 MG: 32.4 TABLET ORAL at 08:03

## 2025-03-18 RX ADMIN — LATANOPROST 1 DROP: 50 SOLUTION OPHTHALMIC at 09:03

## 2025-03-18 RX ADMIN — LEVETIRACETAM 500 MG: 100 INJECTION, SOLUTION INTRAVENOUS at 09:03

## 2025-03-18 RX ADMIN — BRIMONIDINE TARTRATE 1 DROP: 1.5 SOLUTION OPHTHALMIC at 09:03

## 2025-03-18 NOTE — HPI
84-year-old female with PMH  CVA with residual left-sided deficits, seizure disorder, septal optic dysplasia/trigeminal neuralgia, CAD, CHF, HTN, HLD, COPD, CKD stage 3, macular degeneration, and recurring UTIs who presented to ED on  as a level 1 trauma following a suspected fall.  Patient reportedly found down on the ground at the assisted living facility where she resides. LKN 2300 on 3/17.   Home AEDs Dilantin 100 mg qAM and carbamazepine 32.4 mg q.h.s..  Serum levels of both phenytoin and carbamazepine undetectable. phenytoin and carbamazepine undetectable.  pt loaded with 1 gm keppra and started on keppra 500 mg BID in ED.     CT head without negative for acute intracranial abnormalities.  Labs significant for potassium 5.5, BUN/creatinine 22.6/1.38, AST 45, lactate 3, and bacteriuria.

## 2025-03-18 NOTE — H&P
[facility] Hospital Medicine History & Physical Examination       Patient Name: Lisseth Guajardo  MRN: [id number]  Patient Class: IP- Inpatient   Admission Date: 2025   Admitting Service: Hospital Medicine   Length of Stay: 0  Attending Physician: Dr. Alexandra   Primary Care Provider: Melody Ren MD  Face-to-Face encounter date: 2025  Code Status:   Chief Complaint: No chief complaint on file.    Patient information was obtained from patient, patient's family, past medical records and ED records.    MRN [id number]    HISTORY OF PRESENT ILLNESS:   84 y.o. female with a PMHx of  CVA with residual left-sided deficits, seizure disorder, septo-optic dysplasia/trigeminal neuralgia, CAD s/p stents, chronic CHF, hypertension, hyperlipidemia, COPD, CKD stage 3a, macular degeneration, depression, anorexia, degenerative disc disease, osteoporosis, recurrent UTI who presented to [facility] via EMS as a level 1 trauma on 3/18/2025 following suspected fall.  Last known normal was at 11:00 p.m. on 2025.  Per EMS report patient was found down on the ground of the assisted living facility.  Initial GCS was 8.  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    ED Course: Initial ED Vital Signs included BP 98/70, , RR 20, SpO2 96% on room air, temperature 97.9° F.  Labs were notable for hemoglobin 11.3, hematocrit 34.9, potassium 5.5, CO2 17, BUN 26.6, creatinine 1.38, AST 42.  Lactic acid 3.  Carbamazepine and phenytoin level was undetectable.  Alcohol level undetectable.  UDS positive for barbiturates.  Urinalysis indicative of UTI.  Urine cultures pending.  Pelvic x-ray unremarkable.  CT chest abdomen pelvis with IV contrast demonstrated mild subcutaneous stranding in the bilateral gluteal region may represent soft tissue contusion; bilateral adrenal glands appear nodular, left greater than right; no acute traumatic  intrathoracic pathology, no acute traumatic intra-abdominal or pelvic solid organ or bowel pathology identified.  CT C-spine without contrast unremarkable.  CT head without contrast demonstrated CSF filled cleft like defect surrounded by right parietal lobe gray matter with an apparent communication with the posterior horn of the right lateral ventricle consistent with schizencephaly, no acute intracranial process.  CXR unremarkable.  She received a Tdap vaccine, Keppra, ceftriaxone and IV fluids in the ED.  Evaluated by Trauma Service and deemed appropriate for medicine admission.  She was admitted to hospital medicine service for further medical management.    REVIEW OF SYSTEMS:   Unobtainable     PAST MEDICAL HISTORY:    CVA with residual left-sided deficits, seizure disorder, septo-optic dysplasia/trigeminal neuralgia, CAD s/p stents, chronic CHF, hypertension, hyperlipidemia, COPD, CKD stage 3a, macular degeneration, depression, anorexia, degenerative disc disease, osteoporosis, recurrent UTI    PAST SURGICAL HISTORY:   Appendectomy   Cardiac stents   LHC   Carpal tunnel release   Cataract extraction  Cholecystectomy   Craniotomy   Hysterectomy   Partial lobectomy   Lymph node dissection    FAMILY HISTORY:   Reviewed and negative    SOCIAL HISTORY:   Denied alcohol, tobacco or illicit drug use.  Former smoker.    SCREENING FOR SOCIAL DRIVERS FOR HEALTH:   Patient screened for food insecurity, housing instability, transportation needs, utility difficulties, and interpersonal safety (select all that apply as identified as concern):  []Housing or Food  []Transportation Needs  []Utility Difficulties  []Interpersonal safety  [x]None    ALLERGIES:   Patient has no known allergies.    HOME MEDICATIONS:     Prior to Admission medications    Medication Sig Start Date End Date Taking? Authorizing Provider   albuterol sulfate 90 mcg/actuation aebs Inhale 90 mcg into the lungs every 4 (four) hours as needed (2  puffs for wheezing).    Provider, Historical   aspirin 81 MG Chew Take 81 mg by mouth once daily.    Provider, Historical   atorvastatin (LIPITOR) 40 MG tablet Take 40 mg by mouth once daily.    Provider, Historical   benzonatate (TESSALON) 100 MG capsule Take 100 mg by mouth 3 (three) times daily as needed for Cough.    Provider, Historical   brimonidine 0.15 % OPTH DROP (ALPHAGAN) 0.15 % ophthalmic solution 1 drop 3 (three) times daily.    Provider, Historical   calcium-mag-vit B6-D3-minerals 183-15-8-125 ox-dx-wo-unit Tab Take 600 mg by mouth Daily.    Provider, Historical   dorzolamide-timolol 2-0.5% (COSOPT) 22.3-6.8 mg/mL ophthalmic solution 1 drop 3 (three) times daily.    Provider, Historical   latanoprost 0.005 % ophthalmic solution 1 drop every evening.    Provider, Historical   metoprolol succinate (TOPROL-XL) 25 MG 24 hr tablet Take 25 mg by mouth once daily.    Provider, Historical   mv-mn/folic ac/calcium/vit K1 (WOMEN'S 50 PLUS MULTIVITAMIN ORAL) Take by mouth.    Provider, Historical   PHENobarbitaL 32.4 MG tablet Take 32.4 mg by mouth every evening.    Provider, Historical   phenytoin (DILANTIN) 100 MG ER capsule Take 100 mg by mouth every morning.    Provider, Historical   potassium chloride SA (KLOR-CON M15) 15 MEQ tablet Take 20 mEq by mouth Daily. Every morning for 10 days    Provider, Historical     ________________________________________________________________________  INPATIENT LIST OF MEDICATIONS   Current Medications[1]    Scheduled Meds:   [START ON 3/19/2025] cefTRIAXone (Rocephin) IV (PEDS and ADULTS)  1 g Intravenous Q24H    levETIRAcetam in NaCl (iso-os)        levETIRAcetam (Keppra) IV (PEDS and ADULTS)  500 mg Intravenous Q12H    DIPH,PERTUSS(ACELL),TET VACCINE (ADULT)(BOOSTRIX,ADACEL)  0.5 mL Intramuscular ED 1 Time     Continuous Infusions:   0.9% NaCl   Intravenous Continuous 75 mL/hr at 03/18/25 0922 New Bag at 03/18/25 0922     PRN Meds:.  Current Facility-Administered  Medications:     acetaminophen, 650 mg, Oral, Q8H PRN    acetaminophen, 650 mg, Oral, Q4H PRN    levETIRAcetam in NaCl (iso-os), , ,     lorazepam, 2 mg, Intravenous, Q4H PRN    ondansetron, 4 mg, Intravenous, Q8H PRN    PHYSICAL EXAM:     VITAL SIGNS: 24 HRS MIN & MAX LAST   Temp  Min: 97.3 °F (36.3 °C)  Max: 97.9 °F (36.6 °C) 97.3 °F (36.3 °C)   BP  Min: 95/77  Max: 120/72 (!) 102/45   Pulse  Min: 91  Max: 113  95   Resp  Min: 12  Max: 20 12   SpO2  Min: 91 %  Max: 99 % (!) 91 %       Physical Exam per attending MD.    LABS AND IMAGING:     Recent Labs   Lab 03/18/25 0215 03/18/25  0548   WBC 7.02 10.52   RBC 4.38 3.64*   HGB 13.5 11.3*   HCT 42.0 34.9*   MCV 95.9* 95.9*   MCH 30.8 31.0   MCHC 32.1* 32.4*   RDW 14.2 14.2    263   MPV 11.2* 10.8*       Recent Labs   Lab 03/18/25  0215 03/18/25  0446    140   K 5.2* 5.5*    107   CO2 17* 17*   BUN 23.9* 22.6*   CREATININE 1.32* 1.38*   CALCIUM 9.1 8.9   MG  --  2.20   ALBUMIN 3.4 3.5   ALKPHOS 145 144   ALT 20 21   AST 43* 45*   BILITOT 0.4 0.3       Microbiology Results (last 7 days)       Procedure Component Value Units Date/Time    Urine culture [[id number]] Collected: 03/18/25 0315    Order Status: Sent Specimen: Urine Updated: 03/18/25 0356             X-Ray Pelvis Routine AP  Narrative: EXAMINATION:  XR PELVIS ROUTINE AP    CLINICAL HISTORY:  r/o bleeding or hemorrhage;    TECHNIQUE:  Single view of the pelvis.    COMPARISON:  No prior imaging available for comparison    FINDINGS:  No displaced fracture appreciated.  The sacroiliac joints are symmetric.  Refer to dedicated CT.  Impression: No displaced fracture appreciated.  Refer to dedicated CT.    Electronically signed by: Miguel Estes  Date:    03/18/2025  Time:    05:50  CT Chest Abdomen Pelvis With IV Contrast (XPD) NO Oral Contrast  Narrative: EXAMINATION:  CT CHEST ABDOMEN PELVIS WITH IV CONTRAST (XPD)    CLINICAL HISTORY:  Trauma;    TECHNIQUE:  Axial images of the chest,  abdomen, and pelvis were obtained With Contrast. Sagittal and coronal reconstructed images were available for review.    Automatic exposure control was utilized to reduce the patient's radiation dose.    DLP = 303    COMPARISON:  No prior images available for comparison.    FINDINGS:  Impression: Artifact: Mild motion artifact is seen on some images.Soft Tissues: No significant soft tissue swelling identified. Multiple tiny calcified foci are seen in the bilateral breasts.Lines and Tubes: None.Neck: The visualized soft tissues of the neck appear unremarkable.Mediastinum: The mediastinal structures are within normal limits.Heart: The heart size is within normal limits. Moderate coronary artery calcification is seen.Aorta: No aortic dissection or aneurysm is seen. Moderate aortic calcification is seen in the thoracic aorta.Pulmonary Arteries: No filling defects are seen in the pulmonary arteries to suggest pulmonary embolus.Lungs: There is mild non specific dependent change at the lung bases. Moderate streaky opacity is seen in the right upper lobe and right middle lobe scarring and or subsegmental atelectasis. No focal infiltrate or consolidation identified.Pleura: No effusions or pneumothorax are identified.Bony Structures:Spine: Mild spondylolytic changes are seen in the  thoracic spine. Bone cement is seen at T3.Ribs: No rib fractures are identified. The ribs appear unremarkable.Abdomen:Abdominal Wall: There is mild subcutaneous stranding in the bilateral gluteal region.Liver: The liver appears unremarkable.Trauma: No traumatic injury is identified.Biliary System: No intrahepatic biliary duct dilatation is seen. The extra hepatic biliary system appears prominent which may reflect prior obstructive physiology in this patient status post cholecystectomy.Gallbladder: Surgical clips are seen in the gallbladder fossa which may reflect prior cholecystectomy correlate with surgical history and visual inspection.Pancreas:  Moderate pancreatic atrophy is seen.Spleen: The spleen appears unremarkable.Trauma: No specific evidence of splenic trauma is seen.Adrenals: The bilateral adrenal glands appear nodular, left greater than the right.Kidneys: The kidneys appear unremarkable with no stones cysts masses or hydronephrosis.Aorta: There is moderate calcification of  the abdominal aorta and its branches.IVC: Unremarkable.Bowel:Esophagus: The visualized esophagus appears unremarkable.Stomach: The stomach appears unremarkable.Duodenum: Unremarkable appearing duodenum.Small Bowel: The small bowel appears unremarkable.Colon: Nondistended.Appendix: No appendix is identified and surgical clips are seen at the base of the cecum consistent with appendectomy.Peritoneum: No intraperitoneal free air or ascites is seen.Pelvis:Bladder: The bladder appears unremarkable.Female:Uterus: The uterus is not identified.Ovaries: No adnexal masses are seen.Bony structures:Dorsal Spine: There is mild spondylosis of the visualized dorsal spine. There is mild compression deformity at T12.Bony Pelvis: There is moderate degenerative change of the bilateral hip.Impression:1. There is mild subcutaneous stranding in the bilateral gluteal region. This may represent soft tissue contusion.2. 3. No acute traumatic intrathoracic pathology identified. No acute traumatic intraabdominal  or pelvic solid organ or bowel pathology identified. Details and other findings as discussed above.    There is mild subcutaneous stranding in the bilateral gluteal region. This may represent soft tissue contusion.    The bilateral adrenal glands appear nodular, left greater than the right. Correlate with clinical and laboratory findings as regards further evaluation and follow-up.    No acute traumatic intrathoracic pathology identified.  No acute traumatic intra-abdominal or pelvic solid organ or bowel pathology identified.    Electronically signed by: Miguel  Meera  Date:    03/18/2025  Time:    05:37  CT Cervical Spine Without Contrast  Narrative: EXAMINATION:  CT CERVICAL SPINE WITHOUT CONTRAST    CLINICAL HISTORY:  Trauma;    TECHNIQUE:  CT of the cervical spine Without contrast. Sagittal and coronal reconstructions were performed on the source images.    Automatic exposure control was utilized to reduce the patient's radiation dose.    DLP = 12 19    COMPARISON:  No prior imaging available for comparison.    FINDINGS:  Lung apices: Nonspecific scarring but otherwise unremarkable.Spine:Spinal canal: Moderate spinal canal narrowing is seen at C3-C4 level. Correlate clinically as regards additional evaluation and follow up, possibly with MRI.Mineralization: Within normal limits for age.Rotation: No significant rotation is seen.Scoliosis: No significant scoliosis is seen.Vertebral Fusion: No vertebral fusion is identified.Listhesis: There is grade I degenerative posterolisthesis of C3 on C4.Lordosis: Degenerative straightening of the cervical lordosis is seen. An element of myospasm is not excluded.Intervertebral disc spaces: Multilevel loss of disc height is seen.Osteophytes: Moderate multilevel endplate osteophytes are seen.Endplate Sclerosis: Mild multilevel endplate sclerosis is seen.Uncovertebral degenerative changes: Pronounced multilevel uncovertebral joint arthrosis is seen.Facet degenerative changes: Moderate multilevel facet degenerative changes are seen.Fractures: No acute cervical spine fracture dislocation or subluxation is seen.Orthopedic Hardware: None.Miscellaneous:Mastoid air cells: The visualized mastoid air cells appear clear.Soft Tissues: Unremarkable.  Impression: 1. No acute cervical spine abnormality identified.    2. Ligament, spinal cord and/or vascular abnormalities cannot be excluded on the basis of this examination.    Moderate degenerative changes as above.    Electronically signed by: Miguel Estes  Date:    03/18/2025  Time:    05:35  CT  Head Without Contrast  Narrative: EXAMINATION:  CT HEAD WITHOUT CONTRAST    CLINICAL HISTORY:  Trauma;    TECHNIQUE:  Low dose axial images were obtained through the head.  Coronal and sagittal reformations were also performed. Contrast was not administered.    Automatic exposure control was utilized to reduce the patient's radiation dose.    DLP= 12 19    COMPARISON:  None.    FINDINGS:  There is a CSF-filled cleft-like defect surrounded by right parietal lobe gray matter with an apparent communication with the posterior horn of the right lateral ventricle (Series 3 Image 37). This is consistent with Schizencephaly.Hemorrhage: No acute intracranial hemorrhage is seen.CSF spaces: The ventricles, sulci and basal cisterns all appear moderately prominent consistent with global cerebral atrophy. Additionally the ventricles appear dilated out of proportion to the sulci suggesting an element of concurrent non-obstructive hydrocephalus.Brain parenchyma: There is preservation of the grey white junction throughout. No acute infarct is identified.Cerebellum: Unremarkable.Sella and skull base: The sella appears to be within normal limits for age.Intracranial calcifications: Incidental note is made of bilateral choroid plexus calcification. Incidental note is made of some calcification of the falx.Calvarium: Craniotomy defects are seen in the left temporal and parietal bones. Orthopedic hardware is present in the left frontal bone and the greater wing of the left sphenoid bone. No acute linear or depressed skull fracture is seen.Maxillofacial Structures:Paranasal sinuses: There is some mucoperiosteal thickening in the left frontal sinuses. This may reflect chronic sinusitis. The rest of the paranasal sinuses appear clear.Orbits: The orbits appear unremarkable.Zygomatic arches: The zygomatic arches are intact and unremarkable.Temporal bones and mastoids: The temporal bones and mastoids appear unremarkable.TMJ: The mandibular  condyles appear normally placed with respect to the mandibular fossa.  Impression: There is a CSF-filled cleft-like defect surrounded by right parietal lobe gray matter with an apparent communication with the posterior horn of the right lateral ventricle (Series 3 Image 37). This is consistent with Schizencephaly. Correlate with clinical findings and recommend additional evaluation and follow-up as indicated.    No acute intracranial process.    Electronically signed by: Miguel Estes  Date:    03/18/2025  Time:    05:34  X-Ray Chest 1 View  Narrative: EXAMINATION:  XR CHEST 1 VIEW    CLINICAL HISTORY:  r/o bleeding or hemorrhage;    TECHNIQUE:  Single view of the chest    COMPARISON:  No prior imaging available for comparison.    FINDINGS:  No focal opacification, pleural effusion, or pneumothorax.    The cardiomediastinal silhouette is within normal limits.    No acute osseous abnormality.  Impression: No acute cardiopulmonary process.    Electronically signed by: Miguel Estes  Date:    03/18/2025  Time:    05:05  ED US Fast  Max Lennon MD     3/18/2025  4:24 AM  ED US Fast    Date/Time: 3/18/2025 2:15 AM    Performed by: Max Lennon MD  Authorized by: Ezekiel Fournier MD    Indication:  Blunt trauma  Identified Structures:  The pericardium, hepatorenal space, splenorenal   space, and pelvic cul-de-sac were examined and The right and left   hemithoraces were also examined  The following findings in the peritoneal, pericardial, and pleural spaces   were obtained:     Pericardial effusion:  Absent    Hepatorenal free fluid:  Absent    Splenorenal free fluid:  Absent    Suprapubic/Pouch of Braxton free fluid:  Absent    Right lung sliding:  Present    Left lung sliding:  Present    Impression:  No pathologic free fluid    Charge?:  Yes        ASSESSMENT:   Acute metabolic encephalopathy   Seizure disorder with suspected breakthrough seizure   Lactic acidosis  Acute UTI, POA   Hyperkalemia  BRITTNY  on CKD 3A     History:   CVA with residual left-sided deficits, seizure disorder, septo-optic dysplasia/trigeminal neuralgia, CAD s/p stents, chronic CHF, hypertension, hyperlipidemia, COPD, CKD stage 3a, macular degeneration, depression, anorexia, degenerative disc disease, osteoporosis, recurrent UTI      PLAN:   Neurology consultation   Seizure precautions   Fall precautions   IV Keppra b.i.d.   NS at 75 mL/hr   IV ceftriaxone daily   Follow urine culture  Avoid Nephrotoxins  NPO until more awake and alert for a diet  Resume home medications as deemed appropriate once medication reconciliation is updated  Labs in AM    VTE Prophylaxis: SCDs    Discharge Planning and Disposition: TBD    I, Diana Rico, NP have reviewed and discussed the case with Dr. Alexandra.   Please see the attending MD's addendum for further assessment and plan.    Diana Rico, [facility]  Department of Hospital Medicine   [facility]   2025    This note was created with the assistance of [employer] Voice Recognition Software. There may be transcription errors as a result of using this technology however minimal, and effort has been made to assure accuracy of transcription, but any obvious errors or omissions should be clarified with the author of the document.    _______________________________________________________________________________  MD Addendum:  I, Dr. Nasrin downs , assumed care of this patient today   For the patient encounter, I performed the substantive portion of the visit, I reviewed the NP/PA documentation, treatment plan, and medical decision making.  I had face to face time with this patient     A. History:  84 y.o. female with a PMHx of  CVA with residual left-sided deficits, seizure disorder, septo-optic dysplasia/trigeminal neuralgia, CAD s/p stents, chronic CHF, hypertension, hyperlipidemia, COPD, CKD stage 3a, macular degeneration, depression, anorexia, degenerative  disc disease, osteoporosis, recurrent UTI who presented to [facility] via EMS as a level 1 trauma on 3/18/2025 following suspected fall.  Last known normal was at 11:00 p.m. on 2025.  Per EMS report patient was found down on the ground of the assisted living facility.  Initial GCS was 8.  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    ED Course: Initial ED Vital Signs included BP 98/70, , RR 20, SpO2 96% on room air, temperature 97.9° F.  Labs were notable for hemoglobin 11.3, hematocrit 34.9, potassium 5.5, CO2 17, BUN 26.6, creatinine 1.38, AST 42.  Lactic acid 3.  Carbamazepine and phenytoin level was undetectable.  Alcohol level undetectable.  UDS positive for barbiturates.  Urinalysis indicative of UTI.  Urine cultures pending.  Pelvic x-ray unremarkable.  CT chest abdomen pelvis with IV contrast demonstrated mild subcutaneous stranding in the bilateral gluteal region may represent soft tissue contusion; bilateral adrenal glands appear nodular, left greater than right; no acute traumatic intrathoracic pathology, no acute traumatic intra-abdominal or pelvic solid organ or bowel pathology identified.  CT C-spine without contrast unremarkable.  CT head without contrast demonstrated CSF filled cleft like defect surrounded by right parietal lobe gray matter with an apparent communication with the posterior horn of the right lateral ventricle consistent with schizencephaly, no acute intracranial process.  CXR unremarkable.  She received a Tdap vaccine, Keppra, ceftriaxone and IV fluids in the ED.  Evaluated by Trauma Service and deemed appropriate for medicine admission.  She was admitted to hospital medicine service for further medical management.      ASSESSMENT:   Acute metabolic encephalopathy   Seizure disorder with suspected breakthrough seizure   Lactic acidosis  Acute UTI, POA   Hyperkalemia  BRITTNY on CKD 3A      History:   CVA with residual  left-sided deficits, seizure disorder, septo-optic dysplasia/trigeminal neuralgia, CAD s/p stents, chronic CHF, hypertension, hyperlipidemia, COPD, CKD stage 3a, macular degeneration, depression, anorexia, degenerative disc disease, osteoporosis, recurrent UTI        PLAN:   Admit   Neurology consultation   Seizure precautions   Fall precautions   IV Keppra b.i.d.   NS at 75 mL/hr   IV ceftriaxone daily   Follow urine culture  Avoid Nephrotoxins  NPO until more awake and alert for a diet  Resume home medications as deemed appropriate once medication reconciliation is updated  Labs in AM     VTE Prophylaxis: SCDs     Discharge Planning and Disposition: TBD    All diagnosis and differential diagnosis have been reviewed; assessment and plan has been documented; I have personally reviewed the labs and test results that are presently available; I have reviewed the patients medication list; I have reviewed the consulting providers response and recommendations. I have reviewed or attempted to review medical records based upon their availability.    All of the patient and family questions have been addressed and answered. Patient's is agreeable to the above stated plan. I will continue to monitor closely and make adjustments to medical management as needed.      03/18/2025            [1]   Current Facility-Administered Medications:     0.9% NaCl infusion, , Intravenous, Continuous, Diana Rico AGACNP-BC, Last Rate: 75 mL/hr at 03/18/25 0922, New Bag at 03/18/25 0922    acetaminophen tablet 650 mg, 650 mg, Oral, Q8H PRN, Diana Rico, AGACNP-BC    acetaminophen tablet 650 mg, 650 mg, Oral, Q4H PRN, Diana Rico AGACNP-BC    [START ON 3/19/2025] cefTRIAXone injection 1 g, 1 g, Intravenous, Q24H, Anna Reina FNP    levETIRAcetam in NaCl (iso-os) (KEPPRA) 1,000 mg/100 mL IVPB, , , ,     levETIRAcetam injection 500 mg, 500 mg, Intravenous, Q12H, Diana Rico AGACNP-BC, 500 mg at 03/18/25 0922     LORazepam injection 2 mg, 2 mg, Intravenous, Q4H PRN, Anna Reina, FNP    ondansetron injection 4 mg, 4 mg, Intravenous, Q8H PRN, Diana Rico, AGACNP-BC    Tdap vaccine injection 0.5 mL, 0.5 mL, Intramuscular, ED 1 Time, Ezekiel Fournier MD    Current Outpatient Medications:     albuterol sulfate 90 mcg/actuation aebs, Inhale 90 mcg into the lungs every 4 (four) hours as needed (2 puffs for wheezing)., Disp: , Rfl:     aspirin 81 MG Chew, Take 81 mg by mouth once daily., Disp: , Rfl:     atorvastatin (LIPITOR) 40 MG tablet, Take 40 mg by mouth once daily., Disp: , Rfl:     benzonatate (TESSALON) 100 MG capsule, Take 100 mg by mouth 3 (three) times daily as needed for Cough., Disp: , Rfl:     brimonidine 0.15 % OPTH DROP (ALPHAGAN) 0.15 % ophthalmic solution, 1 drop 3 (three) times daily., Disp: , Rfl:     calcium-mag-vit B6-D3-minerals 801-43-9-125 rj-vh-zb-unit Tab, Take 600 mg by mouth Daily., Disp: , Rfl:     dorzolamide-timolol 2-0.5% (COSOPT) 22.3-6.8 mg/mL ophthalmic solution, 1 drop 3 (three) times daily., Disp: , Rfl:     latanoprost 0.005 % ophthalmic solution, 1 drop every evening., Disp: , Rfl:     metoprolol succinate (TOPROL-XL) 25 MG 24 hr tablet, Take 25 mg by mouth once daily., Disp: , Rfl:     mv-mn/folic ac/calcium/vit K1 (WOMEN'S 50 PLUS MULTIVITAMIN ORAL), Take by mouth., Disp: , Rfl:     PHENobarbitaL 32.4 MG tablet, Take 32.4 mg by mouth every evening., Disp: , Rfl:     phenytoin (DILANTIN) 100 MG ER capsule, Take 100 mg by mouth every morning., Disp: , Rfl:     potassium chloride SA (KLOR-CON M15) 15 MEQ tablet, Take 20 mEq by mouth Daily. Every morning for 10 days, Disp: , Rfl:

## 2025-03-18 NOTE — ED PROVIDER NOTES
Encounter Date: 3/18/2025    SCRIBE #1 NOTE: I, Richar Ribeiro, am scribing for, and in the presence of,  Max Lennon MD. I have scribed the following portions of the note - Other sections scribed: HPI,ROS,PE.       History   No chief complaint on file.    85 y/o female presents to ED via EMS as a lvl 1 trauma following a fall. EMS reports pt was last known normal at 23:00 on 3/17. EMS reports pt is at an assisted living facility, and was found down on the ground. EMS reports on scene pt was a GCS 8. EMS states pt is normally a GCS 15 at baseline. 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 EMS reports vitals stable en route to ED.     History and ROS limited due to pt's mental status change.     The history is provided by the EMS personnel. History limited by: mental status change.     Review of patient's allergies indicates:  No Known Allergies  No past medical history on file.  No past surgical history on file.  No family history on file.  Social History[1]  Review of Systems   Unable to perform ROS: Mental status change       Physical Exam     Initial Vitals   BP Pulse Resp Temp SpO2   03/18/25 0206 03/18/25 0206 03/18/25 0206 03/18/25 0211 03/18/25 0154   98/70 (!) 113 20 97.9 °F (36.6 °C) 98 %      MAP       --                Physical Exam    Nursing note and vitals reviewed.  Constitutional: She appears well-developed and well-nourished. She is not diaphoretic. No distress.   HENT:   Head: Normocephalic and atraumatic.   Right Ear: External ear normal.   Left Ear: External ear normal.   Nose: Nose normal.   Eyes: Conjunctivae and EOM are normal. Right eye exhibits no discharge. Left eye exhibits no discharge.   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   Right pupil is oval shaped.   Left pupil is 2mm and sluggish.    Neck:   C-collar applied in trauma room.   No C-spine step offs or deformities.    Cardiovascular:  Normal rate, regular rhythm and normal heart sounds.      Exam reveals no gallop and no friction rub.       No murmur heard.  Pulses:       Radial pulses are 2+ on the right side and 2+ on the left side.   Pulmonary/Chest: Breath sounds normal. No respiratory distress. She has no wheezes. She has no rhonchi. She has no rales.   Abdominal: Abdomen is soft. Bowel sounds are normal. She exhibits no distension and no mass.   Musculoskeletal:         General: No edema.      Comments: Contusion to the RUE.   Contracture of the LUE.  No spinal step offs or deformities.      Neurological: She is alert. No cranial nerve deficit or sensory deficit. GCS eye subscore is 3. GCS verbal subscore is 4. GCS motor subscore is 4.   GCS 11   Skin: Skin is warm and dry. Capillary refill takes less than 2 seconds. No erythema. No pallor.         ED Course   ED US Fast    Date/Time: 3/18/2025 2:15 AM    Performed by: Max Lennon MD  Authorized by: Ezekiel Fournier MD    Indication:  Blunt trauma  Identified Structures:  The pericardium, hepatorenal space, splenorenal space, and pelvic cul-de-sac were examined and The right and left hemithoraces were also examined  The following findings in the peritoneal, pericardial, and pleural spaces were obtained:     Pericardial effusion:  Absent    Hepatorenal free fluid:  Absent    Splenorenal free fluid:  Absent    Suprapubic/Pouch of Braxton free fluid:  Absent    Right lung sliding:  Present    Left lung sliding:  Present    Impression:  No pathologic free fluid    Charge?:  Yes    Labs Reviewed   COMPREHENSIVE METABOLIC PANEL - Abnormal       Result Value    Sodium 141      Potassium 5.2 (*)     Chloride 107      CO2 17 (*)     Glucose 104      Blood Urea Nitrogen 23.9 (*)     Creatinine 1.32 (*)     Calcium 9.1      Protein Total 7.6      Albumin 3.4      Globulin 4.2 (*)     Albumin/Globulin Ratio 0.8 (*)     Bilirubin Total 0.4            ALT 20      AST 43 (*)     eGFR 40      Anion Gap 17.0      BUN/Creatinine Ratio 18      LACTIC ACID, PLASMA - Abnormal    Lactic Acid Level 3.1 (*)    URINALYSIS, REFLEX TO URINE CULTURE - Abnormal    Color, UA Light-Yellow      Appearance, UA Clear      Specific Gravity, UA 1.022      pH, UA 6.5      Protein, UA Trace (*)     Glucose, UA Normal      Ketones, UA Trace (*)     Blood, UA 1+ (*)     Bilirubin, UA Negative      Urobilinogen, UA Normal      Nitrites, UA Negative      Leukocyte Esterase, UA Negative      RBC, UA 6-10 (*)     WBC, UA 11-20 (*)     Bacteria, UA None Seen      Squamous Epithelial Cells, UA Trace      Mucous, UA Trace (*)     Hyaline Casts, UA 11-20 (*)     Calcium Oxalate Crystals, UA Trace (*)    DRUG SCREEN, URINE (BEAKER) - Abnormal    Amphetamines, Urine Negative      Barbiturates, Urine Positive (*)     Benzodiazepine, Urine Negative      Cannabinoids, Urine Negative      Cocaine, Urine Negative      Fentanyl, Urine Negative      MDMA, Urine Negative      Opiates, Urine Negative      Phencyclidine, Urine Negative      pH, Urine 6.5      Specific Gravity, Urine Auto 1.022      Narrative:     Cut off concentrations:    Amphetamines - 1000 ng/ml  Barbiturates - 200 ng/ml  Benzodiazepine - 200 ng/ml  Cannabinoids (THC) - 50 ng/ml  Cocaine - 300 ng/ml  Fentanyl - 1.0 ng/ml  MDMA - 500 ng/ml  Opiates - 300 ng/ml   Phencyclidine (PCP) - 25 ng/ml    Specimen submitted for drug analysis and tested for pH and specific gravity in order to evaluate sample integrity. Suspect tampering if specific gravity is <1.003 and/or pH is not within the range of 4.5 - 8.0  False negatives may result form substances such as bleach added to urine.  False positives may result for the presence of a substance with similar chemical structure to the drug or its metabolite.    This test provides only a PRELIMINARY analytical test result. A more specific alternate chemical method must be used in order to obtain a confirmed analytical result. Gas chromatography/mass spectrometry (GC/MS) is the preferred  confirmatory method. Other chemical confirmation methods are available. Clinical consideration and professional judgement should be applied to any drug of abuse test result, particularly when preliminary positive results are used.    Positive results will be confirmed only at the physicians request. Unconfirmed screening results are to be used only for medical purposes (treatment).        CBC WITH DIFFERENTIAL - Abnormal    WBC 7.02      RBC 4.38      Hgb 13.5      Hct 42.0      MCV 95.9 (*)     MCH 30.8      MCHC 32.1 (*)     RDW 14.2      Platelet 295      MPV 11.2 (*)     Neut % 55.6      Lymph % 32.3      Mono % 9.1      Eos % 1.3      Basophil % 1.3      Imm Grans % 0.4      Neut # 3.90      Lymph # 2.27      Mono # 0.64      Eos # 0.09      Baso # 0.09      Imm Gran # 0.03      NRBC% 0.0     LACTIC ACID, PLASMA - Abnormal    Lactic Acid Level 3.0 (*)    PROTIME-INR - Normal    PT 14.2      INR 1.1      Narrative:     Protimes are used to monitor anticoagulant agents such as warfarin. PT INR values are based on the current patient normal mean and the SAROJ value for the specific instrument reagent used.  **Routine theraputic target values for the INR are 2.0-3.0**   APTT - Normal    PTT 24.5     ALCOHOL,MEDICAL (ETHANOL) - Normal    Ethanol Level <10.0     CULTURE, URINE   CBC W/ AUTO DIFFERENTIAL    Narrative:     The following orders were created for panel order CBC auto differential.  Procedure                               Abnormality         Status                     ---------                               -----------         ------                     CBC with Differential[[id number]]       Abnormal            Final result                 Please view results for these tests on the individual orders.   PHENYTOIN LEVEL, TOTAL   CARBAMAZEPINE LEVEL, TOTAL   TYPE & SCREEN    Group & Rh O POS      Indirect Rosita GEL NEG      Specimen Outdate 03/21/2025 23:59     ABORH RETYPE    ABORH Retype O POS     POCT  GLUCOSE    POCT Glucose 105            Imaging Results              CT Head Without Contrast (Preliminary result)  Result time 03/18/25 02:52:20      Preliminary result by Mart Smith MD (03/18/25 02:52:20)                   Narrative:    START OF REPORT:  Technique: CT of the head was performed without intravenous contrast with axial as well as coronal and sagittal images.    Comparison: None.    Dosage Information: Automated Exposure Control was utilized 1219.11 mGy.cm.    Clinical history: [place] [age] Trauma 1 Found down [facility] [id number].    Findings: There is a CSF-filled cleft-like defect surrounded by right parietal lobe gray matter with an apparent communication with the posterior horn of the right lateral ventricle (Series 3 Image 37). This is consistent with Schizencephaly.  Hemorrhage: No acute intracranial hemorrhage is seen.  CSF spaces: The ventricles, sulci and basal cisterns all appear moderately prominent consistent with global cerebral atrophy. Additionally the ventricles appear dilated out of proportion to the sulci suggesting an element of concurrent non-obstructive hydrocephalus.  Brain parenchyma: There is preservation of the grey white junction throughout. No acute infarct is identified.  Cerebellum: Unremarkable.  Sella and skull base: The sella appears to be within normal limits for age.  Intracranial calcifications: Incidental note is made of bilateral choroid plexus calcification. Incidental note is made of some calcification of the falx.  Calvarium: Craniotomy defects are seen in the left temporal and parietal bones. Orthopedic hardware is present in the left frontal bone and the greater wing of the left sphenoid bone. No acute linear or depressed skull fracture is seen.    Maxillofacial Structures:  Paranasal sinuses: There is some mucoperiosteal thickening in the left frontal sinuses. This may reflect chronic sinusitis. The rest of the paranasal sinuses appear  clear.  Orbits: The orbits appear unremarkable.  Zygomatic arches: The zygomatic arches are intact and unremarkable.  Temporal bones and mastoids: The temporal bones and mastoids appear unremarkable.  TMJ: The mandibular condyles appear normally placed with respect to the mandibular fossa.      Impression:  1. There is a CSF-filled cleft-like defect surrounded by right parietal lobe gray matter with an apparent communication with the posterior horn of the right lateral ventricle (Series 3 Image 37). This is consistent with Schizencephaly. Correlate with clinical findings and recommend additional evaluation and follow-up as indicated.  2. No acute intracranial process identified. Details and other findings as noted above.                                         CT Chest Abdomen Pelvis With IV Contrast (XPD) NO Oral Contrast (Preliminary result)  Result time 03/18/25 02:51:37      Preliminary result by Mart Smith MD (03/18/25 02:51:37)                   Narrative:    START OF REPORT:  Technique: CT Scan of the chest abdomen and pelvis was performed with intravenous contrast with axial as well as sagittal and, coronal images.    Dosage Information: Automated Exposure Control was utilized.    Comparison: None.    Clinical History: [place] forty[age] Trauma 1 Found down [facility] [id number].    Findings:  Artifact: Mild motion artifact is seen on some images.  Soft Tissues: No significant soft tissue swelling identified. Multiple tiny calcified foci are seen in the bilateral breasts.  Lines and Tubes: None.  Neck: The visualized soft tissues of the neck appear unremarkable.  Mediastinum: The mediastinal structures are within normal limits.  Heart: The heart size is within normal limits. Moderate coronary artery calcification is seen.  Aorta: No aortic dissection or aneurysm is seen. Moderate aortic calcification is seen in the thoracic aorta.  Pulmonary Arteries: No filling defects are seen in the pulmonary arteries  to suggest pulmonary embolus.  Lungs: There is mild non specific dependent change at the lung bases. Moderate streaky opacity is seen in the right upper lobe and right middle lobe scarring and or subsegmental atelectasis. No focal infiltrate or consolidation identified.  Pleura: No effusions or pneumothorax are identified.  Bony Structures:  Spine: Mild spondylolytic changes are seen in the thoracic spine. Bone cement is seen at T3.  Ribs: No rib fractures are identified. The ribs appear unremarkable.  Abdomen:  Abdominal Wall: There is mild subcutaneous stranding in the bilateral gluteal region.  Liver: The liver appears unremarkable.  Trauma: No traumatic injury is identified.  Biliary System: No intrahepatic biliary duct dilatation is seen. The extra hepatic biliary system appears prominent which may reflect prior obstructive physiology in this patient status post cholecystectomy.  Gallbladder: Surgical clips are seen in the gallbladder fossa which may reflect prior cholecystectomy correlate with surgical history and visual inspection.  Pancreas: Moderate pancreatic atrophy is seen.  Spleen: The spleen appears unremarkable.  Trauma: No specific evidence of splenic trauma is seen.  Adrenals: The bilateral adrenal glands appear nodular, left greater than the right.  Kidneys: The kidneys appear unremarkable with no stones cysts masses or hydronephrosis.  Aorta: There is moderate calcification of the abdominal aorta and its branches.  IVC: Unremarkable.  Bowel:  Esophagus: The visualized esophagus appears unremarkable.  Stomach: The stomach appears unremarkable.  Duodenum: Unremarkable appearing duodenum.  Small Bowel: The small bowel appears unremarkable.  Colon: Nondistended.  Appendix: No appendix is identified and surgical clips are seen at the base of the cecum consistent with appendectomy.  Peritoneum: No intraperitoneal free air or ascites is seen.    Pelvis:  Bladder: The bladder appears  unremarkable.  Female:  Uterus: The uterus is not identified.  Ovaries: No adnexal masses are seen.    Bony structures:  Dorsal Spine: There is mild spondylosis of the visualized dorsal spine. There is mild compression deformity at T12.  Bony Pelvis: There is moderate degenerative change of the bilateral hip.      Impression:  1. There is mild subcutaneous stranding in the bilateral gluteal region. This may represent soft tissue contusion.  2. The bilateral adrenal glands appear nodular, left greater than the right. Correlate with clinical and laboratory findings as regards further evaluation and follow-up.  3. No acute traumatic intrathoracic pathology identified. No acute traumatic intraabdominal or pelvic solid organ or bowel pathology identified. Details and other findings as discussed above.                                         CT Cervical Spine Without Contrast (Preliminary result)  Result time 03/18/25 02:48:40      Preliminary result by Mart Smith MD (03/18/25 02:48:40)                   Narrative:    START OF REPORT:  Technique: CT of the cervical spine was performed without intravenous contrast with axial as well as sagittal and coronal images.    Comparison: None.    Dosage Information: Automated exposure control was utilized.    Clinical history: [place] [age] Trauma 1 Found down [facility] [id number].    Findings:  Lung apices: Nonspecific scarring but otherwise unremarkable.  Spine:  Spinal canal: Moderate spinal canal narrowing is seen at C3-C4 level. Correlate clinically as regards additional evaluation and follow up, possibly with MRI.  Mineralization: Within normal limits for age.  Rotation: No significant rotation is seen.  Scoliosis: No significant scoliosis is seen.  Vertebral Fusion: No vertebral fusion is identified.  Listhesis: There is grade I degenerative posterolisthesis of C3 on C4.  Lordosis: Degenerative straightening of the cervical lordosis is seen. An element of myospasm  is not excluded.  Intervertebral disc spaces: Multilevel loss of disc height is seen.  Osteophytes: Moderate multilevel endplate osteophytes are seen.  Endplate Sclerosis: Mild multilevel endplate sclerosis is seen.  Uncovertebral degenerative changes: Pronounced multilevel uncovertebral joint arthrosis is seen.  Facet degenerative changes: Moderate multilevel facet degenerative changes are seen.  Fractures: No acute cervical spine fracture dislocation or subluxation is seen.  Orthopedic Hardware: None.    Miscellaneous:  Mastoid air cells: The visualized mastoid air cells appear clear.  Soft Tissues: Unremarkable.      Impression:  1. No acute cervical spine fracture dislocation or subluxation is seen.  2. Degenerative changes and other details as above.                                         X-Ray Pelvis Routine AP (In process)                      X-Ray Chest 1 View (In process)                      Medications   Tdap vaccine injection 0.5 mL (0.5 mLs Intramuscular Not Given 3/18/25 0215)   levETIRAcetam in NaCl (iso-os) (KEPPRA) 1,000 mg/100 mL IVPB (  Not Given 3/18/25 0230)   sodium chloride 0.9% bolus 1,000 mL 1,000 mL (1,000 mLs Intravenous New Bag 3/18/25 0348)   cefTRIAXone injection 1 g (has no administration in time range)   sterile water for injection injection (has no administration in time range)   0.9% NaCl infusion (0 mL/hr Intravenous Stopped 3/18/25 0344)   levETIRAcetam injection 1,000 mg (1,000 mg Intravenous Given 3/18/25 0230)   iohexoL (OMNIPAQUE 350) injection 100 mL (100 mLs Intravenous Given 3/18/25 0236)             Scribe Attestation:   Scribe #1: I performed the above scribed service and the documentation accurately describes the services I performed. I attest to the accuracy of the note.    Attending Attestation:           Physician Attestation for Scribe:  Physician Attestation Statement for Scribe #1: I, Max Lennon MD, reviewed documentation, as scribed by Richar Ribeiro in my  "presence, and it is both accurate and complete.         Medical Decision Making  The differential diagnosis includes, but is not limited to, Metabolic abnormality, intoxication, toxic ingestion, CVA, infection, structural (SAH, ICH, trauma, neoplastic), seizure/postictal, polypharmacy     Patient arrives with a level 1 trauma.  Fortunately she has no obvious traumatic injuries on imaging or on physical exam however she is very confused she is altered only to person.  I found a note from 03/14 from [facility] that reports she has been new onset confusion, memory loss and hallucinations.  Also possibly some hypotension.  No hypotension here today with a possible UTI.  Will admit for further evaluation and management.  Care to be transferred.  Discussed with Anna on-call for hospitalist.  Will admit.      Amount and/or Complexity of Data Reviewed  Independent Historian: EMS     Details: EMS reports pt was last known normal at 23:00 on 3/17. EMS reports pt is at an assisted living facility, and was found down on the ground. EMS reports on scene pt was a GCS 8. EMS states pt is normally a GCS 15 at baseline. 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 EMS reports vitals stable en route to ED.   External Data Reviewed: notes.     Details: See ED course  Labs: ordered. Decision-making details documented in ED Course.  Radiology: ordered and independent interpretation performed. Decision-making details documented in ED Course.    Risk  OTC drugs.  Prescription drug management.  Decision regarding hospitalization.            ED Course as of 03/18/25 0424   Tue Mar 18, 2025   0232 X-Ray Pelvis Routine AP  No obvious fracture or dislocation [MM]   0232 X-Ray Chest 1 View  No obvious fracture, pneumothorax [MM]   0252 Chart review reveals a note from 03/14/2025 "C/O new-onset confusion today, memory loss, hallucinations, and hypotension "  PCP Melody Ren MD [MM]   0253 Basic " metabolic panel from 6 days ago with a creatinine of 0.7.  BUN of 8.  Sodium of 141, potassium 3.1, chloride 106    Today creatinine 1.3, BUN of 23.9.  Sodium 141, potassium 5.2 [MM]   0253 Creatinine(!): 1.32 [MM]   0253 BUN(!): 23.9 [MM]   0253 CBC auto differential(!)  No leukocytosis no anemia [MM]      ED Course User Index  [MM] Max Lennon MD                           Clinical Impression:  Final diagnoses:  [T14.90XA] Trauma (Primary)  [E86.0] Dehydration  [N30.00] Acute cystitis without hematuria  [R41.82] Altered mental status, unspecified altered mental status type  [R41.82] AMS (altered mental status)          ED Disposition Condition    Admit                   [1]         Max Lennon MD  03/18/25 0424

## 2025-03-18 NOTE — SUBJECTIVE & OBJECTIVE
No past medical history on file.    No past surgical history on file.    Review of patient's allergies indicates:  No Known Allergies    Current Neurological Medications:     No current facility-administered medications on file prior to encounter.     Current Outpatient Medications on File Prior to Encounter   Medication Sig    albuterol sulfate 90 mcg/actuation aebs Inhale 90 mcg into the lungs every 4 (four) hours as needed (2 puffs for wheezing).    aspirin 81 MG Chew Take 81 mg by mouth once daily.    atorvastatin (LIPITOR) 40 MG tablet Take 40 mg by mouth once daily.    benzonatate (TESSALON) 100 MG capsule Take 100 mg by mouth 3 (three) times daily as needed for Cough.    brimonidine 0.15 % OPTH DROP (ALPHAGAN) 0.15 % ophthalmic solution 1 drop 3 (three) times daily.    calcium-mag-vit B6-D3-minerals 984-53-4-125 ct-jo-gl-unit Tab Take 600 mg by mouth Daily.    dorzolamide-timolol 2-0.5% (COSOPT) 22.3-6.8 mg/mL ophthalmic solution 1 drop 3 (three) times daily.    latanoprost 0.005 % ophthalmic solution 1 drop every evening.    metoprolol succinate (TOPROL-XL) 25 MG 24 hr tablet Take 25 mg by mouth once daily.    mv-mn/folic ac/calcium/vit K1 (WOMEN'S 50 PLUS MULTIVITAMIN ORAL) Take by mouth.    PHENobarbitaL 32.4 MG tablet Take 32.4 mg by mouth every evening.    phenytoin (DILANTIN) 100 MG ER capsule Take 100 mg by mouth every morning.    potassium chloride SA (KLOR-CON M15) 15 MEQ tablet Take 20 mEq by mouth Daily. Every morning for 10 days     Family History    None       Tobacco Use    Smoking status: Not on file    Smokeless tobacco: Not on file   Substance and Sexual Activity    Alcohol use: Not on file    Drug use: Not on file    Sexual activity: Not on file     Review of Systems  A 14pt ros was reviewed & is negative unless o/w documented in the hpi    Objective:     Vital Signs (Most Recent):  Temp: 97.3 °F (36.3 °C) (03/18/25 0240)  Pulse: 87 (03/18/25 1115)  Resp: 14 (03/18/25 1115)  BP: 114/73  (03/18/25 1113)  SpO2: 95 % (03/18/25 1115) Vital Signs (24h Range):  Temp:  [97.3 °F (36.3 °C)-97.9 °F (36.6 °C)] 97.3 °F (36.3 °C)  Pulse:  [] 87  Resp:  [12-20] 14  SpO2:  [91 %-100 %] 95 %  BP: ()/(45-91) 114/73     Weight: 54.4 kg (120 lb)  Body mass index is 20.6 kg/m².     Physical Exam  Vitals reviewed.   Constitutional:       General: She is awake.   HENT:      Head: Normocephalic and atraumatic.      Nose: Nose normal.      Mouth/Throat:      Mouth: Mucous membranes are moist.      Pharynx: Oropharynx is clear.   Eyes:      Extraocular Movements: Extraocular movements intact and EOM normal.      Pupils: Pupils are equal, round, and reactive to light.   Pulmonary:      Effort: Pulmonary effort is normal.   Skin:     General: Skin is warm and dry.   Neurological:      Mental Status: She is alert and oriented to person, place, and time.          NEUROLOGICAL EXAMINATION:     MENTAL STATUS   Oriented to person, place, and time.   Follows 1 step commands.   Attention: normal. Concentration: normal.   Speech: (dysarthria)  Level of consciousness: alert    CRANIAL NERVES     CN II   Visual fields full to confrontation.     CN III, IV, VI   Pupils are equal, round, and reactive to light.  Extraocular motions are normal.   Nystagmus: none   Conjugate gaze: present    CN V   Facial sensation intact.     MOTOR EXAM        L sided weakness (baseline)  R side opposes gravity       Significant Labs:   Recent Lab Results  (Last 5 results in the past 24 hours)        03/18/25  0548   03/18/25  0446   03/18/25  0319   03/18/25  0315   03/18/25  0217        Phencyclidine       Negative         Albumin/Globulin Ratio   0.8             Albumin   3.5             ALP   144             ALT   21             Amphetamines, Urine       Negative         Anion Gap   16.0             Appearance, UA       Clear         AST   45             Bacteria, UA       None Seen         Barbituates, Urine       Positive         Baso #  0.06               Basophil % 0.6               Benzodiazepine, Urine       Negative         Bilirubin (UA)       Negative         BILIRUBIN TOTAL   0.3             BUN   22.6             BUN/CREAT RATIO   16             Ca Oxalate Jackeline, UA       Trace         Calcium   8.9             Cannabinoids, Urine       Negative         Chloride   107             CO2   17             Cocaine, Urine       Negative         Color, UA       Light-Yellow         CPK   101             Creatinine   1.38             eGFR   38  Comment: Estimated GFR calculated using the CKD-EPI creatinine (2021) equation.             Eos # 0.01               Eos % 0.1               Fentanyl, Urine       Negative         Globulin, Total   4.2             Glucose   99             Glucose, UA       Normal         Hematocrit 34.9               Hemoglobin 11.3               Hyaline Casts, UA       11-20         Immature Grans (Abs) 0.06               Immature Granulocytes 0.6               Ketones, UA       Trace         Lactic Acid Level     3.0           Leukocyte Esterase, UA       Negative         Lymph # 1.08               LYMPH % 10.3               Magnesium    2.20             MCH 31.0               MCHC 32.4               MCV 95.9               MDMA, Urine       Negative         Mono # 0.38               Mono % 3.6               MPV 10.8               Mucous, UA       Trace         Neut # 8.93               Neut % 84.8               NITRITE UA       Negative         nRBC 0.0               Blood, UA       1+         Opiates, Urine       Negative         pH, UA       6.5         pH, Urine       6.5         Platelet Count 263               POCT Glucose         105       Potassium   5.5             PROTEIN TOTAL   7.7             Protein, UA       Trace         RBC 3.64               RBC, UA       6-10         RDW 14.2               Sodium   140             Specific Gravity,UA       1.022         Specific Gravity, Urine Auto       1.022          Squamous Epithelial Cells, UA       Trace         Urobilinogen, UA       Normal         WBC, UA       11-20         WBC 10.52                                      Significant Imaging:  CT head w/o:   Impression:     There is a CSF-filled cleft-like defect surrounded by right parietal lobe gray matter with an apparent communication with the posterior horn of the right lateral ventricle (Series 3 Image 37). This is consistent with Schizencephaly. Correlate with clinical findings and recommend additional evaluation and follow-up as indicated.     No acute intracranial process.     I have reviewed all pertinent imaging results/findings within the past 24 hours.

## 2025-03-18 NOTE — CONSULTS
"   Trauma Surgery   Activation Note    Patient Name: Wild Del Rio  MRN: [id number]   YOB: 1941  Date: 03/18/2025    LEVEL 1 TRAUMA     Subjective:   History source(s): EMS and previous medical records brought in by EMS  History of present illness: Patient is an approximately 84 year old female who presents via ground EMS from assisted living facility. EMS reports patient was last seen normal around 2300 last night. Unable to get much PMH from facility prior to transport aside from known blindness, usual GCS 15 and LUE contracture. Unknown if on blood thinner, previous medication records found in patient room note she is on ASA 81 mg daily and is on anti seizure medications with likely history of seizures. Patient unable to participate much in exam and does not follow commands.     Primary Survey:  A Clear, intact   B Unlabored, breath sounds clear bilaterally    C No uncontrolled external hemorrhage, 2+ radial pulses bilaterally    D GCS 11(E 3, V 4, M 4)    E exposed, log-rolled and examined (see below)   F See below     VITAL SIGNS: 24 HR MIN & MAX LAST   Temp  Min: 97.3 °F (36.3 °C)  Max: 97.9 °F (36.6 °C)  97.3 °F (36.3 °C)   BP  Min: 98/70  Max: 120/72  108/87    Pulse  Min: 96  Max: 113  96    Resp  Min: 15  Max: 20  17    SpO2  Min: 94 %  Max: 98 %  98 %      HT: 5' 4" (162.6 cm)  WT: 54.4 kg (120 lb)  BMI: 20.6     FAST: negative for free fluid    Medications/transfusions received en-route: None  Medications/transfusions received in trauma bay: Homero PEARCE    Scheduled Meds:   levETIRAcetam in NaCl (iso-os)        DIPH,PERTUSS(ACELL),TET VACCINE (ADULT)(BOOSTRIX,ADACEL)  0.5 mL Intramuscular ED 1 Time     Continuous Infusions:  PRN Meds:  Current Facility-Administered Medications:     levETIRAcetam in NaCl (iso-os), , ,     ROS: unable to assess secondary to patients condition     Allergies: Unknown  PMH: unable to obtain secondary to acuity of the patient  PSH: unable to obtain " "secondary to acuity of the patient  Social history: unable to obtain secondary to acuity of the patient  Objective:   Secondary Survey:   General: Well developed, well nourished  Neuro: GCS 11  HEENT:  Normocephalic, atraumatic, right pupil oval shaped, left pupil sluggishly reactive, cervical collar placed  CV:  RRR  Pulse: 2+ RP b/l  Resp/chest:  Non-labored breathing, satting on room air  GI:  Abdomen soft, non-tender, non-distended  :  In brief.   Rectal: Deferred.  Extremities: Moves RUE and RLE spontaneously. Contracture LUE. LLE with poor muscle development and edema to foot.   Back/Spine: No bony TTP, no palpable step offs or deformities.  Cervical back: Normal. No tenderness.  Thoracic back: Normal. No tenderness.  Lumbar back: Normal. No tenderness.  Skin/wounds:  Warm, well perfused    Labs:  Troponin:  No results for input(s): "TROPONINI" in the last 72 hours.  CBC:  Recent Labs     03/18/25 0215   WBC 7.02   RBC 4.38   HGB 13.5   HCT 42.0      MCV 95.9*   MCH 30.8   MCHC 32.1*     CMP:  Recent Labs     03/18/25 0215   CALCIUM 9.1   ALBUMIN 3.4      K 5.2*   CO2 17*      BUN 23.9*   CREATININE 1.32*   ALKPHOS 145   ALT 20   AST 43*   BILITOT 0.4     Lactic Acid:  Recent Labs     03/18/25 0215   LACTATE 3.1*     ETOH:  Recent Labs     03/18/25 0215   ETHANOL <10.0      Urine Drug Screen:  No results for input(s): "COCAINE", "OPIATE", "BARBITURATE", "AMPHETAMINE", "FENTANYL", "CANNABINOIDS", "MDMA" in the last 72 hours.    Invalid input(s): "BENZODIAZEPINE", "PHENCYCLIDINE"   ABG:  No results for input(s): "PH", "PO2", "PCO2", "HCO3", "BE" in the last 168 hours.   I have reviewed all pertinent lab results within the past 24 hours.    Imaging:  Imaging Results              CT Head Without Contrast (Preliminary result)  Result time 03/18/25 02:52:20      Preliminary result by Mart Smith MD (03/18/25 02:52:20)                   Narrative:    START OF REPORT:  Technique: CT of the " head was performed without intravenous contrast with axial as well as coronal and sagittal images.    Comparison: None.    Dosage Information: Automated Exposure Control was utilized 1219.11 mGy.cm.    Clinical history: [place] [age] Trauma 1 Found down [facility] [id number].    Findings: There is a CSF-filled cleft-like defect surrounded by right parietal lobe gray matter with an apparent communication with the posterior horn of the right lateral ventricle (Series 3 Image 37). This is consistent with Schizencephaly.  Hemorrhage: No acute intracranial hemorrhage is seen.  CSF spaces: The ventricles, sulci and basal cisterns all appear moderately prominent consistent with global cerebral atrophy. Additionally the ventricles appear dilated out of proportion to the sulci suggesting an element of concurrent non-obstructive hydrocephalus.  Brain parenchyma: There is preservation of the grey white junction throughout. No acute infarct is identified.  Cerebellum: Unremarkable.  Sella and skull base: The sella appears to be within normal limits for age.  Intracranial calcifications: Incidental note is made of bilateral choroid plexus calcification. Incidental note is made of some calcification of the falx.  Calvarium: Craniotomy defects are seen in the left temporal and parietal bones. Orthopedic hardware is present in the left frontal bone and the greater wing of the left sphenoid bone. No acute linear or depressed skull fracture is seen.    Maxillofacial Structures:  Paranasal sinuses: There is some mucoperiosteal thickening in the left frontal sinuses. This may reflect chronic sinusitis. The rest of the paranasal sinuses appear clear.  Orbits: The orbits appear unremarkable.  Zygomatic arches: The zygomatic arches are intact and unremarkable.  Temporal bones and mastoids: The temporal bones and mastoids appear unremarkable.  TMJ: The mandibular condyles appear normally placed with respect to the mandibular  fossa.      Impression:  1. There is a CSF-filled cleft-like defect surrounded by right parietal lobe gray matter with an apparent communication with the posterior horn of the right lateral ventricle (Series 3 Image 37). This is consistent with Schizencephaly. Correlate with clinical findings and recommend additional evaluation and follow-up as indicated.  2. No acute intracranial process identified. Details and other findings as noted above.                                         CT Chest Abdomen Pelvis With IV Contrast (XPD) NO Oral Contrast (Preliminary result)  Result time 03/18/25 02:51:37      Preliminary result by Mart Smith MD (03/18/25 02:51:37)                   Narrative:    START OF REPORT:  Technique: CT Scan of the chest abdomen and pelvis was performed with intravenous contrast with axial as well as sagittal and, coronal images.    Dosage Information: Automated Exposure Control was utilized.    Comparison: None.    Clinical [facility] [place] [id number]    Findings:  Artifact: Mild motion artifact is seen on some images.  Soft Tissues: No significant soft tissue swelling identified. Multiple tiny calcified foci are seen in the bilateral breasts.  Lines and Tubes: None.  Neck: The visualized soft tissues of the neck appear unremarkable.  Mediastinum: The mediastinal structures are within normal limits.  Heart: The heart size is within normal limits. Moderate coronary artery calcification is seen.  Aorta: No aortic dissection or aneurysm is seen. Moderate aortic calcification is seen in the thoracic aorta.  Pulmonary Arteries: No filling defects are seen in the pulmonary arteries to suggest pulmonary embolus.  Lungs: There is mild non specific dependent change at the lung bases. Moderate streaky opacity is seen in the right upper lobe and right middle lobe scarring and or subsegmental atelectasis. No focal infiltrate or consolidation identified.  Pleura: No  effusions or pneumothorax are identified.  Bony Structures:  Spine: Mild spondylolytic changes are seen in the thoracic spine. Bone cement is seen at T3.  Ribs: No rib fractures are identified. The ribs appear unremarkable.  Abdomen:  Abdominal Wall: There is mild subcutaneous stranding in the bilateral gluteal region.  Liver: The liver appears unremarkable.  Trauma: No traumatic injury is identified.  Biliary System: No intrahepatic biliary duct dilatation is seen. The extra hepatic biliary system appears prominent which may reflect prior obstructive physiology in this patient status post cholecystectomy.  Gallbladder: Surgical clips are seen in the gallbladder fossa which may reflect prior cholecystectomy correlate with surgical history and visual inspection.  Pancreas: Moderate pancreatic atrophy is seen.  Spleen: The spleen appears unremarkable.  Trauma: No specific evidence of splenic trauma is seen.  Adrenals: The bilateral adrenal glands appear nodular, left greater than the right.  Kidneys: The kidneys appear unremarkable with no stones cysts masses or hydronephrosis.  Aorta: There is moderate calcification of the abdominal aorta and its branches.  IVC: Unremarkable.  Bowel:  Esophagus: The visualized esophagus appears unremarkable.  Stomach: The stomach appears unremarkable.  Duodenum: Unremarkable appearing duodenum.  Small Bowel: The small bowel appears unremarkable.  Colon: Nondistended.  Appendix: No appendix is identified and surgical clips are seen at the base of the cecum consistent with appendectomy.  Peritoneum: No intraperitoneal free air or ascites is seen.    Pelvis:  Bladder: The bladder appears unremarkable.  Female:  Uterus: The uterus is not identified.  Ovaries: No adnexal masses are seen.    Bony structures:  Dorsal Spine: There is mild spondylosis of the visualized dorsal spine. There is mild compression deformity at T12.  Bony Pelvis: There is moderate degenerative change of the  bilateral hip.      Impression:  1. There is mild subcutaneous stranding in the bilateral gluteal region. This may represent soft tissue contusion.  2. The bilateral adrenal glands appear nodular, left greater than the right. Correlate with clinical and laboratory findings as regards further evaluation and follow-up.  3. No acute traumatic intrathoracic pathology identified. No acute traumatic intraabdominal or pelvic solid organ or bowel pathology identified. Details and other findings as discussed above.                                         CT Cervical Spine Without Contrast (Preliminary result)  Result time 03/18/25 02:48:40      Preliminary result by Mart Smith MD (03/18/25 02:48:40)                   Narrative:    START OF REPORT:  Technique: CT of the cervical spine was performed without intravenous contrast with axial as well as sagittal and coronal images.    Comparison: None.    Dosage Information: Automated exposure control was utilized.    Clinical [facility] [place] [age][facility] [id number]    Findings:  Lung apices: Nonspecific scarring but otherwise unremarkable.  Spine:  Spinal canal: Moderate spinal canal narrowing is seen at C3-C4 level. Correlate clinically as regards additional evaluation and follow up, possibly with MRI.  Mineralization: Within normal limits for age.  Rotation: No significant rotation is seen.  Scoliosis: No significant scoliosis is seen.  Vertebral Fusion: No vertebral fusion is identified.  Listhesis: There is grade I degenerative posterolisthesis of C3 on C4.  Lordosis: Degenerative straightening of the cervical lordosis is seen. An element of myospasm is not excluded.  Intervertebral disc spaces: Multilevel loss of disc height is seen.  Osteophytes: Moderate multilevel endplate osteophytes are seen.  Endplate Sclerosis: Mild multilevel endplate sclerosis is seen.  Uncovertebral degenerative changes: Pronounced multilevel uncovertebral joint  arthrosis is seen.  Facet degenerative changes: Moderate multilevel facet degenerative changes are seen.  Fractures: No acute cervical spine fracture dislocation or subluxation is seen.  Orthopedic Hardware: None.    Miscellaneous:  Mastoid air cells: The visualized mastoid air cells appear clear.  Soft Tissues: Unremarkable.      Impression:  1. No acute cervical spine fracture dislocation or subluxation is seen.  2. Degenerative changes and other details as above.                                         X-Ray Pelvis Routine AP (In process)                      X-Ray Chest 1 View (In process)                    CXR: No acute findings by my read.  Pelvis XR: No acute fracture by my read.     I have reviewed all pertinent imaging results/findings within the past 24 hours.    Assessment & Plan:   Patient is an 84 year old female who presents from assisted living after being found on the ground following presumed fall. Concern for possible seizure and given Keppra in trauma bay. Labs and imaging reviewed. No acute traumatic injuries identified. Discussed with Dr. Lennon, no indication for trauma admission. Final dispo per EM physician, planning medicine admission.     Milvia Leonard, AGACNP-BC, FNP-BC  Trauma Surgery  [facility]  C: [phone]

## 2025-03-18 NOTE — CONSULTS
[facility] - Emergency Dept  Neurology  Consult Note    Patient Name: Lisseth Guajardo  MRN: [id number]  Admission Date: 3/18/2025  Hospital Length of Stay: 0 days  Code Status: Full Code   Attending Provider: Gerson Alexandra MD   Consulting Provider: SUMEET Tavera  Primary Care Physician: Melody Ren MD  Principal Problem:<principal problem not specified>    Inpatient Consult to Neurology Services (General Neurology)  Consult performed by: Melody Leslie AGACNP-BC  Consult ordered by: Melody Leslie AGACNP-BC         Subjective:     Chief Complaint:       HPI:   84-year-old female with PMH  CVA with residual left-sided deficits, seizure disorder, septal optic dysplasia/trigeminal neuralgia, CAD, CHF, HTN, HLD, COPD, CKD stage 3, macular degeneration, and recurring UTIs who presented to ED on  as a level 1 trauma following a suspected fall.  Patient reportedly found down on the ground at the assisted living facility where she resides. LKN 2300 on 3/17.   Home AEDs Dilantin 100 mg qAM and carbamazepine 32.4 mg q.h.s..  Serum levels of both phenytoin and carbamazepine undetectable. phenytoin and carbamazepine undetectable.  pt loaded with 1 gm keppra and started on keppra 500 mg BID in ED.     CT head without negative for acute intracranial abnormalities.  Labs significant for potassium 5.5, BUN/creatinine 22.6/1.38, AST 45, lactate 3, and bacteriuria.     No past medical history on file.    No past surgical history on file.    Review of patient's allergies indicates:  No Known Allergies    Current Neurological Medications:     No current facility-administered medications on file prior to encounter.     Current Outpatient Medications on File Prior to Encounter   Medication Sig    albuterol sulfate 90 mcg/actuation aebs Inhale 90 mcg into the lungs every 4 (four) hours as needed (2 puffs for wheezing).    aspirin 81 MG Chew Take 81 mg by mouth once daily.     atorvastatin (LIPITOR) 40 MG tablet Take 40 mg by mouth once daily.    benzonatate (TESSALON) 100 MG capsule Take 100 mg by mouth 3 (three) times daily as needed for Cough.    brimonidine 0.15 % OPTH DROP (ALPHAGAN) 0.15 % ophthalmic solution 1 drop 3 (three) times daily.    calcium-mag-vit B6-D3-minerals 625-20-9-125 gw-xv-ws-unit Tab Take 600 mg by mouth Daily.    dorzolamide-timolol 2-0.5% (COSOPT) 22.3-6.8 mg/mL ophthalmic solution 1 drop 3 (three) times daily.    latanoprost 0.005 % ophthalmic solution 1 drop every evening.    metoprolol succinate (TOPROL-XL) 25 MG 24 hr tablet Take 25 mg by mouth once daily.    mv-mn/folic ac/calcium/vit K1 (WOMEN'S 50 PLUS MULTIVITAMIN ORAL) Take by mouth.    PHENobarbitaL 32.4 MG tablet Take 32.4 mg by mouth every evening.    phenytoin (DILANTIN) 100 MG ER capsule Take 100 mg by mouth every morning.    potassium chloride SA (KLOR-CON M15) 15 MEQ tablet Take 20 mEq by mouth Daily. Every morning for 10 days     Family History    None       Tobacco Use    Smoking status: Not on file    Smokeless tobacco: Not on file   Substance and Sexual Activity    Alcohol use: Not on file    Drug use: Not on file    Sexual activity: Not on file     Review of Systems  A 14pt ros was reviewed & is negative unless o/w documented in the hpi    Objective:     Vital Signs (Most Recent):  Temp: 97.3 °F (36.3 °C) (03/18/25 0240)  Pulse: 87 (03/18/25 1115)  Resp: 14 (03/18/25 1115)  BP: 114/73 (03/18/25 1113)  SpO2: 95 % (03/18/25 1115) Vital Signs (24h Range):  Temp:  [97.3 °F (36.3 °C)-97.9 °F (36.6 °C)] 97.3 °F (36.3 °C)  Pulse:  [] 87  Resp:  [12-20] 14  SpO2:  [91 %-100 %] 95 %  BP: ()/(45-91) 114/73     Weight: 54.4 kg (120 lb)  Body mass index is 20.6 kg/m².     Physical Exam  Vitals reviewed.   Constitutional:       General: She is awake.   HENT:      Head: Normocephalic and atraumatic.      Nose: Nose normal.      Mouth/Throat:      Mouth: Mucous membranes are moist.       Pharynx: Oropharynx is clear.   Eyes:      Extraocular Movements: Extraocular movements intact and EOM normal.      Pupils: Pupils are equal, round, and reactive to light.   Pulmonary:      Effort: Pulmonary effort is normal.   Skin:     General: Skin is warm and dry.   Neurological:      Mental Status: She is alert and oriented to person, place, and time.          NEUROLOGICAL EXAMINATION:     MENTAL STATUS   Oriented to person, place, and time.   Follows 1 step commands.   Attention: normal. Concentration: normal.   Speech: (dysarthria)  Level of consciousness: alert    CRANIAL NERVES     CN II   Visual fields full to confrontation.     CN III, IV, VI   Pupils are equal, round, and reactive to light.  Extraocular motions are normal.   Nystagmus: none   Conjugate gaze: present    CN V   Facial sensation intact.     MOTOR EXAM        L sided weakness (baseline)  R side opposes gravity       Significant Labs:   Recent Lab Results  (Last 5 results in the past 24 hours)        03/18/25  0548   03/18/25  0446   03/18/25  0319   03/18/25  0315   03/18/25  0217        Phencyclidine       Negative         Albumin/Globulin Ratio   0.8             Albumin   3.5             ALP   144             ALT   21             Amphetamines, Urine       Negative         Anion Gap   16.0             Appearance, UA       Clear         AST   45             Bacteria, UA       None Seen         Barbituates, Urine       Positive         Baso # 0.06               Basophil % 0.6               Benzodiazepine, Urine       Negative         Bilirubin (UA)       Negative         BILIRUBIN TOTAL   0.3             BUN   22.6             BUN/CREAT RATIO   16             Ca Oxalate Jackeline, UA       Trace         Calcium   8.9             Cannabinoids, Urine       Negative         Chloride   107             CO2   17             Cocaine, Urine       Negative         Color, UA       Light-Yellow         CPK   101             Creatinine   1.38             eGFR    38  Comment: Estimated GFR calculated using the CKD-EPI creatinine (2021) equation.             Eos # 0.01               Eos % 0.1               Fentanyl, Urine       Negative         Globulin, Total   4.2             Glucose   99             Glucose, UA       Normal         Hematocrit 34.9               Hemoglobin 11.3               Hyaline Casts, UA       11-20         Immature Grans (Abs) 0.06               Immature Granulocytes 0.6               Ketones, UA       Trace         Lactic Acid Level     3.0           Leukocyte Esterase, UA       Negative         Lymph # 1.08               LYMPH % 10.3               Magnesium    2.20             MCH 31.0               MCHC 32.4               MCV 95.9               MDMA, Urine       Negative         Mono # 0.38               Mono % 3.6               MPV 10.8               Mucous, UA       Trace         Neut # 8.93               Neut % 84.8               NITRITE UA       Negative         nRBC 0.0               Blood, UA       1+         Opiates, Urine       Negative         pH, UA       6.5         pH, Urine       6.5         Platelet Count 263               POCT Glucose         105       Potassium   5.5             PROTEIN TOTAL   7.7             Protein, UA       Trace         RBC 3.64               RBC, UA       6-10         RDW 14.2               Sodium   140             Specific Gravity,UA       1.022         Specific Gravity, Urine Auto       1.022         Squamous Epithelial Cells, UA       Trace         Urobilinogen, UA       Normal         WBC, UA       11-20         WBC 10.52                                      Significant Imaging:  CT head w/o:   Impression:     There is a CSF-filled cleft-like defect surrounded by right parietal lobe gray matter with an apparent communication with the posterior horn of the right lateral ventricle (Series 3 Image 37). This is consistent with Schizencephaly. Correlate with clinical findings and recommend additional  evaluation and follow-up as indicated.     No acute intracranial process.     I have reviewed all pertinent imaging results/findings within the past 24 hours.  Assessment and Plan:     Seizure disorder  Likely 2/2 missed doses of AED and in the setting of UTI    -home AEDs resumed  -antimicrobial management per primary team        VTE Risk Mitigation (From admission, onward)           Ordered     IP VTE HIGH RISK PATIENT  Once         03/18/25 0832     Place sequential compression device  Until discontinued         03/18/25 0832                    Thank you for your consult.  Further recommendations to follow per MD Melody Leslie, DARRIAN-BC  Neurology  [facility] - Emergency Dept

## 2025-03-18 NOTE — ASSESSMENT & PLAN NOTE
Likely 2/2 missed doses of AED and in the setting of UTI    -home AEDs resumed  -antimicrobial management per primary team

## 2025-03-19 ENCOUNTER — TELEPHONE (OUTPATIENT)
Dept: FAMILY MEDICINE | Facility: CLINIC | Age: 85
End: 2025-03-19
Payer: MEDICARE

## 2025-03-19 LAB
ALBUMIN SERPL-MCNC: 3 G/DL (ref 3.4–4.8)
ALBUMIN/GLOB SERPL: 0.9 RATIO (ref 1.1–2)
ALP SERPL-CCNC: 125 UNIT/L (ref 40–150)
ALT SERPL-CCNC: 22 UNIT/L (ref 0–55)
ANION GAP SERPL CALC-SCNC: 9 MEQ/L
AST SERPL-CCNC: 46 UNIT/L (ref 5–34)
BASOPHILS # BLD AUTO: 0.07 X10(3)/MCL
BASOPHILS NFR BLD AUTO: 1.4 %
BILIRUB SERPL-MCNC: 0.4 MG/DL
BUN SERPL-MCNC: 13.7 MG/DL (ref 9.8–20.1)
CALCIUM SERPL-MCNC: 8.3 MG/DL (ref 8.4–10.2)
CHLORIDE SERPL-SCNC: 109 MMOL/L (ref 98–107)
CO2 SERPL-SCNC: 22 MMOL/L (ref 23–31)
CREAT SERPL-MCNC: 0.8 MG/DL (ref 0.55–1.02)
CREAT/UREA NIT SERPL: 17
EOSINOPHIL # BLD AUTO: 0.27 X10(3)/MCL (ref 0–0.9)
EOSINOPHIL NFR BLD AUTO: 5.6 %
ERYTHROCYTE [DISTWIDTH] IN BLOOD BY AUTOMATED COUNT: 14.2 % (ref 11.5–17)
GFR SERPLBLD CREATININE-BSD FMLA CKD-EPI: >60 ML/MIN/1.73/M2
GLOBULIN SER-MCNC: 3.5 GM/DL (ref 2.4–3.5)
GLUCOSE SERPL-MCNC: 76 MG/DL (ref 82–115)
HCT VFR BLD AUTO: 38.9 % (ref 37–47)
HGB BLD-MCNC: 12.7 G/DL (ref 12–16)
IMM GRANULOCYTES # BLD AUTO: 0.02 X10(3)/MCL (ref 0–0.04)
IMM GRANULOCYTES NFR BLD AUTO: 0.4 %
LYMPHOCYTES # BLD AUTO: 1.47 X10(3)/MCL (ref 0.6–4.6)
LYMPHOCYTES NFR BLD AUTO: 30.2 %
MCH RBC QN AUTO: 30.6 PG (ref 27–31)
MCHC RBC AUTO-ENTMCNC: 32.6 G/DL (ref 33–36)
MCV RBC AUTO: 93.7 FL (ref 80–94)
MONOCYTES # BLD AUTO: 0.66 X10(3)/MCL (ref 0.1–1.3)
MONOCYTES NFR BLD AUTO: 13.6 %
NEUTROPHILS # BLD AUTO: 2.37 X10(3)/MCL (ref 2.1–9.2)
NEUTROPHILS NFR BLD AUTO: 48.8 %
NRBC BLD AUTO-RTO: 0 %
PLATELET # BLD AUTO: 228 X10(3)/MCL (ref 130–400)
PMV BLD AUTO: 11 FL (ref 7.4–10.4)
POTASSIUM SERPL-SCNC: 3.7 MMOL/L (ref 3.5–5.1)
PROT SERPL-MCNC: 6.5 GM/DL (ref 5.8–7.6)
RBC # BLD AUTO: 4.15 X10(6)/MCL (ref 4.2–5.4)
SODIUM SERPL-SCNC: 140 MMOL/L (ref 136–145)
WBC # BLD AUTO: 4.86 X10(3)/MCL (ref 4.5–11.5)

## 2025-03-19 PROCEDURE — 85025 COMPLETE CBC W/AUTO DIFF WBC: CPT | Performed by: NURSE PRACTITIONER

## 2025-03-19 PROCEDURE — 63600175 PHARM REV CODE 636 W HCPCS: Performed by: NURSE PRACTITIONER

## 2025-03-19 PROCEDURE — 25000003 PHARM REV CODE 250: Performed by: INTERNAL MEDICINE

## 2025-03-19 PROCEDURE — 11000001 HC ACUTE MED/SURG PRIVATE ROOM

## 2025-03-19 PROCEDURE — 63600175 PHARM REV CODE 636 W HCPCS: Performed by: INTERNAL MEDICINE

## 2025-03-19 PROCEDURE — 80053 COMPREHEN METABOLIC PANEL: CPT | Performed by: NURSE PRACTITIONER

## 2025-03-19 PROCEDURE — 25000003 PHARM REV CODE 250: Performed by: NURSE PRACTITIONER

## 2025-03-19 PROCEDURE — 36415 COLL VENOUS BLD VENIPUNCTURE: CPT | Performed by: NURSE PRACTITIONER

## 2025-03-19 RX ORDER — NAPROXEN SODIUM 220 MG/1
81 TABLET, FILM COATED ORAL DAILY
Status: DISCONTINUED | OUTPATIENT
Start: 2025-03-19 | End: 2025-03-20 | Stop reason: HOSPADM

## 2025-03-19 RX ORDER — ENOXAPARIN SODIUM 100 MG/ML
40 INJECTION SUBCUTANEOUS EVERY 24 HOURS
Status: DISCONTINUED | OUTPATIENT
Start: 2025-03-19 | End: 2025-03-20 | Stop reason: HOSPADM

## 2025-03-19 RX ORDER — ENOXAPARIN SODIUM 100 MG/ML
INJECTION SUBCUTANEOUS
Status: DISPENSED
Start: 2025-03-19 | End: 2025-03-20

## 2025-03-19 RX ORDER — ATORVASTATIN CALCIUM 40 MG/1
40 TABLET, FILM COATED ORAL DAILY
Status: DISCONTINUED | OUTPATIENT
Start: 2025-03-19 | End: 2025-03-20 | Stop reason: HOSPADM

## 2025-03-19 RX ORDER — CARBAMAZEPINE 100 MG/1
100 CAPSULE, EXTENDED RELEASE ORAL 2 TIMES DAILY
COMMUNITY

## 2025-03-19 RX ORDER — METOPROLOL SUCCINATE 25 MG/1
25 TABLET, EXTENDED RELEASE ORAL DAILY
Status: DISCONTINUED | OUTPATIENT
Start: 2025-03-19 | End: 2025-03-20 | Stop reason: HOSPADM

## 2025-03-19 RX ORDER — CEFDINIR 300 MG/1
300 CAPSULE ORAL EVERY 12 HOURS
Status: DISCONTINUED | OUTPATIENT
Start: 2025-03-19 | End: 2025-03-19

## 2025-03-19 RX ORDER — CARBAMAZEPINE 100 MG/1
100 TABLET, EXTENDED RELEASE ORAL 2 TIMES DAILY
Status: DISCONTINUED | OUTPATIENT
Start: 2025-03-19 | End: 2025-03-20 | Stop reason: HOSPADM

## 2025-03-19 RX ADMIN — ATORVASTATIN CALCIUM 40 MG: 40 TABLET, FILM COATED ORAL at 09:03

## 2025-03-19 RX ADMIN — BRIMONIDINE TARTRATE 1 DROP: 1.5 SOLUTION OPHTHALMIC at 03:03

## 2025-03-19 RX ADMIN — ASPIRIN 81 MG CHEWABLE TABLET 81 MG: 81 TABLET CHEWABLE at 09:03

## 2025-03-19 RX ADMIN — TIMOLOL MALEATE 1 DROP: 5 SOLUTION OPHTHALMIC at 08:03

## 2025-03-19 RX ADMIN — MICONAZOLE NITRATE: 20 POWDER TOPICAL at 08:03

## 2025-03-19 RX ADMIN — CARBAMAZEPINE 100 MG: 100 TABLET, EXTENDED RELEASE ORAL at 09:03

## 2025-03-19 RX ADMIN — ENOXAPARIN SODIUM 40 MG: 40 INJECTION SUBCUTANEOUS at 03:03

## 2025-03-19 RX ADMIN — BRIMONIDINE TARTRATE 1 DROP: 1.5 SOLUTION OPHTHALMIC at 08:03

## 2025-03-19 RX ADMIN — ACETAMINOPHEN 325 MG: 325 TABLET, FILM COATED ORAL at 08:03

## 2025-03-19 RX ADMIN — PHENOBARBITAL 32.4 MG: 32.4 TABLET ORAL at 08:03

## 2025-03-19 RX ADMIN — CEFDINIR 300 MG: 300 CAPSULE ORAL at 09:03

## 2025-03-19 RX ADMIN — PHENYTOIN SODIUM 100 MG: 100 CAPSULE, EXTENDED RELEASE ORAL at 07:03

## 2025-03-19 RX ADMIN — DORZOLAMIDE HCL 1 DROP: 20 SOLUTION/ DROPS OPHTHALMIC at 08:03

## 2025-03-19 RX ADMIN — CARBAMAZEPINE 100 MG: 100 TABLET, EXTENDED RELEASE ORAL at 08:03

## 2025-03-19 RX ADMIN — MICONAZOLE NITRATE: 20 POWDER TOPICAL at 09:03

## 2025-03-19 RX ADMIN — METOPROLOL SUCCINATE 25 MG: 25 TABLET, EXTENDED RELEASE ORAL at 09:03

## 2025-03-19 RX ADMIN — CEFDINIR 300 MG: 300 CAPSULE ORAL at 08:03

## 2025-03-19 RX ADMIN — ACETAMINOPHEN 650 MG: 325 TABLET, FILM COATED ORAL at 08:03

## 2025-03-19 RX ADMIN — CEFTRIAXONE SODIUM 1 G: 1 INJECTION, POWDER, FOR SOLUTION INTRAMUSCULAR; INTRAVENOUS at 05:03

## 2025-03-19 RX ADMIN — LATANOPROST 1 DROP: 50 SOLUTION OPHTHALMIC at 08:03

## 2025-03-19 NOTE — PLAN OF CARE
Problem: Adult Inpatient Plan of Care  Goal: Optimal Comfort and Wellbeing  Outcome: Progressing  Intervention: Monitor Pain and Promote Comfort  Flowsheets (Taken 3/19/2025 1740)  Pain Management Interventions:   heat applied   medication offered but refused   pillow support provided  Intervention: Provide Person-Centered Care  Flowsheets (Taken 3/19/2025 1740)  Trust Relationship/Rapport:   care explained   choices provided   emotional support provided   empathic listening provided   questions answered   questions encouraged   reassurance provided   thoughts/feelings acknowledged

## 2025-03-19 NOTE — TELEPHONE ENCOUNTER
[facility] who states patient is a General since fall 03/18/25.

## 2025-03-19 NOTE — PROGRESS NOTES
[facility] 4th Floor [facility] Medicine - Progress Note      Patient Name: Lisseth Guajardo  MRN: [id number], different chart MRN [id number] - need merger  Admission Date: 3/18/2025 - IP- Inpatient   Length of Stay: 1  Code Status: Full    Subjective   Chief Complaint:   Inpatient Follow-up for syncope vs seizure    HPI and Hospital course:  This is an 84-year-old female with medical history notable for CVA with residual left spastic hemiparesis, epilepsy, septo-optic dysplasia/macular degeneration/blindness and trigeminal neuralgia, DDD/Lumbar spine surgery 2015, CAD/PCI/stents x4, CHF-diastolic dysfunction, HTN, HLD, COPD not on home oxygen, and recurring UTIs, wheelchair dependent who presented to ED on 03/18/2025 as a level 1 trauma following a suspected fall and possible breakthrough seizure.  Upon arrival she was initially confused  Trauma survey imaging was unremarkable with no traumatic injuries, labs notable for mild lactic acidemia, hyperkalemia and acute kidney injury.  Started on IV fluids, IV Keppra and resumed on phenytoin and phenobarbital, and admitted to hospital medicine service.    Neurology consulted, no definite evidence of breakthrough seizure or sign of postictal state and recommended resumption of patient's home antiepileptic medications.      Of note her levels of both phenytoin and carbamazepine were undetectable, I do not see phenobarbital level but UDS positive for barbiturates.    ROS: Except as documented, all systems reviewed and negative.    Interval History:  No overnight events.  Vital signs remained stable.  Labs this morning notable for resolution of BRITTNY with creatinine down to 0.80 with GFR above 60.     Objective   Physical exam:  Vital Signs  Temp:  [97.3 °F (36.3 °C)-97.8 °F (36.6 °C)]   Pulse:  [74-94]   Resp:  [18]   BP: ()/(59-70)   SpO2:  [96 %-98 %]    General: Appears comfortable  Chest: CTABL  CVS: Regular rhythm. Normal  S1/S2.  Abdomen: nondistended, soft and non-tender.  Skin: Warm and dry  Neuro:  Alert and oriented mild right lower extremity and right upper extremity spasticity  Psych: Cooperative    Labs & diagnostics  Recent Labs     03/18/25  0548 03/19/25 0421   WBC 10.52 4.86   RBC 3.64* 4.15*   HGB 11.3* 12.7   HCT 34.9* 38.9   MCV 95.9* 93.7   MCH 31.0 30.6   MCHC 32.4* 32.6*   RDW 14.2 14.2    228     Recent Labs     03/18/25  0215   INR 1.1      Recent Labs     03/18/25 0446 03/19/25 0421    140   K 5.5* 3.7    109*   CO2 17* 22*   BUN 22.6* 13.7   CREATININE 1.38* 0.80   EGFRNORACEVR 38 >60   GLUCOSE 99 76*   CALCIUM 8.9 8.3*   MG 2.20  --    ALBUMIN 3.5 3.0*   GLOBULIN 4.2* 3.5   ALKPHOS 144 125   ALT 21 22   AST 45* 46*   BILITOT 0.3 0.4       Recent Labs     03/18/25 0446           Microbiology Results (last 7 days)       Procedure Component Value Units Date/Time    Urine culture [[id number]] Collected: 03/18/25 0315    Order Status: Sent Specimen: Urine Updated: 03/18/25 0356           X-Ray Pelvis Routine AP  Narrative: EXAMINATION:  XR PELVIS ROUTINE AP    CLINICAL HISTORY:  r/o bleeding or hemorrhage;    TECHNIQUE:  Single view of the pelvis.    COMPARISON:  No prior imaging available for comparison    FINDINGS:  No displaced fracture appreciated.  The sacroiliac joints are symmetric.  Refer to dedicated CT.  Impression: No displaced fracture appreciated.  Refer to dedicated CT.    Electronically signed by: Miguel Estes  Date:    03/18/2025  Time:    05:50  CT Chest Abdomen Pelvis With IV Contrast (XPD) NO Oral Contrast  Narrative: EXAMINATION:  CT CHEST ABDOMEN PELVIS WITH IV CONTRAST (XPD)    CLINICAL HISTORY:  Trauma;    TECHNIQUE:  Axial images of the chest, abdomen, and pelvis were obtained With Contrast. Sagittal and coronal reconstructed images were available for review.    Automatic exposure control was utilized to reduce the patient's radiation dose.    DLP =  303    COMPARISON:  No prior images available for comparison.    FINDINGS:  Impression: Artifact: Mild motion artifact is seen on some images.Soft Tissues: No significant soft tissue swelling identified. Multiple tiny calcified foci are seen in the bilateral breasts.Lines and Tubes: None.Neck: The visualized soft tissues of the neck appear unremarkable.Mediastinum: The mediastinal structures are within normal limits.Heart: The heart size is within normal limits. Moderate coronary artery calcification is seen.Aorta: No aortic dissection or aneurysm is seen. Moderate aortic calcification is seen in the thoracic aorta.Pulmonary Arteries: No filling defects are seen in the pulmonary arteries to suggest pulmonary embolus.Lungs: There is mild non specific dependent change at the lung bases. Moderate streaky opacity is seen in the right upper lobe and right middle lobe scarring and or subsegmental atelectasis. No focal infiltrate or consolidation identified.Pleura: No effusions or pneumothorax are identified.Bony Structures:Spine: Mild spondylolytic changes are seen in the  thoracic spine. Bone cement is seen at T3.Ribs: No rib fractures are identified. The ribs appear unremarkable.Abdomen:Abdominal Wall: There is mild subcutaneous stranding in the bilateral gluteal region.Liver: The liver appears unremarkable.Trauma: No traumatic injury is identified.Biliary System: No intrahepatic biliary duct dilatation is seen. The extra hepatic biliary system appears prominent which may reflect prior obstructive physiology in this patient status post cholecystectomy.Gallbladder: Surgical clips are seen in the gallbladder fossa which may reflect prior cholecystectomy correlate with surgical history and visual inspection.Pancreas: Moderate pancreatic atrophy is seen.Spleen: The spleen appears unremarkable.Trauma: No specific evidence of splenic trauma is seen.Adrenals: The bilateral adrenal glands appear nodular, left greater than the  right.Kidneys: The kidneys appear unremarkable with no stones cysts masses or hydronephrosis.Aorta: There is moderate calcification of  the abdominal aorta and its branches.IVC: Unremarkable.Bowel:Esophagus: The visualized esophagus appears unremarkable.Stomach: The stomach appears unremarkable.Duodenum: Unremarkable appearing duodenum.Small Bowel: The small bowel appears unremarkable.Colon: Nondistended.Appendix: No appendix is identified and surgical clips are seen at the base of the cecum consistent with appendectomy.Peritoneum: No intraperitoneal free air or ascites is seen.Pelvis:Bladder: The bladder appears unremarkable.Female:Uterus: The uterus is not identified.Ovaries: No adnexal masses are seen.Bony structures:Dorsal Spine: There is mild spondylosis of the visualized dorsal spine. There is mild compression deformity at T12.Bony Pelvis: There is moderate degenerative change of the bilateral hip.Impression:1. There is mild subcutaneous stranding in the bilateral gluteal region. This may represent soft tissue contusion.2. 3. No acute traumatic intrathoracic pathology identified. No acute traumatic intraabdominal  or pelvic solid organ or bowel pathology identified. Details and other findings as discussed above.    There is mild subcutaneous stranding in the bilateral gluteal region. This may represent soft tissue contusion.    The bilateral adrenal glands appear nodular, left greater than the right. Correlate with clinical and laboratory findings as regards further evaluation and follow-up.    No acute traumatic intrathoracic pathology identified.  No acute traumatic intra-abdominal or pelvic solid organ or bowel pathology identified.    Electronically signed by: Miguel Estes  Date:    03/18/2025  Time:    05:37  CT Cervical Spine Without Contrast  Narrative: EXAMINATION:  CT CERVICAL SPINE WITHOUT CONTRAST    CLINICAL HISTORY:  Trauma;    TECHNIQUE:  CT of the cervical spine Without contrast. Sagittal and  coronal reconstructions were performed on the source images.    Automatic exposure control was utilized to reduce the patient's radiation dose.    DLP = 12 19    COMPARISON:  No prior imaging available for comparison.    FINDINGS:  Lung apices: Nonspecific scarring but otherwise unremarkable.Spine:Spinal canal: Moderate spinal canal narrowing is seen at C3-C4 level. Correlate clinically as regards additional evaluation and follow up, possibly with MRI.Mineralization: Within normal limits for age.Rotation: No significant rotation is seen.Scoliosis: No significant scoliosis is seen.Vertebral Fusion: No vertebral fusion is identified.Listhesis: There is grade I degenerative posterolisthesis of C3 on C4.Lordosis: Degenerative straightening of the cervical lordosis is seen. An element of myospasm is not excluded.Intervertebral disc spaces: Multilevel loss of disc height is seen.Osteophytes: Moderate multilevel endplate osteophytes are seen.Endplate Sclerosis: Mild multilevel endplate sclerosis is seen.Uncovertebral degenerative changes: Pronounced multilevel uncovertebral joint arthrosis is seen.Facet degenerative changes: Moderate multilevel facet degenerative changes are seen.Fractures: No acute cervical spine fracture dislocation or subluxation is seen.Orthopedic Hardware: None.Miscellaneous:Mastoid air cells: The visualized mastoid air cells appear clear.Soft Tissues: Unremarkable.  Impression: 1. No acute cervical spine abnormality identified.    2. Ligament, spinal cord and/or vascular abnormalities cannot be excluded on the basis of this examination.    Moderate degenerative changes as above.    Electronically signed by: Miguel Estes  Date:    03/18/2025  Time:    05:35  CT Head Without Contrast  Narrative: EXAMINATION:  CT HEAD WITHOUT CONTRAST    CLINICAL HISTORY:  Trauma;    TECHNIQUE:  Low dose axial images were obtained through the head.  Coronal and sagittal reformations were also performed. Contrast was  not administered.    Automatic exposure control was utilized to reduce the patient's radiation dose.    DLP= 12 19    COMPARISON:  None.    FINDINGS:  There is a CSF-filled cleft-like defect surrounded by right parietal lobe gray matter with an apparent communication with the posterior horn of the right lateral ventricle (Series 3 Image 37). This is consistent with Schizencephaly.Hemorrhage: No acute intracranial hemorrhage is seen.CSF spaces: The ventricles, sulci and basal cisterns all appear moderately prominent consistent with global cerebral atrophy. Additionally the ventricles appear dilated out of proportion to the sulci suggesting an element of concurrent non-obstructive hydrocephalus.Brain parenchyma: There is preservation of the grey white junction throughout. No acute infarct is identified.Cerebellum: Unremarkable.Sella and skull base: The sella appears to be within normal limits for age.Intracranial calcifications: Incidental note is made of bilateral choroid plexus calcification. Incidental note is made of some calcification of the falx.Calvarium: Craniotomy defects are seen in the left temporal and parietal bones. Orthopedic hardware is present in the left frontal bone and the greater wing of the left sphenoid bone. No acute linear or depressed skull fracture is seen.Maxillofacial Structures:Paranasal sinuses: There is some mucoperiosteal thickening in the left frontal sinuses. This may reflect chronic sinusitis. The rest of the paranasal sinuses appear clear.Orbits: The orbits appear unremarkable.Zygomatic arches: The zygomatic arches are intact and unremarkable.Temporal bones and mastoids: The temporal bones and mastoids appear unremarkable.TMJ: The mandibular condyles appear normally placed with respect to the mandibular fossa.  Impression: There is a CSF-filled cleft-like defect surrounded by right parietal lobe gray matter with an apparent communication with the posterior horn of the right lateral  ventricle (Series 3 Image 37). This is consistent with Schizencephaly. Correlate with clinical findings and recommend additional evaluation and follow-up as indicated.    No acute intracranial process.    Electronically signed by: Miguel Estes  Date:    03/18/2025  Time:    05:34  X-Ray Chest 1 View  Narrative: EXAMINATION:  XR CHEST 1 VIEW    CLINICAL HISTORY:  r/o bleeding or hemorrhage;    TECHNIQUE:  Single view of the chest    COMPARISON:  No prior imaging available for comparison.    FINDINGS:  No focal opacification, pleural effusion, or pneumothorax.    The cardiomediastinal silhouette is within normal limits.    No acute osseous abnormality.  Impression: No acute cardiopulmonary process.    Electronically signed by: Miguel Estes  Date:    03/18/2025  Time:    05:05  ED US Fast  Max Lennon MD     3/18/2025  4:24 AM  ED US Fast    Date/Time: 3/18/2025 2:15 AM    Performed by: Max Lennon MD  Authorized by: Ezekiel Fournier MD    Indication:  Blunt trauma  Identified Structures:  The pericardium, hepatorenal space, splenorenal   space, and pelvic cul-de-sac were examined and The right and left   hemithoraces were also examined  The following findings in the peritoneal, pericardial, and pleural spaces   were obtained:     Pericardial effusion:  Absent    Hepatorenal free fluid:  Absent    Splenorenal free fluid:  Absent    Suprapubic/Pouch of Braxton free fluid:  Absent    Right lung sliding:  Present    Left lung sliding:  Present    Impression:  No pathologic free fluid    Charge?:  Yes      Inpatient Medications  Scheduled Med:   brimonidine 0.15 % OPTH DROP  1 drop Both Eyes TID    cefTRIAXone (Rocephin) IV (PEDS and ADULTS)  1 g Intravenous Q24H    dorzolamide  1 drop Both Eyes BID    And    timolol maleate 0.5%  1 drop Both Eyes BID    latanoprost  1 drop Both Eyes QHS    levETIRAcetam (Keppra) IV (PEDS and ADULTS)  500 mg Intravenous Q12H    miconazole NITRATE 2 %   Topical (Top)  BID    PHENobarbitaL  32.4 mg Oral QHS    phenytoin  100 mg Oral QAM      Continuous Infusions:     PRN Meds:    Current Facility-Administered Medications:     acetaminophen, 650 mg, Oral, Q8H PRN    acetaminophen, 650 mg, Oral, Q4H PRN    lorazepam, 2 mg, Intravenous, Q4H PRN    ondansetron, 4 mg, Intravenous, Q8H PRN     Assessment:   Syncope versus questionable breakthrough seizure  Acute encephalopathy/questionable postictal state- resolved  Acute kidney injury-resolved  Hyperkalemia-resolved  Asymptomatic bacteriuria  Mild Lactic acidemia -resolved  History of epilepsy, septo-optic dysplasia, macular degeneration, blindness, trigeminal neuralgia, CAD/remote stents, HTN, HLD, COPD not on home oxygen not in exacerbation.  Wheelchair-bound.    Plan:   Continue phenytoin 100 mg q.a.m., phenobarbital 32.4 mg q.p.m.  Restart carbamazepine 100mg BID  Discontinue Keppra  Discontinue IV fluids, discontinue antibiotics  Resume aspirin, statin and metoprolol      VTE Prophylaxis:-SCDs/enoxaparin 40 mg subQ daily  Patient condition:-stable  Disposition:  Discharged back home to assisted living tomorrow      Chucho Fuentes MD  Internal Medicine

## 2025-03-19 NOTE — TELEPHONE ENCOUNTER
----- Message from Veronica sent at 3/18/2025  3:18 PM CDT -----  Who Called: Lisseth Kent Khurramkan is requesting assistance/information from provider's office.Symptoms (please be specific): possible UTI How long has patient had these symptoms:  List of preferred pharmacies on file (remove unneeded): [unfilled]If different, enter pharmacy into here including location and phone number: Preferred Method of Contact: Phone CallPatient's Preferred Phone Number on File: [phone] Best Call Back Number, if different:Additional Information: Pt wants to speak with nurse about possible uti

## 2025-03-19 NOTE — CONSULTS
[facility] - 4th Floor Medical Telemetry  Wound Care    Patient Name:  Lisseth Guajardo   MRN:  [id number]  Date: 3/19/2025  Diagnosis: Seizure disorder    History:     No past medical history on file.    Social History[1]    Precautions:     Allergies as of 03/18/2025    (No Known Allergies)       WOC Assessment Details/Treatment   WOCN consulted for multiple area. Discussed plan of cre with assigned nurse Georgia. Treatment recommendations to be put in place. Camera malfunction during visit, updated photographs not available. EMR reviewed. Folds: Cleanse with hibiclens, dry well. Apply miconazole powder BID and PRN with abd pad. Perineal area: Cleanse with hibiclens, dry well. Apply zinc paste to area BID and PRN with soilage. Education done with staff on good hygiene measures, verbalized understanding. Nursing to continue with treatment recommendations and other preventative measures. Answered all questions to their satisfaction. Will follow up.  03/19/2025         [1]   Social History  Socioeconomic History    Marital status:      Social Drivers of Health     Financial Resource Strain: Low Risk  (3/19/2025)    Overall Financial Resource Strain (CARDIA)     Difficulty of Paying Living Expenses: Not very hard   Food Insecurity: No Food Insecurity (3/19/2025)    Hunger Vital Sign     Worried About Running Out of Food in the Last Year: Never true     Ran Out of Food in the Last Year: Never true   Stress: No Stress Concern Present (3/19/2025)    [employer] - Occupational Stress Questionnaire     Feeling of Stress : Not at all   Housing Stability: Low Risk  (3/19/2025)    Housing Stability Vital Sign     Unable to Pay for Housing in the Last Year: No     Homeless in the Last Year: No

## 2025-03-19 NOTE — PLAN OF CARE
03/19/25 1330   Discharge Assessment   Assessment Type Discharge Planning Assessment   Source of Information patient   Communicated EDILMA with patient/caregiver Date not available/Unable to determine   Reason For Admission AMS, Seizure, UTI   People in Home facility resident   Facility Arrived From: [facility]   Do you expect to return to your current living situation? Yes   Do you have help at home or someone to help you manage your care at home? Yes   Prior to hospitilization cognitive status: Unable to Assess   Current cognitive status: Alert/Oriented   Walking or Climbing Stairs Difficulty yes   Walking or Climbing Stairs ambulation difficulty, dependent;transferring difficulty, assistance 1 person   Mobility Management Wheelchair dependent   Dressing/Bathing Difficulty yes   Dressing/Bathing bathing difficulty, requires equipment;bathing difficulty, assistance 1 person;dressing difficulty, assistance 1 person   Dressing/Bathing Management Shower chair, caregiver or assisted living staff assist   Equipment Currently Used at Home wheelchair   Patient currently being followed by outpatient case management? No   Do you currently have service(s) that help you manage your care at home? Yes   Name and Contact number of agency [facility]  [phone]; Patient also has caregiver for 5 hrs per day at assisted living   Is the pt/caregiver preference to resume services with current agency Yes   Do you take prescription medications? Yes   Do you have prescription coverage? Yes   Coverage Medicare/   Do you have any problems affording any of your prescribed medications? No   Is the patient taking medications as prescribed? yes   Who is going to help you get home at discharge? May need assist if asssited living transport not available on day of discharge   Are you on dialysis? No   Do you take coumadin? No   Discharge Plan A Assisted Living;Home Health   Discharge Plan B Assisted Living;Home Health   Discharge  Plan discussed with: Patient;Caregiver   Transition of Care Barriers None   OTHER   Name(s) of People in Home Patient resides at [facility]

## 2025-03-20 VITALS
BODY MASS INDEX: 20.49 KG/M2 | OXYGEN SATURATION: 96 % | HEIGHT: 64 IN | RESPIRATION RATE: 20 BRPM | DIASTOLIC BLOOD PRESSURE: 74 MMHG | SYSTOLIC BLOOD PRESSURE: 104 MMHG | TEMPERATURE: 98 F | HEART RATE: 78 BPM | WEIGHT: 120 LBS

## 2025-03-20 PROCEDURE — 25000003 PHARM REV CODE 250: Performed by: INTERNAL MEDICINE

## 2025-03-20 RX ADMIN — BRIMONIDINE TARTRATE 1 DROP: 1.5 SOLUTION OPHTHALMIC at 10:03

## 2025-03-20 RX ADMIN — PHENYTOIN SODIUM 100 MG: 100 CAPSULE, EXTENDED RELEASE ORAL at 06:03

## 2025-03-20 RX ADMIN — TIMOLOL MALEATE 1 DROP: 5 SOLUTION OPHTHALMIC at 10:03

## 2025-03-20 RX ADMIN — DORZOLAMIDE HCL 1 DROP: 20 SOLUTION/ DROPS OPHTHALMIC at 10:03

## 2025-03-20 RX ADMIN — CARBAMAZEPINE 100 MG: 100 TABLET, EXTENDED RELEASE ORAL at 10:03

## 2025-03-20 RX ADMIN — METOPROLOL SUCCINATE 25 MG: 25 TABLET, EXTENDED RELEASE ORAL at 10:03

## 2025-03-20 RX ADMIN — ASPIRIN 81 MG CHEWABLE TABLET 81 MG: 81 TABLET CHEWABLE at 10:03

## 2025-03-20 RX ADMIN — ATORVASTATIN CALCIUM 40 MG: 40 TABLET, FILM COATED ORAL at 10:03

## 2025-03-20 NOTE — DISCHARGE SUMMARY
[facility]  Hospital Medicine Discharge Summary    Admit Date: 3/18/2025  Discharge Date and Time: 3/20/96499:39 AM  Admitting Physician: Hospitalist team   Discharging Physician: Yuri Garcia MD.  Primary Care Physician: Melody Ren MD      Discharge Diagnoses:  Syncope versus questionable breakthrough seizure  Acute encephalopathy/questionable postictal state- resolved  Acute kidney injury-resolved  Hyperkalemia-resolved  Asymptomatic bacteriuria  Mild Lactic acidemia -resolved  History of epilepsy, septo-optic dysplasia, macular degeneration, blindness, trigeminal neuralgia, CAD/remote stents, HTN, HLD, COPD not on home oxygen not in exacerbation.  Wheelchair-bound.    Hospital Course:   84-year-old female with medical history notable for CVA with residual left spastic hemiparesis, epilepsy, septo-optic dysplasia/macular degeneration/blindness and trigeminal neuralgia, DDD/Lumbar spine surgery 2015, CAD/PCI/stents x4, CHF-diastolic dysfunction, HTN, HLD, COPD not on home oxygen, and recurring UTIs, wheelchair dependent who presented to ED on 03/18/2025 as a level 1 trauma following a suspected fall and possible breakthrough seizure.  Upon arrival she was initially confused  Trauma survey imaging was unremarkable with no traumatic injuries, labs notable for mild lactic acidemia, hyperkalemia and acute kidney injury.  Started on IV fluids, IV Keppra and resumed on phenytoin and phenobarbital, and admitted to hospital medicine service.     Neurology consulted, no definite evidence of breakthrough seizure or sign of postictal state and recommended resumption of patient's home antiepileptic medications.       Of note her levels of both phenytoin and carbamazepine were undetectable, I do not see phenobarbital level but UDS positive for barbiturates.    Patient returned to baseline.  Feeling better.  Urine culture growing less than 10,000 colony-forming units of Pseudomonas.   "Suspect that this is a dirty catch based on urinalysis.  No further antibiotics needed.  With the patient but she follow up with her neurologist as an outpatient.  She states that she is followed by Dr. Richardson.  Reports not having a seizure at 59 years.  I discussed her that she may need some adjustment in her medications.  She is very resistant to make any changes without speaking to him.  No further inpatient workup needed at this time.  Okay to be discharged back to assisted living with home health services    Vitals:  Blood pressure 127/79, pulse 78, temperature 97.4 °F (36.3 °C), temperature source Oral, resp. rate 18, height 5' 4" (1.626 m), weight 54.4 kg (120 lb), SpO2 97%..    Physical Exam:  General: Appears comfortable  Chest: CTABL  CVS: Regular rhythm. Normal S1/S2.  Abdomen: nondistended, soft and non-tender.  Skin: Warm and dry  Neuro:  Alert and oriented mild right lower extremity and right upper extremity spasticity  Psych: Cooperative    Procedures Performed: No admission procedures for hospital encounter.     Significant Diagnostic Studies: See Full reports for all details  Admission on 03/18/2025   Component Date Value    Sodium 03/18/2025 141     Potassium 03/18/2025 5.2 (H)     Chloride 03/18/2025 107     CO2 03/18/2025 17 (L)     Glucose 03/18/2025 104     Blood Urea Nitrogen 03/18/2025 23.9 (H)     Creatinine 03/18/2025 1.32 (H)     Calcium 03/18/2025 9.1     Protein Total 03/18/2025 7.6     Albumin 03/18/2025 3.4     Globulin 03/18/2025 4.2 (H)     Albumin/Globulin Ratio 03/18/2025 0.8 (L)     Bilirubin Total 03/18/2025 0.4     ALP 03/18/2025 145     ALT 03/18/2025 20     AST 03/18/2025 43 (H)     eGFR 03/18/2025 40     Anion Gap 03/18/2025 17.0     BUN/Creatinine Ratio 03/18/2025 18     PT 03/18/2025 14.2     INR 03/18/2025 1.1     PTT 03/18/2025 24.5     Lactic Acid Level 03/18/2025 3.1 (H)     Group & Rh 03/18/2025 O POS     Indirect Rosita GEL 03/18/2025 NEG     Specimen Outdate " 03/18/2025 03/21/2025 23:59     Color, UA 03/18/2025 Light-Yellow     Appearance, UA 03/18/2025 Clear     Specific Gravity, UA 03/18/2025 1.022     pH, UA 03/18/2025 6.5     Protein, UA 03/18/2025 Trace (A)     Glucose, UA 03/18/2025 Normal     Ketones, UA 03/18/2025 Trace (A)     Blood, UA 03/18/2025 1+ (A)     Bilirubin, UA 03/18/2025 Negative     Urobilinogen, UA 03/18/2025 Normal     Nitrites, UA 03/18/2025 Negative     Leukocyte Esterase, UA 03/18/2025 Negative     RBC, UA 03/18/2025 6-10 (A)     WBC, UA 03/18/2025 11-20 (A)     Bacteria, UA 03/18/2025 None Seen     Squamous Epithelial Cell* 03/18/2025 Trace     Mucous, UA 03/18/2025 Trace (A)     Hyaline Casts, UA 03/18/2025 11-20 (A)     Calcium Oxalate Crystals* 03/18/2025 Trace (A)     Amphetamines, Urine 03/18/2025 Negative     Barbiturates, Urine 03/18/2025 Positive (A)     Benzodiazepine, Urine 03/18/2025 Negative     Cannabinoids, Urine 03/18/2025 Negative     Cocaine, Urine 03/18/2025 Negative     Fentanyl, Urine 03/18/2025 Negative     MDMA, Urine 03/18/2025 Negative     Opiates, Urine 03/18/2025 Negative     Phencyclidine, Urine 03/18/2025 Negative     pH, Urine 03/18/2025 6.5     Specific Gravity, Urine * 03/18/2025 1.022     Ethanol Level 03/18/2025 <10.0     WBC 03/18/2025 7.02     RBC 03/18/2025 4.38     Hgb 03/18/2025 13.5     Hct 03/18/2025 42.0     MCV 03/18/2025 95.9 (H)     MCH 03/18/2025 30.8     MCHC 03/18/2025 32.1 (L)     RDW 03/18/2025 14.2     Platelet 03/18/2025 295     MPV 03/18/2025 11.2 (H)     Neut % 03/18/2025 55.6     Lymph % 03/18/2025 32.3     Mono % 03/18/2025 9.1     Eos % 03/18/2025 1.3     Basophil % 03/18/2025 1.3     Imm Grans % 03/18/2025 0.4     Neut # 03/18/2025 3.90     Lymph # 03/18/2025 2.27     Mono # 03/18/2025 0.64     Eos # 03/18/2025 0.09     Baso # 03/18/2025 0.09     Imm Gran # 03/18/2025 0.03     NRBC% 03/18/2025 0.0     POCT Glucose 03/18/2025 105     Lactic Acid Level 03/18/2025 3.0 (H)     ABORH Retype  03/18/2025 O POS     Urine Culture 03/18/2025 Less than 10,000 colonies/ml Pseudomonas aeruginosa (A)     Phenytoin 03/18/2025 <1.8 (L)     Carbamazepine Level 03/18/2025 <1.9 (L)     Sodium 03/18/2025 140     Potassium 03/18/2025 5.5 (H)     Chloride 03/18/2025 107     CO2 03/18/2025 17 (L)     Glucose 03/18/2025 99     Blood Urea Nitrogen 03/18/2025 22.6 (H)     Creatinine 03/18/2025 1.38 (H)     Calcium 03/18/2025 8.9     Protein Total 03/18/2025 7.7 (H)     Albumin 03/18/2025 3.5     Globulin 03/18/2025 4.2 (H)     Albumin/Globulin Ratio 03/18/2025 0.8 (L)     Bilirubin Total 03/18/2025 0.3     ALP 03/18/2025 144     ALT 03/18/2025 21     AST 03/18/2025 45 (H)     eGFR 03/18/2025 38     Anion Gap 03/18/2025 16.0     BUN/Creatinine Ratio 03/18/2025 16     Magnesium Level 03/18/2025 2.20     Creatine Kinase 03/18/2025 101     WBC 03/18/2025 10.52     RBC 03/18/2025 3.64 (L)     Hgb 03/18/2025 11.3 (L)     Hct 03/18/2025 34.9 (L)     MCV 03/18/2025 95.9 (H)     MCH 03/18/2025 31.0     MCHC 03/18/2025 32.4 (L)     RDW 03/18/2025 14.2     Platelet 03/18/2025 263     MPV 03/18/2025 10.8 (H)     Neut % 03/18/2025 84.8     Lymph % 03/18/2025 10.3     Mono % 03/18/2025 3.6     Eos % 03/18/2025 0.1     Basophil % 03/18/2025 0.6     Imm Grans % 03/18/2025 0.6     Neut # 03/18/2025 8.93     Lymph # 03/18/2025 1.08     Mono # 03/18/2025 0.38     Eos # 03/18/2025 0.01     Baso # 03/18/2025 0.06     Imm Gran # 03/18/2025 0.06 (H)     NRBC% 03/18/2025 0.0     Lactic Acid Level 03/18/2025 2.5 (H)     Sodium 03/19/2025 140     Potassium 03/19/2025 3.7     Chloride 03/19/2025 109 (H)     CO2 03/19/2025 22 (L)     Glucose 03/19/2025 76 (L)     Blood Urea Nitrogen 03/19/2025 13.7     Creatinine 03/19/2025 0.80     Calcium 03/19/2025 8.3 (L)     Protein Total 03/19/2025 6.5     Albumin 03/19/2025 3.0 (L)     Globulin 03/19/2025 3.5     Albumin/Globulin Ratio 03/19/2025 0.9 (L)     Bilirubin Total 03/19/2025 0.4     ALP 03/19/2025 125      ALT 03/19/2025 22     AST 03/19/2025 46 (H)     eGFR 03/19/2025 >60     Anion Gap 03/19/2025 9.0     BUN/Creatinine Ratio 03/19/2025 17     WBC 03/19/2025 4.86     RBC 03/19/2025 4.15 (L)     Hgb 03/19/2025 12.7     Hct 03/19/2025 38.9     MCV 03/19/2025 93.7     MCH 03/19/2025 30.6     MCHC 03/19/2025 32.6 (L)     RDW 03/19/2025 14.2     Platelet 03/19/2025 228     MPV 03/19/2025 11.0 (H)     Neut % 03/19/2025 48.8     Lymph % 03/19/2025 30.2     Mono % 03/19/2025 13.6     Eos % 03/19/2025 5.6     Basophil % 03/19/2025 1.4     Imm Grans % 03/19/2025 0.4     Neut # 03/19/2025 2.37     Lymph # 03/19/2025 1.47     Mono # 03/19/2025 0.66     Eos # 03/19/2025 0.27     Baso # 03/19/2025 0.07     Imm Gran # 03/19/2025 0.02     NRBC% 03/19/2025 0.0         Microbiology Results (last 7 days)       Procedure Component Value Units Date/Time    Urine culture [[id number]]  (Abnormal) Collected: 03/18/25 0315    Order Status: Completed Specimen: Urine Updated: 03/19/25 1135     Urine Culture Less than 10,000 colonies/ml Pseudomonas aeruginosa     Comment: [place] counts of <10,000colonies/ml are of questionable significance. Therefore organisms are identified only.                X-Ray Pelvis Routine AP  Result Date: 3/18/2025  EXAMINATION: XR PELVIS ROUTINE AP CLINICAL HISTORY: r/o bleeding or hemorrhage; TECHNIQUE: Single view of the pelvis. COMPARISON: No prior imaging available for comparison FINDINGS: No displaced fracture appreciated.  The sacroiliac joints are symmetric.  Refer to dedicated CT.     No displaced fracture appreciated.  Refer to dedicated CT. Electronically signed by: Miguel Estes Date:    03/18/2025 Time:    05:50    CT Chest Abdomen Pelvis With IV Contrast (XPD) NO Oral Contrast  Result Date: 3/18/2025  EXAMINATION: CT CHEST ABDOMEN PELVIS WITH IV CONTRAST (XPD) CLINICAL HISTORY: Trauma; TECHNIQUE: Axial images of the chest, abdomen, and pelvis were obtained With Contrast. Sagittal and coronal  reconstructed images were available for review. Automatic exposure control was utilized to reduce the patient's radiation dose. DLP = 303 COMPARISON: No prior images available for comparison. FINDINGS:    Artifact: Mild motion artifact is seen on some images.Soft Tissues: No significant soft tissue swelling identified. Multiple tiny calcified foci are seen in the bilateral breasts.Lines and Tubes: None.Neck: The visualized soft tissues of the neck appear unremarkable.Mediastinum: The mediastinal structures are within normal limits.Heart: The heart size is within normal limits. Moderate coronary artery calcification is seen.Aorta: No aortic dissection or aneurysm is seen. Moderate aortic calcification is seen in the thoracic aorta.Pulmonary Arteries: No filling defects are seen in the pulmonary arteries to suggest pulmonary embolus.Lungs: There is mild non specific dependent change at the lung bases. Moderate streaky opacity is seen in the right upper lobe and right middle lobe scarring and or subsegmental atelectasis. No focal infiltrate or consolidation identified.Pleura: No effusions or pneumothorax are identified.Bony Structures:Spine: Mild spondylolytic changes are seen in the thoracic spine. Bone cement is seen at T3.Ribs: No rib fractures are identified. The ribs appear unremarkable.Abdomen:Abdominal Wall: There is mild subcutaneous stranding in the bilateral gluteal region.Liver: The liver appears unremarkable.Trauma: No traumatic injury is identified.Biliary System: No intrahepatic biliary duct dilatation is seen. The extra hepatic biliary system appears prominent which may reflect prior obstructive physiology in this patient status post cholecystectomy.Gallbladder: Surgical clips are seen in the gallbladder fossa which may reflect prior cholecystectomy correlate with surgical history and visual inspection.Pancreas: Moderate pancreatic atrophy is seen.Spleen: The spleen appears unremarkable.Trauma: No  specific evidence of splenic trauma is seen.Adrenals: The bilateral adrenal glands appear nodular, left greater than the right.Kidneys: The kidneys appear unremarkable with no stones cysts masses or hydronephrosis.Aorta: There is moderate calcification of the abdominal aorta and its branches.IVC: Unremarkable.Bowel:Esophagus: The visualized esophagus appears unremarkable.Stomach: The stomach appears unremarkable.Duodenum: Unremarkable appearing duodenum.Small Bowel: The small bowel appears unremarkable.Colon: Nondistended.Appendix: No appendix is identified and surgical clips are seen at the base of the cecum consistent with appendectomy.Peritoneum: No intraperitoneal free air or ascites is seen.Pelvis:Bladder: The bladder appears unremarkable.Female:Uterus: The uterus is not identified.Ovaries: No adnexal masses are seen.Bony structures:Dorsal Spine: There is mild spondylosis of the visualized dorsal spine. There is mild compression deformity at T12.Bony Pelvis: There is moderate degenerative change of the bilateral hip.Impression:1. There is mild subcutaneous stranding in the bilateral gluteal region. This may represent soft tissue contusion.2. 3. No acute traumatic intrathoracic pathology identified. No acute traumatic intraabdominal or pelvic solid organ or bowel pathology identified. Details and other findings as discussed above. There is mild subcutaneous stranding in the bilateral gluteal region. This may represent soft tissue contusion. The bilateral adrenal glands appear nodular, left greater than the right. Correlate with clinical and laboratory findings as regards further evaluation and follow-up. No acute traumatic intrathoracic pathology identified.  No acute traumatic intra-abdominal or pelvic solid organ or bowel pathology identified. Electronically signed by: Miguel Estes Date:    03/18/2025 Time:    05:37    CT Cervical Spine Without Contrast  Result Date: 3/18/2025  EXAMINATION: CT CERVICAL SPINE  WITHOUT CONTRAST CLINICAL HISTORY: Trauma; TECHNIQUE: CT of the cervical spine Without contrast. Sagittal and coronal reconstructions were performed on the source images. Automatic exposure control was utilized to reduce the patient's radiation dose. DLP = 12 19 COMPARISON: No prior imaging available for comparison. FINDINGS: Lung apices: Nonspecific scarring but otherwise unremarkable.Spine:Spinal canal: Moderate spinal canal narrowing is seen at C3-C4 level. Correlate clinically as regards additional evaluation and follow up, possibly with MRI.Mineralization: Within normal limits for age.Rotation: No significant rotation is seen.Scoliosis: No significant scoliosis is seen.Vertebral Fusion: No vertebral fusion is identified.Listhesis: There is grade I degenerative posterolisthesis of C3 on C4.Lordosis: Degenerative straightening of the cervical lordosis is seen. An element of myospasm is not excluded.Intervertebral disc spaces: Multilevel loss of disc height is seen.Osteophytes: Moderate multilevel endplate osteophytes are seen.Endplate Sclerosis: Mild multilevel endplate sclerosis is seen.Uncovertebral degenerative changes: Pronounced multilevel uncovertebral joint arthrosis is seen.Facet degenerative changes: Moderate multilevel facet degenerative changes are seen.Fractures: No acute cervical spine fracture dislocation or subluxation is seen.Orthopedic Hardware: None.Miscellaneous:Mastoid air cells: The visualized mastoid air cells appear clear.Soft Tissues: Unremarkable.     1. No acute cervical spine abnormality identified. 2. Ligament, spinal cord and/or vascular abnormalities cannot be excluded on the basis of this examination. Moderate degenerative changes as above. Electronically signed by: Miguel Estes Date:    03/18/2025 Time:    05:35    CT Head Without Contrast  Result Date: 3/18/2025  EXAMINATION: CT HEAD WITHOUT CONTRAST CLINICAL HISTORY: Trauma; TECHNIQUE: Low dose axial images were obtained  through the head.  Coronal and sagittal reformations were also performed. Contrast was not administered. Automatic exposure control was utilized to reduce the patient's radiation dose. DLP= 12 19 COMPARISON: None. FINDINGS: There is a CSF-filled cleft-like defect surrounded by right parietal lobe gray matter with an apparent communication with the posterior horn of the right lateral ventricle (Series 3 Image 37). This is consistent with Schizencephaly.Hemorrhage: No acute intracranial hemorrhage is seen.CSF spaces: The ventricles, sulci and basal cisterns all appear moderately prominent consistent with global cerebral atrophy. Additionally the ventricles appear dilated out of proportion to the sulci suggesting an element of concurrent non-obstructive hydrocephalus.Brain parenchyma: There is preservation of the grey white junction throughout. No acute infarct is identified.Cerebellum: Unremarkable.Sella and skull base: The sella appears to be within normal limits for age.Intracranial calcifications: Incidental note is made of bilateral choroid plexus calcification. Incidental note is made of some calcification of the falx.Calvarium: Craniotomy defects are seen in the left temporal and parietal bones. Orthopedic hardware is present in the left frontal bone and the greater wing of the left sphenoid bone. No acute linear or depressed skull fracture is seen.Maxillofacial Structures:Paranasal sinuses: There is some mucoperiosteal thickening in the left frontal sinuses. This may reflect chronic sinusitis. The rest of the paranasal sinuses appear clear.Orbits: The orbits appear unremarkable.Zygomatic arches: The zygomatic arches are intact and unremarkable.Temporal bones and mastoids: The temporal bones and mastoids appear unremarkable.TMJ: The mandibular condyles appear normally placed with respect to the mandibular fossa.     There is a CSF-filled cleft-like defect surrounded by right parietal lobe gray matter with an  apparent communication with the posterior horn of the right lateral ventricle (Series 3 Image 37). This is consistent with Schizencephaly. Correlate with clinical findings and recommend additional evaluation and follow-up as indicated. No acute intracranial process. Electronically signed by: Miguel Estes Date:    03/18/2025 Time:    05:34    X-Ray Chest 1 View  Result Date: 3/18/2025  EXAMINATION: XR CHEST 1 VIEW CLINICAL HISTORY: r/o bleeding or hemorrhage; TECHNIQUE: Single view of the chest COMPARISON: No prior imaging available for comparison. FINDINGS: No focal opacification, pleural effusion, or pneumothorax. The cardiomediastinal silhouette is within normal limits. No acute osseous abnormality.     No acute cardiopulmonary process. Electronically signed by: Miguel Estes Date:    03/18/2025 Time:    05:05    ED US Fast  Result Date: 3/18/2025  Max Lennon MD     3/18/2025  4:24 AM ED US Fast Date/Time: 3/18/2025 2:15 AM Performed by: Max Lennon MD Authorized by: Ezekiel Fournier MD  Indication:  Blunt trauma Identified Structures:  The pericardium, hepatorenal space, splenorenal space, and pelvic cul-de-sac were examined and The right and left hemithoraces were also examined The following findings in the peritoneal, pericardial, and pleural spaces were obtained:   Pericardial effusion:  Absent   Hepatorenal free fluid:  Absent   Splenorenal free fluid:  Absent   Suprapubic/Pouch of Braxton free fluid:  Absent   Right lung sliding:  Present   Left lung sliding:  Present   Impression:  No pathologic free fluid   Charge?:  Yes  - pulls last radiology orders        Medication List        CONTINUE taking these medications      albuterol sulfate 90 mcg/actuation Aebs     aspirin 81 MG Chew     atorvastatin 40 MG tablet  Commonly known as: LIPITOR     benzonatate 100 MG capsule  Commonly known as: TESSALON     brimonidine 0.15 % OPTH DROP 0.15 % ophthalmic solution  Commonly known as:  ALPHAGAN     calcium-mag-vit B6-D3-minerals 295-84-7-125 rd-xm-ei-unit Tab     carBAMazepine 100 MG Cm12  Commonly known as: CARBATROL     dorzolamide-timolol 2-0.5% 22.3-6.8 mg/mL ophthalmic solution  Commonly known as: COSOPT     latanoprost 0.005 % ophthalmic solution     metoprolol succinate 25 MG 24 hr tablet  Commonly known as: TOPROL-XL     PHENobarbitaL 32.4 MG tablet     phenytoin 100 MG ER capsule  Commonly known as: DILANTIN     potassium chloride SA 15 MEQ tablet  Commonly known as: KLOR-CON M15     WOMEN'S 50 PLUS MULTIVITAMIN ORAL               Explained in detail to the patient about the discharge plan, medications, and follow-up visits. Pt understands and agrees with the treatment plan  Discharged Condition: stable  Diet:  Disposition:    Medications Per DC med rec  Activities as tolerated  Follow up with your PCP in 2 wks   For further questions contact hospitalist office    Discharge time 33 minutes    For worsening symptoms, chest pain, shortness of breath, increased abdominal pain, high grade fever, stroke or stroke like symptoms, immediately go to the nearest Emergency Room or call 911 as soon as possible.        Yuri Vasquez M.D, on 3/20/2025. at 6:39 AM.

## 2025-03-20 NOTE — PLAN OF CARE
Problem: Adult Inpatient Plan of Care  Goal: Plan of Care Review  Outcome: Progressing  Goal: Patient-Specific Goal (Individualized)  Outcome: Progressing  Goal: Absence of Hospital-Acquired Illness or Injury  Outcome: Progressing  Intervention: Identify and Manage Fall Risk  Flowsheets (Taken 3/20/2025 0326)  Safety Promotion/Fall Prevention:   assistive device/personal item within reach   bed alarm set   Fall Risk signage in place  Intervention: Prevent Skin Injury  Flowsheets (Taken 3/20/2025 0326)  Body Position:   weight shifting   heels elevated  Skin Protection: incontinence pads utilized  Device Skin Pressure Protection: absorbent pad utilized/changed  Goal: Optimal Comfort and Wellbeing  Outcome: Progressing  Intervention: Monitor Pain and Promote Comfort  Flowsheets (Taken 3/20/2025 0326)  Pain Management Interventions:   relaxation techniques promoted   quiet environment facilitated   position adjusted   pillow support provided   care clustered

## 2025-03-20 NOTE — PLAN OF CARE
03/20/25 0849   Final Note   Assessment Type Final Discharge Note   Anticipated Discharge Disposition Home-Health   Hospital Resources/Appts/Education Provided Post-Acute resouces added to AVS   Post-Acute Status   Post-Acute Authorization Home Health   Home Health Status Set-up Complete/Auth obtained   Discharge Delays None known at this time     Notified Bushra at [facility] of discharge today. Discharge information faxed. They will provide transportation. Patient is current with Daisy [facility]ON[facility] info sent via [employer].

## 2025-03-21 ENCOUNTER — DOCUMENTATION ONLY (OUTPATIENT)
Dept: FAMILY MEDICINE | Facility: CLINIC | Age: 85
End: 2025-03-21
Payer: MEDICARE

## 2025-03-21 ENCOUNTER — PATIENT OUTREACH (OUTPATIENT)
Dept: ADMINISTRATIVE | Facility: CLINIC | Age: 85
End: 2025-03-21
Payer: MEDICARE

## 2025-03-21 ENCOUNTER — TELEPHONE (OUTPATIENT)
Dept: FAMILY MEDICINE | Facility: CLINIC | Age: 85
End: 2025-03-21
Payer: MEDICARE

## 2025-03-21 ENCOUNTER — RESULTS FOLLOW-UP (OUTPATIENT)
Dept: FAMILY MEDICINE | Facility: CLINIC | Age: 85
End: 2025-03-21

## 2025-03-21 DIAGNOSIS — K57.90 DIVERTICULOSIS: Primary | ICD-10-CM

## 2025-03-21 DIAGNOSIS — M41.9 SCOLIOSIS, UNSPECIFIED SCOLIOSIS TYPE, UNSPECIFIED SPINAL REGION: ICD-10-CM

## 2025-03-21 DIAGNOSIS — M43.9 COMPRESSION DEFORMITY OF VERTEBRA: ICD-10-CM

## 2025-03-21 LAB — BACTERIA UR CULT: ABNORMAL

## 2025-03-21 NOTE — TELEPHONE ENCOUNTER
----- Message from Nazia sent at 3/21/2025 11:07 AM CDT -----  Who Called: Daisy Turner is requesting assistance/information from provider's office.Symptoms (please be specific): Got out of hospital on 3/20/25, would like to know if pt needs to do a UA. Pt was having some confusion and UTI symptoms. Hospital did get one but it was contaminated. Please advise.  How long has patient had these symptoms:  n/aList of preferred pharmacies on file (remove unneeded): n/aPreferred Method of Contact: Phone CallPatient's Preferred Phone Number on File: [phone] Best Call Back Number, if different: [phone] Information:  Caller would like to know if a new UA is needed. Please advise.

## 2025-03-22 ENCOUNTER — LAB REQUISITION (OUTPATIENT)
Dept: LAB | Facility: HOSPITAL | Age: 85
End: 2025-03-22
Payer: MEDICARE

## 2025-03-22 DIAGNOSIS — N39.0 URINARY TRACT INFECTION, SITE NOT SPECIFIED: ICD-10-CM

## 2025-03-22 LAB
BACTERIA #/AREA URNS AUTO: ABNORMAL /HPF
BILIRUB UR QL STRIP.AUTO: NEGATIVE
CLARITY UR: ABNORMAL
COLOR UR AUTO: YELLOW
EPI CELLS #/AREA URNS HPF: ABNORMAL /HPF
GLUCOSE UR QL STRIP: NORMAL
HGB UR QL STRIP: ABNORMAL
KETONES UR QL STRIP: NEGATIVE
LEUKOCYTE ESTERASE UR QL STRIP: 500
MUCOUS THREADS URNS QL MICRO: ABNORMAL /LPF
NITRITE UR QL STRIP: NEGATIVE
PH UR STRIP: 7 [PH]
PROT UR QL STRIP: ABNORMAL
RBC #/AREA URNS AUTO: ABNORMAL /HPF
SP GR UR STRIP.AUTO: 1.01 (ref 1–1.03)
SQUAMOUS #/AREA URNS LPF: ABNORMAL /HPF
UROBILINOGEN UR STRIP-ACNC: NORMAL
WBC #/AREA URNS AUTO: >100 /HPF
WBC CLUMPS UR QL AUTO: ABNORMAL

## 2025-03-22 PROCEDURE — 87077 CULTURE AEROBIC IDENTIFY: CPT | Mod: 91 | Performed by: FAMILY MEDICINE

## 2025-03-22 PROCEDURE — 81001 URINALYSIS AUTO W/SCOPE: CPT | Performed by: FAMILY MEDICINE

## 2025-03-24 ENCOUNTER — TELEPHONE (OUTPATIENT)
Dept: FAMILY MEDICINE | Facility: CLINIC | Age: 85
End: 2025-03-24
Payer: MEDICARE

## 2025-03-24 DIAGNOSIS — R39.9 UTI SYMPTOMS: Primary | ICD-10-CM

## 2025-03-24 DIAGNOSIS — B35.6 TINEA CRURIS: Primary | ICD-10-CM

## 2025-03-24 RX ORDER — KETOCONAZOLE 20 MG/G
CREAM TOPICAL DAILY
Qty: 60 G | Refills: 1 | Status: SHIPPED | OUTPATIENT
Start: 2025-03-24 | End: 2025-04-08

## 2025-03-25 ENCOUNTER — RESULTS FOLLOW-UP (OUTPATIENT)
Dept: FAMILY MEDICINE | Facility: CLINIC | Age: 85
End: 2025-03-25

## 2025-03-25 DIAGNOSIS — N39.0 BACTERIAL UTI: Primary | ICD-10-CM

## 2025-03-25 DIAGNOSIS — A49.9 BACTERIAL UTI: Primary | ICD-10-CM

## 2025-03-25 LAB
BACTERIA UR CULT: ABNORMAL
BACTERIA UR CULT: ABNORMAL

## 2025-03-25 NOTE — TELEPHONE ENCOUNTER
----- Message from Nurse Samantha sent at 3/24/2025  3:54 PM CDT -----  Regarding: HomeHealth assessments  [facility] sent over two assessments from 03/21/25, patient has been non-compliant with health recommendations and patient was having intense itching with yeast odor they are requesting an antifungal cream. Please advise thanks, I have scanned the assessments into

## 2025-03-25 NOTE — TELEPHONE ENCOUNTER
----- Message from Nurse Gary sent at 3/24/2025  7:32 AM CDT -----    ----- Message -----  From: Veronica Bang  Sent: 3/21/2025   1:27 PM CDT  To: Mikal VAZQUEZ Staff    Who Called: Lisseth Hargrove is requesting assistance/information from provider's office.Symptoms (please be specific):  How long has patient had these symptoms:  List of preferred pharmacies on file (remove unneeded): [unfilled]If different, enter pharmacy into here including location and phone number: Preferred Method of Contact: Phone CallPatient's Preferred Phone Number on File: [phone] Best Call Back Number, if different:Additional Information: [facility] nurse call requesting unrinaysis and in & out catheter

## 2025-03-26 ENCOUNTER — TELEPHONE (OUTPATIENT)
Dept: FAMILY MEDICINE | Facility: CLINIC | Age: 85
End: 2025-03-26

## 2025-03-26 DIAGNOSIS — N39.0 BACTERIAL UTI: Primary | ICD-10-CM

## 2025-03-26 DIAGNOSIS — A49.9 BACTERIAL UTI: Primary | ICD-10-CM

## 2025-03-26 RX ORDER — NITROFURANTOIN 25; 75 MG/1; MG/1
100 CAPSULE ORAL EVERY 12 HOURS
Qty: 14 CAPSULE | Refills: 0 | Status: SHIPPED | OUTPATIENT
Start: 2025-03-26 | End: 2025-03-27 | Stop reason: SDUPTHER

## 2025-03-26 NOTE — TELEPHONE ENCOUNTER
Mariaelena Cabrera with Home Health states that the patient allergy severity for Macrobid is listed as hives. Patient states that she will take the macrobid she is willing to try anything and asks that the prescription be sent to [facility]'s pharmacy.

## 2025-03-27 ENCOUNTER — TELEPHONE (OUTPATIENT)
Dept: FAMILY MEDICINE | Facility: CLINIC | Age: 85
End: 2025-03-27
Payer: MEDICARE

## 2025-03-27 DIAGNOSIS — B35.6 TINEA CRURIS: Primary | ICD-10-CM

## 2025-03-27 DIAGNOSIS — B35.6 TINEA CRURIS: ICD-10-CM

## 2025-03-27 DIAGNOSIS — A49.9 BACTERIAL UTI: ICD-10-CM

## 2025-03-27 DIAGNOSIS — N39.0 BACTERIAL UTI: ICD-10-CM

## 2025-03-27 RX ORDER — NITROFURANTOIN 25; 75 MG/1; MG/1
100 CAPSULE ORAL EVERY 12 HOURS
Qty: 14 CAPSULE | Refills: 0 | Status: SHIPPED | OUTPATIENT
Start: 2025-03-27 | End: 2025-04-03

## 2025-03-27 RX ORDER — NYSTATIN 100000 [USP'U]/G
POWDER TOPICAL 4 TIMES DAILY
Qty: 60 G | Refills: 1 | Status: SHIPPED | OUTPATIENT
Start: 2025-03-27

## 2025-03-27 RX ORDER — NYSTATIN 100000 [USP'U]/G
POWDER TOPICAL 4 TIMES DAILY
Qty: 60 G | Refills: 1 | Status: SHIPPED | OUTPATIENT
Start: 2025-03-27 | End: 2025-03-27 | Stop reason: SDUPTHER

## 2025-03-27 NOTE — TELEPHONE ENCOUNTER
Deborah Ren Mrs. Montanez has a rash between her thighs to perineal area, the rash has no drainage but is itchy and emmanuel. [facility] has been applying a zinc ointment to the rash along with cleaning with baby shampoo. Nurse with [facility] is asking for possibly an nystatin powder to apply or if you would like to use the ketoconazole cream that was prescribed previously ? Please advise thanks !

## 2025-03-29 ENCOUNTER — HOSPITAL ENCOUNTER (EMERGENCY)
Facility: HOSPITAL | Age: 85
Discharge: HOME | End: 2025-03-30
Attending: EMERGENCY MEDICINE
Payer: MEDICARE

## 2025-03-29 DIAGNOSIS — N39.0 URINARY TRACT INFECTION WITHOUT HEMATURIA, SITE UNSPECIFIED: ICD-10-CM

## 2025-03-29 DIAGNOSIS — R11.2 NAUSEA AND VOMITING, UNSPECIFIED VOMITING TYPE: Primary | ICD-10-CM

## 2025-03-29 PROCEDURE — 99285 EMERGENCY DEPT VISIT HI MDM: CPT | Mod: 25

## 2025-03-30 VITALS
TEMPERATURE: 98 F | DIASTOLIC BLOOD PRESSURE: 83 MMHG | HEART RATE: 105 BPM | SYSTOLIC BLOOD PRESSURE: 100 MMHG | OXYGEN SATURATION: 97 % | RESPIRATION RATE: 18 BRPM

## 2025-03-30 LAB
ALBUMIN SERPL-MCNC: 3.3 G/DL (ref 3.4–4.8)
ALBUMIN/GLOB SERPL: 0.7 RATIO (ref 1.1–2)
ALP SERPL-CCNC: 163 UNIT/L (ref 40–150)
ALT SERPL-CCNC: 16 UNIT/L (ref 0–55)
AMORPH URATE CRY URNS QL MICRO: ABNORMAL /UL
ANION GAP SERPL CALC-SCNC: 9 MEQ/L
AST SERPL-CCNC: 37 UNIT/L (ref 11–45)
BACTERIA #/AREA URNS AUTO: ABNORMAL /HPF
BASOPHILS # BLD AUTO: 0.05 X10(3)/MCL
BASOPHILS NFR BLD AUTO: 0.4 %
BILIRUB SERPL-MCNC: 0.3 MG/DL
BILIRUB UR QL STRIP.AUTO: NEGATIVE
BUN SERPL-MCNC: 19.3 MG/DL (ref 9.8–20.1)
CALCIUM SERPL-MCNC: 8.8 MG/DL (ref 8.4–10.2)
CHLORIDE SERPL-SCNC: 106 MMOL/L (ref 98–107)
CLARITY UR: ABNORMAL
CO2 SERPL-SCNC: 26 MMOL/L (ref 23–31)
COLOR UR AUTO: YELLOW
CREAT SERPL-MCNC: 0.91 MG/DL (ref 0.55–1.02)
CREAT/UREA NIT SERPL: 21
EOSINOPHIL # BLD AUTO: 0.02 X10(3)/MCL (ref 0–0.9)
EOSINOPHIL NFR BLD AUTO: 0.1 %
ERYTHROCYTE [DISTWIDTH] IN BLOOD BY AUTOMATED COUNT: 14.2 % (ref 11.5–17)
GFR SERPLBLD CREATININE-BSD FMLA CKD-EPI: >60 ML/MIN/1.73/M2
GLOBULIN SER-MCNC: 4.6 GM/DL (ref 2.4–3.5)
GLUCOSE SERPL-MCNC: 135 MG/DL (ref 82–115)
GLUCOSE UR QL STRIP: NORMAL
HCT VFR BLD AUTO: 41.4 % (ref 37–47)
HGB BLD-MCNC: 13.5 G/DL (ref 12–16)
HGB UR QL STRIP: ABNORMAL
IMM GRANULOCYTES # BLD AUTO: 0.05 X10(3)/MCL (ref 0–0.04)
IMM GRANULOCYTES NFR BLD AUTO: 0.4 %
KETONES UR QL STRIP: ABNORMAL
LEUKOCYTE ESTERASE UR QL STRIP: 500
LYMPHOCYTES # BLD AUTO: 0.39 X10(3)/MCL (ref 0.6–4.6)
LYMPHOCYTES NFR BLD AUTO: 2.9 %
MCH RBC QN AUTO: 30.7 PG (ref 27–31)
MCHC RBC AUTO-ENTMCNC: 32.6 G/DL (ref 33–36)
MCV RBC AUTO: 94.1 FL (ref 80–94)
MONOCYTES # BLD AUTO: 0.52 X10(3)/MCL (ref 0.1–1.3)
MONOCYTES NFR BLD AUTO: 3.8 %
MUCOUS THREADS URNS QL MICRO: ABNORMAL /LPF
NEUTROPHILS # BLD AUTO: 12.59 X10(3)/MCL (ref 2.1–9.2)
NEUTROPHILS NFR BLD AUTO: 92.4 %
NITRITE UR QL STRIP: NEGATIVE
NRBC BLD AUTO-RTO: 0 %
PH UR STRIP: 6.5 [PH]
PLATELET # BLD AUTO: 247 X10(3)/MCL (ref 130–400)
PMV BLD AUTO: 10.7 FL (ref 7.4–10.4)
POTASSIUM SERPL-SCNC: 3.8 MMOL/L (ref 3.5–5.1)
PROT SERPL-MCNC: 7.9 GM/DL (ref 5.8–7.6)
PROT UR QL STRIP: ABNORMAL
RBC # BLD AUTO: 4.4 X10(6)/MCL (ref 4.2–5.4)
RBC #/AREA URNS AUTO: ABNORMAL /HPF
SODIUM SERPL-SCNC: 141 MMOL/L (ref 136–145)
SP GR UR STRIP.AUTO: 1.02 (ref 1–1.03)
SQUAMOUS #/AREA URNS LPF: ABNORMAL /HPF
UROBILINOGEN UR STRIP-ACNC: NORMAL
WBC # BLD AUTO: 13.62 X10(3)/MCL (ref 4.5–11.5)
WBC #/AREA URNS AUTO: ABNORMAL /HPF

## 2025-03-30 PROCEDURE — 80053 COMPREHEN METABOLIC PANEL: CPT | Performed by: PHYSICIAN ASSISTANT

## 2025-03-30 PROCEDURE — 63600175 PHARM REV CODE 636 W HCPCS: Performed by: PHYSICIAN ASSISTANT

## 2025-03-30 PROCEDURE — 63600175 PHARM REV CODE 636 W HCPCS

## 2025-03-30 PROCEDURE — 85025 COMPLETE CBC W/AUTO DIFF WBC: CPT | Performed by: PHYSICIAN ASSISTANT

## 2025-03-30 PROCEDURE — 25500020 PHARM REV CODE 255: Performed by: EMERGENCY MEDICINE

## 2025-03-30 PROCEDURE — 96361 HYDRATE IV INFUSION ADD-ON: CPT

## 2025-03-30 PROCEDURE — 96374 THER/PROPH/DIAG INJ IV PUSH: CPT

## 2025-03-30 PROCEDURE — 87077 CULTURE AEROBIC IDENTIFY: CPT | Performed by: PHYSICIAN ASSISTANT

## 2025-03-30 PROCEDURE — 81001 URINALYSIS AUTO W/SCOPE: CPT | Performed by: PHYSICIAN ASSISTANT

## 2025-03-30 RX ORDER — ONDANSETRON HYDROCHLORIDE 2 MG/ML
INJECTION, SOLUTION INTRAVENOUS
Status: COMPLETED
Start: 2025-03-30 | End: 2025-03-30

## 2025-03-30 RX ORDER — ONDANSETRON HYDROCHLORIDE 2 MG/ML
4 INJECTION, SOLUTION INTRAVENOUS
Status: COMPLETED | OUTPATIENT
Start: 2025-03-30 | End: 2025-03-30

## 2025-03-30 RX ORDER — IPRATROPIUM BROMIDE AND ALBUTEROL SULFATE 2.5; .5 MG/3ML; MG/3ML
3 SOLUTION RESPIRATORY (INHALATION)
Status: DISCONTINUED | OUTPATIENT
Start: 2025-03-30 | End: 2025-03-30 | Stop reason: HOSPADM

## 2025-03-30 RX ADMIN — ONDANSETRON HYDROCHLORIDE 4 MG: 2 INJECTION, SOLUTION INTRAVENOUS at 03:03

## 2025-03-30 RX ADMIN — SODIUM CHLORIDE, POTASSIUM CHLORIDE, SODIUM LACTATE AND CALCIUM CHLORIDE 500 ML: 600; 310; 30; 20 INJECTION, SOLUTION INTRAVENOUS at 01:03

## 2025-03-30 RX ADMIN — ONDANSETRON 4 MG: 2 INJECTION INTRAMUSCULAR; INTRAVENOUS at 03:03

## 2025-03-30 RX ADMIN — IOHEXOL 100 ML: 350 INJECTION, SOLUTION INTRAVENOUS at 03:03

## 2025-03-30 NOTE — ED NOTES
[facility] called x8 times with no answer. Contacted [facility] and spoke with Maida in dispatch for wellness check of [facility] staff. Callback number given and will follow up as needed.

## 2025-03-30 NOTE — ED PROVIDER NOTES
Encounter Date: 3/29/2025       History     Chief Complaint   Patient presents with    Emesis     Arrives [facility] from [place] c/o n/v/d x3 wks - rec 4mg zofran IM      84-year-old female with medical history notable for CVA with residual left spastic hemiparesis, epilepsy, septo-optic dysplasia/macular degeneration/blindness and trigeminal neuralgia, DDD/Lumbar spine surgery 2015, CAD/PCI/stents x4, CHF-diastolic dysfunction, HTN, HLD, COPD not on home oxygen, and recurring UTIs, wheelchair dependent who presented to ED   Anju documents that she has being throwing up for three weeks,             Review of patient's allergies indicates:   Allergen Reactions    Nitrofurantoin monohyd/m-cryst Hives    Penicillin Hives     Past Medical History:   Diagnosis Date    Cervical radiculitis 08/29/2022    Cervical spinal stenosis 08/29/2022    Cervicalgia 08/29/2022    Coronary arteriosclerosis     DDD (degenerative disc disease), cervical 08/29/2022    Depression     Epilepsy, unspecified, not intractable, with status epilepticus     High cholesterol     Hypokalemia     Mild renal insufficiency 07/06/2022    Myocardial infarction     Osteoporosis     Primary hypertension 1/31/2023    Recurrent UTI     Unspecified macular degeneration      Past Surgical History:   Procedure Laterality Date    APPENDECTOMY      BACK SURGERY      cardiac stents      CARPAL TUNNEL RELEASE      CATARACT EXTRACTION Left     CHOLECYSTECTOMY      crainotomy      FOOT SURGERY Left     HYSTERECTOMY      LUNG REMOVAL, PARTIAL Right     LYMPH NODE DISSECTION       Family History   Family history unknown: Yes     Social History[1]  Review of Systems   Constitutional:  Negative for fever.   HENT:  Negative for sore throat.    Respiratory:  Negative for shortness of breath.    Cardiovascular:  Negative for chest pain.   Gastrointestinal:  Negative for nausea.   Genitourinary:  Negative for dysuria.   Musculoskeletal:  Negative for back pain.    Skin:  Negative for rash.   Neurological:  Negative for weakness.   Hematological:  Does not bruise/bleed easily.       Physical Exam     Initial Vitals [03/29/25 2224]   BP Pulse Resp Temp SpO2   117/85 105 16 98.2 °F (36.8 °C) 96 %      MAP       --         Physical Exam    Vitals reviewed.  Constitutional:   She is not in acute distress    HENT:   Head: Normocephalic.   Right Ear: Tympanic membrane normal. Tympanic membrane is not bulging.   Left Ear: Tympanic membrane normal. Tympanic membrane is not bulging.   Nose: Nose normal. Right sinus exhibits no frontal sinus tenderness. Left sinus exhibits no frontal sinus tenderness. Mouth/Throat: Uvula is midline, oropharynx is clear and moist and mucous membranes are normal.   Cardiovascular:  Normal rate and regular rhythm.           Pulmonary/Chest: Breath sounds normal.   Abdominal: Bowel sounds are normal. There is no abdominal tenderness.   Musculoskeletal:      Comments: Left side hemiparesis which is a baseline      Neurological: She is alert.   Skin: Skin is warm.         ED Course   Procedures  Labs Reviewed   COMPREHENSIVE METABOLIC PANEL - Abnormal       Result Value    Sodium 141      Potassium 3.8      Chloride 106      CO2 26      Glucose 135 (*)     Blood Urea Nitrogen 19.3      Creatinine 0.91      Calcium 8.8      Protein Total 7.9 (*)     Albumin 3.3 (*)     Globulin 4.6 (*)     Albumin/Globulin Ratio 0.7 (*)     Bilirubin Total 0.3       (*)     ALT 16      AST 37      eGFR >60      Anion Gap 9.0      BUN/Creatinine Ratio 21     CBC WITH DIFFERENTIAL - Abnormal    WBC 13.62 (*)     RBC 4.40      Hgb 13.5      Hct 41.4      MCV 94.1 (*)     MCH 30.7      MCHC 32.6 (*)     RDW 14.2      Platelet 247      MPV 10.7 (*)     Neut % 92.4      Lymph % 2.9      Mono % 3.8      Eos % 0.1      Basophil % 0.4      Imm Grans % 0.4      Neut # 12.59 (*)     Lymph # 0.39 (*)     Mono # 0.52      Eos # 0.02      Baso # 0.05      Imm Gran # 0.05 (*)      NRBC% 0.0     URINALYSIS, REFLEX TO URINE CULTURE - Abnormal    Color, UA Yellow      Appearance, UA Turbid (*)     Specific Gravity, UA 1.018      pH, UA 6.5      Protein, UA Trace (*)     Glucose, UA Normal      Ketones, UA 2+ (*)     Blood, UA 1+ (*)     Bilirubin, UA Negative      Urobilinogen, UA Normal      Nitrites, UA Negative      Leukocyte Esterase,  (*)     RBC, UA 6-10 (*)     WBC, UA 51-99 (*)     Bacteria, UA Trace      Squamous Epithelial Cells, UA Occasional (*)     Mucous, UA Trace (*)     Amorphous Crystal, UA Trace (*)    CULTURE, URINE   CBC W/ AUTO DIFFERENTIAL    Narrative:     The following orders were created for panel order CBC auto differential.  Procedure                               Abnormality         Status                     ---------                               -----------         ------                     CBC with Differential[[id number]]       Abnormal            Final result                 Please view results for these tests on the individual orders.          Imaging Results              X-Ray Chest 1 View (In process)                      CT Abdomen Pelvis With IV Contrast NO Oral Contrast (Preliminary result)  Result time 03/30/25 04:20:36      Preliminary result by Sotero James MD (03/30/25 04:20:36)                   Narrative:    START OF REPORT:  Technique: CT of the abdomen and pelvis was performed with axial images as well as sagittal and coronal reconstruction images with intravenous contrast.    Comparison: None available.    Clinical History: Abdominal pain, acute, nonlocalized.    Dosage Information: Automated Exposure Control was utilized.    Findings:  Technical notes: There is moderate motion and streak artifacts in portions of the abdomen and pelvis which decreases sensitivity and specificity of the study for subtle findings certain subtle pathology.  Lines and Tubes: None.  Thorax:  Lungs: There is mild nonspecific dependent change at the lung  bases. No focal infiltrate or consolidation is seen.  Pleura: No effusions or thickening are seen. No pneumothorax is seen in the visualized lung bases.  Heart: The heart size is within normal limits. Pronounced coronary artery calcification is seen.  Abdomen:  Abdominal Wall: No abdominal wall pathology is seen.  Liver: The liver appears unremarkable.  Biliary System: No intrahepatic or extrahepatic biliary duct dilatation is seen.  Gallbladder: Surgical clips are seen in the gallbladder fossa which may reflect prior cholecystectomy.  Pancreas: The pancreas appears unremarkable. No pancreatic mass, or, ductal dilatation is seen.  Spleen: The spleen appears unremarkable.  Adrenals: The right adrenal gland is unremarkable. The left adrenal gland is thickened with an 8mm hypodense nodule in the lateral limb seen on Series 2 Image 32. This finding may represent an adrenal adenoma.  Kidneys: There are some areas of diminished cortical enhancement of the right-greater-than-left kidney for instance on the right on image 36 series 2 and on the left at image 42 series 2.  Aorta: There is moderate calcification of the abdominal aorta and its branches.  IVC: Unremarkable.  Bowel:  Esophagus: The visualized esophagus appears unremarkable.  Stomach: There is mild wall thickening and edema in the gastric antrum seen on Series 2 Image 56. Correlate clinically as regards possible mild acute gastritis.  Duodenum: Unremarkable appearing duodenum.  Small Bowel: The small bowel appears unremarkable.  Colon: Nondistended. There is moderate stool in the colon which could reflect an element of constipation. Multiple diverticula are seen predominantly descending and sigmoid colon.  Appendix: The appendix is not identified but no inflammatory changes are seen in the right lower quadrant to suggest appendicitis.  Peritoneum: No intraperitoneal free air or ascites is seen.    Pelvis:  Bladder: The bladder is nondistended however the bladder  wall appears thickened after considering state of nondistension.  Female:  Uterus: The uterus is not identified.  Ovaries: The ovaries are not identified. No adnexal masses are seen.  Inguinal Findings: Incidental note is made of a few prominent inguinal lymph nodes on the left.    Bony structures:  Dorsal Spine: There is moderate multilevel spondylosis of the visualized dorsal spine.  Bony Pelvis: There is mild degenerative change of the bilateral hips. The visualized bony structures of the pelvis otherwise appear unremarkable.      Impression:  1. There is mild wall thickening and edema in the gastric antrum seen on Series 2 Image 56. Correlate clinically as regards possible mild acute gastritis.  2. There are some areas of diminished cortical enhancement of the right-greater-than-left kidney for instance on the right on image 36 series 2 and on the left at image 42 series 2. This may reflect scarring or streak and motion artifact however the possibility of pyelonephritis is considered. Correlate with clinical and laboratory findings.  3. The bladder is nondistended however the bladder wall appears thickened after considering state of nondistension. Cystitis is not excluded. Correlate with clinical and laboratory findings.  4. Details and other findings as discussed above.                                         Medications   albuterol-ipratropium 2.5 mg-0.5 mg/3 mL nebulizer solution 3 mL (3 mLs Nebulization Not Given 3/30/25 0445)   lactated ringers bolus 500 mL (0 mLs Intravenous Stopped 3/30/25 0330)   ondansetron injection 4 mg (4 mg Intravenous Given 3/30/25 0330)   iohexoL (OMNIPAQUE 350) injection 100 mL (100 mLs Intravenous Given 3/30/25 0358)     Medical Decision Making  84-year-old female with medical history notable for CVA with residual left spastic hemiparesis, epilepsy, septo-optic dysplasia/macular degeneration/blindness and trigeminal neuralgia, DDD/Lumbar spine surgery 2015, CAD/PCI/stents x4,  CHF-diastolic dysfunction, HTN, HLD, COPD not on home oxygen, and recurring UTIs, wheelchair dependent who presented to ED from [place] c/o n/v/d x3 wks - rec 4mg zofran.  IM   Patient was admitted on 03/20/2025 after a fall.    Patient tolerating p.o. in emergency room patient abdomen is soft CT does not reveal any abnormality mild leukocytosis is currently on antibiotics for UTI which her UA is positive will continue treatment at home no diarrhea in emergency room did have a formed stool        Amount and/or Complexity of Data Reviewed  Labs: ordered.  Radiology: ordered.    Risk  Prescription drug management.               ED Course as of 03/30/25 0454   Sun Mar 30, 2025   0450 Patient with UTI is currently on antibiotics and tolerating tolerates p.o. in the emergency room patient with borderline sat but patient refusing nebulizer [FK]   0450 CT without abnormality [FK]      ED Course User Index  [FK] Steve Thakur III, MD                           Clinical Impression:  Final diagnoses:  [R11.2] Nausea and vomiting, unspecified vomiting type (Primary)  [N39.0] Urinary tract infection without hematuria, site unspecified          ED Disposition Condition    Discharge Stable          ED Prescriptions    None       Follow-up Information       Follow up With Specialties Details Why Contact Info    Melody Ren MD Family Medicine In 3 days  [address] [facility]  [address]  [facility] [address] [phone]                   [1]   Social History  Tobacco Use    Smoking status: Former     Types: Cigarettes     Passive exposure: Past    Smokeless tobacco: Never   Substance Use Topics    Alcohol use: Never    Drug use: Never        Steve Thakur III, MD  03/30/25 0452

## 2025-03-31 ENCOUNTER — TELEPHONE (OUTPATIENT)
Dept: FAMILY MEDICINE | Facility: CLINIC | Age: 85
End: 2025-03-31

## 2025-03-31 ENCOUNTER — RESULTS FOLLOW-UP (OUTPATIENT)
Dept: FAMILY MEDICINE | Facility: CLINIC | Age: 85
End: 2025-03-31

## 2025-03-31 ENCOUNTER — OFFICE VISIT (OUTPATIENT)
Dept: FAMILY MEDICINE | Facility: CLINIC | Age: 85
End: 2025-03-31
Payer: MEDICARE

## 2025-03-31 VITALS
DIASTOLIC BLOOD PRESSURE: 85 MMHG | HEART RATE: 109 BPM | TEMPERATURE: 98 F | OXYGEN SATURATION: 95 % | HEIGHT: 64 IN | SYSTOLIC BLOOD PRESSURE: 131 MMHG | RESPIRATION RATE: 18 BRPM | BODY MASS INDEX: 20.49 KG/M2 | WEIGHT: 120 LBS

## 2025-03-31 DIAGNOSIS — K29.70 GASTRITIS, PRESENCE OF BLEEDING UNSPECIFIED, UNSPECIFIED CHRONICITY, UNSPECIFIED GASTRITIS TYPE: ICD-10-CM

## 2025-03-31 DIAGNOSIS — D35.02 ADENOMA OF LEFT ADRENAL GLAND: ICD-10-CM

## 2025-03-31 DIAGNOSIS — N39.0 BACTERIAL UTI: Primary | ICD-10-CM

## 2025-03-31 DIAGNOSIS — A49.9 BACTERIAL UTI: Primary | ICD-10-CM

## 2025-03-31 DIAGNOSIS — N39.0 RECURRENT UTI: ICD-10-CM

## 2025-03-31 DIAGNOSIS — R11.2 NAUSEA AND VOMITING, UNSPECIFIED VOMITING TYPE: ICD-10-CM

## 2025-03-31 DIAGNOSIS — R11.0 CHRONIC NAUSEA: ICD-10-CM

## 2025-03-31 DIAGNOSIS — R55 SYNCOPE, UNSPECIFIED SYNCOPE TYPE: ICD-10-CM

## 2025-03-31 DIAGNOSIS — R94.6 ABNORMAL RESULTS OF THYROID FUNCTION STUDIES: ICD-10-CM

## 2025-03-31 DIAGNOSIS — Z09 HOSPITAL DISCHARGE FOLLOW-UP: ICD-10-CM

## 2025-03-31 RX ORDER — PANTOPRAZOLE SODIUM 40 MG/1
40 TABLET, DELAYED RELEASE ORAL DAILY
Qty: 30 TABLET | Refills: 1 | Status: SHIPPED | OUTPATIENT
Start: 2025-03-31 | End: 2025-05-30

## 2025-03-31 RX ORDER — ONDANSETRON 4 MG/1
4 TABLET, ORALLY DISINTEGRATING ORAL EVERY 8 HOURS PRN
Qty: 30 TABLET | Refills: 5 | Status: SHIPPED | OUTPATIENT
Start: 2025-03-31

## 2025-03-31 NOTE — PROGRESS NOTES
Patient ID: [id number]     Chief Complaint: Follow-up (Hospital f/u 03/20)        HPI:     Lisseth Guajardo is a 84 y.o. female here today for a follow up s/p hospital discharge on 03/20/2025 due to near syncopal versus seizure and ER visit on 03/29/2025 due to cystitis.   Admit Date: 3/18/2025  Discharge Date and Time: 3/20/50891:39 AM  Admitting Physician: Hospitalist team   Discharging Physician: Yuri Garcia MD.  Primary Care Physician: Melody Ren MD        Discharge Diagnoses:  Syncope versus questionable breakthrough seizure  Acute encephalopathy/questionable postictal state- resolved  Acute kidney injury-resolved  Hyperkalemia-resolved  Asymptomatic bacteriuria  Mild Lactic acidemia -resolved  History of epilepsy, septo-optic dysplasia, macular degeneration, blindness, trigeminal neuralgia, CAD/remote stents, HTN, HLD, COPD not on home oxygen not in exacerbation.  Wheelchair-bound.     Hospital Course:   84-year-old female with medical history notable for CVA with residual left spastic hemiparesis, epilepsy, septo-optic dysplasia/macular degeneration/blindness and trigeminal neuralgia, DDD/Lumbar spine surgery 2015, CAD/PCI/stents x4, CHF-diastolic dysfunction, HTN, HLD, COPD not on home oxygen, and recurring UTIs, wheelchair dependent who presented to ED on 03/18/2025 as a level 1 trauma following a suspected fall and possible breakthrough seizure.  Upon arrival she was initially confused  Trauma survey imaging was unremarkable with no traumatic injuries, labs notable for mild lactic acidemia, hyperkalemia and acute kidney injury.  Started on IV fluids, IV Keppra and resumed on phenytoin and phenobarbital, and admitted to hospital medicine service.     Neurology consulted, no definite evidence of breakthrough seizure or sign of postictal state and recommended resumption of patient's home antiepileptic medications.       Of note her levels of both phenytoin and carbamazepine were  undetectable, I do not see phenobarbital level but UDS positive for barbiturates.     Patient returned to baseline.  Feeling better.  Urine culture growing less than 10,000 colony-forming units of Pseudomonas.  Suspect that this is a dirty catch based on urinalysis.  No further antibiotics needed.  With the patient but she follow up with her neurologist as an outpatient.  She states that she is followed by Dr. Richardson.  Reports not having a seizure at 59 years.  I discussed her that she may need some adjustment in her medications.  She is very resistant to make any changes without speaking to him.  No further inpatient workup needed at this time.  Okay to be discharged back to assisted living with home health services.    ER visit on 03/29/2025 due to cystitis and gastritis, still with nausea, non-bilious vomiting x 1 today. Has been taking Macrobid. Not taking any Rx gastritis. Cannot find her Zofran. She is blind and living in an assisted living facility, does not want to pay an extra $300 for staff to help with her medication administration during the weekends, she does have home health with [facility]. Discussed consideration of nursing home placement and patient is not interested in nursing home placement at this time. She denies abdominal pain, diarrhea, constipation, or bloody stools.     Transitional Care Note    Family and/or Caretaker present at visit?  No.  Diagnostic tests reviewed/disposition: No diagnosic tests pending after this hospitalization.  Disease/illness education: Cystitis, gastritis  Home health/community services discussion/referrals: [facility]   Establishment or re-establishment of referral orders for community resources: No other necessary community resources.   Discussion with other health care providers: No discussion with other health care providers necessary.     - Patient is without any other complaints today.          -------------------------------------    Cervical radiculitis    Cervical spinal stenosis    Cervicalgia    Coronary arteriosclerosis    DDD (degenerative disc disease), cervical    Depression    Epilepsy, unspecified, not intractable, with status epilepticus    High cholesterol    Hypokalemia    Mild renal insufficiency    Myocardial infarction    Osteoporosis    Primary hypertension    Recurrent UTI    Unspecified macular degeneration        Past Surgical History:   Procedure Laterality Date    APPENDECTOMY      BACK SURGERY      cardiac stents      CARPAL TUNNEL RELEASE      CATARACT EXTRACTION Left     CHOLECYSTECTOMY      crainotomy      FOOT SURGERY Left     HYSTERECTOMY      LUNG REMOVAL, PARTIAL Right     LYMPH NODE DISSECTION         Review of patient's allergies indicates:   Allergen Reactions    Nitrofurantoin monohyd/m-cryst Hives    Penicillin Hives       Outpatient Medications Marked as Taking for the 3/31/25 encounter (Office Visit) with Melody Ren MD   Medication Sig Dispense Refill    albuterol (PROVENTIL/VENTOLIN HFA) 90 mcg/actuation inhaler Inhale 1-2 puffs into the lungs every 6 (six) hours as needed for Wheezing or Shortness of Breath. 18 g 5    albuterol sulfate 90 mcg/actuation aebs Inhale 90 mcg into the lungs every 4 (four) hours as needed (2 puffs for wheezing).      ALPHAGAN P 0.15 % ophthalmic solution Place 1 drop into both eyes 3 (three) times daily. Duplicate- taking generic      aspirin (ECOTRIN) 81 MG EC tablet Take 81 mg by mouth once daily.      aspirin 81 MG Chew Take 81 mg by mouth once daily.      atorvastatin (LIPITOR) 40 MG tablet TAKE 1 TABLET DAILY 90 tablet 3    atorvastatin (LIPITOR) 40 MG tablet Take 40 mg by mouth once daily.      benzonatate (TESSALON) 100 MG capsule Take 1 capsule (100 mg total) by mouth 3 (three) times daily as needed for Cough. 21 capsule 0    benzonatate (TESSALON) 100 MG capsule Take 100 mg by mouth 3 (three) times daily as  needed for Cough.      brimonidine 0.15 % OPTH DROP (ALPHAGAN) 0.15 % ophthalmic solution 1 drop 3 (three) times daily.      brimonidine 0.2% (ALPHAGAN) 0.2 % Drop Place 1 drop into both eyes 3 (three) times daily.      calcium carbonate/vitamin D3 (CALTRATE 600 + D ORAL) Take 1 tablet by mouth 2 (two) times a day.      calcium-mag-vit B6-D3-minerals 937-72-4-125 jj-mh-wk-unit Tab Take 600 mg by mouth Daily.      carBAMazepine (CARBATROL) 100 MG CM12 Take 1 capsule (100 mg total) by mouth 2 (two) times a day. 180 capsule 3    carBAMazepine (CARBATROL) 100 MG CM12 Take 100 mg by mouth 2 (two) times daily.      cephALEXin (KEFLEX) 250 MG capsule Take 1 capsule (250 mg total) by mouth once daily. 90 capsule 3    COSOPT 22.3-6.8 mg/mL ophthalmic solution Place 1 drop into both eyes 2 (two) times daily.      COSOPT, PF, 2-0.5 % Dpet ophthalmic solution Place 1 drop into both eyes 2 (two) times daily.      diclofenac (FLECTOR) 1.3 % PT12 Apply 1 patch topically daily as needed (pain). 30 patch 2    dorzolamide-timolol 2-0.5% (COSOPT) 22.3-6.8 mg/mL ophthalmic solution 1 drop 3 (three) times daily.      fluconazole (DIFLUCAN) 150 MG Tab Take one tablet now and one in three days. 2 tablet 0    hydrOXYzine (ATARAX) 50 MG tablet Take 1 tablet (50 mg total) by mouth 3 (three) times daily as needed for Itching. 180 tablet 2    hydrOXYzine HCL (ATARAX) 10 MG Tab Take 1 tablet (10 mg total) by mouth 3 (three) times daily as needed (itching/anxiety). 90 tablet 3    ketoconazole (NIZORAL) 2 % cream Apply topically once daily. for 15 days 60 g 1    latanoprost 0.005 % ophthalmic solution 1 drop every evening.      loratadine (CLARITIN) 10 mg tablet TAKE 1 TABLET ONCE DAILY FOR ALLERGIES/FLUID IN THE EARS 90 tablet 3    melatonin (MELATIN) 3 mg tablet Take 1 tablet (3 mg total) by mouth nightly as needed for Insomnia. 90 tablet 3    metoprolol succinate (TOPROL-XL) 25 MG 24 hr tablet Take 25 mg by mouth once daily.      metoprolol  succinate 25 mg CSpX Take 25 mg by mouth once daily. 90 capsule 3    miconazole (MICOTIN) 2 % cream Apply 1 g topically 2 (two) times daily. (Patient taking differently: Apply 1 g topically 2 (two) times daily. PRN) 28 g 3    mv-mn/folic ac/calcium/vit K1 (WOMEN'S 50 PLUS MULTIVITAMIN ORAL) Take by mouth.      nitrofurantoin, macrocrystal-monohydrate, (MACROBID) 100 MG capsule Take 1 capsule (100 mg total) by mouth every 12 (twelve) hours. for 7 days 14 capsule 0    nystatin (MYCOSTATIN) powder Apply topically 4 (four) times daily. 60 g 1    PHENobarbitaL 32.4 MG tablet Take 1 tablet (32.4 mg total) by mouth every evening. 90 tablet 3    PHENobarbitaL 32.4 MG tablet Take 32.4 mg by mouth every evening.      phenytoin (DILANTIN) 100 MG ER capsule Take 1 capsule (100 mg total) by mouth 2 (two) times daily. 180 capsule 3    phenytoin (DILANTIN) 100 MG ER capsule Take 100 mg by mouth every morning.      potassium chloride SA (KLOR-CON M15) 15 MEQ tablet Take 20 mEq by mouth Daily. Every morning for 10 days      sulfamethoxazole-trimethoprim 400-80mg (BACTRIM,SEPTRA) 400-80 mg per tablet Take 1 tablet by mouth once daily. 90 tablet 3    XALATAN 0.005 % ophthalmic solution Place 1 drop into both eyes nightly.      [DISCONTINUED] ondansetron (ZOFRAN-ODT) 4 MG TbDL PLACE 1 TABLET ON TONGUE EVERY 8 HOURS AS NEEDED FOR NAUSEA/VOMITING 30 tablet 5       Social History[1]     Family History   Family history unknown: Yes        Subjective:       Review of Systems:    See HPI for details    Constitutional: Denies Change in appetite. Denies Chills. Denies Fever. Denies Night sweats.  Eye: Denies Blurred vision. Denies Discharge. Denies Eye pain.  ENT: Denies Decreased hearing. Denies Sore throat. Denies Swollen glands.  Respiratory: Denies Cough. Denies Shortness of breath. Denies Shortness of breath with exertion. Denies Wheezing.  Cardiovascular: Denies Chest pain at rest. Denies Chest pain with exertion. Denies Irregular  "heartbeat. Denies Palpitations.  Gastrointestinal: Denies Abdominal pain. Denies Diarrhea. Reports Nausea. Reports Vomiting. Denies Hematemesis or Hematochezia.  Genitourinary: Denies Dysuria. Denies Urinary frequency. Denies Urinary urgency. Denies Blood in urine.  Endocrine: Denies Cold intolerance. Denies Excessive thirst. Denies Heat intolerance. Denies Weight loss. Denies Weight gain.  Musculoskeletal: Denies Painful joints. Denies Weakness.  Integumentary: Denies Rash. Denies Itching. Denies Dry skin.  Neurologic: Denies Dizziness. Denies Fainting. Denies Headache.  Psychiatric: Denies Depression. Denies Anxiety. Denies Suicidal/Homicidal ideations.    All Other ROS: Negative except as stated in HPI.       Objective:     /85 (BP Location: Right arm, Patient Position: Sitting)   Pulse 109   Temp 97.8 °F (36.6 °C) (Oral)   Resp 18   Ht 5' 4" (1.626 m)   Wt 54.4 kg (120 lb)   SpO2 95%   BMI 20.60 kg/m²     Physical Exam    General: Alert and oriented, No acute distress.  Head: Normocephalic, Atraumatic.  Eye: Blindness, Extraocular movements are intact, Sclera non-icteric.  Ears/Nose/Throat: OP clear, MMM  Neck/Thyroid: Supple, Non-tender, No carotid bruit, No palpable thyromegaly or thyroid nodule, No lymphadenopathy, No JVD, Full range of motion.  Respiratory: Clear to auscultation bilaterally; No wheezes, rales or rhonchi,Non-labored respirations, Symmetrical chest wall expansion.  Cardiovascular: Regular rate and rhythm, S1/S2 normal, No murmurs, rubs or gallops.  Gastrointestinal: Soft, Non-tender, Non-distended, Normal bowel sounds, No palpable organomegaly.  Musculoskeletal: Normal range of motion.   Integumentary: Warm, Dry, Intact, No suspicious lesions.   Extremities: Wheelchair bound. No clubbing, cyanosis or edema  Neurologic: No focal deficits, Cranial Nerves II-XII are grossly intact, Motor strength normal upper and lower extremities, Sensory exam intact.  Psychiatric: Normal " interaction, Coherent speech, Euthymic mood, Appropriate affect.     *Hospital admission from 03/18/2025 and ER visit from 03/29/2025 including CT abdomen/pelvis, were reviewed and discussed with patient and patient voices understanding.*          Assessment:       ICD-10-CM ICD-9-CM   1. Bacterial UTI  N39.0 599.0    A49.9 041.9   2. Hospital discharge follow-up  Z09 V67.59   3. Syncope, unspecified syncope type  R55 780.2   4. Nausea and vomiting, unspecified vomiting type  R11.2 787.01   5. Chronic nausea  R11.0 787.02   6. Adenoma of left adrenal gland  D35.02 227.0   7. Recurrent UTI  N39.0 599.0   8. Abnormal results of thyroid function studies  R94.6 794.5   9. Gastritis, presence of bleeding unspecified, unspecified chronicity, unspecified gastritis type  K29.70 535.50        Plan:     Problem List Items Addressed This Visit          Other    Hospital discharge follow-up    Relevant Orders    Ambulatory referral/consult to Urology     Other Visit Diagnoses         Bacterial UTI    -  Primary    Relevant Orders    CBC Auto Differential    Comprehensive Metabolic Panel    TSH      Syncope, unspecified syncope type          Nausea and vomiting, unspecified vomiting type        Relevant Orders    Ambulatory referral/consult to Gastroenterology      Chronic nausea        Relevant Medications    ondansetron (ZOFRAN-ODT) 4 MG TbDL    Other Relevant Orders    CBC Auto Differential    Comprehensive Metabolic Panel    TSH    Ambulatory referral/consult to Gastroenterology      Adenoma of left adrenal gland        Relevant Orders    Ambulatory referral/consult to Urology      Recurrent UTI        Relevant Orders    Ambulatory referral/consult to Urology      Abnormal results of thyroid function studies        Relevant Orders    TSH      Gastritis, presence of bleeding unspecified, unspecified chronicity, unspecified gastritis type        Relevant Medications    pantoprazole (PROTONIX) 40 MG tablet         1. Bacterial  UTI  - CBC Auto Differential; Future  - Comprehensive Metabolic Panel; Future  - TSH; Future  - Urine culture is still pending from ER visit on 03/29/2025, will treat pending results, UTI precautions encouraged, referral to Urology for evaluation and treatment. Notify M.D. or ER if symptoms persist or worsen, hematuria, vomiting, abdominal pain, temp >100.4, or any acute illness.      2. Hospital discharge follow-up  - Ambulatory referral/consult to Urology; Future  - Same as #1.     3. Syncope, unspecified syncope type  - Resolved, continue followup with Neurology as scheduled.    4. Nausea and vomiting, unspecified vomiting type  - Ambulatory referral/consult to Gastroenterology; Future for evaluation and treatment  - Rx Zofran refilled today.  - Will treat pending results. Notify M.D. or ER if symptoms persist or worsen, abdominal pain, vomiting, bloody stools, temp >100.4, or any acute illness.      5. Chronic nausea  - ondansetron (ZOFRAN-ODT) 4 MG TbDL; Take 1 tablet (4 mg total) by mouth every 8 (eight) hours as needed (nausea/vomiting).  Dispense: 30 tablet; Refill: 5  - CBC Auto Differential; Future  - Comprehensive Metabolic Panel; Future  - TSH; Future  - Ambulatory referral/consult to Gastroenterology; Future  - Same as #4.     6. Adenoma of left adrenal gland  - Ambulatory referral/consult to Urology; Future for evaluation and treatment; annual CT abdomen/pelvis in 03/2026 to ensure stability.     7. Recurrent UTI  - Ambulatory referral/consult to Urology; Future  - Same as #1.     8. Abnormal results of thyroid function studies  - TSH; Future    9. Gastritis, presence of bleeding unspecified, unspecified chronicity, unspecified gastritis type  - Rx trial of pantoprazole (PROTONIX) 40 MG tablet; Take 1 tablet (40 mg total) by mouth once daily.  Dispense: 30 tablet; Refill: 1; GERD dietary precautions encouraged; increase hydration to avoid dehydration. Same as #4.         Lisseth was seen today for  follow-up.    Diagnoses and all orders for this visit:    Bacterial UTI  -     CBC Auto Differential; Future  -     Comprehensive Metabolic Panel; Future  -     TSH; Future    Hospital discharge follow-up  -     Ambulatory referral/consult to Urology; Future    Syncope, unspecified syncope type    Nausea and vomiting, unspecified vomiting type  -     Ambulatory referral/consult to Gastroenterology; Future    Chronic nausea  -     ondansetron (ZOFRAN-ODT) 4 MG TbDL; Take 1 tablet (4 mg total) by mouth every 8 (eight) hours as needed (nausea/vomiting).  -     CBC Auto Differential; Future  -     Comprehensive Metabolic Panel; Future  -     TSH; Future  -     Ambulatory referral/consult to Gastroenterology; Future    Adenoma of left adrenal gland  -     Ambulatory referral/consult to Urology; Future    Recurrent UTI  -     Ambulatory referral/consult to Urology; Future    Abnormal results of thyroid function studies  -     TSH; Future    Gastritis, presence of bleeding unspecified, unspecified chronicity, unspecified gastritis type  -     pantoprazole (PROTONIX) 40 MG tablet; Take 1 tablet (40 mg total) by mouth once daily.          Medication List with Changes/Refills   New Medications    PANTOPRAZOLE (PROTONIX) 40 MG TABLET    Take 1 tablet (40 mg total) by mouth once daily.       Start Date: 3/31/2025 End Date: 5/30/2025   Current Medications    ALBUTEROL (PROVENTIL/VENTOLIN HFA) 90 MCG/ACTUATION INHALER    Inhale 1-2 puffs into the lungs every 6 (six) hours as needed for Wheezing or Shortness of Breath.       Start Date: 8/26/2024 End Date: --    ALBUTEROL SULFATE 90 MCG/ACTUATION AEBS    Inhale 90 mcg into the lungs every 4 (four) hours as needed (2 puffs for wheezing).       Start Date: --        End Date: --    ALPHAGAN P 0.15 % OPHTHALMIC SOLUTION    Place 1 drop into both eyes 3 (three) times daily. Duplicate- taking generic       Start Date: 4/25/2022 End Date: --    ASPIRIN (ECOTRIN) 81 MG EC TABLET    Take  81 mg by mouth once daily.       Start Date: 7/14/2021 End Date: --    ASPIRIN 81 MG CHEW    Take 81 mg by mouth once daily.       Start Date: --        End Date: --    ATORVASTATIN (LIPITOR) 40 MG TABLET    TAKE 1 TABLET DAILY       Start Date: 10/11/2023End Date: --    ATORVASTATIN (LIPITOR) 40 MG TABLET    Take 40 mg by mouth once daily.       Start Date: --        End Date: --    BENZONATATE (TESSALON) 100 MG CAPSULE    Take 1 capsule (100 mg total) by mouth 3 (three) times daily as needed for Cough.       Start Date: 11/3/2023 End Date: --    BENZONATATE (TESSALON) 100 MG CAPSULE    Take 100 mg by mouth 3 (three) times daily as needed for Cough.       Start Date: --        End Date: --    BRIMONIDINE 0.15 % OPTH DROP (ALPHAGAN) 0.15 % OPHTHALMIC SOLUTION    1 drop 3 (three) times daily.       Start Date: --        End Date: --    BRIMONIDINE 0.2% (ALPHAGAN) 0.2 % DROP    Place 1 drop into both eyes 3 (three) times daily.       Start Date: 4/25/2022 End Date: --    CALCIUM CARBONATE/VITAMIN D3 (CALTRATE 600 + D ORAL)    Take 1 tablet by mouth 2 (two) times a day.       Start Date: 1/4/2022  End Date: --    CALCIUM-MAG-VIT B6-D3-MINERALS 113-34-5-125 ZF-CV-QD-UNIT TAB    Take 600 mg by mouth Daily.       Start Date: --        End Date: --    CARBAMAZEPINE (CARBATROL) 100 MG CM12    Take 1 capsule (100 mg total) by mouth 2 (two) times a day.       Start Date: 2/13/2025 End Date: --    CARBAMAZEPINE (CARBATROL) 100 MG CM12    Take 100 mg by mouth 2 (two) times daily.       Start Date: --        End Date: --    CEPHALEXIN (KEFLEX) 250 MG CAPSULE    Take 1 capsule (250 mg total) by mouth once daily.       Start Date: 10/10/2024End Date: --    COSOPT 22.3-6.8 MG/ML OPHTHALMIC SOLUTION    Place 1 drop into both eyes 2 (two) times daily.       Start Date: 4/25/2022 End Date: --    COSOPT, PF, 2-0.5 % DPET OPHTHALMIC SOLUTION    Place 1 drop into both eyes 2 (two) times daily.       Start Date: 7/10/2023 End Date: --     DICLOFENAC (FLECTOR) 1.3 % PT12    Apply 1 patch topically daily as needed (pain).       Start Date: 10/12/2023End Date: --    DORZOLAMIDE-TIMOLOL 2-0.5% (COSOPT) 22.3-6.8 MG/ML OPHTHALMIC SOLUTION    1 drop 3 (three) times daily.       Start Date: --        End Date: --    FLUCONAZOLE (DIFLUCAN) 150 MG TAB    Take one tablet now and one in three days.       Start Date: 5/20/2024 End Date: --    HYDROXYZINE (ATARAX) 50 MG TABLET    Take 1 tablet (50 mg total) by mouth 3 (three) times daily as needed for Itching.       Start Date: 8/22/2024 End Date: --    HYDROXYZINE HCL (ATARAX) 10 MG TAB    Take 1 tablet (10 mg total) by mouth 3 (three) times daily as needed (itching/anxiety).       Start Date: 8/26/2024 End Date: --    KETOCONAZOLE (NIZORAL) 2 % CREAM    Apply topically once daily. for 15 days       Start Date: 3/24/2025 End Date: 4/8/2025    LATANOPROST 0.005 % OPHTHALMIC SOLUTION    1 drop every evening.       Start Date: --        End Date: --    LORATADINE (CLARITIN) 10 MG TABLET    TAKE 1 TABLET ONCE DAILY FOR ALLERGIES/FLUID IN THE EARS       Start Date: 10/16/2023End Date: --    MELATONIN (MELATIN) 3 MG TABLET    Take 1 tablet (3 mg total) by mouth nightly as needed for Insomnia.       Start Date: 9/4/2024  End Date: 9/4/2025    METOPROLOL SUCCINATE (TOPROL-XL) 25 MG 24 HR TABLET    Take 25 mg by mouth once daily.       Start Date: --        End Date: --    METOPROLOL SUCCINATE 25 MG CSPX    Take 25 mg by mouth once daily.       Start Date: 2/19/2024 End Date: --    MICONAZOLE (MICOTIN) 2 % CREAM    Apply 1 g topically 2 (two) times daily.       Start Date: 6/3/2022  End Date: --    MV-MN/FOLIC AC/CALCIUM/VIT K1 (WOMEN'S 50 PLUS MULTIVITAMIN ORAL)    Take by mouth.       Start Date: --        End Date: --    NITROFURANTOIN, MACROCRYSTAL-MONOHYDRATE, (MACROBID) 100 MG CAPSULE    Take 1 capsule (100 mg total) by mouth every 12 (twelve) hours. for 7 days       Start Date: 3/27/2025 End Date: 4/3/2025     NYSTATIN (MYCOSTATIN) POWDER    Apply topically 4 (four) times daily.       Start Date: 3/27/2025 End Date: --    PHENOBARBITAL 32.4 MG TABLET    Take 1 tablet (32.4 mg total) by mouth every evening.       Start Date: 1/6/2025  End Date: --    PHENOBARBITAL 32.4 MG TABLET    Take 32.4 mg by mouth every evening.       Start Date: --        End Date: --    PHENYTOIN (DILANTIN) 100 MG ER CAPSULE    Take 1 capsule (100 mg total) by mouth 2 (two) times daily.       Start Date: 11/6/2024 End Date: --    PHENYTOIN (DILANTIN) 100 MG ER CAPSULE    Take 100 mg by mouth every morning.       Start Date: --        End Date: --    POTASSIUM CHLORIDE SA (KLOR-CON M15) 15 MEQ TABLET    Take 20 mEq by mouth Daily. Every morning for 10 days       Start Date: --        End Date: --    SULFAMETHOXAZOLE-TRIMETHOPRIM 400-80MG (BACTRIM,SEPTRA) 400-80 MG PER TABLET    Take 1 tablet by mouth once daily.       Start Date: 9/23/2024 End Date: --    XALATAN 0.005 % OPHTHALMIC SOLUTION    Place 1 drop into both eyes nightly.       Start Date: 4/25/2022 End Date: --   Changed and/or Refilled Medications    Modified Medication Previous Medication    ONDANSETRON (ZOFRAN-ODT) 4 MG TBDL ondansetron (ZOFRAN-ODT) 4 MG TbDL       Take 1 tablet (4 mg total) by mouth every 8 (eight) hours as needed (nausea/vomiting).    PLACE 1 TABLET ON TONGUE EVERY 8 HOURS AS NEEDED FOR NAUSEA/VOMITING       Start Date: 3/31/2025 End Date: --    Start Date: 7/22/2024 End Date: 3/31/2025          Follow up in about 4 months (around 8/14/2025).          [1]   Social History  Socioeconomic History    Marital status:    Tobacco Use    Smoking status: Former     Types: Cigarettes     Passive exposure: Past    Smokeless tobacco: Never   Substance and Sexual Activity    Alcohol use: Never    Drug use: Never    Sexual activity: Not Currently   Social History Narrative    ** Merged History Encounter **          Social Drivers of Health     Financial Resource Strain: Low  Risk  (3/19/2025)    Overall Financial Resource Strain (CARDIA)     Difficulty of Paying Living Expenses: Not very hard   Food Insecurity: No Food Insecurity (3/19/2025)    Hunger Vital Sign     Worried About Running Out of Food in the Last Year: Never true     Ran Out of Food in the Last Year: Never true   Transportation Needs: Unmet Transportation Needs (1/9/2024)    PRAPARE - Transportation     Lack of Transportation (Medical): No     Lack of Transportation (Non-Medical): Yes   Physical Activity: Inactive (1/9/2024)    Exercise Vital Sign     Days of Exercise per Week: 0 days     Minutes of Exercise per Session: 0 min   Stress: No Stress Concern Present (3/19/2025)    [facility] - Occupational Stress Questionnaire     Feeling of Stress : Not at all   Housing Stability: Low Risk  (3/19/2025)    Housing Stability Vital Sign     Unable to Pay for Housing in the Last Year: No     Homeless in the Last Year: No

## 2025-03-31 NOTE — TELEPHONE ENCOUNTER
----- Message from Melody Ren MD sent at 3/31/2025  2:37 PM CDT -----  Please advise them to bring patient to [facility] for evaluation and treatment and may need IVFs. Thanks.  ----- Message -----  From: Cookie Santana LPN  Sent: 3/31/2025   2:25 PM CDT  To: Melody Ren MD    Home health nurse called to report patient B/P 90/60, P 101.  States patient was upset and speaking to her  mother.  Concerned that patient may need fluids. Please advise.  ----- Message -----  From: Armando Rojas  Sent: 3/31/2025   2:18 PM CDT  To: Mikal VAZQUEZ Staff    .Who Called: [facility] Caller is requesting assistance/information from provider's office.Symptoms (please be specific):  n/aHow long has patient had these symptoms:  n/aList of preferred pharmacies on file (remove unneeded): [unfilled]If different, enter pharmacy into here including location and phone number: n/aPreferred Method of Contact: Phone CallPatient's Preferred Phone Number on File: [phone] Best Call Back Number, if different: [phone] Information: called requesting to speak with nurse  calling to report abnormal vital signs and an abnormal assessment.

## 2025-04-01 RX ORDER — CIPROFLOXACIN 500 MG/1
500 TABLET, FILM COATED ORAL EVERY 12 HOURS
Qty: 14 TABLET | Refills: 0 | Status: SHIPPED | OUTPATIENT
Start: 2025-04-01 | End: 2025-04-08

## 2025-04-02 ENCOUNTER — TELEPHONE (OUTPATIENT)
Dept: FAMILY MEDICINE | Facility: CLINIC | Age: 85
End: 2025-04-02
Payer: MEDICARE

## 2025-04-02 DIAGNOSIS — M81.0 OSTEOPOROSIS, UNSPECIFIED OSTEOPOROSIS TYPE, UNSPECIFIED PATHOLOGICAL FRACTURE PRESENCE: Primary | ICD-10-CM

## 2025-04-02 LAB
BACTERIA UR CULT: ABNORMAL
BACTERIA UR CULT: ABNORMAL

## 2025-04-02 NOTE — TELEPHONE ENCOUNTER
----- Message from Melody Ren MD sent at 4/1/2025  7:57 PM CDT -----  Preliminary urine culture shows evidence of bacterial urinary tract infection, discontinue Macrobid, Keflex, and bactrim DS, susceptible to Cipro. I sent an antibiotic called Cipro to take as   directed while awaiting results of final urine culture with sensitivity results.     Here are some evidence-based strategies to prevent UTIs:   Hydration:   · Drink plenty of fluids, especially water, throughout the day. This helps dilute urine and flush out bacteria.   Hygiene:   · Wipe from front to back after using the bathroom.  · Wash the genital area regularly with soap and water.  · Avoid using douches, sprays, or powders in the genital area.   Urination Habits:   · Urinate frequently, especially after sexual intercourse.  · Empty the bladder completely when you urinate.  · Avoid holding urine for long periods.   Dietary Modifications:   · Consume cranberry juice or cranberry supplements. Cranberries contain proanthocyanidins, which may help prevent bacteria from adhering to the bladder wall.   · Consider taking vitamin C supplements, as it may help acidify the urine, making it less hospitable to bacteria.   Other Measures:   · Use a heating pad on the lower abdomen to increase blood flow and help fight infection.   · Wear breathable cotton underwear.   · Avoid using tampons or sanitary pads for extended periods.   · Take probiotics, which may help boost the immune system and fight off bacteria.      [facility] for your [facility] needs. Have a great day!   -Melody Ren MD, [facility]    ----- Message -----  From: Lab, Background User  Sent: 3/30/2025   1:33 AM CDT  To: Melody Ren MD

## 2025-04-22 ENCOUNTER — TELEPHONE (OUTPATIENT)
Dept: FAMILY MEDICINE | Facility: CLINIC | Age: 85
End: 2025-04-22
Payer: MEDICARE

## 2025-04-22 DIAGNOSIS — R53.81 PHYSICAL DECONDITIONING: ICD-10-CM

## 2025-04-22 DIAGNOSIS — M43.9 COMPRESSION DEFORMITY OF VERTEBRA: ICD-10-CM

## 2025-04-22 DIAGNOSIS — M41.9 SCOLIOSIS, UNSPECIFIED SCOLIOSIS TYPE, UNSPECIFIED SPINAL REGION: Primary | ICD-10-CM

## 2025-04-22 NOTE — TELEPHONE ENCOUNTER
----- Message from Nurse Singh sent at 4/21/2025 11:57 AM CDT -----  Regarding: FW: REFERREL    ----- Message -----  From: Marlen Berry  Sent: 4/17/2025   1:26 PM CDT  To: Samantha Maciel LPN  Subject: NICOL MCCLENDON WITH [facility] CALLED REQUESTING A REFERRAL FOR MS. FRANCISCO TO GET PHYSICAL THERAPY IN THE HOME CALL BACK NUMBER: [phone]

## 2025-04-28 DIAGNOSIS — B35.6 TINEA CRURIS: ICD-10-CM

## 2025-04-28 RX ORDER — NYSTATIN 100000 [USP'U]/G
POWDER TOPICAL 4 TIMES DAILY
Qty: 60 G | Refills: 1 | Status: SHIPPED | OUTPATIENT
Start: 2025-04-28

## 2025-05-01 ENCOUNTER — TELEPHONE (OUTPATIENT)
Dept: FAMILY MEDICINE | Facility: CLINIC | Age: 85
End: 2025-05-01
Payer: MEDICARE

## 2025-05-01 NOTE — TELEPHONE ENCOUNTER
----- Message from Margarita sent at 4/29/2025  1:47 PM CDT -----  .Who Called: Lisseth Kent GaurisPreferred Method of Contact: Phone CallPatient's Preferred Phone Number on File: [phone] Best Call Back Number, if different:Additional Information: pt asking what can she use til her script comes thru mail order ?

## 2025-05-07 ENCOUNTER — TELEPHONE (OUTPATIENT)
Dept: FAMILY MEDICINE | Facility: CLINIC | Age: 85
End: 2025-05-07
Payer: MEDICARE

## 2025-05-07 DIAGNOSIS — R56.9 SEIZURES: Primary | ICD-10-CM

## 2025-05-21 ENCOUNTER — TELEPHONE (OUTPATIENT)
Dept: FAMILY MEDICINE | Facility: CLINIC | Age: 85
End: 2025-05-21
Payer: MEDICARE

## 2025-05-22 ENCOUNTER — TELEPHONE (OUTPATIENT)
Dept: FAMILY MEDICINE | Facility: CLINIC | Age: 85
End: 2025-05-22
Payer: MEDICARE

## 2025-05-22 DIAGNOSIS — B35.6 TINEA CRURIS: ICD-10-CM

## 2025-05-22 RX ORDER — NYSTATIN 100000 [USP'U]/G
POWDER TOPICAL 4 TIMES DAILY
Qty: 60 G | Refills: 1 | Status: SHIPPED | OUTPATIENT
Start: 2025-05-22

## 2025-05-22 NOTE — TELEPHONE ENCOUNTER
Patient states that she recently was admitted for UTI and when she returned to her residence her Antifungal powder was missing.  She is asking for another script.

## 2025-05-22 NOTE — TELEPHONE ENCOUNTER
Copied from CRM #[id number]. Topic: General Inquiry - Patient Advice  >> May 22, 2025 10:21 AM Armando wrote:  .Who Called: Lisseth Guajardo    Caller is requesting assistance/information from provider's office.    Symptoms (please be specific): n/a   How long has patient had these symptoms:  n/a  List of preferred pharmacies on file (remove unneeded): [unfilled]  If different, enter pharmacy into here including location and phone number: n/a      Preferred Method of Contact: Phone Call  Patient's Preferred Phone Number on File: [phone]   Best Call Back Number, if different:  Additional Information: pt called requesting to speak with nurse

## 2025-05-22 NOTE — TELEPHONE ENCOUNTER
Copied from CRM #[id number]. Topic: General Inquiry - Patient Advice  >> May 22, 2025  3:58 PM Rosalva wrote:  Who Called: Hayley    Caller is requesting assistance/information from provider's office.    Symptoms (please be specific):    How long has patient had these symptoms:   List of preferred pharmacies on file (remove unneeded): [unfilled]  If different, enter pharmacy into here including location and phone number:       Preferred Method of Contact: Phone Call  Patient's Preferred Phone Number on File: [phone]   Best Call Back Number, if different:[phone]  Additional Information: Wondering if md will sign off on orders and get her in home health

## 2025-05-29 ENCOUNTER — OFFICE VISIT (OUTPATIENT)
Dept: FAMILY MEDICINE | Facility: CLINIC | Age: 85
End: 2025-05-29
Payer: MEDICARE

## 2025-05-29 VITALS
HEART RATE: 75 BPM | HEIGHT: 64 IN | BODY MASS INDEX: 18.44 KG/M2 | WEIGHT: 108 LBS | SYSTOLIC BLOOD PRESSURE: 79 MMHG | DIASTOLIC BLOOD PRESSURE: 51 MMHG | OXYGEN SATURATION: 98 %

## 2025-05-29 DIAGNOSIS — Z09 HOSPITAL DISCHARGE FOLLOW-UP: Primary | ICD-10-CM

## 2025-05-29 DIAGNOSIS — R41.82 ALTERED MENTAL STATUS, UNSPECIFIED ALTERED MENTAL STATUS TYPE: ICD-10-CM

## 2025-05-29 DIAGNOSIS — R56.9 SEIZURES: ICD-10-CM

## 2025-05-29 DIAGNOSIS — N39.0 RECURRENT UTI: ICD-10-CM

## 2025-05-29 DIAGNOSIS — I95.9 HYPOTENSION, UNSPECIFIED HYPOTENSION TYPE: ICD-10-CM

## 2025-05-29 NOTE — PROGRESS NOTES
Family Medicine        Patient ID: [id number]     Chief Complaint: Follow-up (Home health)      HPI:       Lisseth Guajardo is a 84 y.o. female here today for a follow up s/p hospital discharge on 03/20/2025 due to near syncopal versus seizure and ER visit on 03/29/2025 due to cystitis.   Admit Date: 5/16/2025  Discharge Date and Time: 5/21/25  Admitting Provider: Romeo Holley MD   Attending Provider: Sandie Blanco MD   Primary Care Physician: Melody Ren MD        Discharge Diagnoses:  1. Altered mental status, unspecified altered mental status type   2. Acute metabolic encephalopathy   3. Urinary tract infection without hematuria, site unspecified   4. Hypotension, unspecified hypotension type       Hospital Course:   84-year-old female with medical history notable for CVA with residual left spastic hemiparesis, epilepsy, septo-optic dysplasia/macular degeneration/blindness and trigeminal neuralgia, DDD/Lumbar spine surgery 2015, CAD/PCI/stents x4, CHF-diastolic dysfunction, HTN, HLD, COPD not on home oxygen, and recurring UTIs, wheelchair dependent who initially presented to ED on 5/16/25 with complaint of mid and lower back pain. Symptoms started the night before and persisted into this morning. No history of any trauma. No fever. No urinary changes. No abdominal pain.    CT of lumbar spine showed:  1. No acute vertebral compression fracture detected. MRI would be more sensitive for noncompressed fractures.   2. Diffuse osteopenia with chronic T12 vertebral compression fracture unchanged since 2022.     Labs collected showing UTI with multiple bacteria with culture pending, so patient prescribed cefdinir for 7 days and pain medication for her back and discharged home back to assisted living.    Patient later presented back to emergency room on 05/17/2025 with worsening altered mental status, generalized weakness, and was admitted for further treatment and workup  Final culture grew  Enterococcus faecalis patient is started on vancomycin in hospital and discharged home on prophylactic Macrobid    Of note her levels of both phenytoin and carbamazepine were subtherapeutic upon discharge, medications were restarted at previous dosage once patient able to take.    Patient hypotensive in office.  BP 70/ 50s Patient sitter reports that she has been weak at the assisted living and not drinking much water.  She only has a sitter at the assisted living 5 days of the week. She denies taking her medications this am. She states that her family has taken her medications from her while she has been at the assisted living and she has not had acesss to them when her daily sitter is not with her for her safety.    She is blind and living in an assisted living facility, does not want to pay an extra $300 for staff to help with her medication administration during the weekends, she does have home health with [facility]. Discussed she really needs to consider nursing home placement for her safety but and patient is not interested in nursing home placement at this time. She denies abdominal pain, diarrhea, constipation, or bloody stools.     Patient previously referred to Dr. Leilani Forte with Urology on 03/31/2025.  Unable to confirm if she saw that specialist     Transitional Care Note     Family and/or Caretaker present at visit?  Sitter  Diagnostic tests reviewed/disposition: No diagnosic tests pending after this hospitalization.  Home health/community services discussion/referrals: Patient has home health established at [facility].   Establishment or re-establishment of referral orders for community resources: No other necessary community resources.   Discussion with other health care providers: No discussion with other health care providers necessary.      - Patient is without any other complaints today.     Past Medical History:   Diagnosis Date    Cervical radiculitis 08/29/2022    Cervical  spinal stenosis 08/29/2022    Cervicalgia 08/29/2022    Coronary arteriosclerosis     DDD (degenerative disc disease), cervical 08/29/2022    Depression     Epilepsy, unspecified, not intractable, with status epilepticus     High cholesterol     Hypokalemia     Mild renal insufficiency 07/06/2022    Myocardial infarction     Osteoporosis     Primary hypertension 1/31/2023    Recurrent UTI     Unspecified macular degeneration         Past Surgical History:   Procedure Laterality Date    APPENDECTOMY      BACK SURGERY      cardiac stents      CARPAL TUNNEL RELEASE      CATARACT EXTRACTION Left     CHOLECYSTECTOMY      crainotomy      FOOT SURGERY Left     HYSTERECTOMY      LUNG REMOVAL, PARTIAL Right     LYMPH NODE DISSECTION          Social History     Tobacco Use    Smoking status: Former     Types: Cigarettes     Passive exposure: Past    Smokeless tobacco: Never   Substance and Sexual Activity    Alcohol use: Never    Drug use: Never    Sexual activity: Not Currently        Current Outpatient Medications   Medication Instructions    albuterol (PROVENTIL/VENTOLIN HFA) 90 mcg/actuation inhaler 1-2 puffs, Inhalation, Every 6 hours PRN    albuterol sulfate 90 mcg, Every 4 hours PRN    ALPHAGAN P 0.15 % ophthalmic solution 1 drop, 3 times daily    aspirin (ECOTRIN) 81 mg, Daily    atorvastatin (LIPITOR) 40 mg, Oral    benzonatate (TESSALON) 100 mg, Oral, 3 times daily PRN    calcium carbonate/vitamin D3 (CALTRATE 600 + D ORAL) 1 tablet, 2 times daily    calcium-mag-vit B6-D3-minerals 248-72-8-125 bj-li-ob-unit Tab 600 mg, Daily    carBAMazepine (CARBATROL) 100 mg, Oral, 2 times daily    COSOPT 22.3-6.8 mg/mL ophthalmic solution 1 drop, 2 times daily    COSOPT, PF, 2-0.5 % Dpet ophthalmic solution 1 drop, 2 times daily    diclofenac (FLECTOR) 1.3 % PT12 1 patch, Topical (Top), Daily PRN    dorzolamide-timolol 2-0.5% (COSOPT) 22.3-6.8 mg/mL ophthalmic solution 1 drop, 3 times daily    fluconazole (DIFLUCAN) 150 MG Tab  Take one tablet now and one in three days.    hydrOXYzine (ATARAX) 50 mg, Oral, 3 times daily PRN    hydrOXYzine HCL (ATARAX) 10 mg, Oral, 3 times daily PRN    ketoconazole (NIZORAL) 2 % cream Topical (Top), Daily    latanoprost 0.005 % ophthalmic solution 1 drop, Nightly    loratadine (CLARITIN) 10 mg tablet TAKE 1 TABLET ONCE DAILY FOR ALLERGIES/FLUID IN THE EARS    melatonin (MELATIN) 3 mg, Oral, Nightly PRN    metoprolol succinate 25 mg, Oral, Daily    miconazole (MICOTIN) 1 g, Topical (Top), 2 times daily    mv-mn/folic ac/calcium/vit K1 (WOMEN'S 50 PLUS MULTIVITAMIN ORAL) Take by mouth.    nystatin (MYCOSTATIN) powder Topical (Top), 4 times daily    ondansetron (ZOFRAN-ODT) 4 mg, Oral, Every 8 hours PRN    pantoprazole (PROTONIX) 40 mg, Oral, Daily    PHENobarbitaL 32.4 mg, Oral, Nightly    phenytoin (DILANTIN) 100 mg, Oral, 2 times daily    potassium chloride SA (KLOR-CON M15) 15 MEQ tablet 20 mEq, Daily    XALATAN 0.005 % ophthalmic solution 1 drop, Nightly       Review of patient's allergies indicates:   Allergen Reactions    Nitrofurantoin monohyd/m-cryst Hives    Penicillin Hives        Patient Care Team:  Melody Ren MD as PCP - General (Family Medicine)  Melody Ren MD (Family Medicine)  Melody Ren MD (Family Medicine)     Subjective:     Review of Systems   Constitutional:  Positive for fatigue. Negative for activity change and unexpected weight change.   Eyes:         Baseline blindness   Respiratory: Negative.     Cardiovascular: Negative.    Gastrointestinal: Negative.    Endocrine: Negative.    Genitourinary:  Positive for frequency.        Incontinent in diapers   Musculoskeletal:  Positive for gait problem.        At baseline in wheelchair, issues with transferring    Skin: Negative.    Allergic/Immunologic: Negative.    Neurological:  Positive for weakness. Negative for dizziness and syncope.   Hematological: Negative.    Psychiatric/Behavioral: Negative.    "      12 point review of systems conducted, negative except as stated in the history of present illness. See HPI for details.    Objective:     Visit Vitals  BP (!) 79/51 (BP Location: Right arm, Patient Position: Sitting)   Pulse 75   Ht 5' 4" (1.626 m)   Wt 49 kg (108 lb)   SpO2 98%   BMI 18.54 kg/m²       Physical Exam  Constitutional:       Appearance: Normal appearance.      Comments: In wheelchair   HENT:      Head: Normocephalic and atraumatic.      Mouth/Throat:      Mouth: Mucous membranes are moist.   Eyes:      Comments: Baseline blindness bilaterally   Cardiovascular:      Rate and Rhythm: Normal rate and regular rhythm.   Pulmonary:      Effort: Pulmonary effort is normal.      Breath sounds: Normal breath sounds.   Musculoskeletal:      Cervical back: Normal range of motion.      Comments: Wheelchair-bound   Skin:     General: Skin is warm and dry.      Capillary Refill: Capillary refill takes less than 2 seconds.   Neurological:      Mental Status: She is alert and oriented to person, place, and time. Mental status is at baseline.   Psychiatric:         Behavior: Behavior normal.         Thought Content: Thought content normal.      Comments: Judgment impaired           Assessment:       ICD-10-CM ICD-9-CM   1. Hospital discharge follow-up  Z09 V67.59   2. Hypotension, unspecified hypotension type  I95.9 458.9   3. Recurrent UTI  N39.0 599.0   4. Altered mental status, unspecified altered mental status type  R41.82 780.97   5. Seizures  R56.9 780.39        Plan:     1. Hospital discharge follow-up  All hospital records labs and imaging reviewed in detail.  Patient diagnosis of hypotension unresolved.  Will follow up on previous referral to Urology.    2. Hypotension, unspecified hypotension type  -     EKG 12-lead  -     CBC Auto Differential; Future; Expected date: 05/29/2025  -     Comprehensive Metabolic Panel; Future; Expected date: 05/29/2025  -     TSH; Future; Expected date: 05/29/2025  -     " Urinalysis, Reflex to Urine Culture Urine, Clean Catch  Asymptomatic.  Patient not in distress.  Given current set of vital signs ambulance called in office and patient transferred to ER for further workup and evaluation    3. Recurrent UTI  -     Urinalysis, Reflex to Urine Culture Urine, Clean Catch  Will repeat urine culture to make sure infection cleared.    4. Altered mental status, unspecified altered mental status type  -     TSH; Future; Expected date: 05/29/2025  Resolved.    Patient back at neurological baseline    5. Seizures   Patient denies any seizures since discharge.  Repeat levels of Dilantin and phenobarb not drawn since discharge  Repeat levels ordered    No follow-ups on file. In addition to their scheduled follow up, the patient has also been instructed to follow up on as needed basis.     Future Appointments   Date Time Provider Department Center   8/14/2025 10:30 AM Melody Ren MD [facility] [place]

## 2025-06-03 DIAGNOSIS — B35.6 TINEA CRURIS: ICD-10-CM

## 2025-06-03 RX ORDER — NYSTATIN 100000 [USP'U]/G
POWDER TOPICAL 4 TIMES DAILY
Qty: 60 G | Refills: 1 | Status: SHIPPED | OUTPATIENT
Start: 2025-06-03

## 2025-06-11 ENCOUNTER — TELEPHONE (OUTPATIENT)
Dept: FAMILY MEDICINE | Facility: CLINIC | Age: 85
End: 2025-06-11
Payer: MEDICARE

## 2025-06-11 NOTE — TELEPHONE ENCOUNTER
Copied from CRM #[id number]. Topic: General Inquiry - Patient Advice  >> Jun 11, 2025  3:30 PM Beronica wrote:  Who Called: Lisseth Guajardo    Caller is requesting assistance/information from provider's office.    Symptoms (please be specific):    How long has patient had these symptoms:    List of preferred pharmacies on file (remove unneeded): [unfilled]  If different, enter pharmacy into here including location and phone number:       Preferred Method of Contact: Phone Call  Patient's Preferred Phone Number on File: [phone]   Best Call Back Number, if different:  Additional Information: Dionna [facility] [phone] called to speak with nurse in regards to pt low bp and slurred speech also refused ambulance pls advise

## 2025-06-11 NOTE — TELEPHONE ENCOUNTER
Dionna from [facility] voiced that the pt's BP is low, lethargic, slurred speech, and gurgling when talking. Dionna voiced that the ambulance came but pt refused to go and pt signed an AMA. Dionna also voiced that she will talk to assisted living facility where the pt stays to discuss hospice care. I voiced thanks and understanding.

## 2025-06-12 ENCOUNTER — DOCUMENTATION ONLY (OUTPATIENT)
Dept: FAMILY MEDICINE | Facility: CLINIC | Age: 85
End: 2025-06-12
Payer: MEDICARE

## 2025-06-12 ENCOUNTER — TELEPHONE (OUTPATIENT)
Dept: FAMILY MEDICINE | Facility: CLINIC | Age: 85
End: 2025-06-12
Payer: MEDICARE

## 2025-06-12 NOTE — TELEPHONE ENCOUNTER
Copied from CRM #[id number]. Topic: General Inquiry - Patient Advice  >> Jun 12, 2025 12:08 PM Gustabo wrote:  .Who Called: [facility]    Caller is requesting assistance/information from provider's office.    Symptoms (please be specific): N/A   How long has patient had these symptoms:  N/A  List of preferred pharmacies on file (remove unneeded): [unfilled]  If different, enter pharmacy into here including location and phone number: N/A    Preferred Method of Contact: Phone Call    Patient's Preferred Phone Number on File: [phone]     Best Call Back Number, if different: [phone] ([facility])    Additional Information: Called stating pt's therapist contacted her to make her aware pt b/p has been low. Pt's systolic was in the low 70's and pt refused to go to hospital/ urgent care. Says b/p today was 87/64 and she called to inform the nurse. Requesting a cb. Please advise, thank you.

## 2025-06-17 ENCOUNTER — TELEPHONE (OUTPATIENT)
Dept: FAMILY MEDICINE | Facility: CLINIC | Age: 85
End: 2025-06-17
Payer: MEDICARE

## 2025-06-17 NOTE — TELEPHONE ENCOUNTER
Home health nurse reports a low BP it was 74/46 this morning. Patient did agree to go to ER she is still there now. Nurse also states that patient is abusing staff, refusing medication, and being non compliant. Michelle [facility] staff has been having to call the police lately.

## 2025-06-25 ENCOUNTER — TELEPHONE (OUTPATIENT)
Dept: FAMILY MEDICINE | Facility: CLINIC | Age: 85
End: 2025-06-25
Payer: MEDICARE

## 2025-06-25 NOTE — TELEPHONE ENCOUNTER
Ximena with [facility] assisted living facility called to inform that they will be discharging patient to nursing home due to her behavior with hitting and pinching staff members, refusing medication and a detriment to herself.  She has been to the hospital 5 times in the last month. Patient's brother is trying to located a facility who will take her.

## 2025-06-25 NOTE — TELEPHONE ENCOUNTER
Copied from CRM #[id number]. Topic: General Inquiry - Patient Advice  >> Jun 24, 2025  2:43 PM Armando wrote:  .Who Called: [facility]    Caller is requesting assistance/information from provider's office.    Symptoms (please be specific): n/a   How long has patient had these symptoms:  n/a  List of preferred pharmacies on file (remove unneeded): [unfilled]  If different, enter pharmacy into here including location and phone number: n/a      Preferred Method of Contact: Phone Call  Patient's Preferred Phone Number on File: [phone]   Best Call Back Number, if different: [phone]  Additional Information: called requesting to speak with nurse in regards to patient status

## 2025-06-26 ENCOUNTER — TELEPHONE (OUTPATIENT)
Dept: FAMILY MEDICINE | Facility: CLINIC | Age: 85
End: 2025-06-26
Payer: MEDICARE

## 2025-06-26 DIAGNOSIS — I95.9 HYPOTENSION, UNSPECIFIED HYPOTENSION TYPE: Primary | ICD-10-CM

## 2025-06-26 NOTE — TELEPHONE ENCOUNTER
Patient's brother, Jaziel is requesting a call for you at your convenience about Miss Guajardo. [phone]

## 2025-06-26 NOTE — TELEPHONE ENCOUNTER
Good afternoon pt had to go back to ER 70/30 blood pressure had no energy,    Patient still taking metoprolol 100mg that was prescribed in march by bharath and are requesting for a decrease in this medication due to repeated low BP. Please advise thanks !

## 2025-06-27 ENCOUNTER — TELEPHONE (OUTPATIENT)
Dept: FAMILY MEDICINE | Facility: CLINIC | Age: 85
End: 2025-06-27
Payer: MEDICARE

## 2025-06-27 ENCOUNTER — HOSPITAL ENCOUNTER (INPATIENT)
Facility: HOSPITAL | Age: 85
LOS: 4 days | Discharge: HOME | DRG: 758 | End: 2025-07-01
Attending: STUDENT IN AN ORGANIZED HEALTH CARE EDUCATION/TRAINING PROGRAM
Payer: MEDICARE

## 2025-06-27 DIAGNOSIS — R13.10 DYSPHAGIA: ICD-10-CM

## 2025-06-27 DIAGNOSIS — R06.02 SOB (SHORTNESS OF BREATH): ICD-10-CM

## 2025-06-27 DIAGNOSIS — R53.1 WEAKNESS: ICD-10-CM

## 2025-06-27 DIAGNOSIS — N30.00 ACUTE CYSTITIS WITHOUT HEMATURIA: Primary | ICD-10-CM

## 2025-06-27 DIAGNOSIS — I10 PRIMARY HYPERTENSION: ICD-10-CM

## 2025-06-27 DIAGNOSIS — R29.898 MUSCULAR DECONDITIONING: ICD-10-CM

## 2025-06-27 DIAGNOSIS — I95.2 HYPOTENSION DUE TO MEDICATION: Primary | ICD-10-CM

## 2025-06-27 DIAGNOSIS — R44.3 HALLUCINATIONS: ICD-10-CM

## 2025-06-27 LAB
ACCEPTIBLE SP GR UR QL: 1.01 (ref 1–1.03)
ALBUMIN SERPL-MCNC: 3 G/DL (ref 3.4–4.8)
ALBUMIN/GLOB SERPL: 0.7 RATIO (ref 1.1–2)
ALP SERPL-CCNC: 127 UNIT/L (ref 40–150)
ALT SERPL-CCNC: 21 UNIT/L (ref 0–55)
AMPHET UR QL SCN: NEGATIVE
ANION GAP SERPL CALC-SCNC: 13 MEQ/L
AST SERPL-CCNC: 43 UNIT/L (ref 11–45)
BACTERIA #/AREA URNS AUTO: ABNORMAL /HPF
BARBITURATE SCN PRESENT UR: POSITIVE
BASOPHILS # BLD AUTO: 0.1 X10(3)/MCL
BASOPHILS NFR BLD AUTO: 2.5 %
BENZODIAZ UR QL SCN: NEGATIVE
BILIRUB SERPL-MCNC: 0.2 MG/DL
BILIRUB UR QL STRIP.AUTO: NEGATIVE
BUN SERPL-MCNC: 8.3 MG/DL (ref 9.8–20.1)
CALCIUM SERPL-MCNC: 8.9 MG/DL (ref 8.4–10.2)
CANNABINOIDS UR QL SCN: NEGATIVE
CHLORIDE SERPL-SCNC: 112 MMOL/L (ref 98–107)
CLARITY UR: ABNORMAL
CO2 SERPL-SCNC: 23 MMOL/L (ref 23–31)
COCAINE UR QL SCN: NEGATIVE
COLOR UR AUTO: YELLOW
CREAT SERPL-MCNC: 0.96 MG/DL (ref 0.55–1.02)
CREAT/UREA NIT SERPL: 9
EOSINOPHIL # BLD AUTO: 0.16 X10(3)/MCL (ref 0–0.9)
EOSINOPHIL NFR BLD AUTO: 3.9 %
ERYTHROCYTE [DISTWIDTH] IN BLOOD BY AUTOMATED COUNT: 15.2 % (ref 11.5–17)
ETHANOL SERPL-MCNC: <10 MG/DL
FENTANYL UR QL SCN: NEGATIVE
GFR SERPLBLD CREATININE-BSD FMLA CKD-EPI: 58 ML/MIN/1.73/M2
GLOBULIN SER-MCNC: 4.3 GM/DL (ref 2.4–3.5)
GLUCOSE SERPL-MCNC: 88 MG/DL (ref 82–115)
GLUCOSE UR QL STRIP: NORMAL
HCT VFR BLD AUTO: 37.3 % (ref 37–47)
HGB BLD-MCNC: 12 G/DL (ref 12–16)
HGB UR QL STRIP: ABNORMAL
IMM GRANULOCYTES # BLD AUTO: 0.01 X10(3)/MCL (ref 0–0.04)
IMM GRANULOCYTES NFR BLD AUTO: 0.2 %
KETONES UR QL STRIP: ABNORMAL
LEUKOCYTE ESTERASE UR QL STRIP: 500
LYMPHOCYTES # BLD AUTO: 1.21 X10(3)/MCL (ref 0.6–4.6)
LYMPHOCYTES NFR BLD AUTO: 29.7 %
MCH RBC QN AUTO: 30.4 PG (ref 27–31)
MCHC RBC AUTO-ENTMCNC: 32.2 G/DL (ref 33–36)
MCV RBC AUTO: 94.4 FL (ref 80–94)
MDMA UR QL SCN: NEGATIVE
MONOCYTES # BLD AUTO: 0.49 X10(3)/MCL (ref 0.1–1.3)
MONOCYTES NFR BLD AUTO: 12 %
NEUTROPHILS # BLD AUTO: 2.11 X10(3)/MCL (ref 2.1–9.2)
NEUTROPHILS NFR BLD AUTO: 51.7 %
NITRITE UR QL STRIP: NEGATIVE
NRBC BLD AUTO-RTO: 0 %
OPIATES UR QL SCN: NEGATIVE
PCP UR QL: NEGATIVE
PH UR STRIP: 6 [PH]
PH UR: 6 [PH] (ref 3–11)
PLATELET # BLD AUTO: 243 X10(3)/MCL (ref 130–400)
PMV BLD AUTO: 11.2 FL (ref 7.4–10.4)
POTASSIUM SERPL-SCNC: 3.1 MMOL/L (ref 3.5–5.1)
PROT SERPL-MCNC: 7.3 GM/DL (ref 5.8–7.6)
PROT UR QL STRIP: ABNORMAL
RBC # BLD AUTO: 3.95 X10(6)/MCL (ref 4.2–5.4)
RBC #/AREA URNS AUTO: ABNORMAL /HPF
SODIUM SERPL-SCNC: 148 MMOL/L (ref 136–145)
SP GR UR STRIP.AUTO: 1.01 (ref 1–1.03)
SQUAMOUS #/AREA URNS LPF: ABNORMAL /HPF
TROPONIN I SERPL-MCNC: <0.01 NG/ML (ref 0–0.04)
UROBILINOGEN UR STRIP-ACNC: NORMAL
WBC # BLD AUTO: 4.08 X10(3)/MCL (ref 4.5–11.5)
WBC #/AREA URNS AUTO: >100 /HPF
YEAST BUDDING URNS QL: ABNORMAL /HPF

## 2025-06-27 PROCEDURE — 99285 EMERGENCY DEPT VISIT HI MDM: CPT | Mod: 25

## 2025-06-27 PROCEDURE — 80053 COMPREHEN METABOLIC PANEL: CPT | Performed by: PHYSICIAN ASSISTANT

## 2025-06-27 PROCEDURE — 93010 ELECTROCARDIOGRAM REPORT: CPT | Mod: ,,, | Performed by: INTERNAL MEDICINE

## 2025-06-27 PROCEDURE — 80307 DRUG TEST PRSMV CHEM ANLYZR: CPT | Performed by: STUDENT IN AN ORGANIZED HEALTH CARE EDUCATION/TRAINING PROGRAM

## 2025-06-27 PROCEDURE — 82077 ASSAY SPEC XCP UR&BREATH IA: CPT | Performed by: STUDENT IN AN ORGANIZED HEALTH CARE EDUCATION/TRAINING PROGRAM

## 2025-06-27 PROCEDURE — 87086 URINE CULTURE/COLONY COUNT: CPT | Performed by: STUDENT IN AN ORGANIZED HEALTH CARE EDUCATION/TRAINING PROGRAM

## 2025-06-27 PROCEDURE — 84484 ASSAY OF TROPONIN QUANT: CPT | Performed by: PHYSICIAN ASSISTANT

## 2025-06-27 PROCEDURE — 63600175 PHARM REV CODE 636 W HCPCS: Performed by: INTERNAL MEDICINE

## 2025-06-27 PROCEDURE — 85025 COMPLETE CBC W/AUTO DIFF WBC: CPT | Performed by: PHYSICIAN ASSISTANT

## 2025-06-27 PROCEDURE — 93005 ELECTROCARDIOGRAM TRACING: CPT

## 2025-06-27 PROCEDURE — 21400001 HC TELEMETRY ROOM

## 2025-06-27 PROCEDURE — 11000001 HC ACUTE MED/SURG PRIVATE ROOM

## 2025-06-27 PROCEDURE — 81001 URINALYSIS AUTO W/SCOPE: CPT | Performed by: STUDENT IN AN ORGANIZED HEALTH CARE EDUCATION/TRAINING PROGRAM

## 2025-06-27 RX ORDER — FLUCONAZOLE 200 MG/1
200 TABLET ORAL DAILY
Status: DISCONTINUED | OUTPATIENT
Start: 2025-06-28 | End: 2025-06-28

## 2025-06-27 RX ORDER — CIPROFLOXACIN 2 MG/ML
400 INJECTION, SOLUTION INTRAVENOUS
Status: DISCONTINUED | OUTPATIENT
Start: 2025-06-27 | End: 2025-06-27

## 2025-06-27 RX ORDER — METOPROLOL SUCCINATE 50 MG/1
50 TABLET, EXTENDED RELEASE ORAL DAILY
COMMUNITY
End: 2025-06-27 | Stop reason: SDUPTHER

## 2025-06-27 RX ORDER — TALC
6 POWDER (GRAM) TOPICAL NIGHTLY PRN
Status: DISCONTINUED | OUTPATIENT
Start: 2025-06-27 | End: 2025-07-01 | Stop reason: HOSPADM

## 2025-06-27 RX ORDER — METOPROLOL SUCCINATE 50 MG/1
50 TABLET, EXTENDED RELEASE ORAL DAILY
Qty: 90 TABLET | Refills: 3 | Status: ON HOLD | OUTPATIENT
Start: 2025-06-27

## 2025-06-27 RX ORDER — SODIUM CHLORIDE 0.9 % (FLUSH) 0.9 %
10 SYRINGE (ML) INJECTION
Status: DISCONTINUED | OUTPATIENT
Start: 2025-06-27 | End: 2025-07-01 | Stop reason: HOSPADM

## 2025-06-27 RX ORDER — ENOXAPARIN SODIUM 100 MG/ML
40 INJECTION SUBCUTANEOUS EVERY 24 HOURS
Status: DISCONTINUED | OUTPATIENT
Start: 2025-06-27 | End: 2025-07-01 | Stop reason: HOSPADM

## 2025-06-27 RX ORDER — CEFTRIAXONE 1 G/1
1 INJECTION, POWDER, FOR SOLUTION INTRAMUSCULAR; INTRAVENOUS
Status: COMPLETED | OUTPATIENT
Start: 2025-06-27 | End: 2025-06-29

## 2025-06-27 RX ORDER — HALOPERIDOL LACTATE 5 MG/ML
5 INJECTION, SOLUTION INTRAMUSCULAR EVERY 6 HOURS PRN
Status: DISCONTINUED | OUTPATIENT
Start: 2025-06-27 | End: 2025-07-01 | Stop reason: HOSPADM

## 2025-06-27 RX ORDER — ACETAMINOPHEN 325 MG/1
650 TABLET ORAL EVERY 8 HOURS PRN
Status: DISCONTINUED | OUTPATIENT
Start: 2025-06-27 | End: 2025-07-01 | Stop reason: HOSPADM

## 2025-06-27 RX ADMIN — CEFTRIAXONE SODIUM 1 G: 1 INJECTION, POWDER, FOR SOLUTION INTRAMUSCULAR; INTRAVENOUS at 10:06

## 2025-06-27 RX ADMIN — ENOXAPARIN SODIUM 40 MG: 40 INJECTION SUBCUTANEOUS at 10:06

## 2025-06-27 NOTE — PROGRESS NOTES
"Spoke with sister in law, Imani Torres [phone]. She reported the below: pt , no children. Pts brother, Bill "Morgan" Brian primary caretaker [phone]. No POA. Pt lives at [facility] but facility is discharging her in 2 weeks. Pt is blind, uses wheelchair, wears briefs, has private sitter M-F 8-2 who assists with ADLs and HH (Unknown agency). Pt often noncompliant with meds, refuses care and refuses to eat or drink due to paranoia. She reports pt has a psych hx at [facility]. Pt reportedly is lucid at times and other times has hallucinations and is delusional and daughter in law voiced is not able to make decisions. She reportedly has been speaking to PCP Melody Ren about interdiction. Reportedly, pt has been week, disoriented and falling. Discussed plan with family and Dr. Lennon: Admit for medical, PT/ OT for level of care determination, psych eval for decision making determination and then plan of care to be determined from there. If pt requiring inpatient psych, family does not want [facility].   "

## 2025-06-27 NOTE — ED PROVIDER NOTES
Encounter Date: 6/27/2025    SCRIBE #1 NOTE: I, Tonie Reyes, am scribing for, and in the presence of,  Max Lennon MD. I have scribed the following portions of the note - Other sections scribed: HPI, ROS, PE.       History     Chief Complaint   Patient presents with    Urinary Tract Infection     Dx with UTI yesterday at [facility]. Sent from [facility] for weakness and hallucinations. C/o dry mouth     Patient is a 84 year old female with a history of hypercholesterolemia,HTN, MI, and epilepsy presenting to the ED from [facility] for weakness and hallucinations prior to arrival to the ED. Patient claims she has not been eating or drinking okay because she does not like the food at the NH. Patient's sitter at bedside reports frequent UTIs, with the patient being in and out of [facility] a couple of times within the past 2 weeks. Diagnosed with a UTI at [facility] yesterday 6/26 and discharged with Keflex. The sitter claims the patient refuses to drink water. She has been having hallucinations with the most recent being on arrival. She claims the patient spoke of a man who was not there, along with having a conversation with the president. Patient refuses to take her medications. She had been combative at the NH recently, leading to her being evicted. Patient refuses anyone to obtain power of .       The history is provided by the patient, the nursing home and a caregiver. No  was used.     Review of patient's allergies indicates:   Allergen Reactions    Nitrofurantoin monohyd/m-cryst Hives    Penicillin Hives     Past Medical History:   Diagnosis Date    Cervical radiculitis 08/29/2022    Cervical spinal stenosis 08/29/2022    Cervicalgia 08/29/2022    Coronary arteriosclerosis     DDD (degenerative disc disease), cervical 08/29/2022    Depression     Epilepsy, unspecified, not intractable, with status epilepticus     High cholesterol     Hypokalemia     Mild renal insufficiency  07/06/2022    Myocardial infarction     Osteoporosis     Primary hypertension 1/31/2023    Recurrent UTI     Unspecified macular degeneration      Past Surgical History:   Procedure Laterality Date    APPENDECTOMY      BACK SURGERY      cardiac stents      CARPAL TUNNEL RELEASE      CATARACT EXTRACTION Left     CHOLECYSTECTOMY      crainotomy      FOOT SURGERY Left     HYSTERECTOMY      LUNG REMOVAL, PARTIAL Right     LYMPH NODE DISSECTION       Family History   Family history unknown: Yes     Social History[1]  Review of Systems   Constitutional:  Positive for appetite change (Decrease in appetite, does not like food at NH.).        Generalized weakness.    Psychiatric/Behavioral:  Positive for hallucinations.        Physical Exam     Initial Vitals [06/27/25 1114]   BP Pulse Resp Temp SpO2   (!) 143/84 84 16 96.9 °F (36.1 °C) 98 %      MAP       --         Physical Exam    Nursing note and vitals reviewed.  Constitutional: She appears well-developed and well-nourished. She is not diaphoretic. No distress.   Difficult to understand.    HENT:   Head: Normocephalic and atraumatic.   Right Ear: External ear normal.   Left Ear: External ear normal.   Nose: Nose normal. Mouth/Throat: Mucous membranes are dry.   Eyes: Conjunctivae and EOM are normal. Pupils are equal, round, and reactive to light. Right eye exhibits no discharge. Left eye exhibits no discharge.   Cardiovascular:  Normal rate, regular rhythm, normal heart sounds and intact distal pulses.     Exam reveals no gallop and no friction rub.       No murmur heard.  Pulmonary/Chest: Breath sounds normal. No respiratory distress. She has no wheezes. She has no rhonchi. She has no rales. She exhibits no tenderness.   Lungs are clear.    Abdominal: Abdomen is soft. Bowel sounds are normal. She exhibits no distension and no mass. There is no abdominal tenderness. There is no rebound and no guarding.   Musculoskeletal:         General: No edema. Normal range of  motion.     Neurological: She is alert and oriented to person, place, and time. No cranial nerve deficit or sensory deficit.   Skin: Skin is warm and dry. Capillary refill takes less than 2 seconds. No erythema. No pallor.         ED Course   Procedures  Labs Reviewed   COMPREHENSIVE METABOLIC PANEL - Abnormal       Result Value    Sodium 148 (*)     Potassium 3.1 (*)     Chloride 112 (*)     CO2 23      Glucose 88      Blood Urea Nitrogen 8.3 (*)     Creatinine 0.96      Calcium 8.9      Protein Total 7.3      Albumin 3.0 (*)     Globulin 4.3 (*)     Albumin/Globulin Ratio 0.7 (*)     Bilirubin Total 0.2            ALT 21      AST 43      eGFR 58      Anion Gap 13.0      BUN/Creatinine Ratio 9     CBC WITH DIFFERENTIAL - Abnormal    WBC 4.08 (*)     RBC 3.95 (*)     Hgb 12.0      Hct 37.3      MCV 94.4 (*)     MCH 30.4      MCHC 32.2 (*)     RDW 15.2      Platelet 243      MPV 11.2 (*)     Neut % 51.7      Lymph % 29.7      Mono % 12.0      Eos % 3.9      Basophil % 2.5      Imm Grans % 0.2      Neut # 2.11      Lymph # 1.21      Mono # 0.49      Eos # 0.16      Baso # 0.10      Imm Gran # 0.01      NRBC% 0.0     URINALYSIS, REFLEX TO URINE CULTURE - Abnormal    Color, UA Yellow      Appearance, UA Turbid (*)     Specific Gravity, UA 1.015      pH, UA 6.0      Protein, UA 1+ (*)     Glucose, UA Normal      Ketones, UA 3+ (*)     Blood, UA 2+ (*)     Bilirubin, UA Negative      Urobilinogen, UA Normal      Nitrites, UA Negative      Leukocyte Esterase,  (*)     RBC, UA 11-20 (*)     WBC, UA >100 (*)     Bacteria, UA None Seen      Budding Yeast, UA Trace (*)     Squamous Epithelial Cells, UA Trace     DRUG SCREEN, URINE (BEAKER) - Abnormal    Amphetamines, Urine Negative      Barbiturates, Urine Positive (*)     Benzodiazepine, Urine Negative      Cannabinoids, Urine Negative      Cocaine, Urine Negative      Fentanyl, Urine Negative      MDMA, Urine Negative      Opiates, Urine Negative       Phencyclidine, Urine Negative      pH, Urine 6.0      Specific Gravity, Urine Auto 1.015      Narrative:     Cut off concentrations:    Amphetamines - 1000 ng/ml  Barbiturates - 200 ng/ml  Benzodiazepine - 200 ng/ml  Cannabinoids (THC) - 50 ng/ml  Cocaine - 300 ng/ml  Fentanyl - 1.0 ng/ml  MDMA - 500 ng/ml  Opiates - 300 ng/ml   Phencyclidine (PCP) - 25 ng/ml    Specimen submitted for drug analysis and tested for pH and specific gravity in order to evaluate sample integrity. Suspect tampering if specific gravity is <1.003 and/or pH is not within the range of 4.5 - 8.0  False negatives may result form substances such as bleach added to urine.  False positives may result for the presence of a substance with similar chemical structure to the drug or its metabolite.    This test provides only a PRELIMINARY analytical test result. A more specific alternate chemical method must be used in order to obtain a confirmed analytical result. Gas chromatography/mass spectrometry (GC/MS) is the preferred confirmatory method. Other chemical confirmation methods are available. Clinical consideration and professional judgement should be applied to any drug of abuse test result, particularly when preliminary positive results are used.    Positive results will be confirmed only at the physicians request. Unconfirmed screening results are to be used only for medical purposes (treatment).        TROPONIN I - Normal    Troponin-I <0.010     ALCOHOL,MEDICAL (ETHANOL) - Normal    Ethanol Level <10.0     CULTURE, URINE   CBC W/ AUTO DIFFERENTIAL    Narrative:     The following orders were created for panel order CBC auto differential.  Procedure                               Abnormality         Status                     ---------                               -----------         ------                     CBC with Differential[[id number]]       Abnormal            Final result                 Please view results for these tests on the  individual orders.          Imaging Results              X-Ray Chest AP Portable (Final result)  Result time 06/27/25 13:38:49      Final result by Armani Berry MD (06/27/25 13:38:49)                   Impression:      No acute findings.      Electronically signed by: Armani Berry  Date:    06/27/2025  Time:    13:38               Narrative:    EXAMINATION:  XR CHEST AP PORTABLE    CLINICAL HISTORY:  Dysphagia, unspecified    COMPARISON:  22 June 2025    FINDINGS:  Frontal view of the chest was obtained. Heart and mediastinum unchanged.  There is no new focal consolidation or pneumothorax.                                       Medications - No data to display  Medical Decision Making  Differential diagnosis includes, but is not limited to, UTI, hallucinations secondary to infection, acute psychosis, electrolyte abnormality, renal dysfunction, dementia.      Patient is awake and alert she is very difficult to understand due to significant dry mouth.  Family, sitter reports that she has been hallucinating.  Evidently she needs permanent total care at her assisted living and has been evicted.  Family evidently that has going out of town.  That has a long discussion with case management patient requires OT/PT/psych as well as case management for further evaluation management determine placement.  Did discuss with the brother who evidently was patient's only relative who reports that they are working with the PCP to try to find longer-term care for patient.  We will admit for antibiotics hydration in consultation of various specialties.  Brother is comfortable with the plan.  Patient is otherwise hemodynamically stable.  Suitable for admission at this time.    Amount and/or Complexity of Data Reviewed  Independent Historian: caregiver     Details:  Patient's sitter at bedside reports frequent UTIs, with the patient being in and out of [facility] a couple of times within the past 2 weeks. Diagnosed with a UTI at [facility]  yesterday 6/26 and discharged with Keflex. The sitter claims the patient refuses to drink water. She has been having hallucinations with the most recent being on arrival. She claims the patient spoke of a man who was not there, along with having a conversation with the president. Patient refuses to take her medications. She had been combative at the NH recently, leading to her being evicted. Patient refuses anyone to obtain power of .   External Data Reviewed: notes.     Details: See ED course  Labs: ordered. Decision-making details documented in ED Course.  Radiology: ordered and independent interpretation performed.  ECG/medicine tests: ordered and independent interpretation performed.    Risk  Decision regarding hospitalization.            Scribe Attestation:   Scribe #1: I performed the above scribed service and the documentation accurately describes the services I performed. I attest to the accuracy of the note.    Attending Attestation:           Physician Attestation for Scribe:  Physician Attestation Statement for Scribe #1: I, Max Lennon MD, reviewed documentation, as scribed by Tonie Brush in my presence, and it is both accurate and complete.             ED Course as of 06/27/25 1920 Fri Jun 27, 2025   1141 Chart review reveals patient was seen yesterday at outside hospital for hypotension.  Diagnosed with a UTI discharged home.  Per chart review has been declining, Dionna with the [facility] recommended hospice. [MM]   1209 CBC auto differential(!)  No significant leukocytosis or anemia [MM]   1252 EKG done at 12:40 p.m. shows sinus rhythm rate of 83 .  Normal axis.  No ST-elevation or depression [MM]   1252 Troponin I: <0.010 [MM]   1648 Barbituates, Urine(!): Positive [MM]   1648 Urinalysis, Reflex to Urine Culture Urine, Clean Catch(!)  Possible UTI [MM]   1701 [facility] [place] medicine.  [KL]      ED Course User Index  [KL] Tonie Brush  [MM] Max Lennon MD                            Clinical Impression:  Final diagnoses:  [R53.1] Weakness  [R13.10] Dysphagia          ED Disposition Condition    Admit                       [1]   Social History  Tobacco Use    Smoking status: Former     Types: Cigarettes     Passive exposure: Past    Smokeless tobacco: Never   Substance Use Topics    Alcohol use: Never    Drug use: Never        Max Lennon MD  06/27/25 1920

## 2025-06-27 NOTE — TELEPHONE ENCOUNTER
Dionna with [facility] stated Ms cm been declining and she been in and out of [facility] . Her BP has been High. She has been in a confusion state of mind and she is currently going to [facility] . Dionna stated she really needs Hospice but refused and now she cannot make decisions.

## 2025-06-27 NOTE — TELEPHONE ENCOUNTER
[facility] can be [facility] to them for patient to receive the decrease dosage of metoprolol, please advise thanks!

## 2025-06-28 LAB
ALBUMIN SERPL-MCNC: 3.4 G/DL (ref 3.4–4.8)
ALBUMIN/GLOB SERPL: 0.7 RATIO (ref 1.1–2)
ALP SERPL-CCNC: 141 UNIT/L (ref 40–150)
ALT SERPL-CCNC: 23 UNIT/L (ref 0–55)
ANION GAP SERPL CALC-SCNC: 21 MEQ/L
AST SERPL-CCNC: 46 UNIT/L (ref 11–45)
BASOPHILS # BLD AUTO: 0.1 X10(3)/MCL
BASOPHILS NFR BLD AUTO: 1.8 %
BILIRUB SERPL-MCNC: 0.3 MG/DL
BUN SERPL-MCNC: 9.9 MG/DL (ref 9.8–20.1)
CALCIUM SERPL-MCNC: 9.3 MG/DL (ref 8.4–10.2)
CHLORIDE SERPL-SCNC: 108 MMOL/L (ref 98–107)
CO2 SERPL-SCNC: 19 MMOL/L (ref 23–31)
CREAT SERPL-MCNC: 1.06 MG/DL (ref 0.55–1.02)
CREAT/UREA NIT SERPL: 9
EOSINOPHIL # BLD AUTO: 0.07 X10(3)/MCL (ref 0–0.9)
EOSINOPHIL NFR BLD AUTO: 1.3 %
ERYTHROCYTE [DISTWIDTH] IN BLOOD BY AUTOMATED COUNT: 15.3 % (ref 11.5–17)
GFR SERPLBLD CREATININE-BSD FMLA CKD-EPI: 52 ML/MIN/1.73/M2
GLOBULIN SER-MCNC: 4.6 GM/DL (ref 2.4–3.5)
GLUCOSE SERPL-MCNC: 72 MG/DL (ref 70–110)
GLUCOSE SERPL-MCNC: 72 MG/DL (ref 82–115)
HCT VFR BLD AUTO: 43 % (ref 37–47)
HGB BLD-MCNC: 13.7 G/DL (ref 12–16)
IMM GRANULOCYTES # BLD AUTO: 0.02 X10(3)/MCL (ref 0–0.04)
IMM GRANULOCYTES NFR BLD AUTO: 0.4 %
LYMPHOCYTES # BLD AUTO: 1.06 X10(3)/MCL (ref 0.6–4.6)
LYMPHOCYTES NFR BLD AUTO: 19.4 %
MCH RBC QN AUTO: 30.4 PG (ref 27–31)
MCHC RBC AUTO-ENTMCNC: 31.9 G/DL (ref 33–36)
MCV RBC AUTO: 95.3 FL (ref 80–94)
MONOCYTES # BLD AUTO: 0.4 X10(3)/MCL (ref 0.1–1.3)
MONOCYTES NFR BLD AUTO: 7.3 %
NEUTROPHILS # BLD AUTO: 3.8 X10(3)/MCL (ref 2.1–9.2)
NEUTROPHILS NFR BLD AUTO: 69.8 %
NRBC BLD AUTO-RTO: 0 %
OHS QRS DURATION: 66 MS
OHS QTC CALCULATION: 452 MS
PHENYTOIN SERPL-MCNC: <1.8 UG/ML (ref 10–20)
PLATELET # BLD AUTO: 218 X10(3)/MCL (ref 130–400)
PMV BLD AUTO: 12.2 FL (ref 7.4–10.4)
POCT GLUCOSE: 139 MG/DL (ref 70–110)
POCT GLUCOSE: 72 MG/DL (ref 70–110)
POCT GLUCOSE: 78 MG/DL (ref 70–110)
POCT GLUCOSE: 83 MG/DL (ref 70–110)
POTASSIUM SERPL-SCNC: 2.9 MMOL/L (ref 3.5–5.1)
PROT SERPL-MCNC: 8 GM/DL (ref 5.8–7.6)
RBC # BLD AUTO: 4.51 X10(6)/MCL (ref 4.2–5.4)
SODIUM SERPL-SCNC: 148 MMOL/L (ref 136–145)
WBC # BLD AUTO: 5.45 X10(3)/MCL (ref 4.5–11.5)

## 2025-06-28 PROCEDURE — 80053 COMPREHEN METABOLIC PANEL: CPT | Performed by: INTERNAL MEDICINE

## 2025-06-28 PROCEDURE — 25000003 PHARM REV CODE 250: Performed by: INTERNAL MEDICINE

## 2025-06-28 PROCEDURE — 21400001 HC TELEMETRY ROOM

## 2025-06-28 PROCEDURE — 97162 PT EVAL MOD COMPLEX 30 MIN: CPT

## 2025-06-28 PROCEDURE — 85025 COMPLETE CBC W/AUTO DIFF WBC: CPT | Performed by: INTERNAL MEDICINE

## 2025-06-28 PROCEDURE — 51798 US URINE CAPACITY MEASURE: CPT

## 2025-06-28 PROCEDURE — 80185 ASSAY OF PHENYTOIN TOTAL: CPT | Performed by: INTERNAL MEDICINE

## 2025-06-28 PROCEDURE — 36415 COLL VENOUS BLD VENIPUNCTURE: CPT | Performed by: INTERNAL MEDICINE

## 2025-06-28 PROCEDURE — 25000003 PHARM REV CODE 250: Performed by: NURSE PRACTITIONER

## 2025-06-28 PROCEDURE — 63600175 PHARM REV CODE 636 W HCPCS: Performed by: INTERNAL MEDICINE

## 2025-06-28 RX ORDER — SODIUM CHLORIDE, SODIUM LACTATE, POTASSIUM CHLORIDE, CALCIUM CHLORIDE 600; 310; 30; 20 MG/100ML; MG/100ML; MG/100ML; MG/100ML
INJECTION, SOLUTION INTRAVENOUS CONTINUOUS
Status: DISCONTINUED | OUTPATIENT
Start: 2025-06-28 | End: 2025-06-28

## 2025-06-28 RX ORDER — ARIPIPRAZOLE 2 MG/1
2 TABLET ORAL NIGHTLY
Status: DISCONTINUED | OUTPATIENT
Start: 2025-06-28 | End: 2025-07-01 | Stop reason: HOSPADM

## 2025-06-28 RX ORDER — DEXTROSE MONOHYDRATE, SODIUM CHLORIDE, AND POTASSIUM CHLORIDE 50; 1.49; 4.5 G/1000ML; G/1000ML; G/1000ML
INJECTION, SOLUTION INTRAVENOUS CONTINUOUS
Status: DISCONTINUED | OUTPATIENT
Start: 2025-06-28 | End: 2025-06-30

## 2025-06-28 RX ORDER — MIRTAZAPINE 30 MG/1
30 TABLET, ORALLY DISINTEGRATING ORAL NIGHTLY PRN
COMMUNITY

## 2025-06-28 RX ORDER — BRIMONIDINE TARTRATE 2 MG/ML
1 SOLUTION/ DROPS OPHTHALMIC 3 TIMES DAILY
COMMUNITY

## 2025-06-28 RX ADMIN — CEFTRIAXONE SODIUM 1 G: 1 INJECTION, POWDER, FOR SOLUTION INTRAMUSCULAR; INTRAVENOUS at 09:06

## 2025-06-28 RX ADMIN — ENOXAPARIN SODIUM 40 MG: 40 INJECTION SUBCUTANEOUS at 04:06

## 2025-06-28 RX ADMIN — FLUCONAZOLE 200 MG: 200 TABLET ORAL at 10:06

## 2025-06-28 RX ADMIN — DEXTROSE MONOHYDRATE, SODIUM CHLORIDE, AND POTASSIUM CHLORIDE: 50; 4.5; 1.49 INJECTION, SOLUTION INTRAVENOUS at 02:06

## 2025-06-28 RX ADMIN — SODIUM CHLORIDE, POTASSIUM CHLORIDE, SODIUM LACTATE AND CALCIUM CHLORIDE: 600; 310; 30; 20 INJECTION, SOLUTION INTRAVENOUS at 04:06

## 2025-06-28 NOTE — PT/OT/SLP EVAL
Physical Therapy Evaluation    Patient Name:  Lisseth Guajardo   MRN:  [id number]    Recommendations:     Discharge therapy intensity: Moderate Intensity Therapy   Discharge Equipment Recommendations: to be determined by next level of care   Barriers to discharge: Impaired mobility and Ongoing medical needs    Assessment:     Lisseth Guajardo is a 84 y.o. female admitted with a medical diagnosis of UTI, weakness previous CVA living left side weaker.  She presents with the following impairments/functional limitations: weakness, impaired balance, visual deficits, impaired endurance, impaired self care skills, impaired functional mobility . Pt does appear somewhat combative and is very hard to understand. Not sure if she is a very reliable historian.    DME Justification:  No DME recommended requiring DME justifications    Rehab Prognosis: Fair; patient would benefit from acute skilled PT services to address these deficits and reach maximum level of function.    Recent Surgery: * No surgery found *      Plan:     During this hospitalization, patient would benefit from acute PT services 3 x/week to address the identified rehab impairments via therapeutic activities, therapeutic exercises and progress toward the following goals:    Plan of Care Expires:       Subjective     Chief Complaint:   Patient/Family Comments/goals:   Pain/Comfort:       Patients cultural, spiritual, Evangelical conflicts given the current situation:      Living Environment:  Pt currently lives in assisted living but  states she will be kicked out of assisted living soon  Prior to admission, patients level of function was dependent.  Equipment used at home: wheelchair.  DME owned (not currently used): .  Upon discharge, patient will have assistance from [facility].    Objective:     Communicated with nurse prior to session.  Patient found supine with telemetry, pulse ox (continuous), SCD, pressure relief boots  upon PT entry to  room.    General Precautions: Standard, fall  Orthopedic Precautions:N/A   Braces: N/A  Respiratory Status: Room air  Blood Pressure:       Exams:  Pt would not move RLE on command and her LLE movement is somewhat limited a very uncoordinated. She cannot use LUE for fxnl mobility and keeps it adducted ,in elbow flexion close to ribcage  Skin integrity: Visible skin intact      Functional Mobility:  Bed Mobility:     Rolling Left:  total assistance  Rolling Right: total assistance  Scooting: total assistance  Supine to Sit: total assistance  Sit to Supine: total assistance      AM-PAC 6 CLICK MOBILITY  Total Score:      Co-Treatment: No    Treatment & Education:      Patient provided with verbal education education regarding PT role/goals/POC, fall prevention, and safety awareness.  Understanding was verbalized, however additional teaching warranted.     Patient left supine with all lines intact and call button in reach.      GOALS:   Multidisciplinary Problems       Physical Therapy Goals          Problem: Physical Therapy    Goal Priority Disciplines Outcome Interventions   Physical Therapy Goal     PT, PT/OT Progressing    Description: Pt will be seen by the following goals  1. Pt will be todd with bed mobility  2. Pt will sit at eob unsupported  3. Pt will be mod with bed to chair or wc transfers                       History:     Past Medical History:   Diagnosis Date    Cervical radiculitis 08/29/2022    Cervical spinal stenosis 08/29/2022    Cervicalgia 08/29/2022    Coronary arteriosclerosis     DDD (degenerative disc disease), cervical 08/29/2022    Depression     Epilepsy, unspecified, not intractable, with status epilepticus     High cholesterol     Hypokalemia     Mild renal insufficiency 07/06/2022    Myocardial infarction     Osteoporosis     Primary hypertension 1/31/2023    Recurrent UTI     Unspecified macular degeneration        Past Surgical History:   Procedure Laterality Date    APPENDECTOMY       BACK SURGERY      cardiac stents      CARPAL TUNNEL RELEASE      CATARACT EXTRACTION Left     CHOLECYSTECTOMY      crainotomy      FOOT SURGERY Left     HYSTERECTOMY      LUNG REMOVAL, PARTIAL Right     LYMPH NODE DISSECTION         Time Tracking:     PT Received On:    PT Start Time: 0950     PT Stop Time: 1010  PT Total Time (min): 20 min     Billable Minutes: Evaluation 20 06/28/2025

## 2025-06-28 NOTE — NURSING
Patient received from ED, made comfortable in bed, mouth care given, offered drink but patient refusing, combative and biting straw or spoon. Did not pass urine since in and out done in ED at around 15:30 according to the report received, bladder scan done at midnight, and was 255 ml, encouraged to drink but still refusing, bladder scan at around 0400 and was 407 ml, Dr. Larios informed, ordered to do in and out which is done and removed 340 ml, ordered to start on LR @ 100 ml/hr. Unable to complete assessment and Med rec for patient as unable to answer questions, noted in the notes patient is from [facility] but can't find contact details, tried calling [facility] but no answer, tried calling next of kin ( brother and sister in law) few times but no answer, will try again later. Dr. Larios aware.

## 2025-06-28 NOTE — PROGRESS NOTES
[facility] Hospital Medicine Progress Note        Chief Complaint: Inpatient Follow-up for FTT    HPI:   Patient is an 84-year-old female with a past medical history of HTN, HLD, epilepsy, CHF, CAD, previous CVA, glaucoma, CKD, chronic left-sided weakness/wheelchair-bound with underlying psychiatric undiagnosed issue which is making her care difficult.  Family reports she is in an [facility] but is being affected currently due to behavioral issues.  Family is requesting psych eval for decision making as patient is unwilling to a signs someone power of .  She also was seen in the ER in the last few days at [facility] for a urinary tract infection and a yeast infection, but was just part bacteremia nursing facility where she has continued to have hallucinations and agitation     She arrived to the ER afebrile hemodynamically stable maintaining normal sats on room air, with laboratory work showing evidence of urinary tract infection.  Patient has been admitted to the hospitalist service with consultation to case management for assistance with placement.    Interval Hx:   Patient awake and oriented only to self. She is asking why no one is taking care of her. She had luis UE and LE contractures. Has been afebrile. Per nurse, patient is not eating.     No family at bedside.     Case was discussed with patient's nurse and  on the floor.    Objective/physical exam:  General: In no acute distress, frail, looks chronically ill  Chest: Clear to auscultation bilaterally  Heart: RRR, +S1, S2, no appreciable murmur  Abdomen: Soft, nontender, BS +  Neurologic: Luis UE and LE contractures     VITAL SIGNS: 24 HRS MIN & MAX LAST   Temp  Min: 96.4 °F (35.8 °C)  Max: 98.2 °F (36.8 °C) 97.5 °F (36.4 °C)   BP  Min: 125/67  Max: 152/94 125/67   Pulse  Min: 84  Max: 106  94   Resp  Min: 12  Max: 21 20   SpO2  Min: 95 %  Max: 100 % 97 %     I have reviewed the  following labs:  Recent Labs   Lab 06/27/25  1133 06/28/25  0425   WBC 4.08* 5.45   RBC 3.95* 4.51   HGB 12.0 13.7   HCT 37.3 43.0   MCV 94.4* 95.3*   MCH 30.4 30.4   MCHC 32.2* 31.9*   RDW 15.2 15.3    218   MPV 11.2* 12.2*     Recent Labs   Lab 06/23/25  0752 06/24/25  0750 06/25/25  0949 06/27/25  1133 06/28/25  0425    143 146* 148* 148*   K 3.9 3.6 3.3* 3.1* 2.9*    108 111* 112* 108*   CO2 17* 21* 22 23 19*   ANIONGAP 18* 14* 13  --   --    BUN 13 9 7 8.3* 9.9   CREATININE 0.83 0.77 0.74 0.96 1.06*   GLU  --   --   --  88 72*   CALCIUM 8.5* 8.3* 7.8* 8.9 9.3   ALBUMIN  --   --   --  3.0* 3.4   PROT  --   --   --  7.3 8.0*   ALKPHOS  --   --   --  127 141   ALT  --   --   --  21 23   AST  --   --   --  43 46*   BILITOT  --   --   --  0.2 0.3     Microbiology Results (last 7 days)       Procedure Component Value Units Date/Time    Urine culture [[id number]] Collected: 06/27/25 1531    Order Status: Completed Specimen: Urine Updated: 06/28/25 0658     Urine Culture No Growth At 24 Hours             See below for Radiology    Assessment/Plan:  ? UTI, POA   Vaginal candidiasis  Hypokalemia   Mild dehydration   FTT  ? Dementia   Anorexia     Hx: HTN, HLD, epilepsy, CHF, CAD, previous CVA, glaucoma, CKD, chronic left-sided weakness/wheelchair-bound     Plan:  Patient looks chronically ill and contracted   She will need palliative care team consult , will be done on Monday    Meanwhile we will continue IV D5 1/2 NS  with potassium   Potassium today 2.9, Na 148  Will closely monitor patients daily urine out put, renal parameters and volume status      Will continue iv rocephin for today   F/U on cultures     Continue supportive care       VTE prophylaxis: Lovenox     Patient condition:  Fair    Anticipated discharge and Disposition:   Home with ? Comfort care       All diagnosis and differential diagnosis have been reviewed; assessment and plan has been documented; I have personally reviewed the labs  and test results that are presently available; I have reviewed the patients medication list; I have reviewed the consulting providers response and recommendations. I have reviewed or attempted to review medical records based upon their availability    All of the patient's questions have been  addressed and answered. Patient's is agreeable to the above stated plan. I will continue to monitor closely and make adjustments to medical management as needed.    Portions of this note dictated using EMR integrated voice recognition software, and may be subject to voice recognition errors not corrected at proofreading. Please contact writer for clarification if needed.   _____________________________________________________________________    Malnutrition Status:  Nutrition consulted. Most recent weight and BMI monitored-     Measurements:  Wt Readings from Last 1 Encounters:   06/27/25 49.9 kg (110 lb)   Body mass index is 20.78 kg/m².    Patient has been screened and assessed by RD.    Malnutrition Type:  Context:    Level:      Malnutrition Characteristic Summary:       Interventions/Recommendations (treatment strategy):        Scheduled Med:   cefTRIAXone (Rocephin) IV (PEDS and ADULTS)  1 g Intravenous Q24H    enoxparin  40 mg Subcutaneous Daily    fluconazole  200 mg Oral Daily      Continuous Infusions:   lactated ringers   Intravenous Continuous 100 mL/hr at 06/28/25 0435 New Bag at 06/28/25 0435      PRN Meds:    Current Facility-Administered Medications:     acetaminophen, 650 mg, Oral, Q8H PRN    haloperidol lactate, 5 mg, Intramuscular, Q6H PRN    melatonin, 6 mg, Oral, Nightly PRN    sodium chloride 0.9%, 10 mL, Intravenous, PRN     Radiology:  I have personally reviewed the following imaging and agree with the radiologist.     X-Ray Chest AP Portable  Narrative: EXAMINATION:  XR CHEST AP PORTABLE    CLINICAL HISTORY:  Dysphagia, unspecified    COMPARISON:  22 June 2025    FINDINGS:  Frontal view of the chest was  obtained. Heart and mediastinum unchanged.  There is no new focal consolidation or pneumothorax.  Impression: No acute findings.    Electronically signed by: Armani Berry  Date:    06/27/2025  Time:    13:38      Rolando Avilez MD  Department of Hospital Medicine   [facility]   06/28/2025

## 2025-06-28 NOTE — H&P
[facility] Hospital Medicine History & Physical Examination       Patient Name: Lisseth Guajardo  MRN: [id number]  Patient Class: IP- Inpatient   Admission Date: 6/27/2025 11:22 AM  Length of Stay: 0  Admitting Service: Hospital Medicine   Attending Physician: Raudel Larios MD   Primary Care Provider: Melody Ren MD  History source: EMR, patient and/or patient's family  Screening for Social Drivers for health: Patient screened for food insecurity, housing instability, transportation needs, utility   difficulties, and interpersonal safety (select all that apply as identified as concern)  []Housing or Food  []Transportation Needs  []Utility Difficulties  []Interpersonal safety  [x]None    CHIEF COMPLAINT   Urinary Tract Infection (Dx with UTI yesterday at [facility]. Sent from [facility] for weakness and hallucinations. C/o dry mouth)    HISTORY OF PRESENT ILLNESS:   Patient is an 84-year-old female with a past medical history of HTN, HLD, epilepsy, CHF, CAD, previous CVA, glaucoma, CKD, chronic left-sided weakness/wheelchair-bound with underlying psychiatric undiagnosed issue which is making her care difficult.  Family reports she is in an assisted living at [facility] but is being affected currently due to behavioral issues.  Family is requesting psych eval for decision making as patient is unwilling to a signs someone power of .  She also was seen in the ER in the last few days at [facility] for a urinary tract infection and a yeast infection, but was just part bacteremia nursing facility where she has continued to have hallucinations and agitation    She arrived to the ER afebrile hemodynamically stable maintaining normal sats on room air, with laboratory work showing evidence of urinary tract infection.  Patient has been admitted to the hospitalist service with consultation to case management for assistance with placement.    PAST MEDICAL HISTORY:      Past Medical History:   Diagnosis Date    Cervical radiculitis 08/29/2022    Cervical spinal stenosis 08/29/2022    Cervicalgia 08/29/2022    Coronary arteriosclerosis     DDD (degenerative disc disease), cervical 08/29/2022    Depression     Epilepsy, unspecified, not intractable, with status epilepticus     High cholesterol     Hypokalemia     Mild renal insufficiency 07/06/2022    Myocardial infarction     Osteoporosis     Primary hypertension 1/31/2023    Recurrent UTI     Unspecified macular degeneration        PAST SURGICAL HISTORY:     Past Surgical History:   Procedure Laterality Date    APPENDECTOMY      BACK SURGERY      cardiac stents      CARPAL TUNNEL RELEASE      CATARACT EXTRACTION Left     CHOLECYSTECTOMY      crainotomy      FOOT SURGERY Left     HYSTERECTOMY      LUNG REMOVAL, PARTIAL Right     LYMPH NODE DISSECTION         ALLERGIES:   Nitrofurantoin monohyd/m-cryst and Penicillin    FAMILY HISTORY:   Reviewed and non-contributory     SOCIAL HISTORY:     Social History     Tobacco Use    Smoking status: Former     Types: Cigarettes     Passive exposure: Past    Smokeless tobacco: Never   Substance Use Topics    Alcohol use: Never        HOME MEDICATIONS:     Prior to Admission medications    Medication Sig   albuterol (PROVENTIL/VENTOLIN HFA) 90 mcg/actuation inhaler Inhale 1-2 puffs into the lungs every 6 (six) hours as needed for Wheezing or Shortness of Breath.   albuterol sulfate 90 mcg/actuation aebs Inhale 90 mcg into the lungs every 4 (four) hours as needed (2 puffs for wheezing).   ALPHAGAN P 0.15 % ophthalmic solution Place 1 drop into both eyes 3 (three) times daily. Duplicate- taking generic   aspirin (ECOTRIN) 81 MG EC tablet Take 81 mg by mouth once daily.   atorvastatin (LIPITOR) 40 MG tablet TAKE 1 TABLET DAILY   benzonatate (TESSALON) 100 MG capsule Take 1 capsule (100 mg total) by mouth 3 (three) times daily as needed for Cough.   calcium carbonate/vitamin D3 (CALTRATE 600 + D  ORAL) Take 1 tablet by mouth 2 (two) times a day.   calcium-mag-vit B6-D3-minerals 365-78-5-125 ds-ww-pb-unit Tab Take 600 mg by mouth Daily.   carBAMazepine (CARBATROL) 100 MG CM12 Take 1 capsule (100 mg total) by mouth 2 (two) times a day.   COSOPT 22.3-6.8 mg/mL ophthalmic solution Place 1 drop into both eyes 2 (two) times daily.   COSOPT, PF, 2-0.5 % Dpet ophthalmic solution Place 1 drop into both eyes 2 (two) times daily.   diclofenac (FLECTOR) 1.3 % PT12 Apply 1 patch topically daily as needed (pain).   dorzolamide-timolol 2-0.5% (COSOPT) 22.3-6.8 mg/mL ophthalmic solution 1 drop 3 (three) times daily.   fluconazole (DIFLUCAN) 150 MG Tab Take one tablet now and one in three days.   hydrOXYzine (ATARAX) 50 MG tablet Take 1 tablet (50 mg total) by mouth 3 (three) times daily as needed for Itching.   hydrOXYzine HCL (ATARAX) 10 MG Tab Take 1 tablet (10 mg total) by mouth 3 (three) times daily as needed (itching/anxiety).   ketoconazole (NIZORAL) 2 % cream Apply topically once daily. for 15 days   latanoprost 0.005 % ophthalmic solution 1 drop every evening.   loratadine (CLARITIN) 10 mg tablet TAKE 1 TABLET ONCE DAILY FOR ALLERGIES/FLUID IN THE EARS   melatonin (MELATIN) 3 mg tablet Take 1 tablet (3 mg total) by mouth nightly as needed for Insomnia.   metoprolol succinate (TOPROL-XL) 50 MG 24 hr tablet Take 1 tablet (50 mg total) by mouth once daily.   miconazole (MICOTIN) 2 % cream Apply 1 g topically 2 (two) times daily.  Patient taking differently: Apply 1 g topically 2 (two) times daily. PRN   mv-mn/folic ac/calcium/vit K1 (WOMEN'S 50 PLUS MULTIVITAMIN ORAL) Take by mouth.   nystatin (MYCOSTATIN) powder Apply topically 4 (four) times daily.   ondansetron (ZOFRAN-ODT) 4 MG TbDL Take 1 tablet (4 mg total) by mouth every 8 (eight) hours as needed (nausea/vomiting).   pantoprazole (PROTONIX) 40 MG tablet Take 1 tablet (40 mg total) by mouth once daily.   PHENobarbitaL 32.4 MG tablet Take 1 tablet (32.4 mg total)  "by mouth every evening.   phenytoin (DILANTIN) 100 MG ER capsule Take 1 capsule (100 mg total) by mouth 2 (two) times daily.   potassium chloride SA (KLOR-CON M15) 15 MEQ tablet Take 20 mEq by mouth Daily. Every morning for 10 days   XALATAN 0.005 % ophthalmic solution Place 1 drop into both eyes nightly.   metoprolol succinate (TOPROL-XL) 50 MG 24 hr tablet Take 50 mg by mouth once daily.   metoprolol succinate 25 mg CSpX Take 25 mg by mouth once daily.       REVIEW OF SYSTEMS:   Except as documented, all other systems reviewed and negative     PHYSICAL EXAM:   T 96.9 °F (36.1 °C)   BP (!) 152/94   P 84   RR 12   O2 96 %  GENERAL: awake, alert and mildly agitated, states her name but is difficult to understand  HEENT: normocephalic atraumatic   NECK: supple   LUNGS: Clear bilaterally, no wheezing or rales, no accessory muscle use   CVS: Regular rate and rhythm, normal peripheral perfusion  ABD: Soft, non-tender, non-distended, bowel sounds present  EXTREMITIES: no clubbing or cyanosis  SKIN: Warm, dry.   NEURO: alert and oriented, contracted LUE  PSYCHIATRIC: uncooperative    LABS AND IMAGING:     Recent Labs     06/27/25  1133   WBC 4.08*   RBC 3.95*   HGB 12.0   HCT 37.3   MCV 94.4*   MCH 30.4   MCHC 32.2*   RDW 15.2        No results for input(s): "LACTIC" in the last 72 hours.  No results for input(s): "INR", "APTT", "D-DIMER" in the last 72 hours.  No results for input(s): "HGBA1C", "CHOL", "TRIG", "LDL", "VLDL", "HDL" in the last 72 hours.   Recent Labs     06/25/25  0949 06/27/25  1133   * 148*   K 3.3* 3.1*   CO2 22 23   BUN 7 8.3*   CREATININE 0.74 0.96   CALCIUM 7.8* 8.9   ALBUMIN  --  3.0*   GLOBULIN  --  4.3*   ALKPHOS  --  127   ALT  --  21   AST  --  43   BILITOT  --  0.2     Recent Labs     06/27/25  1133   TROPONINI <0.010          X-Ray Chest AP Portable  Narrative: EXAMINATION:  XR CHEST AP PORTABLE    CLINICAL HISTORY:  Dysphagia, unspecified    COMPARISON:  22 June " 2025    FINDINGS:  Frontal view of the chest was obtained. Heart and mediastinum unchanged.  There is no new focal consolidation or pneumothorax.  Impression: No acute findings.    Electronically signed by: Armani Berry  Date:    06/27/2025  Time:    13:38      ASSESSMENT & PLAN:   UTI, present on arrival   Vaginal candidiasis  Acute hyperactive delirium 2/2 above  Failure to thrive (?advancing dementia V underlying psychiatric issue)    Hx: HTN, HLD, epilepsy, CHF, CAD, previous CVA, glaucoma, CKD, chronic left-sided weakness/wheelchair-bound     -ceftriaxone, follow up urine cultures obtained yesterday at [facility]   -complete 3 day course of Diflucan  -gentle IV fluids overnight x1 leaving  -case management consult for placement  -psychiatry consult for behavioral issues  -resume longstanding medications as appropriate  -Haldol as needed for agitation    DVT prophylaxis:  Lovenox  Code status:  Full code    If patient was admitted under observational status it is with my approval/permission.     At least 55 min was spent on this history and physical.  Time seen:  11:00 p.m. 6/27  Raudel Larios MD

## 2025-06-28 NOTE — NURSING
Spoken to patient's sister in law (Imani) confirmed that patient is from [facility] and not [place], tried calling [employer] but no answer. CBG this morning- 72, given minimal amount of apple juice but kept refusing to drink.

## 2025-06-28 NOTE — CONSULTS
[facility] - 5th Floor Med Surg  Wound Care    Patient Name:  Lisseth Guajardo   MRN:  [id number]  Date: 6/28/2025  Diagnosis: Acute cystitis without hematuria    History:     Past Medical History:   Diagnosis Date    Cervical radiculitis 08/29/2022    Cervical spinal stenosis 08/29/2022    Cervicalgia 08/29/2022    Coronary arteriosclerosis     DDD (degenerative disc disease), cervical 08/29/2022    Depression     Epilepsy, unspecified, not intractable, with status epilepticus     High cholesterol     Hypokalemia     Mild renal insufficiency 07/06/2022    Myocardial infarction     Osteoporosis     Primary hypertension 1/31/2023    Recurrent UTI     Unspecified macular degeneration        Social History[1]    Precautions:     Allergies as of 06/27/2025 - Reviewed 06/27/2025   Allergen Reaction Noted    Nitrofurantoin monohyd/m-cryst Hives 05/02/2022    Penicillin Hives 05/02/2022       WOC Assessment Details/Treatment        06/28/25 1103   WOCN Assessment   Visit Date 06/28/25   Visit Time 1103   Consult Type New   WOCN Speciality Wound   Wound pressure   Intervention assessed;changed;applied;chart review;coordination of care;orders   Teaching on-going        Wound 06/27/25 2030 Pressure Injury  Sacral spine   Date First Assessed/Time First Assessed: 06/27/25 2030   Present on Original Admission: Yes  Primary Wound Type: Pressure Injury  Side: (c)   Location: Sacral spine   Wound Image    Pressure Injury Stage 2   Dressing Appearance Dry;Intact   Drainage Amount None   Appearance Pink;Moist;Red   Tissue loss description Partial thickness   Black (%), Wound Tissue Color 0 %   Red (%), Wound Tissue Color 100 %   Yellow (%), Wound Tissue Color 0 %   Periwound Area Dry   Care Cleansed with:;Soap and water;Other (see comments)  (remedy soap)   Dressing Changed;Foam;Other (comment)  (AG foam)     WOCN consulted for sacrum. Patient awake, in bed, mobility very limited, pulsate bed ordered, preventative  measures in place. Educated patient on importance of turning every two hours to promote wound healing. Patient verbalized understanding. Treatment recommendations: cleanse with remedy soap, dry well, cover with AG foam dressing, change daily and PRN soilage. Nursing to continue with current treatment recommendations. Wound care will follow.     06/28/2025         [1]   Social History  Socioeconomic History    Marital status:    Tobacco Use    Smoking status: Former     Types: Cigarettes     Passive exposure: Past    Smokeless tobacco: Never   Substance and Sexual Activity    Alcohol use: Never    Drug use: Never    Sexual activity: Not Currently   Social History Narrative    ** Merged History Encounter **          Social Drivers of Health     Financial Resource Strain: Low Risk  (3/19/2025)    Overall Financial Resource Strain (CARDIA)     Difficulty of Paying Living Expenses: Not very hard   Food Insecurity: No Food Insecurity (3/19/2025)    Hunger Vital Sign     Worried About Running Out of Food in the Last Year: Never true     Ran Out of Food in the Last Year: Never true   Transportation Needs: Patient Declined (6/20/2025)    Received from [facility] Subsidi[facility] and Aff[facility]    PRAPARE - Transportation     Lack of Transportation (Medical): Patient declined     Lack of Transportation (Non-Medical): Patient declined   Physical Activity: Inactive (1/9/2024)    Exercise Vital Sign     Days of Exercise per Week: 0 days     Minutes of Exercise per Session: 0 min   Stress: No Stress Concern Present (3/19/2025)    [employer] - Occupational Stress Questionnaire     Feeling of Stress : Not at all   Housing Stability: Unknown (6/20/2025)    [facility]    Housing Stability Vital Sign     Unable to Pay for Housing in the Last Year: Patient declined

## 2025-06-28 NOTE — PLAN OF CARE
Problem: Physical Therapy  Goal: Physical Therapy Goal  Description: Pt will be seen by the following goals  1. Pt will be todd with bed mobility  2. Pt will sit at eob unsupported  3. Pt will be mod with bed to chair or wc transfers  Outcome: Progressing

## 2025-06-29 LAB
ALBUMIN SERPL-MCNC: 3 G/DL (ref 3.4–4.8)
ALBUMIN/GLOB SERPL: 0.8 RATIO (ref 1.1–2)
ALP SERPL-CCNC: 124 UNIT/L (ref 40–150)
ALT SERPL-CCNC: 18 UNIT/L (ref 0–55)
ANION GAP SERPL CALC-SCNC: 9 MEQ/L
AST SERPL-CCNC: 38 UNIT/L (ref 11–45)
BACTERIA UR CULT: ABNORMAL
BASOPHILS # BLD AUTO: 0.08 X10(3)/MCL
BASOPHILS NFR BLD AUTO: 1.2 %
BILIRUB SERPL-MCNC: 0.3 MG/DL
BUN SERPL-MCNC: 8.3 MG/DL (ref 9.8–20.1)
CALCIUM SERPL-MCNC: 8.5 MG/DL (ref 8.4–10.2)
CHLORIDE SERPL-SCNC: 111 MMOL/L (ref 98–107)
CO2 SERPL-SCNC: 25 MMOL/L (ref 23–31)
CREAT SERPL-MCNC: 1.05 MG/DL (ref 0.55–1.02)
CREAT/UREA NIT SERPL: 8
EOSINOPHIL # BLD AUTO: 0.22 X10(3)/MCL (ref 0–0.9)
EOSINOPHIL NFR BLD AUTO: 3.4 %
ERYTHROCYTE [DISTWIDTH] IN BLOOD BY AUTOMATED COUNT: 15.4 % (ref 11.5–17)
GFR SERPLBLD CREATININE-BSD FMLA CKD-EPI: 52 ML/MIN/1.73/M2
GLOBULIN SER-MCNC: 3.8 GM/DL (ref 2.4–3.5)
GLUCOSE SERPL-MCNC: 137 MG/DL (ref 70–110)
GLUCOSE SERPL-MCNC: 140 MG/DL (ref 82–115)
HCT VFR BLD AUTO: 36.7 % (ref 37–47)
HGB BLD-MCNC: 12.4 G/DL (ref 12–16)
IMM GRANULOCYTES # BLD AUTO: 0.03 X10(3)/MCL (ref 0–0.04)
IMM GRANULOCYTES NFR BLD AUTO: 0.5 %
LYMPHOCYTES # BLD AUTO: 1.46 X10(3)/MCL (ref 0.6–4.6)
LYMPHOCYTES NFR BLD AUTO: 22.4 %
MAGNESIUM SERPL-MCNC: 1.7 MG/DL (ref 1.6–2.6)
MCH RBC QN AUTO: 31 PG (ref 27–31)
MCHC RBC AUTO-ENTMCNC: 33.8 G/DL (ref 33–36)
MCV RBC AUTO: 91.8 FL (ref 80–94)
MONOCYTES # BLD AUTO: 0.81 X10(3)/MCL (ref 0.1–1.3)
MONOCYTES NFR BLD AUTO: 12.4 %
NEUTROPHILS # BLD AUTO: 3.93 X10(3)/MCL (ref 2.1–9.2)
NEUTROPHILS NFR BLD AUTO: 60.1 %
NRBC BLD AUTO-RTO: 0 %
PLATELET # BLD AUTO: 237 X10(3)/MCL (ref 130–400)
PMV BLD AUTO: 11.6 FL (ref 7.4–10.4)
POCT GLUCOSE: 101 MG/DL (ref 70–110)
POCT GLUCOSE: 123 MG/DL (ref 70–110)
POCT GLUCOSE: 137 MG/DL (ref 70–110)
POCT GLUCOSE: 143 MG/DL (ref 70–110)
POTASSIUM SERPL-SCNC: 3.2 MMOL/L (ref 3.5–5.1)
PROT SERPL-MCNC: 6.8 GM/DL (ref 5.8–7.6)
RBC # BLD AUTO: 4 X10(6)/MCL (ref 4.2–5.4)
SODIUM SERPL-SCNC: 145 MMOL/L (ref 136–145)
WBC # BLD AUTO: 6.53 X10(3)/MCL (ref 4.5–11.5)

## 2025-06-29 PROCEDURE — 21400001 HC TELEMETRY ROOM

## 2025-06-29 PROCEDURE — 76937 US GUIDE VASCULAR ACCESS: CPT

## 2025-06-29 PROCEDURE — 25000003 PHARM REV CODE 250: Performed by: NURSE PRACTITIONER

## 2025-06-29 PROCEDURE — 63600175 PHARM REV CODE 636 W HCPCS: Performed by: INTERNAL MEDICINE

## 2025-06-29 PROCEDURE — 85025 COMPLETE CBC W/AUTO DIFF WBC: CPT | Performed by: INTERNAL MEDICINE

## 2025-06-29 PROCEDURE — 25000003 PHARM REV CODE 250: Performed by: INTERNAL MEDICINE

## 2025-06-29 PROCEDURE — 36410 VNPNXR 3YR/> PHY/QHP DX/THER: CPT

## 2025-06-29 PROCEDURE — C1751 CATH, INF, PER/CENT/MIDLINE: HCPCS

## 2025-06-29 PROCEDURE — 36415 COLL VENOUS BLD VENIPUNCTURE: CPT | Performed by: INTERNAL MEDICINE

## 2025-06-29 PROCEDURE — 80053 COMPREHEN METABOLIC PANEL: CPT | Performed by: INTERNAL MEDICINE

## 2025-06-29 PROCEDURE — 83735 ASSAY OF MAGNESIUM: CPT | Performed by: INTERNAL MEDICINE

## 2025-06-29 RX ORDER — ASPIRIN 81 MG/1
81 TABLET ORAL DAILY
Status: DISCONTINUED | OUTPATIENT
Start: 2025-06-29 | End: 2025-07-01 | Stop reason: HOSPADM

## 2025-06-29 RX ORDER — ATORVASTATIN CALCIUM 40 MG/1
40 TABLET, FILM COATED ORAL DAILY
Status: DISCONTINUED | OUTPATIENT
Start: 2025-06-29 | End: 2025-07-01 | Stop reason: HOSPADM

## 2025-06-29 RX ORDER — PHENYTOIN SODIUM 100 MG/1
100 CAPSULE, EXTENDED RELEASE ORAL 2 TIMES DAILY
Status: DISCONTINUED | OUTPATIENT
Start: 2025-06-29 | End: 2025-07-01 | Stop reason: HOSPADM

## 2025-06-29 RX ORDER — MIRTAZAPINE 15 MG/1
15 TABLET, FILM COATED ORAL NIGHTLY
Status: DISCONTINUED | OUTPATIENT
Start: 2025-06-29 | End: 2025-07-01 | Stop reason: HOSPADM

## 2025-06-29 RX ORDER — SODIUM CHLORIDE 0.9 % (FLUSH) 0.9 %
10 SYRINGE (ML) INJECTION EVERY 12 HOURS PRN
Status: DISCONTINUED | OUTPATIENT
Start: 2025-06-29 | End: 2025-07-01 | Stop reason: HOSPADM

## 2025-06-29 RX ORDER — PHENOBARBITAL 32.4 MG/1
32.4 TABLET ORAL NIGHTLY
Status: DISCONTINUED | OUTPATIENT
Start: 2025-06-29 | End: 2025-07-01 | Stop reason: HOSPADM

## 2025-06-29 RX ADMIN — PHENYTOIN SODIUM 100 MG: 100 CAPSULE, EXTENDED RELEASE ORAL at 09:06

## 2025-06-29 RX ADMIN — ATORVASTATIN CALCIUM 40 MG: 40 TABLET, FILM COATED ORAL at 11:06

## 2025-06-29 RX ADMIN — DEXTROSE MONOHYDRATE, SODIUM CHLORIDE, AND POTASSIUM CHLORIDE: 50; 4.5; 1.49 INJECTION, SOLUTION INTRAVENOUS at 12:06

## 2025-06-29 RX ADMIN — PHENOBARBITAL 32.4 MG: 32.4 TABLET ORAL at 09:06

## 2025-06-29 RX ADMIN — ENOXAPARIN SODIUM 40 MG: 40 INJECTION SUBCUTANEOUS at 04:06

## 2025-06-29 RX ADMIN — Medication 81 MG: at 11:06

## 2025-06-29 RX ADMIN — POTASSIUM BICARBONATE 25 MEQ: 977.5 TABLET, EFFERVESCENT ORAL at 11:06

## 2025-06-29 RX ADMIN — CEFTRIAXONE SODIUM 1 G: 1 INJECTION, POWDER, FOR SOLUTION INTRAMUSCULAR; INTRAVENOUS at 09:06

## 2025-06-29 RX ADMIN — DEXTROSE MONOHYDRATE, SODIUM CHLORIDE, AND POTASSIUM CHLORIDE: 50; 4.5; 1.49 INJECTION, SOLUTION INTRAVENOUS at 09:06

## 2025-06-29 RX ADMIN — PHENYTOIN SODIUM 100 MG: 100 CAPSULE, EXTENDED RELEASE ORAL at 11:06

## 2025-06-29 NOTE — PROGRESS NOTES
[facility] Hospital Medicine Progress Note        Chief Complaint: Inpatient Follow-up for FTT    HPI:   Patient is an 84-year-old female with a past medical history of HTN, HLD, epilepsy, CHF, CAD, previous CVA, glaucoma, CKD, chronic left-sided weakness/wheelchair-bound with underlying psychiatric undiagnosed issue which is making her care difficult.  Family reports she is in an assisted living at [facility] nursing home but is being affected currently due to behavioral issues.  Family is requesting psych eval for decision making as patient is unwilling to a signs someone power of .  She also was seen in the ER in the last few days at [facility] for a urinary tract infection and a yeast infection, but was just part bacteremia nursing facility where she has continued to have hallucinations and agitation     She arrived to the ER afebrile hemodynamically stable maintaining normal sats on room air, with laboratory work showing evidence of urinary tract infection.  Patient has been admitted to the hospitalist service with consultation to case management for assistance with placement.    Palliative care was consulted. Her potassium was low and was replaced.     Interval Hx:   Patient awake but lethargic. Very drowsy. Per nurse she was more awake and alert this morning. She is not eating much. Has been afebrile.     No family at bedside.     Case was discussed with patient's nurse and  on the floor.    Objective/physical exam:  General: In no acute distress, frail, looks chronically ill  Chest: Clear to auscultation bilaterally  Heart: RRR, +S1, S2, no appreciable murmur  Abdomen: Soft, nontender, BS +  Neurologic: Luis UE and LE contractures     VITAL SIGNS: 24 HRS MIN & MAX LAST   Temp  Min: 96.3 °F (35.7 °C)  Max: 97.5 °F (36.4 °C) 97.4 °F (36.3 °C)   BP  Min: 106/60  Max: 131/84 (!) 110/59   Pulse  Min: 66  Max: 107  66   Resp  Min: 18  Max: 20 18   SpO2  Min: 79 %  Max:  99 % (!) 91 %     I have reviewed the following labs:  Recent Labs   Lab 06/27/25  1133 06/28/25  0425 06/29/25  0705   WBC 4.08* 5.45 6.53   RBC 3.95* 4.51 4.00*   HGB 12.0 13.7 12.4   HCT 37.3 43.0 36.7*   MCV 94.4* 95.3* 91.8   MCH 30.4 30.4 31.0   MCHC 32.2* 31.9* 33.8   RDW 15.2 15.3 15.4    218 237   MPV 11.2* 12.2* 11.6*     Recent Labs   Lab 06/23/25  0752 06/24/25  0750 06/25/25  0949 06/27/25  1133 06/28/25  0425 06/29/25  0705    143 146* 148* 148* 145   K 3.9 3.6 3.3* 3.1* 2.9* 3.2*    108 111* 112* 108* 111*   CO2 17* 21* 22 23 19* 25   ANIONGAP 18* 14* 13  --   --   --    BUN 13 9 7 8.3* 9.9 8.3*   CREATININE 0.83 0.77 0.74 0.96 1.06* 1.05*   GLU  --   --   --  88 72* 140*   CALCIUM 8.5* 8.3* 7.8* 8.9 9.3 8.5   MG  --   --   --   --   --  1.70   ALBUMIN  --   --   --  3.0* 3.4 3.0*   PROT  --   --   --  7.3 8.0* 6.8   ALKPHOS  --   --   --  127 141 124   ALT  --   --   --  21 23 18   AST  --   --   --  43 46* 38   BILITOT  --   --   --  0.2 0.3 0.3     Microbiology Results (last 7 days)       Procedure Component Value Units Date/Time    Urine culture [[id number]] Collected: 06/27/25 1531    Order Status: Completed Specimen: Urine Updated: 06/28/25 0658     Urine Culture No Growth At 24 Hours             See below for Radiology    Assessment/Plan:  ? UTI, POA   Vaginal candidiasis  Hypokalemia   Mild dehydration   FTT  ? Dementia   Anorexia     Hx: HTN, HLD, epilepsy, CHF, CAD, previous CVA, glaucoma, CKD, chronic left-sided weakness/wheelchair-bound     Plan:  Patient more drowsy today   Has been afebrile.   She will need palliative care, consult placed   ABG if she continued to be lethargic   CBG >100    Continue IV D5 1/2 NS  with potassium   Potassium today 3.2,  Na 145, Crt 1.05  Will closely monitor patients daily urine out put, renal parameters and volume status      Will continue iv rocephin for today and stop  F/U on cultures     Continue supportive care       VTE  prophylaxis: Lovenox     Patient condition:  Fair    Anticipated discharge and Disposition:   Home with ? Comfort care       All diagnosis and differential diagnosis have been reviewed; assessment and plan has been documented; I have personally reviewed the labs and test results that are presently available; I have reviewed the patients medication list; I have reviewed the consulting providers response and recommendations. I have reviewed or attempted to review medical records based upon their availability    All of the patient's questions have been  addressed and answered. Patient's is agreeable to the above stated plan. I will continue to monitor closely and make adjustments to medical management as needed.    Portions of this note dictated using EMR integrated voice recognition software, and may be subject to voice recognition errors not corrected at proofreading. Please contact writer for clarification if needed.   _____________________________________________________________________    Malnutrition Status:  Nutrition consulted. Most recent weight and BMI monitored-     Measurements:  Wt Readings from Last 1 Encounters:   06/27/25 49.9 kg (110 lb)   Body mass index is 20.78 kg/m².    Patient has been screened and assessed by RD.    Malnutrition Type:  Context:    Level:      Malnutrition Characteristic Summary:       Interventions/Recommendations (treatment strategy):        Scheduled Med:   ARIPiprazole  2 mg Oral QHS    aspirin  81 mg Oral Daily    atorvastatin  40 mg Oral Daily    cefTRIAXone (Rocephin) IV (PEDS and ADULTS)  1 g Intravenous Q24H    enoxparin  40 mg Subcutaneous Daily    mirtazapine  15 mg Oral QHS    PHENobarbitaL  32.4 mg Oral QHS    phenytoin  100 mg Oral BID    potassium bicarbonate  25 mEq Oral Once      Continuous Infusions:   dextrose 5 % and 0.45 % NaCl with KCl 20 mEq   Intravenous Continuous 100 mL/hr at 06/29/25 0039 New Bag at 06/29/25 0039      PRN Meds:    Current  Facility-Administered Medications:     acetaminophen, 650 mg, Oral, Q8H PRN    haloperidol lactate, 5 mg, Intramuscular, Q6H PRN    melatonin, 6 mg, Oral, Nightly PRN    sodium chloride 0.9%, 10 mL, Intravenous, PRN    Flushing PICC/Midline Protocol, , , Until Discontinued **AND** sodium chloride 0.9%, 10 mL, Intravenous, Q12H PRN     Radiology:  I have personally reviewed the following imaging and agree with the radiologist.     X-Ray Chest AP Portable  Narrative: EXAMINATION:  XR CHEST AP PORTABLE    CLINICAL HISTORY:  Dysphagia, unspecified    COMPARISON:  22 June 2025    FINDINGS:  Frontal view of the chest was obtained. Heart and mediastinum unchanged.  There is no new focal consolidation or pneumothorax.  Impression: No acute findings.    Electronically signed by: Armani Berry  Date:    06/27/2025  Time:    13:38      Rolando Avilez MD  Department of Hospital Medicine   [facility]   06/29/2025

## 2025-06-29 NOTE — PROCEDURES
"Lisseth Guajardo is a 84 y.o. female patient.    Temp: 96.8 °F (36 °C) (06/29/25 0400)  Pulse: 98 (06/29/25 0400)  Resp: 18 (06/29/25 0400)  BP: 126/85 (06/29/25 0400)  SpO2: 96 % (06/29/25 0400)  Weight: 49.9 kg (110 lb) (06/27/25 1114)  Height: 5' 1" (154.9 cm) (06/27/25 1114)    PICC  Time out: Immediately prior to procedure a time out was called to verify the correct patient, procedure, equipment, support staff and site/side marked as required  Indications: med administration  Preparation: skin prepped with ChloraPrep  Skin prep agent dried: skin prep agent completely dried prior to procedure  Sterile barriers: all five maximum sterile barriers used - cap, mask, sterile gown, sterile gloves, and large sterile sheet  Hand hygiene: hand hygiene performed prior to central venous catheter insertion  Location details: right brachial  Catheter type: single lumen  Catheter size: 4 Fr  Catheter Length: 13cm    Ultrasound guidance: yes  Needle advanced into vessel with real time Ultrasound guidance.  Guidewire confirmed in vessel.  Sterile sheath used.  Number of attempts: 1  Post-procedure: blood return through all ports and sterile dressing applied            Name jayy  6/29/2025    "

## 2025-06-29 NOTE — PLAN OF CARE
Problem: Adult Inpatient Plan of Care  Goal: Plan of Care Review  Outcome: Progressing  Goal: Patient-Specific Goal (Individualized)  Outcome: Progressing  Goal: Absence of Hospital-Acquired Illness or Injury  Outcome: Progressing  Goal: Optimal Comfort and Wellbeing  Outcome: Progressing  Goal: Readiness for Transition of Care  Outcome: Progressing     Problem: Skin Injury Risk Increased  Goal: Skin Health and Integrity  Outcome: Progressing     Problem: Infection  Goal: Absence of Infection Signs and Symptoms  Outcome: Progressing     Problem: Wound  Goal: Optimal Coping  Outcome: Progressing  Goal: Optimal Functional Ability  Outcome: Progressing  Goal: Absence of Infection Signs and Symptoms  Outcome: Progressing  Goal: Improved Oral Intake  Outcome: Progressing  Goal: Optimal Pain Control and Function  Outcome: Progressing  Goal: Skin Health and Integrity  Outcome: Progressing  Goal: Optimal Wound Healing  Outcome: Progressing     Problem: Fall Injury Risk  Goal: Absence of Fall and Fall-Related Injury  Outcome: Progressing

## 2025-06-29 NOTE — NURSING
Notified hospitalist NP (Latosha Castillo) about patient is a hard stick to start an IV, RN tried it to start one but not successful. NP is aware and stated it's ok to put a MIDLINE for her. Order verified and placed.

## 2025-06-29 NOTE — PLAN OF CARE
Problem: Adult Inpatient Plan of Care  Goal: Plan of Care Review  Outcome: Progressing  Goal: Patient-Specific Goal (Individualized)  Outcome: Progressing  Goal: Absence of Hospital-Acquired Illness or Injury  Outcome: Progressing  Goal: Optimal Comfort and Wellbeing  Outcome: Progressing  Goal: Readiness for Transition of Care  Outcome: Progressing     Problem: Skin Injury Risk Increased  Goal: Skin Health and Integrity  Outcome: Progressing     Problem: Infection  Goal: Absence of Infection Signs and Symptoms  Outcome: Progressing     Problem: Wound  Goal: Optimal Coping  Outcome: Progressing  Goal: Optimal Functional Ability  Outcome: Progressing  Goal: Absence of Infection Signs and Symptoms  Outcome: Progressing  Goal: Improved Oral Intake  Outcome: Progressing  Goal: Optimal Pain Control and Function  Outcome: Progressing  Goal: Skin Health and Integrity  Outcome: Progressing  Goal: Optimal Wound Healing  Outcome: Progressing     Problem: Fall Injury Risk  Goal: Absence of Fall and Fall-Related Injury  Outcome: Progressing     Problem: Coping Ineffective  Goal: Effective Coping  Outcome: Progressing

## 2025-06-29 NOTE — PSYCH EVALUATION
"Subjective: "They said I had a UTI."    Patient is a 84 y.o. female seen for evaluation for hallucinations. Past medical history of HTN, HLD, epilepsy, CHF, CAD, previous CVA, glaucoma, CKD, chronic left-sided weakness/wheelchair-bound with underlying psychiatric undiagnosed issue which is making her care difficult. Family reports she is in an assisted living at [facility] still nursing home but is being affected currently due to behavioral issues. Family is requesting psych eval for decision making as patient is unwilling to a signs someone power of . She also was seen in the ER in the last few days at [facility] for a urinary tract infection and a yeast infection, but was just part bacteremia nursing facility where she has continued to have hallucinations and agitation. Arrived to the ER afebrile hemodynamically stable maintaining normal sats on room air, with laboratory work showing evidence of urinary tract infection. Patient has been admitted to the hospitalist service with consultation to case management for assistance with placement.     She reports having seizures since 9th grade, mostly managed with phenobarbital and dilantin. 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  since 2024, recently relocated to another assisted living facility in [place] after previously residing in one in [place]. Her and  sold their home in 2019 and both moved into assisted living facility in [place] before he passed away. She worked as a  for more than 40 years in [place], before moving back to [place] (where she is originally from). Never had children. Denies significant psych hx, denies legal hx, denies substance abuse hx, denies  history. (+) trauma of MGM passing away when she was young, this person mainly reared her growing up. Admits to experiencing hallucinations over past several years, says they don't bother her and " she is aware when she has them. Most recently has heard her  snoring on the couch in her current hospital room (2 nights ago), has seen a man in black with an ax, has also heard her 's voice telling her he loves her, and has seen her  grandmother. She has lost most of her sight r/t macular degeneration and mainly senses some lights in her room only. Oriented appropriately with a relatively impressive memory considering. UDS (+) barbituates, ETOH negative.    Past Psychiatric History:   denies  Currently in treatment with n/a.  Education: high school diploma/GED    Substance Abuse History:  Recreational drugs: denies  Use of Alcohol: denied  Use of Caffeine: denies use  Use of OTC: n/a    Patient Active Problem List    Diagnosis Date Noted    Acute cystitis without hematuria 2025    Seizure disorder 2025    Asymptomatic microscopic hematuria 2025    Unable to walk 2025    Fall 2025    Seizures 2025    Hospital discharge follow-up 2024    Aortic atherosclerosis 2024    Anorexia nervosa, unspecified 2024    Septo-optic dysplasia of brain 2024    Stage 3a chronic kidney disease 2024    Recurrent major depressive disorder, remission status unspecified 2023    Heart failure, unspecified HF chronicity, unspecified heart failure type 2023    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 2023    Chronic obstructive pulmonary disease with (acute) exacerbation 2023    Exudative age-related macular degeneration, bilateral, with active choroidal neovascularization 2023    Epilepsy, unspecified, not intractable, with status epilepticus 2023    Primary hypertension 2023    Mixed hyperlipidemia 2023    Coronary artery disease 2023    Cervical radiculitis 2022    Cervicalgia 2022    DDD (degenerative disc disease), cervical  08/29/2022    Cervical spinal stenosis 08/29/2022    Mild renal insufficiency 07/06/2022     Past Medical History:   Diagnosis Date    Cervical radiculitis 08/29/2022    Cervical spinal stenosis 08/29/2022    Cervicalgia 08/29/2022    Coronary arteriosclerosis     DDD (degenerative disc disease), cervical 08/29/2022    Depression     Epilepsy, unspecified, not intractable, with status epilepticus     High cholesterol     Hypokalemia     Mild renal insufficiency 07/06/2022    Myocardial infarction     Osteoporosis     Primary hypertension 1/31/2023    Recurrent UTI     Unspecified macular degeneration       Past Surgical History:   Procedure Laterality Date    APPENDECTOMY      BACK SURGERY      cardiac stents      CARPAL TUNNEL RELEASE      CATARACT EXTRACTION Left     CHOLECYSTECTOMY      crainotomy      FOOT SURGERY Left     HYSTERECTOMY      LUNG REMOVAL, PARTIAL Right     LYMPH NODE DISSECTION        Prescriptions Prior to Admission[1]  Review of patient's allergies indicates:   Allergen Reactions    Nitrofurantoin monohyd/m-cryst Hives    Penicillin Hives      Social History     Tobacco Use    Smoking status: Former     Types: Cigarettes     Passive exposure: Past    Smokeless tobacco: Never   Substance Use Topics    Alcohol use: Never      Family History   Family history unknown: Yes      Psychiatric Review Of Systems:  sleep: yes  appetite changes: yes  weight changes: yes  energy/anergy: yes  interest/pleasure/anhedonia: yes  somatic symptoms: yes  libido: no  anxiety/panic: yes  guilty/hopeless: no  S.I.B.s/risky behavior: no  any drugs: no  alcohol: no       Objective:  Vital signs:  Temp:  [96.3 °F (35.7 °C)-97.5 °F (36.4 °C)] 96.3 °F (35.7 °C)  Pulse:  [] 107  Resp:  [18-20] 18  SpO2:  [95 %-99 %] 95 %  BP: (125-137)/(67-85) 131/84    Mental Status Evaluation:  Appearance:  lying in bed, thin & gaunt looking, hospital gown   Behavior:  friendly and cooperative, eye contact minimal, restless at  times   Speech:  pressured, talkative   Mood:  anxious   Affect:  expansive   Thought Process:  circumstantial, blocked at times   Thought Content:  Denies SI/HI, (+) psychosis   Sensorium:  grossly intact   Cognition:  grossly intact   Insight:  fair   Judgment:  questionable     Assessment/Plan:  Axis I: Organic Affective Syndrome, See current hospital problem list, and R/O psychosis/agitation r/t UTI  Axis II: Deferred  Axis III:   Past Medical History:   Diagnosis Date    Cervical radiculitis 08/29/2022    Cervical spinal stenosis 08/29/2022    Cervicalgia 08/29/2022    Coronary arteriosclerosis     DDD (degenerative disc disease), cervical 08/29/2022    Depression     Epilepsy, unspecified, not intractable, with status epilepticus     High cholesterol     Hypokalemia     Mild renal insufficiency 07/06/2022    Myocardial infarction     Osteoporosis     Primary hypertension 1/31/2023    Recurrent UTI     Unspecified macular degeneration      Axis IV: other psychosocial or environmental problems, problems related to social environment, and problems with primary support group  Axis V: 0 Inadequate information    Plan:    1.) start abilify 2mg po QHS for agitation, monitor for somnolence/UTI related agitation/psychosis  2.) psych cont to follow       [1]   Medications Prior to Admission   Medication Sig Dispense Refill Last Dose/Taking    albuterol sulfate 90 mcg/actuation aebs Inhale 90 mcg into the lungs every 4 (four) hours as needed (2 puffs for wheezing).       aspirin (ECOTRIN) 81 MG EC tablet Take 81 mg by mouth once daily.       atorvastatin (LIPITOR) 40 MG tablet TAKE 1 TABLET DAILY 90 tablet 3     brimonidine 0.2% (ALPHAGAN) 0.2 % Drop Place 1 drop into both eyes 3 (three) times daily. 8am- 12nn- 7pm       calcium carbonate/vitamin D3 (CALTRATE 600 + D ORAL) Take 1 tablet by mouth 2 (two) times a day.       dorzolamide-timolol 2-0.5% (COSOPT) 22.3-6.8 mg/mL ophthalmic solution Place 1 drop into both eyes 2  (two) times daily. 8am-7pm       fluconazole (DIFLUCAN) 150 MG Tab Take one tablet now and one in three days. 2 tablet 0     latanoprost 0.005 % ophthalmic solution 1 drop every evening.       loratadine (CLARITIN) 10 mg tablet TAKE 1 TABLET ONCE DAILY FOR ALLERGIES/FLUID IN THE EARS (Patient taking differently: Take 10 mg by mouth once daily.) 90 tablet 3     melatonin (MELATIN) 3 mg tablet Take 1 tablet (3 mg total) by mouth nightly as needed for Insomnia. 90 tablet 3     metoprolol succinate (TOPROL-XL) 50 MG 24 hr tablet Take 1 tablet (50 mg total) by mouth once daily. (Patient taking differently: Take 100 mg by mouth once daily.) 90 tablet 3     mirtazapine (REMERON SOL-TAB) 30 MG disintegrating tablet Take 30 mg by mouth nightly as needed.       mv-mn/folic ac/calcium/vit K1 (WOMEN'S 50 PLUS MULTIVITAMIN ORAL) Take 50 mg by mouth Daily.       ondansetron (ZOFRAN-ODT) 4 MG TbDL Take 1 tablet (4 mg total) by mouth every 8 (eight) hours as needed (nausea/vomiting). 30 tablet 5     PHENobarbitaL 32.4 MG tablet Take 1 tablet (32.4 mg total) by mouth every evening. (Patient taking differently: Take 32.4 mg by mouth Daily.) 90 tablet 3     phenytoin (DILANTIN) 100 MG ER capsule Take 1 capsule (100 mg total) by mouth 2 (two) times daily. (Patient taking differently: Take 200 mg by mouth once daily.) 180 capsule 3

## 2025-06-30 LAB
ANION GAP SERPL CALC-SCNC: 6 MEQ/L
BUN SERPL-MCNC: 6.1 MG/DL (ref 9.8–20.1)
CALCIUM SERPL-MCNC: 8.2 MG/DL (ref 8.4–10.2)
CHLORIDE SERPL-SCNC: 113 MMOL/L (ref 98–107)
CO2 SERPL-SCNC: 25 MMOL/L (ref 23–31)
CREAT SERPL-MCNC: 0.73 MG/DL (ref 0.55–1.02)
CREAT/UREA NIT SERPL: 8
GFR SERPLBLD CREATININE-BSD FMLA CKD-EPI: >60 ML/MIN/1.73/M2
GLUCOSE SERPL-MCNC: 116 MG/DL (ref 82–115)
POCT GLUCOSE: 111 MG/DL (ref 70–110)
POCT GLUCOSE: 133 MG/DL (ref 70–110)
POTASSIUM SERPL-SCNC: 3.4 MMOL/L (ref 3.5–5.1)
SODIUM SERPL-SCNC: 144 MMOL/L (ref 136–145)

## 2025-06-30 PROCEDURE — 21400001 HC TELEMETRY ROOM

## 2025-06-30 PROCEDURE — 80048 BASIC METABOLIC PNL TOTAL CA: CPT | Performed by: INTERNAL MEDICINE

## 2025-06-30 PROCEDURE — 25000003 PHARM REV CODE 250: Performed by: INTERNAL MEDICINE

## 2025-06-30 PROCEDURE — 25000003 PHARM REV CODE 250: Performed by: NURSE PRACTITIONER

## 2025-06-30 PROCEDURE — 36415 COLL VENOUS BLD VENIPUNCTURE: CPT | Performed by: INTERNAL MEDICINE

## 2025-06-30 PROCEDURE — 63600175 PHARM REV CODE 636 W HCPCS: Performed by: INTERNAL MEDICINE

## 2025-06-30 PROCEDURE — 99222 1ST HOSP IP/OBS MODERATE 55: CPT | Mod: ,,,

## 2025-06-30 RX ADMIN — POTASSIUM BICARBONATE 25 MEQ: 977.5 TABLET, EFFERVESCENT ORAL at 03:06

## 2025-06-30 RX ADMIN — ENOXAPARIN SODIUM 40 MG: 40 INJECTION SUBCUTANEOUS at 03:06

## 2025-06-30 RX ADMIN — PHENOBARBITAL 32.4 MG: 32.4 TABLET ORAL at 09:06

## 2025-06-30 RX ADMIN — ARIPIPRAZOLE 2 MG: 2 TABLET ORAL at 09:06

## 2025-06-30 RX ADMIN — ACETAMINOPHEN 650 MG: 325 TABLET ORAL at 09:06

## 2025-06-30 RX ADMIN — Medication 81 MG: at 09:06

## 2025-06-30 RX ADMIN — PHENYTOIN SODIUM 100 MG: 100 CAPSULE, EXTENDED RELEASE ORAL at 09:06

## 2025-06-30 RX ADMIN — DEXTROSE MONOHYDRATE, SODIUM CHLORIDE, AND POTASSIUM CHLORIDE: 50; 4.5; 1.49 INJECTION, SOLUTION INTRAVENOUS at 07:06

## 2025-06-30 RX ADMIN — ATORVASTATIN CALCIUM 40 MG: 40 TABLET, FILM COATED ORAL at 09:06

## 2025-06-30 RX ADMIN — MIRTAZAPINE 15 MG: 15 TABLET, FILM COATED ORAL at 09:06

## 2025-06-30 NOTE — PHYSICIAN QUERY
Please provide the integumentary diagnosis related to the documentation of the Sacrum.   Pressure Injury/Decubitus Ulcer, Stage 2

## 2025-06-30 NOTE — PROGRESS NOTES
Inpatient Nutrition Assessment    Admit Date: 6/27/2025   Total duration of encounter: 3 days   Patient Age: 84 y.o.    Nutrition Recommendation/Prescription     Continue oral diet as tolerated; Diet Adult Regular   Will trial Boost (240 kcal, 10 gm protein per container)    Communication of Recommendations: reviewed with nurse    Nutrition Assessment     Malnutrition Assessment/Nutrition-Focused Physical Exam       Malnutrition Level: other (see comments) (Unable to assess) (06/30/25 1620)  Energy Intake (Malnutrition): other (see comments) (Does not meet criteria) (06/30/25 1620)  Weight Loss (Malnutrition): other (see comments) (Does not meet criteria) (06/30/25 1620)                                                  A minimum of two characteristics is recommended for diagnosis of either severe or non-severe malnutrition.    Chart Review    Reason Seen: continuous nutrition monitoring    Malnutrition Screening Tool Results              Diagnosis:  ? UTI, POA   Vaginal candidiasis  Hypokalemia   Mild dehydration   FTT  ? Dementia   Anorexia     Relevant Medical History: HTN, HLD, epilepsy, CHF, CAD, previous CVA, glaucoma, CKD, chronic left-sided weakness/wheelchair-bound     Scheduled Medications:  ARIPiprazole, 2 mg, QHS  aspirin, 81 mg, Daily  atorvastatin, 40 mg, Daily  enoxparin, 40 mg, Daily  mirtazapine, 15 mg, QHS  PHENobarbitaL, 32.4 mg, QHS  phenytoin, 100 mg, BID  potassium bicarbonate, 25 mEq, Daily    Continuous Infusions:   PRN Medications:  acetaminophen, 650 mg, Q8H PRN  haloperidol lactate, 5 mg, Q6H PRN  melatonin, 6 mg, Nightly PRN  sodium chloride 0.9%, 10 mL, PRN  sodium chloride 0.9%, 10 mL, Q12H PRN    Calorie Containing IV Medications: no significant kcals from medications at this time    Recent Labs   Lab 06/24/25  0750 06/25/25  0949 06/27/25  1133 06/28/25  0425 06/29/25  0705 06/30/25  0526    146* 148* 148* 145 144   K 3.6 3.3* 3.1* 2.9* 3.2* 3.4*   CALCIUM 8.3* 7.8* 8.9 9.3 8.5  "8.2*   MG  --   --   --   --  1.70  --     111* 112* 108* 111* 113*   CO2 21* 22 23 19* 25 25   BUN 9 7 8.3* 9.9 8.3* 6.1*   CREATININE 0.77 0.74 0.96 1.06* 1.05* 0.73   EGFRNORACEVR  --   --  58 52 52 >60   GLU  --   --  88 72* 140* 116*   BILITOT  --   --  0.2 0.3 0.3  --    ALKPHOS  --   --  127 141 124  --    ALT  --   --  21 23 18  --    AST  --   --  43 46* 38  --    ALBUMIN  --   --  3.0* 3.4 3.0*  --    WBC  --   --  4.08* 5.45 6.53  --    HGB  --   --  12.0 13.7 12.4  --    HCT  --   --  37.3 43.0 36.7*  --      Nutrition Orders:  Diet Adult Regular  Dietary nutrition supplements All Meals; Boost Original Nutritional Drink - Any flavor    Appetite/Oral Intake: poor/0-25% of meals  Factors Affecting Nutritional Intake: behavioral (mealtime) and dislike of food served  Social Needs Impacting Access to Food: none identified  Food/[place]/Cultural Preferences: none reported  Food Allergies: no known food allergies  Last Bowel Movement: 06/27/25  Wound(s):     Wound 06/27/25 2030 Pressure Injury  Sacral spine-Tissue loss description: Partial thickness     Comments    6/30/25 Pt not eating much, does not like the food served, said the banana was not sweet enough; family brought her chicken nuggets over the weekend which she ate. Will trial Boost with meals. Weight appears overall stable in EMR. Pt lives in an assisted living.    Anthropometrics    Height: 5' 0.98" (154.9 cm), Height Method: Stated  Last Weight: 49.9 kg (110 lb) (06/27/25 1114), Weight Method: Stated  BMI (Calculated): 20.8  BMI Classification: underweight (BMI less than 22 if >65 years of age)     Ideal Body Weight (IBW), Female: 104.9 lb     % Ideal Body Weight, Female (lb): 104.76 %                             Usual Weight Provided By: EMR weight history    Wt Readings from Last 5 Encounters:   06/27/25 49.9 kg (110 lb)   05/29/25 49 kg (108 lb)   03/31/25 54.4 kg (120 lb)   03/18/25 54.4 kg (120 lb)   02/13/25 49 kg (108 lb)     Weight " Change(s) Since Admission:   Wt Readings from Last 1 Encounters:   06/27/25 1114 49.9 kg (110 lb)   Admit Weight: 49.9 kg (110 lb) (06/27/25 1114), Weight Method: Stated    Estimated Needs    Weight Used For Calorie Calculations: 49.9 kg (110 lb 0.2 oz)  Energy Calorie Requirements (kcal): 7797-8184 kcal (25-30 kcal/kg)  Energy Need Method: Kcal/kg  Weight Used For Protein Calculations: 49.9 kg (110 lb 0.2 oz)  Protein Requirements: 65-70gm (1.3-1.4 gm/kg)  Fluid Requirements (mL): 1247ml (25 ml/kg)        Enteral Nutrition     Patient not receiving enteral nutrition at this time.    Parenteral Nutrition     Patient not receiving parenteral nutrition support at this time.    Evaluation of Received Nutrient Intake    Calories: not meeting estimated needs  Protein: not meeting estimated needs    Patient Education     Not applicable.    Nutrition Diagnosis     PES: Inadequate oral intake related to acute illness as evidenced by <50% intake of meals since admit. (new)     PES:            Nutrition Interventions     Intervention(s): commercial beverage and collaboration with other providers  Intervention(s):      Goal: Consume % of meals/snacks by follow-up. (new)  Goal: Consume % of oral supplements by follow-up. (new)    Nutrition Goals & Monitoring     Dietitian will monitor: food and beverage intake, weight change, and beliefs/attitudes  Discharge planning: too early to determine; pending clinical course  Nutrition Risk/Follow-Up: patient at increased nutrition risk; dietitian will follow-up twice weekly   Please consult if re-assessment needed sooner.

## 2025-06-30 NOTE — PROGRESS NOTES
[facility] Hospital Medicine Progress Note        Chief Complaint: Inpatient Follow-up for FTT    HPI:   Patient is an 84-year-old female with a past medical history of HTN, HLD, epilepsy, CHF, CAD, previous CVA, glaucoma, CKD, chronic left-sided weakness/wheelchair-bound with underlying psychiatric undiagnosed issue which is making her care difficult.  Family reports she is in an assisted living at [facility] nursing home but is being affected currently due to behavioral issues.  Family is requesting psych eval for decision making as patient is unwilling to a signs someone power of .  She also was seen in the ER in the last few days at [facility] for a urinary tract infection and a yeast infection, but was just part bacteremia nursing facility where she has continued to have hallucinations and agitation     She arrived to the ER afebrile hemodynamically stable maintaining normal sats on room air, with laboratory work showing evidence of urinary tract infection.  Patient has been admitted to the hospitalist service with consultation to case management for assistance with placement.    Palliative care was consulted. Her potassium was low and was replaced.     Interval Hx:   Patient awake and alert today. States she does not want to go to a NH. She has a sitter at the bedside. Sitter helps Mon-Friday at the assisted living.     No family at bedside.     Case was discussed with patient's nurse and  on the floor.    Objective/physical exam:  General: In no acute distress, frail, looks chronically ill  Chest: Clear to auscultation bilaterally  Heart: RRR, +S1, S2, no appreciable murmur  Abdomen: Soft, nontender, BS +  Neurologic: Left sided weakness.     VITAL SIGNS: 24 HRS MIN & MAX LAST   Temp  Min: 97.2 °F (36.2 °C)  Max: 98.1 °F (36.7 °C) 97.5 °F (36.4 °C)   BP  Min: 107/90  Max: 128/78 112/78   Pulse  Min: 87  Max: 103  96   Resp  Min: 18  Max: 18 18   SpO2  Min: 96 %   Max: 98 % 96 %     I have reviewed the following labs:  Recent Labs   Lab 06/27/25  1133 06/28/25  0425 06/29/25  0705   WBC 4.08* 5.45 6.53   RBC 3.95* 4.51 4.00*   HGB 12.0 13.7 12.4   HCT 37.3 43.0 36.7*   MCV 94.4* 95.3* 91.8   MCH 30.4 30.4 31.0   MCHC 32.2* 31.9* 33.8   RDW 15.2 15.3 15.4    218 237   MPV 11.2* 12.2* 11.6*     Recent Labs   Lab 06/24/25  0750 06/25/25  0949 06/27/25  1133 06/27/25  1133 06/28/25  0425 06/29/25  0705 06/30/25  0526    146* 148*   < > 148* 145 144   K 3.6 3.3* 3.1*   < > 2.9* 3.2* 3.4*    111* 112*   < > 108* 111* 113*   CO2 21* 22 23   < > 19* 25 25   ANIONGAP 14* 13  --   --   --   --   --    BUN 9 7 8.3*   < > 9.9 8.3* 6.1*   CREATININE 0.77 0.74 0.96   < > 1.06* 1.05* 0.73   GLU  --   --  88   < > 72* 140* 116*   CALCIUM 8.3* 7.8* 8.9   < > 9.3 8.5 8.2*   MG  --   --   --   --   --  1.70  --    ALBUMIN  --   --  3.0*  --  3.4 3.0*  --    PROT  --   --  7.3  --  8.0* 6.8  --    ALKPHOS  --   --  127  --  141 124  --    ALT  --   --  21  --  23 18  --    AST  --   --  43  --  46* 38  --    BILITOT  --   --  0.2  --  0.3 0.3  --     < > = values in this interval not displayed.     Microbiology Results (last 7 days)       Procedure Component Value Units Date/Time    Urine culture [[id number]]  (Abnormal) Collected: 06/27/25 1531    Order Status: Completed Specimen: Urine Updated: 06/29/25 1239     Urine Culture 25,000-50,000 colonies/ml Candida albicans             See below for Radiology    Assessment/Plan:  ? UTI, POA   Vaginal candidiasis  Hypokalemia   Mild dehydration   FTT  ? Dementia   Anorexia     Hx: HTN, HLD, epilepsy, CHF, CAD, previous CVA, glaucoma, CKD, chronic left-sided weakness/wheelchair-bound     Plan:  Patient alert and comfortable today   Sitter helping with ALDs   Has been afebrile.     Will dc iv fluids, K 3.4 replaced   PWill closely monitor patients daily urine out put, renal parameters and volume status      No UTI, low colony count  adrienne. No tx indicated   F/U on cultures     Continue supportive care     F/U on palliative care team recommendations       VTE prophylaxis: Lovenox     Patient condition:  Fair    Anticipated discharge and Disposition:   Home with palliative care       All diagnosis and differential diagnosis have been reviewed; assessment and plan has been documented; I have personally reviewed the labs and test results that are presently available; I have reviewed the patients medication list; I have reviewed the consulting providers response and recommendations. I have reviewed or attempted to review medical records based upon their availability    All of the patient's questions have been  addressed and answered. Patient's is agreeable to the above stated plan. I will continue to monitor closely and make adjustments to medical management as needed.    Portions of this note dictated using EMR integrated voice recognition software, and may be subject to voice recognition errors not corrected at proofreading. Please contact writer for clarification if needed.   _____________________________________________________________________    Malnutrition Status:  Nutrition consulted. Most recent weight and BMI monitored-     Measurements:  Wt Readings from Last 1 Encounters:   06/27/25 49.9 kg (110 lb)   Body mass index is 20.78 kg/m².    Patient has been screened and assessed by RD.    Malnutrition Type:  Context:    Level:      Malnutrition Characteristic Summary:       Interventions/Recommendations (treatment strategy):        Scheduled Med:   ARIPiprazole  2 mg Oral QHS    aspirin  81 mg Oral Daily    atorvastatin  40 mg Oral Daily    enoxparin  40 mg Subcutaneous Daily    mirtazapine  15 mg Oral QHS    PHENobarbitaL  32.4 mg Oral QHS    phenytoin  100 mg Oral BID      Continuous Infusions:   dextrose 5 % and 0.45 % NaCl with KCl 20 mEq   Intravenous Continuous 100 mL/hr at 06/30/25 0738 New Bag at 06/30/25 0738      PRN  Meds:    Current Facility-Administered Medications:     acetaminophen, 650 mg, Oral, Q8H PRN    haloperidol lactate, 5 mg, Intramuscular, Q6H PRN    melatonin, 6 mg, Oral, Nightly PRN    sodium chloride 0.9%, 10 mL, Intravenous, PRN    Flushing PICC/Midline Protocol, , , Until Discontinued **AND** sodium chloride 0.9%, 10 mL, Intravenous, Q12H PRN     Radiology:  I have personally reviewed the following imaging and agree with the radiologist.     X-Ray Chest AP Portable  Narrative: EXAMINATION:  XR CHEST AP PORTABLE    CLINICAL HISTORY:  Dysphagia, unspecified    COMPARISON:  22 June 2025    FINDINGS:  Frontal view of the chest was obtained. Heart and mediastinum unchanged.  There is no new focal consolidation or pneumothorax.  Impression: No acute findings.    Electronically signed by: Armani Berry  Date:    06/27/2025  Time:    13:38      Rolando Avilez MD  Department of Hospital Medicine   [facility]   06/30/2025

## 2025-06-30 NOTE — PLAN OF CARE
06/30/25 1132   Discharge Assessment   Assessment Type Discharge Planning Assessment   Confirmed/corrected address, phone number and insurance Yes   Confirmed Demographics Correct on Facesheet   Source of Information patient;family   Communicated EDILMA with patient/caregiver Date not available/Unable to determine   People in Home facility resident   Facility Arrived From: [facility] living   Do you expect to return to your current living situation? Yes   Do you have help at home or someone to help you manage your care at home? Yes   Who are your caregiver(s) and their phone number(s)? Lien HINDS 9am-2pm   Prior to hospitilization cognitive status: Unable to Assess   Current cognitive status: Alert/Oriented   Walking or Climbing Stairs Difficulty yes   Walking or Climbing Stairs ambulation difficulty, requires equipment   Mobility Management wc   Dressing/Bathing Difficulty yes   Dressing/Bathing bathing difficulty, assistance 1 person;dressing difficulty, assistance 1 person;bathing difficulty, requires equipment   Dressing/Bathing Management Shower chair, caregiver / staff at facility   Home Accessibility wheelchair accessible   Home Layout Able to live on 1st floor   Equipment Currently Used at Home shower chair;wheelchair   Patient currently being followed by outpatient case management? No   Do you currently have service(s) that help you manage your care at home? Yes   How Many hours does patient receive services 25   Name and Contact number of agency [facility]/ 25 hrs a week with sitter   Is the pt/caregiver preference to resume services with current agency Yes   Do you have prescription coverage? Yes   Who is going to help you get home at discharge? TBD   How do you get to doctors appointments? other (see comments)  (Assisted living shuttle)   Discharge Plan A New Nursing Home placement - [facility] care facility;Skilled Nursing Facility   Discharge Plan B Assisted Living   DME Needed Upon Discharge  none    Discharge Plan discussed with: Patient;Sibling   Name(s) and Number(s) Bill   Transition of Care Barriers Does not adhere to care plan     Patient is a resident at [facility]. Patient is currently on services with [facility], per [facility] patient is on the verge of being evicted from AL. ROSS spoke with Paul Ibarra, . Per Paul patient is able to dc back to her room. NH choice list given, patient not receptive and denies placement. CM contacted patient's brother, Bill, spoke with patient and Bill on speaker phone. Bill is interested in rehab placement, informed him patient need to be able to ambulate before admitted to a rehab. Bill was also not interested in NH placement at this time.     PCP Melody Ren MD  Pharmacy [facility]

## 2025-06-30 NOTE — PSYCH
"[facility] - 5th Floor Med Surg  Psychiatry  Progress Note    Code Status: Full Code  Admission Date: 6/27/2025  Hospital Length of Stay: 2 days  Attending Physician: Rolando Avilez MD  Primary Care Provider: Melody Ren MD    Current Legal Status: Uncontested      Subjective:   Principal Problem:Acute cystitis without hematuria    Chief Complaint: "Them bananas wasn't sweet."    HPI: 84 y.o. female seen for evaluation for hallucinations. Past medical history of HTN, HLD, epilepsy, CHF, CAD, previous CVA, glaucoma, CKD, chronic left-sided weakness/wheelchair-bound with underlying psychiatric undiagnosed issue which is making her care difficult. Family reports she is in an assisted living at [facility] still nursing home but is being affected currently due to behavioral issues. Family is requesting psych eval for decision making as patient is unwilling to a signs someone power of . She also was seen in the ER in the last few days at [facility] for a urinary tract infection and a yeast infection, but was just part bacteremia nursing facility where she has continued to have hallucinations and agitation. Arrived to the ER afebrile hemodynamically stable maintaining normal sats on room air, with laboratory work showing evidence of urinary tract infection. Patient has been admitted to the hospitalist service with consultation to case management for assistance with placement     Hospital Course: Today  B/F, in room alone today, sitting up with nurse at . Nursing reports difficulty swallowing pills today, attempted to put in pudding and applesauce and pt still had some difficulty with eating, bowl on BS tray. She has been picky about eating but was able to eat some chips with this provider and some chicken nuggets brought to her last night by her nephew "Alcides". Also drank some soda with this provider today. Complained that the soda was "hot" and the chips "don't got no salt on it". " "Otherwise requires some coaxing for compliance with getting accuchecks, makes it clear she doesn't like to be poked/prodded but is able to make needs known and ask for things she wants when she wants them. Oriented appropriately, talkative and alert. Does report seeing a "black shadow" move across her bed earlier in the day when she is certain she was in her room alone. Minimal agitation today compared to yesterday, makes it clear she wants to be explained processes before delivery, sometimes will outright refuse when "I don't want that".       Scheduled Medications:   ARIPiprazole  2 mg Oral QHS    aspirin  81 mg Oral Daily    atorvastatin  40 mg Oral Daily    cefTRIAXone (Rocephin) IV (PEDS and ADULTS)  1 g Intravenous Q24H    enoxparin  40 mg Subcutaneous Daily    mirtazapine  15 mg Oral QHS    PHENobarbitaL  32.4 mg Oral QHS    phenytoin  100 mg Oral BID       PRN Medications:     Current Facility-Administered Medications:     acetaminophen, 650 mg, Oral, Q8H PRN    haloperidol lactate, 5 mg, Intramuscular, Q6H PRN    melatonin, 6 mg, Oral, Nightly PRN    sodium chloride 0.9%, 10 mL, Intravenous, PRN    Flushing PICC/Midline Protocol, , , Until Discontinued **AND** sodium chloride 0.9%, 10 mL, Intravenous, Q12H PRN    Review of patient's allergies indicates:   Allergen Reactions    Nitrofurantoin monohyd/m-cryst Hives    Penicillin Hives     Objective:  Vital signs:  Temp:  [96.6 °F (35.9 °C)-97.7 °F (36.5 °C)] 97.7 °F (36.5 °C)  Pulse:  [] 103  Resp:  [18] 18  SpO2:  [79 %-97 %] 97 %  BP: (106-137)/(59-90) 115/77    Mental Status Evaluation:  Appearance:  thin & gaunt looking, hospital gown   Behavior:  cooperative, eye contact minimal   Speech:  pressured, difficult to understand at times/garbled   Mood:  anxious   Affect:  mood-congruent   Thought Process:  circumstantial   Thought Content:  Denies SI/HI, some (+) VH, denies AH at present   Sensorium:  grossly intact   Cognition:  grossly intact "   Insight:  limited   Judgment:  limited     Assessment/Plan:   Organic Affective Syndrome, See current hospital problem list, and R/O psychosis/agitation r/t UTI     Plan:   1.) cont poc/cont encourage compliance with PO meds (consider swallow study vs crushing)  2.) monitor for inc agitation/psychosis  3.) psych sign off    Catherine Mosqueda NP   Psychiatry  [facility] - 5th Floor Med Surg

## 2025-06-30 NOTE — CONSULTS
Patient Name: Lisseth Guajardo   MRN: [id number]   Admission Date: 2025   Hospital Length of Stay: 3   Attending Provider: Rolando Avilez MD   Consulting Provider: Cherelle LAURENT  Reason for Consult: Goals of Care  Primary Care Physician: Melody Ren MD     Principal Problem: Acute cystitis without hematuria     Patient information was obtained from patient and ER records.      Final diagnoses:  [R53.1] Weakness  [R13.10] Dysphagia  [N30.00] Acute cystitis without hematuria (Primary)  [R29.898] Muscular deconditioning  [R44.3] Hallucinations     Assessment/Plan:     I reviewed the patient and family's understanding of the seriousness of the illness and its expected prognosis. We discussed the patient's goals of care and treatment preferences.  I clarified current code status. I identified the surrogate decision maker or health care POA.  I answered all questions and we formulated a plan including recommendations for symptom management and how to best achieve goals of care.  Advance Care Planning     Date: 2025    [facility]  I engaged the patient in a voluntary conversation about advance care planning and we specifically addressed what the goals of care would be moving forward, in light of the patient's change in clinical status, specifically current condition.  We did specifically address the patient's likely prognosis, which is poor.  We explored the patient's values and preferences for future care.  The patient endorses that what is most important right now is to focus on curative/life-prolongation (regardless of treatment burdens)    Accordingly, we have decided that the best plan to meet the patient's goals includes continuing with treatment     This discussion occurred on a fully voluntary basis with the verbal consent of the patient and/or family.            Met with patient and sitter at bedside, introduced services.   She is , her   in  and she has no  children. She has poor insight to the severity of her current condition. She currently lives at an assisted living and has a sitter from 9-2pm M-F. 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 Her sitter denies this. The patient is upset and states she does not want to go to the nursing home. She is adamant and thinks she does not need 24 hour care. I explained that she needs around the clock care, however she continues to refuse. I also discussed hospice. She states this has been discussed with her before, however she states she does not have a terminal disease and does not need their services. Support provided.  Reflective listening, validation concerns, and normalization of fears provided.      Called brother, Bill, and left message.    Discussed with nursing, case management, and hospital medicine        History of Present Illness:     This is an 84-year-old female with a past medical history of hypertension hyperlipidemia, epilepsy, CHF, CAD, previous CVA, glaucoma, CKD, wheelchair-bound with left-sided weakness. She was sent from [facility] for weakness and hallucinations. Recently diagnosed with UTI at [facility] on 06/26. There are reports of agitation and combative behavior at the NH. Psych has been consulted. Palliative medicine consulted for goals of care discussions. Plan is for discharge back to [place].       Active Ambulatory Problems     Diagnosis Date Noted    Mild renal insufficiency 07/06/2022    Cervical radiculitis 08/29/2022    Cervicalgia 08/29/2022    DDD (degenerative disc disease), cervical 08/29/2022    Cervical spinal stenosis 08/29/2022    Epilepsy, unspecified, not intractable, with status epilepticus 01/31/2023    Primary hypertension 01/31/2023    Mixed hyperlipidemia 01/31/2023    Coronary artery disease 01/31/2023    Recurrent major depressive disorder, remission status unspecified 05/11/2023     Heart failure, unspecified HF chronicity, unspecified heart failure type 05/11/2023    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 05/11/2023    Chronic obstructive pulmonary disease with (acute) exacerbation 05/11/2023    Exudative age-related macular degeneration, bilateral, with active choroidal neovascularization 05/11/2023    Anorexia nervosa, unspecified 01/08/2024    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    Stage 3a chronic kidney disease 01/08/2024    Aortic atherosclerosis 08/12/2024    Hospital discharge follow-up 12/11/2024    Unable to walk 01/13/2025    Fall 01/13/2025    Seizures 01/13/2025    Asymptomatic microscopic hematuria 02/13/2025    Seizure disorder 03/18/2025     Resolved Ambulatory Problems     Diagnosis Date Noted    Recurrent UTI 01/31/2023     Past Medical History:   Diagnosis Date    Coronary arteriosclerosis     Depression     High cholesterol     Hypokalemia     Myocardial infarction     Osteoporosis     Unspecified macular degeneration         Past Surgical History:   Procedure Laterality Date    APPENDECTOMY      BACK SURGERY      cardiac stents      CARPAL TUNNEL RELEASE      CATARACT EXTRACTION Left     CHOLECYSTECTOMY      crainotomy      FOOT SURGERY Left     HYSTERECTOMY      LUNG REMOVAL, PARTIAL Right     LYMPH NODE DISSECTION          Review of patient's allergies indicates:   Allergen Reactions    Nitrofurantoin monohyd/m-cryst Hives    Penicillin Hives        Current Medications[1]       Current Facility-Administered Medications:     acetaminophen, 650 mg, Oral, Q8H PRN    haloperidol lactate, 5 mg, Intramuscular, Q6H PRN    melatonin, 6 mg, Oral, Nightly PRN    sodium chloride 0.9%, 10 mL, Intravenous, PRN    Flushing PICC/Midline Protocol, , , Until Discontinued **AND** sodium chloride 0.9%, 10 mL, Intravenous, Q12H PRN     Family History   Family history unknown: Yes        Review of Systems   All other systems  "reviewed and are negative.           Objective:   BP (!) 125/92 (BP Location: Left arm, Patient Position: Lying)   Pulse 94   Temp 97.5 °F (36.4 °C) (Oral)   Resp 18   Ht 5' 1" (1.549 m)   Wt 49.9 kg (110 lb)   SpO2 98%   BMI 20.78 kg/m²      Physical Exam  Constitutional:       Appearance: She is ill-appearing.   HENT:      Head: Normocephalic and atraumatic.   Cardiovascular:      Rate and Rhythm: Normal rate and regular rhythm.   Pulmonary:      Effort: Pulmonary effort is normal.   Neurological:      Mental Status: She is alert.      Motor: Weakness present.   Psychiatric:         Behavior: Behavior is uncooperative.             Review of Symptoms      Symptom Assessment (ESAS 0-10 Scale)  Pain:  0  Dyspnea:  0  Anxiety:  0  Nausea:  0  Depression:  0  Anorexia:  0  Fatigue:  0  Insomnia:  0  Restlessness:  0  Agitation:  0         Bowel Management Plan (BMP):  Yes      Living Arrangements:  Lives in assisted living    Psychosocial/Cultural:   See Palliative Psychosocial Note: Yes  ; no children  **Primary  to Follow**  Palliative Care  Consult: No      Advance Care Planning   Advance Directives:     Decision Making:  Patient answered questions  Goals of Care: The patient endorses that what is most important right now is to focus on curative/life-prolongation (regardless of treatment burdens)    Accordingly, we have decided that the best plan to meet the patient's goals includes continuing with treatment          PAINAD: NA    Caregiver burden formerly assessed: Yes        > 50% of 60 min of encounter was spent in chart review, face to face discussion of goals of care, symptom assessment, coordination of care and emotional support.         MEHRAN Lewis  Palliative Medicine  [facility]           [1]   Current Facility-Administered Medications:     acetaminophen tablet 650 mg, 650 mg, Oral, Q8H PRN, Raudel Larios MD    ARIPiprazole tablet 2 " mg, 2 mg, Oral, QHS, Catherine Mosqueda, NP    aspirin EC tablet 81 mg, 81 mg, Oral, Daily, Rolando Avilez MD, 81 mg at 06/29/25 1126    atorvastatin tablet 40 mg, 40 mg, Oral, Daily, Rolando Avilez MD, 40 mg at 06/29/25 1126    dextrose 5 % and 0.45 % NaCl with KCl 20 mEq infusion, , Intravenous, Continuous, Rolando Avilez MD, Last Rate: 100 mL/hr at 06/30/25 0738, New Bag at 06/30/25 0738    enoxaparin injection 40 mg, 40 mg, Subcutaneous, Daily, Raudel Larios MD, 40 mg at 06/29/25 1651    haloperidol lactate injection 5 mg, 5 mg, Intramuscular, Q6H PRN, Raudel Larios MD    melatonin tablet 6 mg, 6 mg, Oral, Nightly PRN, Raudel Larios MD    mirtazapine tablet 15 mg, 15 mg, Oral, QHS, Rolando Avilez MD    PHENobarbitaL tablet 32.4 mg, 32.4 mg, Oral, QHS, Rolando Avilez MD, 32.4 mg at 06/29/25 2116    phenytoin (DILANTIN) ER capsule 100 mg, 100 mg, Oral, BID, Rolando Avilez MD, 100 mg at 06/29/25 2116    sodium chloride 0.9% flush 10 mL, 10 mL, Intravenous, PRN, Raudel Larios MD    Flushing PICC/Midline Protocol, , , Until Discontinued **AND** sodium chloride 0.9% flush 10 mL, 10 mL, Intravenous, Q12H PRN, Rolando Avilez MD

## 2025-07-01 VITALS
DIASTOLIC BLOOD PRESSURE: 81 MMHG | BODY MASS INDEX: 20.77 KG/M2 | TEMPERATURE: 98 F | OXYGEN SATURATION: 96 % | SYSTOLIC BLOOD PRESSURE: 100 MMHG | RESPIRATION RATE: 17 BRPM | WEIGHT: 110 LBS | HEART RATE: 105 BPM | HEIGHT: 61 IN

## 2025-07-01 LAB
ANION GAP SERPL CALC-SCNC: 6 MEQ/L
BUN SERPL-MCNC: 5.1 MG/DL (ref 9.8–20.1)
CALCIUM SERPL-MCNC: 8.4 MG/DL (ref 8.4–10.2)
CHLORIDE SERPL-SCNC: 111 MMOL/L (ref 98–107)
CO2 SERPL-SCNC: 28 MMOL/L (ref 23–31)
CREAT SERPL-MCNC: 0.76 MG/DL (ref 0.55–1.02)
CREAT/UREA NIT SERPL: 7
GFR SERPLBLD CREATININE-BSD FMLA CKD-EPI: >60 ML/MIN/1.73/M2
GLUCOSE SERPL-MCNC: 90 MG/DL (ref 70–110)
GLUCOSE SERPL-MCNC: 90 MG/DL (ref 82–115)
MAGNESIUM SERPL-MCNC: 1.6 MG/DL (ref 1.6–2.6)
POTASSIUM SERPL-SCNC: 3.1 MMOL/L (ref 3.5–5.1)
SODIUM SERPL-SCNC: 145 MMOL/L (ref 136–145)

## 2025-07-01 PROCEDURE — 80048 BASIC METABOLIC PNL TOTAL CA: CPT | Performed by: INTERNAL MEDICINE

## 2025-07-01 PROCEDURE — 25000003 PHARM REV CODE 250: Performed by: INTERNAL MEDICINE

## 2025-07-01 PROCEDURE — 36415 COLL VENOUS BLD VENIPUNCTURE: CPT | Performed by: INTERNAL MEDICINE

## 2025-07-01 PROCEDURE — 83735 ASSAY OF MAGNESIUM: CPT | Performed by: INTERNAL MEDICINE

## 2025-07-01 RX ORDER — ARIPIPRAZOLE 2 MG/1
2 TABLET ORAL NIGHTLY
Qty: 30 TABLET | Refills: 11 | Status: ON HOLD | OUTPATIENT
Start: 2025-07-01 | End: 2025-07-07 | Stop reason: HOSPADM

## 2025-07-01 RX ADMIN — ACETAMINOPHEN 650 MG: 325 TABLET ORAL at 10:07

## 2025-07-01 RX ADMIN — ATORVASTATIN CALCIUM 40 MG: 40 TABLET, FILM COATED ORAL at 08:07

## 2025-07-01 RX ADMIN — POTASSIUM BICARBONATE 25 MEQ: 977.5 TABLET, EFFERVESCENT ORAL at 08:07

## 2025-07-01 RX ADMIN — PHENYTOIN SODIUM 100 MG: 100 CAPSULE, EXTENDED RELEASE ORAL at 08:07

## 2025-07-01 RX ADMIN — Medication 81 MG: at 08:07

## 2025-07-01 NOTE — PROGRESS NOTES
[facility] - 5th Floor Med Surg  Wound Care    Patient Name:  Lisseth Guajardo   MRN:  [id number]  Date: 7/1/2025  Diagnosis: Acute cystitis without hematuria    History:     Past Medical History:   Diagnosis Date    Cervical radiculitis 08/29/2022    Cervical spinal stenosis 08/29/2022    Cervicalgia 08/29/2022    Coronary arteriosclerosis     DDD (degenerative disc disease), cervical 08/29/2022    Depression     Epilepsy, unspecified, not intractable, with status epilepticus     High cholesterol     Hypokalemia     Mild renal insufficiency 07/06/2022    Myocardial infarction     Osteoporosis     Primary hypertension 1/31/2023    Recurrent UTI     Unspecified macular degeneration        Social History[1]    Precautions:     Allergies as of 06/27/2025 - Reviewed 06/27/2025   Allergen Reaction Noted    Nitrofurantoin monohyd/m-cryst Hives 05/02/2022    Penicillin Hives 05/02/2022       WOC Assessment Details/Treatment        07/01/25 0858   WOCN Assessment   Visit Date 07/01/25   Visit Time 0858   Consult Type Follow Up   WO Speciality Wound   Wound pressure   Intervention applied;chart review   Teaching on-going        Wound 06/27/25 2030 Pressure Injury  Sacral spine   Date First Assessed/Time First Assessed: 06/27/25 2030   Present on Original Admission: Yes  Primary Wound Type: Pressure Injury  Side: (c)   Location: Sacral spine   Wound Image    Pressure Injury Stage 2   Dressing Appearance Moist drainage   Drainage Amount Small   Drainage Characteristics/Odor Serosanguineous;Brown   Appearance Red;Moist;Yellow   Tissue loss description Partial thickness   Black (%), Wound Tissue Color 0 %   Red (%), Wound Tissue Color 100 %   Yellow (%), Wound Tissue Color 0 %   Care Cleansed with:;Antimicrobial agent;Other (see comments)  (vashe)   Dressing Applied;Other (comment)  (Ag foam)     WOCN follow up for sacrum. No family present at bedside. Continue current treatment recommendations in place Sacrum:  cleanse with remedy soap, dry well, cover with AG foam dressing, change daily and PRN soilage. Nursing to continue with current treatment recommendations. Pt to be discharged today.     07/01/2025         [1]   Social History  Socioeconomic History    Marital status:    Tobacco Use    Smoking status: Former     Types: Cigarettes     Passive exposure: Past    Smokeless tobacco: Never   Substance and Sexual Activity    Alcohol use: Never    Drug use: Never    Sexual activity: Not Currently   Social History Narrative    ** Merged History Encounter **          Social Drivers of Health     Financial Resource Strain: Low Risk  (3/19/2025)    Overall Financial Resource Strain (CARDIA)     Difficulty of Paying Living Expenses: Not very hard   Food Insecurity: No Food Insecurity (3/19/2025)    Hunger Vital Sign     Worried About Running Out of Food in the Last Year: Never true     Ran Out of Food in the Last Year: Never true   Transportation Needs: Patient Declined (6/20/2025)    [facility]    PRAPARE - Transportation     Lack of Transportation (Medical): Patient declined     Lack of Transportation (Non-Medical): Patient declined   Physical Activity: Inactive (1/9/2024)    Exercise Vital Sign     Days of Exercise per Week: 0 days     Minutes of Exercise per Session: 0 min   Stress: No Stress Concern Present (3/19/2025)    [employer] - Occupational Stress Questionnaire     Feeling of Stress : Not at all   Housing Stability: Unknown (6/20/2025)    [facility]    Housing Stability Vital Sign     Unable to Pay for Housing in the Last Year: Patient declined

## 2025-07-01 NOTE — PLAN OF CARE
07/01/25 1014   Final Note   Assessment Type Final Discharge Note   Anticipated Discharge Disposition Home-Health   Hospital Resources/Appts/Education Provided Post-Acute resouces added to AVS   Post-Acute Status   Post-Acute Authorization Home Health;Hospice   Home Health Status Set-up Complete/Auth obtained   Hospice Status Referrals Sent  (Palliative medicine)   Discharge Delays None known at this time     Patient will dc back to [facility] today. CM spoke with Ximena, , Ximena confirmed patient is able to return to facility. Clinicals and annalise paperwork faxed to [phone], assisted living will send shuttle van to  the patient. Report not needed, confirmed with Ximena. Patient will continue HH with annalise [facility] paperwork sent via Epic. Referral sent to [facility] for Palliative Medicine, patient aware and argeeable. FOC placed in chart. Contacted patient's brother, he is aware of dc today.

## 2025-07-01 NOTE — NURSING
Left with facility staff at approximately 1145 in wheelchair. Alert. No complaints voiced. Discharge packet given to transport.

## 2025-07-01 NOTE — DISCHARGE SUMMARY
[facility] Hospital Medicine Discharge Summary    Admit Date: 6/27/2025  Discharge Date and Time: 7/1/20259:44 AM  Admitting Physician: MERISSA Team  Discharging Physician: Rolando Avilez MD.  Primary Care Physician: Melody Ren MD      Discharge Diagnoses:  Vaginal candidiasis  Hypokalemia   Mild dehydration   FTT  ? Dementia   Anorexia      Hx: HTN, HLD, epilepsy, CHF, CAD, previous CVA, glaucoma, CKD, chronic left-sided weakness/wheelchair-bound     Hospital Course:   Patient is an 84-year-old female with a past medical history of HTN, HLD, epilepsy, CHF, CAD, previous CVA, glaucoma, CKD, chronic left-sided weakness/wheelchair-bound with underlying psychiatric undiagnosed issue which is making her care difficult.  Family reports she is in an assisted living at [facility] but is being affected currently due to behavioral issues.  Family is requesting psych eval for decision making as patient is unwilling to a signs someone power of .  She also was seen in the ER in the last few days at [facility] for a urinary tract infection and a yeast infection, but was just part bacteremia nursing facility where she has continued to have hallucinations and agitation     She arrived to the ER afebrile hemodynamically stable maintaining normal sats on room air, with laboratory work showing evidence of urinary tract infection.  Patient has been admitted to the hospitalist service with consultation to case management for assistance with placement.     Palliative care was consulted. Her potassium was low and was replaced. Patient was at baseline mental status. Does not want to go to a NH. She wanted to be a full code. Seen by psych team and added abilify 2 mg q hs.     Urine cultures + for low colony count yeast. She did receive diflucan on admit,. No other treatment needed. She was afebrile. She was continue with potassium supplements and discharged back to the assisted  living. She will benefit from palliative care.     Pt was seen and examined on the day of discharge  Vitals:  VITAL SIGNS: 24 HRS MIN & MAX LAST   Temp  Min: 97.5 °F (36.4 °C)  Max: 98.3 °F (36.8 °C) 97.8 °F (36.6 °C)   BP  Min: 100/81  Max: 125/80 100/81   Pulse  Min: 88  Max: 126  105   Resp  Min: 15  Max: 18 16   SpO2  Min: 95 %  Max: 99 % 96 %       Physical Exam:  General: In no acute distress, frail, looks chronically ill  Chest: Clear to auscultation bilaterally  Heart: RRR, +S1, S2, no appreciable murmur  Abdomen: Soft, nontender, BS +  Neurologic: Left sided weakness.     Procedures Performed: No admission procedures for hospital encounter.     Significant Diagnostic Studies: See Full reports for all details    Recent Labs   Lab 06/27/25  1133 06/28/25  0425 06/29/25  0705   WBC 4.08* 5.45 6.53   RBC 3.95* 4.51 4.00*   HGB 12.0 13.7 12.4   HCT 37.3 43.0 36.7*   MCV 94.4* 95.3* 91.8   MCH 30.4 30.4 31.0   MCHC 32.2* 31.9* 33.8   RDW 15.2 15.3 15.4    218 237   MPV 11.2* 12.2* 11.6*       Recent Labs   Lab 06/25/25  0949 06/27/25  1133 06/27/25  1133 06/28/25  0425 06/29/25  0705 06/30/25  0526 07/01/25  0447   * 148*   < > 148* 145 144 145   K 3.3* 3.1*   < > 2.9* 3.2* 3.4* 3.1*   * 112*   < > 108* 111* 113* 111*   CO2 22 23   < > 19* 25 25 28   ANIONGAP 13  --   --   --   --   --   --    BUN 7 8.3*   < > 9.9 8.3* 6.1* 5.1*   CREATININE 0.74 0.96   < > 1.06* 1.05* 0.73 0.76   GLU  --  88   < > 72* 140* 116* 90   CALCIUM 7.8* 8.9   < > 9.3 8.5 8.2* 8.4   MG  --   --   --   --  1.70  --  1.60   ALBUMIN  --  3.0*  --  3.4 3.0*  --   --    PROT  --  7.3  --  8.0* 6.8  --   --    ALKPHOS  --  127  --  141 124  --   --    ALT  --  21  --  23 18  --   --    AST  --  43  --  46* 38  --   --    BILITOT  --  0.2  --  0.3 0.3  --   --     < > = values in this interval not displayed.        Microbiology Results (last 7 days)       Procedure Component Value Units Date/Time    Urine culture [[id number]]   (Abnormal) Collected: 06/27/25 1531    Order Status: Completed Specimen: Urine Updated: 06/29/25 1239     Urine Culture 25,000-50,000 colonies/ml Candida albicans             X-Ray Chest AP Portable  Narrative: EXAMINATION:  XR CHEST AP PORTABLE    CLINICAL HISTORY:  Dysphagia, unspecified    COMPARISON:  22 June 2025    FINDINGS:  Frontal view of the chest was obtained. Heart and mediastinum unchanged.  There is no new focal consolidation or pneumothorax.  Impression: No acute findings.    Electronically signed by: Armani Berry  Date:    06/27/2025  Time:    13:38         Medication List        START taking these medications      ARIPiprazole 2 MG Tab  Commonly known as: ABILIFY  Take 1 tablet (2 mg total) by mouth every evening.     potassium bicarbonate disintegrating tablet  Commonly known as: K-LYTE  Take 1 tablet (25 mEq total) by mouth once daily. for 4 days  Start taking on: July 2, 2025            CONTINUE taking these medications      albuterol sulfate 90 mcg/actuation Aebs     aspirin 81 MG EC tablet  Commonly known as: ECOTRIN     atorvastatin 40 MG tablet  Commonly known as: LIPITOR  TAKE 1 TABLET DAILY     brimonidine 0.2% 0.2 % Drop  Commonly known as: ALPHAGAN     CALTRATE 600 + D ORAL     dorzolamide-timolol 2-0.5% 22.3-6.8 mg/mL ophthalmic solution  Commonly known as: COSOPT     latanoprost 0.005 % ophthalmic solution     melatonin 3 mg tablet  Commonly known as: MELATIN  Take 1 tablet (3 mg total) by mouth nightly as needed for Insomnia.     metoprolol succinate 50 MG 24 hr tablet  Commonly known as: TOPROL-XL  Take 1 tablet (50 mg total) by mouth once daily.     mirtazapine 30 MG disintegrating tablet  Commonly known as: REMERON SOL-TAB     ondansetron 4 MG Tbdl  Commonly known as: ZOFRAN-ODT  Take 1 tablet (4 mg total) by mouth every 8 (eight) hours as needed (nausea/vomiting).     PHENobarbitaL 32.4 MG tablet  Take 1 tablet (32.4 mg total) by mouth every evening.     phenytoin 100 MG ER  capsule  Commonly known as: DILANTIN  Take 1 capsule (100 mg total) by mouth 2 (two) times daily.     WOMEN'S 50 PLUS MULTIVITAMIN ORAL            STOP taking these medications      fluconazole 150 MG Tab  Commonly known as: DIFLUCAN     loratadine 10 mg tablet  Commonly known as: CLARITIN               Where to Get Your Medications        These medications were sent to [facility] - [place], [place] - [address] [place] [address]  [address] [facility] [address]      Phone: [phone]   ARIPiprazole 2 MG Tab  potassium bicarbonate disintegrating tablet          Explained in detail to the patient about the discharge plan, medications, and follow-up visits. Pt understands and agrees with the treatment plan  Discharge Disposition: Home with HH   Discharged Condition: stable  Diet-   Dietary Orders (From admission, onward)       Start     Ordered    06/30/25 1620  Dietary nutrition supplements All Meals; Boost Original Nutritional Drink - Any flavor  Continuous        Question Answer Comment   Frequency: All Meals    Select PO Supplement: Boost Original Nutritional Drink - Any flavor        06/30/25 1620    06/27/25 2039  Diet Adult Regular  (Diet/Nutrition - [facility])  Diet effective now         06/27/25 2039                   Medications Per DC med rec  Activities as tolerated   Contact information for after-discharge care       Home Medical Care       [facility] .    Service: Home Health Services  Contact information:  [address], [place] [address]  [phone]                                 For further questions contact hospitalist office    Discharge time 33 minutes    For worsening symptoms, chest pain, shortness of breath, increased abdominal pain, high grade fever, stroke or stroke like symptoms, immediately go to the nearest Emergency Room or call 911 as soon as possible.      Rolando Rose M.D on 7/1/2025. at 9:44 AM.

## 2025-07-02 ENCOUNTER — TELEPHONE (OUTPATIENT)
Dept: FAMILY MEDICINE | Facility: CLINIC | Age: 85
End: 2025-07-02
Payer: MEDICARE

## 2025-07-02 ENCOUNTER — HOSPITAL ENCOUNTER (INPATIENT)
Facility: HOSPITAL | Age: 85
LOS: 4 days | Discharge: HOME HEALTH | DRG: 884 | End: 2025-07-07
Attending: STUDENT IN AN ORGANIZED HEALTH CARE EDUCATION/TRAINING PROGRAM | Admitting: INTERNAL MEDICINE
Payer: MEDICARE

## 2025-07-02 DIAGNOSIS — R31.9 URINARY TRACT INFECTION WITH HEMATURIA, SITE UNSPECIFIED: ICD-10-CM

## 2025-07-02 DIAGNOSIS — F03.90 DEMENTIA, UNSPECIFIED DEMENTIA SEVERITY, UNSPECIFIED DEMENTIA TYPE, UNSPECIFIED WHETHER BEHAVIORAL, PSYCHOTIC, OR MOOD DISTURBANCE OR ANXIETY: ICD-10-CM

## 2025-07-02 DIAGNOSIS — R00.0 TACHYCARDIA: ICD-10-CM

## 2025-07-02 DIAGNOSIS — I69.854 HEMIPLEGIA AND HEMIPARESIS FOLLOWING OTHER CEREBROVASCULAR DISEASE AFFECTING LEFT NON-DOMINANT SIDE: ICD-10-CM

## 2025-07-02 DIAGNOSIS — R56.9 SEIZURES: ICD-10-CM

## 2025-07-02 DIAGNOSIS — R62.7 FAILURE TO THRIVE IN ADULT: ICD-10-CM

## 2025-07-02 DIAGNOSIS — N39.0 URINARY TRACT INFECTION WITH HEMATURIA, SITE UNSPECIFIED: ICD-10-CM

## 2025-07-02 DIAGNOSIS — R07.9 CHEST PAIN: ICD-10-CM

## 2025-07-02 DIAGNOSIS — R41.82 ALTERED MENTAL STATUS, UNSPECIFIED ALTERED MENTAL STATUS TYPE: Primary | ICD-10-CM

## 2025-07-02 DIAGNOSIS — N30.00 ACUTE CYSTITIS WITHOUT HEMATURIA: ICD-10-CM

## 2025-07-02 DIAGNOSIS — R56.9 UNSPECIFIED CONVULSIONS: ICD-10-CM

## 2025-07-02 PROBLEM — E87.6 HYPOKALEMIA: Status: ACTIVE | Noted: 2025-07-02

## 2025-07-02 PROBLEM — F29 PSYCHOSIS: Status: ACTIVE | Noted: 2025-07-02

## 2025-07-02 LAB
ACCEPTIBLE SP GR UR QL: 1.01 (ref 1–1.03)
ALBUMIN SERPL-MCNC: 3 G/DL (ref 3.4–4.8)
ALBUMIN/GLOB SERPL: 0.7 RATIO (ref 1.1–2)
ALP SERPL-CCNC: 140 UNIT/L (ref 40–150)
ALT SERPL-CCNC: 19 UNIT/L (ref 0–55)
AMPHET UR QL SCN: NEGATIVE
ANION GAP SERPL CALC-SCNC: 11 MEQ/L
APTT PPP: <20 SECONDS (ref 23.2–33.7)
AST SERPL-CCNC: 36 UNIT/L (ref 11–45)
BACTERIA #/AREA URNS AUTO: ABNORMAL /HPF
BARBITURATE SCN PRESENT UR: POSITIVE
BASOPHILS # BLD AUTO: 0.06 X10(3)/MCL
BASOPHILS NFR BLD AUTO: 1.1 %
BENZODIAZ UR QL SCN: NEGATIVE
BILIRUB SERPL-MCNC: 0.4 MG/DL
BILIRUB UR QL STRIP.AUTO: NEGATIVE
BNP BLD-MCNC: 39.8 PG/ML
BUN SERPL-MCNC: 8.6 MG/DL (ref 9.8–20.1)
CALCIUM SERPL-MCNC: 8.9 MG/DL (ref 8.4–10.2)
CANNABINOIDS UR QL SCN: NEGATIVE
CHLORIDE SERPL-SCNC: 108 MMOL/L (ref 98–107)
CLARITY UR: CLEAR
CO2 SERPL-SCNC: 26 MMOL/L (ref 23–31)
COCAINE UR QL SCN: NEGATIVE
COLOR UR AUTO: COLORLESS
CREAT SERPL-MCNC: 0.82 MG/DL (ref 0.55–1.02)
CREAT/UREA NIT SERPL: 10
EOSINOPHIL # BLD AUTO: 0.13 X10(3)/MCL (ref 0–0.9)
EOSINOPHIL NFR BLD AUTO: 2.4 %
ERYTHROCYTE [DISTWIDTH] IN BLOOD BY AUTOMATED COUNT: 15.7 % (ref 11.5–17)
ETHANOL SERPL-MCNC: <10 MG/DL
FENTANYL UR QL SCN: NEGATIVE
FLUAV AG UPPER RESP QL IA.RAPID: NOT DETECTED
FLUBV AG UPPER RESP QL IA.RAPID: NOT DETECTED
GFR SERPLBLD CREATININE-BSD FMLA CKD-EPI: >60 ML/MIN/1.73/M2
GLOBULIN SER-MCNC: 4.1 GM/DL (ref 2.4–3.5)
GLUCOSE SERPL-MCNC: 103 MG/DL (ref 82–115)
GLUCOSE UR QL STRIP: NORMAL
HCT VFR BLD AUTO: 45.4 % (ref 37–47)
HGB BLD-MCNC: 14.3 G/DL (ref 12–16)
HGB UR QL STRIP: ABNORMAL
IMM GRANULOCYTES # BLD AUTO: 0.02 X10(3)/MCL (ref 0–0.04)
IMM GRANULOCYTES NFR BLD AUTO: 0.4 %
INR PPP: 1.1
KETONES UR QL STRIP: ABNORMAL
LEUKOCYTE ESTERASE UR QL STRIP: NEGATIVE
LYMPHOCYTES # BLD AUTO: 1.56 X10(3)/MCL (ref 0.6–4.6)
LYMPHOCYTES NFR BLD AUTO: 28.4 %
MAGNESIUM SERPL-MCNC: 1.7 MG/DL (ref 1.6–2.6)
MCH RBC QN AUTO: 30.9 PG (ref 27–31)
MCHC RBC AUTO-ENTMCNC: 31.5 G/DL (ref 33–36)
MCV RBC AUTO: 98.1 FL (ref 80–94)
MDMA UR QL SCN: NEGATIVE
MONOCYTES # BLD AUTO: 0.65 X10(3)/MCL (ref 0.1–1.3)
MONOCYTES NFR BLD AUTO: 11.8 %
MUCOUS THREADS URNS QL MICRO: ABNORMAL /LPF
NEUTROPHILS # BLD AUTO: 3.07 X10(3)/MCL (ref 2.1–9.2)
NEUTROPHILS NFR BLD AUTO: 55.9 %
NITRITE UR QL STRIP: NEGATIVE
NRBC BLD AUTO-RTO: 0 %
OHS QRS DURATION: 66 MS
OHS QTC CALCULATION: 480 MS
OPIATES UR QL SCN: NEGATIVE
PCP UR QL: NEGATIVE
PH UR STRIP: 7 [PH]
PH UR: 7 [PH] (ref 3–11)
PHENYTOIN SERPL-MCNC: 2.8 UG/ML (ref 10–20)
PLATELET # BLD AUTO: 194 X10(3)/MCL (ref 130–400)
PMV BLD AUTO: 11 FL (ref 7.4–10.4)
POTASSIUM SERPL-SCNC: 3.9 MMOL/L (ref 3.5–5.1)
PROT SERPL-MCNC: 7.1 GM/DL (ref 5.8–7.6)
PROT UR QL STRIP: NEGATIVE
PROTHROMBIN TIME: 14.6 SECONDS (ref 12.5–14.5)
RBC # BLD AUTO: 4.63 X10(6)/MCL (ref 4.2–5.4)
RBC #/AREA URNS AUTO: ABNORMAL /HPF
SARS-COV-2 RNA RESP QL NAA+PROBE: NOT DETECTED
SODIUM SERPL-SCNC: 145 MMOL/L (ref 136–145)
SP GR UR STRIP.AUTO: 1.01 (ref 1–1.03)
SQUAMOUS #/AREA URNS LPF: ABNORMAL /HPF
TROPONIN I SERPL-MCNC: 0.01 NG/ML (ref 0–0.04)
TSH SERPL-ACNC: 1.36 UIU/ML (ref 0.35–4.94)
UROBILINOGEN UR STRIP-ACNC: NORMAL
WBC # BLD AUTO: 5.49 X10(3)/MCL (ref 4.5–11.5)
WBC #/AREA URNS AUTO: ABNORMAL /HPF

## 2025-07-02 PROCEDURE — 82077 ASSAY SPEC XCP UR&BREATH IA: CPT | Performed by: STUDENT IN AN ORGANIZED HEALTH CARE EDUCATION/TRAINING PROGRAM

## 2025-07-02 PROCEDURE — 85730 THROMBOPLASTIN TIME PARTIAL: CPT | Performed by: STUDENT IN AN ORGANIZED HEALTH CARE EDUCATION/TRAINING PROGRAM

## 2025-07-02 PROCEDURE — 80053 COMPREHEN METABOLIC PANEL: CPT | Performed by: STUDENT IN AN ORGANIZED HEALTH CARE EDUCATION/TRAINING PROGRAM

## 2025-07-02 PROCEDURE — 80185 ASSAY OF PHENYTOIN TOTAL: CPT | Performed by: STUDENT IN AN ORGANIZED HEALTH CARE EDUCATION/TRAINING PROGRAM

## 2025-07-02 PROCEDURE — 96361 HYDRATE IV INFUSION ADD-ON: CPT

## 2025-07-02 PROCEDURE — G0378 HOSPITAL OBSERVATION PER HR: HCPCS

## 2025-07-02 PROCEDURE — 93010 ELECTROCARDIOGRAM REPORT: CPT | Mod: ,,, | Performed by: INTERNAL MEDICINE

## 2025-07-02 PROCEDURE — 80307 DRUG TEST PRSMV CHEM ANLYZR: CPT | Performed by: STUDENT IN AN ORGANIZED HEALTH CARE EDUCATION/TRAINING PROGRAM

## 2025-07-02 PROCEDURE — 85610 PROTHROMBIN TIME: CPT | Performed by: STUDENT IN AN ORGANIZED HEALTH CARE EDUCATION/TRAINING PROGRAM

## 2025-07-02 PROCEDURE — 84443 ASSAY THYROID STIM HORMONE: CPT | Performed by: STUDENT IN AN ORGANIZED HEALTH CARE EDUCATION/TRAINING PROGRAM

## 2025-07-02 PROCEDURE — 0240U COVID/FLU A&B PCR: CPT | Performed by: STUDENT IN AN ORGANIZED HEALTH CARE EDUCATION/TRAINING PROGRAM

## 2025-07-02 PROCEDURE — 83880 ASSAY OF NATRIURETIC PEPTIDE: CPT | Performed by: STUDENT IN AN ORGANIZED HEALTH CARE EDUCATION/TRAINING PROGRAM

## 2025-07-02 PROCEDURE — 84484 ASSAY OF TROPONIN QUANT: CPT | Performed by: STUDENT IN AN ORGANIZED HEALTH CARE EDUCATION/TRAINING PROGRAM

## 2025-07-02 PROCEDURE — 25000003 PHARM REV CODE 250: Performed by: STUDENT IN AN ORGANIZED HEALTH CARE EDUCATION/TRAINING PROGRAM

## 2025-07-02 PROCEDURE — 96360 HYDRATION IV INFUSION INIT: CPT

## 2025-07-02 PROCEDURE — 63600175 PHARM REV CODE 636 W HCPCS: Performed by: STUDENT IN AN ORGANIZED HEALTH CARE EDUCATION/TRAINING PROGRAM

## 2025-07-02 PROCEDURE — 99285 EMERGENCY DEPT VISIT HI MDM: CPT | Mod: 25

## 2025-07-02 PROCEDURE — 93005 ELECTROCARDIOGRAM TRACING: CPT

## 2025-07-02 PROCEDURE — 81015 MICROSCOPIC EXAM OF URINE: CPT | Performed by: STUDENT IN AN ORGANIZED HEALTH CARE EDUCATION/TRAINING PROGRAM

## 2025-07-02 PROCEDURE — 85025 COMPLETE CBC W/AUTO DIFF WBC: CPT | Performed by: STUDENT IN AN ORGANIZED HEALTH CARE EDUCATION/TRAINING PROGRAM

## 2025-07-02 PROCEDURE — 83735 ASSAY OF MAGNESIUM: CPT | Performed by: STUDENT IN AN ORGANIZED HEALTH CARE EDUCATION/TRAINING PROGRAM

## 2025-07-02 RX ORDER — ACETAMINOPHEN 325 MG/1
650 TABLET ORAL EVERY 6 HOURS PRN
Status: DISCONTINUED | OUTPATIENT
Start: 2025-07-02 | End: 2025-07-02

## 2025-07-02 RX ORDER — PROCHLORPERAZINE EDISYLATE 5 MG/ML
5 INJECTION INTRAMUSCULAR; INTRAVENOUS EVERY 6 HOURS PRN
Status: DISCONTINUED | OUTPATIENT
Start: 2025-07-02 | End: 2025-07-02

## 2025-07-02 RX ORDER — IBUPROFEN 200 MG
16 TABLET ORAL
Status: DISCONTINUED | OUTPATIENT
Start: 2025-07-02 | End: 2025-07-07 | Stop reason: HOSPADM

## 2025-07-02 RX ORDER — HYDROCODONE BITARTRATE AND ACETAMINOPHEN 5; 325 MG/1; MG/1
1 TABLET ORAL EVERY 6 HOURS PRN
Refills: 0 | Status: DISCONTINUED | OUTPATIENT
Start: 2025-07-02 | End: 2025-07-07 | Stop reason: HOSPADM

## 2025-07-02 RX ORDER — MIRTAZAPINE 15 MG/1
30 TABLET, FILM COATED ORAL NIGHTLY
Status: DISCONTINUED | OUTPATIENT
Start: 2025-07-02 | End: 2025-07-03

## 2025-07-02 RX ORDER — BENZONATATE 100 MG/1
100 CAPSULE ORAL 3 TIMES DAILY PRN
COMMUNITY

## 2025-07-02 RX ORDER — BETAMETHASONE VALERATE 1 MG/G
1 CREAM TOPICAL 2 TIMES DAILY
COMMUNITY

## 2025-07-02 RX ORDER — PHENYTOIN 125 MG/5ML
100 SUSPENSION ORAL
Status: DISCONTINUED | OUTPATIENT
Start: 2025-07-02 | End: 2025-07-02

## 2025-07-02 RX ORDER — PANTOPRAZOLE SODIUM 40 MG/1
40 TABLET, DELAYED RELEASE ORAL DAILY
COMMUNITY

## 2025-07-02 RX ORDER — TALC
9 POWDER (GRAM) TOPICAL NIGHTLY PRN
Status: DISCONTINUED | OUTPATIENT
Start: 2025-07-02 | End: 2025-07-07 | Stop reason: HOSPADM

## 2025-07-02 RX ORDER — GLUCAGON 1 MG
1 KIT INJECTION
Status: DISCONTINUED | OUTPATIENT
Start: 2025-07-02 | End: 2025-07-07 | Stop reason: HOSPADM

## 2025-07-02 RX ORDER — SODIUM CHLORIDE 0.9 % (FLUSH) 0.9 %
10 SYRINGE (ML) INJECTION
Status: DISCONTINUED | OUTPATIENT
Start: 2025-07-02 | End: 2025-07-02

## 2025-07-02 RX ORDER — BISACODYL 10 MG/1
10 SUPPOSITORY RECTAL DAILY PRN
Status: DISCONTINUED | OUTPATIENT
Start: 2025-07-02 | End: 2025-07-07 | Stop reason: HOSPADM

## 2025-07-02 RX ORDER — ENOXAPARIN SODIUM 100 MG/ML
30 INJECTION SUBCUTANEOUS EVERY 24 HOURS
Status: DISCONTINUED | OUTPATIENT
Start: 2025-07-02 | End: 2025-07-07 | Stop reason: HOSPADM

## 2025-07-02 RX ORDER — ACETAMINOPHEN 325 MG/1
650 TABLET ORAL EVERY 4 HOURS PRN
Status: DISCONTINUED | OUTPATIENT
Start: 2025-07-02 | End: 2025-07-07 | Stop reason: HOSPADM

## 2025-07-02 RX ORDER — SIMETHICONE 80 MG
1 TABLET,CHEWABLE ORAL 4 TIMES DAILY PRN
Status: DISCONTINUED | OUTPATIENT
Start: 2025-07-02 | End: 2025-07-07 | Stop reason: HOSPADM

## 2025-07-02 RX ORDER — METOPROLOL SUCCINATE 50 MG/1
100 TABLET, EXTENDED RELEASE ORAL DAILY
Status: DISCONTINUED | OUTPATIENT
Start: 2025-07-03 | End: 2025-07-07 | Stop reason: HOSPADM

## 2025-07-02 RX ORDER — ATORVASTATIN CALCIUM 40 MG/1
40 TABLET, FILM COATED ORAL DAILY
Status: DISCONTINUED | OUTPATIENT
Start: 2025-07-03 | End: 2025-07-07 | Stop reason: HOSPADM

## 2025-07-02 RX ORDER — ARIPIPRAZOLE 2 MG/1
2 TABLET ORAL NIGHTLY
Status: DISCONTINUED | OUTPATIENT
Start: 2025-07-02 | End: 2025-07-03

## 2025-07-02 RX ORDER — OLANZAPINE 2.5 MG/1
2.5 TABLET, FILM COATED ORAL 2 TIMES DAILY PRN
COMMUNITY

## 2025-07-02 RX ORDER — ASPIRIN 81 MG/1
81 TABLET ORAL DAILY
Status: DISCONTINUED | OUTPATIENT
Start: 2025-07-03 | End: 2025-07-07 | Stop reason: HOSPADM

## 2025-07-02 RX ORDER — PHENYTOIN SODIUM 100 MG/1
200 CAPSULE, EXTENDED RELEASE ORAL DAILY
Status: DISCONTINUED | OUTPATIENT
Start: 2025-07-03 | End: 2025-07-07 | Stop reason: HOSPADM

## 2025-07-02 RX ORDER — IPRATROPIUM BROMIDE AND ALBUTEROL SULFATE 2.5; .5 MG/3ML; MG/3ML
3 SOLUTION RESPIRATORY (INHALATION) EVERY 6 HOURS PRN
Status: DISCONTINUED | OUTPATIENT
Start: 2025-07-02 | End: 2025-07-07 | Stop reason: HOSPADM

## 2025-07-02 RX ORDER — NALOXONE HCL 0.4 MG/ML
0.02 VIAL (ML) INJECTION
Status: DISCONTINUED | OUTPATIENT
Start: 2025-07-02 | End: 2025-07-07 | Stop reason: HOSPADM

## 2025-07-02 RX ORDER — PHENOBARBITAL 32.4 MG/1
32.4 TABLET ORAL DAILY
Status: DISCONTINUED | OUTPATIENT
Start: 2025-07-03 | End: 2025-07-07 | Stop reason: HOSPADM

## 2025-07-02 RX ORDER — BRIMONIDINE TARTRATE 1.5 MG/ML
1 SOLUTION/ DROPS OPHTHALMIC 3 TIMES DAILY
Status: DISCONTINUED | OUTPATIENT
Start: 2025-07-02 | End: 2025-07-07 | Stop reason: HOSPADM

## 2025-07-02 RX ORDER — IBUPROFEN 200 MG
24 TABLET ORAL
Status: DISCONTINUED | OUTPATIENT
Start: 2025-07-02 | End: 2025-07-07 | Stop reason: HOSPADM

## 2025-07-02 RX ORDER — MORPHINE SULFATE 4 MG/ML
2 INJECTION, SOLUTION INTRAMUSCULAR; INTRAVENOUS EVERY 6 HOURS PRN
Refills: 0 | Status: DISCONTINUED | OUTPATIENT
Start: 2025-07-02 | End: 2025-07-07 | Stop reason: HOSPADM

## 2025-07-02 RX ORDER — ALUMINUM HYDROXIDE, MAGNESIUM HYDROXIDE, AND SIMETHICONE 1200; 120; 1200 MG/30ML; MG/30ML; MG/30ML
30 SUSPENSION ORAL 4 TIMES DAILY PRN
Status: DISCONTINUED | OUTPATIENT
Start: 2025-07-02 | End: 2025-07-07 | Stop reason: HOSPADM

## 2025-07-02 RX ORDER — ACETAMINOPHEN 500 MG
1000 TABLET ORAL EVERY 6 HOURS PRN
Status: DISCONTINUED | OUTPATIENT
Start: 2025-07-02 | End: 2025-07-02

## 2025-07-02 RX ORDER — SODIUM CHLORIDE 0.9 % (FLUSH) 0.9 %
10 SYRINGE (ML) INJECTION
Status: DISCONTINUED | OUTPATIENT
Start: 2025-07-02 | End: 2025-07-07 | Stop reason: HOSPADM

## 2025-07-02 RX ORDER — MAGNESIUM SULFATE HEPTAHYDRATE 40 MG/ML
2 INJECTION, SOLUTION INTRAVENOUS ONCE
Status: COMPLETED | OUTPATIENT
Start: 2025-07-02 | End: 2025-07-02

## 2025-07-02 RX ORDER — ONDANSETRON HYDROCHLORIDE 2 MG/ML
4 INJECTION, SOLUTION INTRAVENOUS EVERY 8 HOURS PRN
Status: DISCONTINUED | OUTPATIENT
Start: 2025-07-02 | End: 2025-07-07 | Stop reason: HOSPADM

## 2025-07-02 RX ORDER — POLYETHYLENE GLYCOL 3350 17 G/17G
17 POWDER, FOR SOLUTION ORAL 3 TIMES DAILY PRN
Status: DISCONTINUED | OUTPATIENT
Start: 2025-07-02 | End: 2025-07-07 | Stop reason: HOSPADM

## 2025-07-02 RX ORDER — ACETAMINOPHEN 325 MG/1
650 TABLET ORAL EVERY 4 HOURS PRN
Status: DISCONTINUED | OUTPATIENT
Start: 2025-07-02 | End: 2025-07-02

## 2025-07-02 RX ORDER — LATANOPROST 50 UG/ML
1 SOLUTION/ DROPS OPHTHALMIC NIGHTLY
Status: DISCONTINUED | OUTPATIENT
Start: 2025-07-02 | End: 2025-07-07 | Stop reason: HOSPADM

## 2025-07-02 RX ORDER — ONDANSETRON HYDROCHLORIDE 2 MG/ML
4 INJECTION, SOLUTION INTRAVENOUS EVERY 4 HOURS PRN
Status: DISCONTINUED | OUTPATIENT
Start: 2025-07-02 | End: 2025-07-02

## 2025-07-02 RX ORDER — SODIUM CHLORIDE 9 MG/ML
500 INJECTION, SOLUTION INTRAVENOUS
Status: COMPLETED | OUTPATIENT
Start: 2025-07-02 | End: 2025-07-02

## 2025-07-02 RX ORDER — ONDANSETRON 4 MG/1
8 TABLET, ORALLY DISINTEGRATING ORAL EVERY 8 HOURS PRN
Status: DISCONTINUED | OUTPATIENT
Start: 2025-07-02 | End: 2025-07-07 | Stop reason: HOSPADM

## 2025-07-02 RX ORDER — METOPROLOL SUCCINATE 50 MG/1
50 TABLET, EXTENDED RELEASE ORAL
Status: DISCONTINUED | OUTPATIENT
Start: 2025-07-02 | End: 2025-07-02

## 2025-07-02 RX ADMIN — SODIUM CHLORIDE 500 ML: 9 INJECTION, SOLUTION INTRAVENOUS at 12:07

## 2025-07-02 RX ADMIN — MIRTAZAPINE 30 MG: 15 TABLET, FILM COATED ORAL at 08:07

## 2025-07-02 RX ADMIN — MAGNESIUM SULFATE HEPTAHYDRATE 2 G: 40 INJECTION, SOLUTION INTRAVENOUS at 08:07

## 2025-07-02 RX ADMIN — ARIPIPRAZOLE 2 MG: 2 TABLET ORAL at 08:07

## 2025-07-02 RX ADMIN — SODIUM CHLORIDE 2000 ML: 9 INJECTION, SOLUTION INTRAVENOUS at 03:07
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
## 2025-07-02 NOTE — PROGRESS NOTES
"Note from  6/27:        Spoke with sister in law, Imani Torres [phone]. She reported the below: pt , no children. Pts brother, Bill "Morgan" Brian primary caretaker [phone]. No POA. Pt lives at [facility] but facility is discharging her in 2 weeks. Pt is blind, uses wheelchair, wears briefs, has private sitter M-F 8-2 who assists with ADLs and HH (Unknown agency). Pt often noncompliant with meds, refuses care and refuses to eat or drink due to paranoia. She reports pt has a psych hx at [facility]. Pt reportedly is lucid at times and other times has hallucinations and is delusional and daughter in law voiced is not able to make decisions. She reportedly has been speaking to PCP Melody Ren about interdiction. Reportedly, pt has been week, disoriented and falling. Discussed plan with family and Dr. Lennon: Admit for medical, PT/ OT for level of care determination, psych eval for decision making determination and then plan of care to be determined from there. If pt requiring inpatient psych, family does not want [facility].         *Pt was discharged back home following admit 6/27 amadou guardado and [facility]. Received a call today 7/2 from Dionna with [facility] who reported pt is about to be evicted from [facility], is not HH appropriate due to limited mobility, AMS, combativeness and needs higher level of care. Therefore, they will not be accepting pt back.   "
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
## 2025-07-02 NOTE — TELEPHONE ENCOUNTER
Copied from CRM #[id number]. Topic: General Inquiry - Patient Advice  >> Jul 2, 2025  9:40 AM Ailin wrote:  .Who Called: Pedro with home health     Patient is returning phone call    Who Left Message for Patient:  Does the patient know what this is regarding?: Report pt is being sent back to hospital       Preferred Method of Contact: Phone Call  Patient's Preferred Phone Number on File: [phone]   Best Call Back Number, if different: [phone]   Additional Information:

## 2025-07-03 PROBLEM — E44.0 MODERATE MALNUTRITION: Status: ACTIVE | Noted: 2025-07-03

## 2025-07-03 LAB
ALBUMIN SERPL-MCNC: 3 G/DL (ref 3.4–4.8)
ALBUMIN/GLOB SERPL: 0.8 RATIO (ref 1.1–2)
ALP SERPL-CCNC: 136 UNIT/L (ref 40–150)
ALT SERPL-CCNC: 18 UNIT/L (ref 0–55)
ANION GAP SERPL CALC-SCNC: 12 MEQ/L
AST SERPL-CCNC: 37 UNIT/L (ref 11–45)
BASOPHILS # BLD AUTO: 0.06 X10(3)/MCL
BASOPHILS NFR BLD AUTO: 1 %
BILIRUB SERPL-MCNC: 0.5 MG/DL
BUN SERPL-MCNC: 8.4 MG/DL (ref 9.8–20.1)
CALCIUM SERPL-MCNC: 8.7 MG/DL (ref 8.4–10.2)
CHLORIDE SERPL-SCNC: 109 MMOL/L (ref 98–107)
CO2 SERPL-SCNC: 26 MMOL/L (ref 23–31)
CREAT SERPL-MCNC: 0.73 MG/DL (ref 0.55–1.02)
CREAT/UREA NIT SERPL: 12
EOSINOPHIL # BLD AUTO: 0.32 X10(3)/MCL (ref 0–0.9)
EOSINOPHIL NFR BLD AUTO: 5.5 %
ERYTHROCYTE [DISTWIDTH] IN BLOOD BY AUTOMATED COUNT: 15.6 % (ref 11.5–17)
GFR SERPLBLD CREATININE-BSD FMLA CKD-EPI: >60 ML/MIN/1.73/M2
GLOBULIN SER-MCNC: 4 GM/DL (ref 2.4–3.5)
GLUCOSE SERPL-MCNC: 79 MG/DL (ref 82–115)
HCT VFR BLD AUTO: 36.7 % (ref 37–47)
HGB BLD-MCNC: 12 G/DL (ref 12–16)
IMM GRANULOCYTES # BLD AUTO: 0.02 X10(3)/MCL (ref 0–0.04)
IMM GRANULOCYTES NFR BLD AUTO: 0.3 %
LYMPHOCYTES # BLD AUTO: 1.38 X10(3)/MCL (ref 0.6–4.6)
LYMPHOCYTES NFR BLD AUTO: 23.8 %
MCH RBC QN AUTO: 30.6 PG (ref 27–31)
MCHC RBC AUTO-ENTMCNC: 32.7 G/DL (ref 33–36)
MCV RBC AUTO: 93.6 FL (ref 80–94)
MONOCYTES # BLD AUTO: 1.29 X10(3)/MCL (ref 0.1–1.3)
MONOCYTES NFR BLD AUTO: 22.3 %
NEUTROPHILS # BLD AUTO: 2.72 X10(3)/MCL (ref 2.1–9.2)
NEUTROPHILS NFR BLD AUTO: 47.1 %
NRBC BLD AUTO-RTO: 0 %
PLATELET # BLD AUTO: 151 X10(3)/MCL (ref 130–400)
PMV BLD AUTO: 11.9 FL (ref 7.4–10.4)
POTASSIUM SERPL-SCNC: 3.2 MMOL/L (ref 3.5–5.1)
PROT SERPL-MCNC: 7 GM/DL (ref 5.8–7.6)
RBC # BLD AUTO: 3.92 X10(6)/MCL (ref 4.2–5.4)
SODIUM SERPL-SCNC: 147 MMOL/L (ref 136–145)
WBC # BLD AUTO: 5.79 X10(3)/MCL (ref 4.5–11.5)

## 2025-07-03 PROCEDURE — 11000001 HC ACUTE MED/SURG PRIVATE ROOM

## 2025-07-03 PROCEDURE — 95717 EEG PHYS/QHP 2-12 HR W/O VID: CPT | Mod: ,,, | Performed by: PSYCHIATRY & NEUROLOGY

## 2025-07-03 PROCEDURE — 95711 VEEG 2-12 HR UNMONITORED: CPT

## 2025-07-03 PROCEDURE — 97162 PT EVAL MOD COMPLEX 30 MIN: CPT

## 2025-07-03 PROCEDURE — 85025 COMPLETE CBC W/AUTO DIFF WBC: CPT | Performed by: STUDENT IN AN ORGANIZED HEALTH CARE EDUCATION/TRAINING PROGRAM

## 2025-07-03 PROCEDURE — 95819 EEG AWAKE AND ASLEEP: CPT

## 2025-07-03 PROCEDURE — 99222 1ST HOSP IP/OBS MODERATE 55: CPT | Mod: ,,,

## 2025-07-03 PROCEDURE — 97167 OT EVAL HIGH COMPLEX 60 MIN: CPT

## 2025-07-03 PROCEDURE — 36415 COLL VENOUS BLD VENIPUNCTURE: CPT | Performed by: STUDENT IN AN ORGANIZED HEALTH CARE EDUCATION/TRAINING PROGRAM

## 2025-07-03 PROCEDURE — 25000003 PHARM REV CODE 250: Performed by: STUDENT IN AN ORGANIZED HEALTH CARE EDUCATION/TRAINING PROGRAM

## 2025-07-03 PROCEDURE — 25000003 PHARM REV CODE 250

## 2025-07-03 PROCEDURE — 80053 COMPREHEN METABOLIC PANEL: CPT | Performed by: STUDENT IN AN ORGANIZED HEALTH CARE EDUCATION/TRAINING PROGRAM

## 2025-07-03 RX ORDER — ARIPIPRAZOLE 5 MG/1
5 TABLET ORAL NIGHTLY
Status: DISCONTINUED | OUTPATIENT
Start: 2025-07-03 | End: 2025-07-07 | Stop reason: HOSPADM

## 2025-07-03 RX ORDER — FLUCONAZOLE 200 MG/1
200 TABLET ORAL DAILY
Status: DISCONTINUED | OUTPATIENT
Start: 2025-07-03 | End: 2025-07-07 | Stop reason: HOSPADM

## 2025-07-03 RX ADMIN — ARIPIPRAZOLE 5 MG: 5 TABLET ORAL at 08:07

## 2025-07-03 RX ADMIN — PHENOBARBITAL 32.4 MG: 32.4 TABLET ORAL at 05:07

## 2025-07-03 RX ADMIN — BRIMONIDINE TARTRATE 1 DROP: 1.5 SOLUTION OPHTHALMIC at 08:07

## 2025-07-03 RX ADMIN — LATANOPROST 1 DROP: 50 SOLUTION OPHTHALMIC at 08:07

## 2025-07-03 RX ADMIN — ACETAMINOPHEN 650 MG: 325 TABLET ORAL at 01:07

## 2025-07-03 NOTE — PT/OT/SLP EVAL
"Occupational Therapy  Evaluation    Name: Lisseth Guajardo  MRN: [id number]  Recent Surgery: * No surgery found *      Recommendations:     Discharge therapy intensity: Moderate Intensity Therapy   Discharge Equipment Recommendations:  to be determined by next level of care  Barriers to discharge:       Assessment:     Lisseth Guajardo is a 84 y.o. female with a medical diagnosis of AMS, dementia with psychosis, blind, hx of chronic L sided weakness and w/c bound, epilepsy.  She presents with the following performance deficits affecting function: impaired endurance, weakness, impaired self care skills, impaired functional mobility, gait instability, impaired balance, impaired cognition, decreased coordination, decreased upper extremity function, abnormal tone, decreased ROM, impaired coordination.   Pt lives at assist living facility with sitters 9-2 to assist in ADLs and functional mobility. Caregiver reports pt has been becoming increasingly difficult and now requiring a 2 person assist. Pt required max A bed mobility and mod A sitting balance EOB, max A sit <> stand with bilateral knees blocked and HHA for RUE (LUE contracted). Poor standing balance tolerance and poor motivation. Recommend moderate intensity.     DME Justification: No DME recommended requiring DME justifications    Rehab Prognosis: Fair; patient would benefit from acute skilled OT services to address these deficits and reach maximum level of function.       Plan:     Patient to be seen 3 x/week to address the above listed problems via self-care/home management, therapeutic activities, therapeutic exercises  Plan of Care Expires: 08/04/25  Plan of Care Reviewed with: patient, caregiver    Subjective     Chief Complaint: "I want my coke"   Patient/Family Comments/goals: to get her coke     Occupational Profile:  Living Environment: pt lives at assisted living facility   Previous level of function: assist to t/f to w/c and to propel ; assist for " all ADLs with the exception of feeding once tray table is set up 2/2 blindness   Roles and Routines: -  Equipment Used at Home: wheelchair  Assistance upon Discharge: TBD      Pain/Comfort:  Pain Rating 1: 0/10    Patients cultural, spiritual, Uatsdin conflicts given the current situation:      Objective:     OT communicated with nrsg prior to session.      Patient was found HOB elevated with   upon OT entry to room.    General Precautions: Standard, fall  Orthopedic Precautions:    Braces:        Functional Mobility/Transfers:  Bed mobility:    Supine to Sit: maximal assistance  Sit to Supine: maximal assistance  Transfers: Sit to Stand: maximal assistance with hand-held assist  Functional Mobility: poor standing tolerance     Activities of Daily Living:  Upper Body Dressing: maximal assistance    Lower Body Dressing: maximal assistance    Toileting: maximal assistance      AMPAC 6 Click ADL:  AMPAC Total Score: 6    Functional Cognition:  Orientation: oriented to Person and not oriented to place/month/year     Visual Perceptual Skills:  Blind     Upper Extremity Function:  Right Upper Extremity:   Range of Motion: 3-/5    Left Upper Extremity:  Range of Motion: contracted in flexion     Balance:   Static sitting balance: mod A     Therapeutic Positioning  Risk for acquired pressure injuries is increased due to relative decrease in mobility d/t hospitalization  and impaired mobility.    OT interventions performed during the course of today's session:   Therapeutic positioning was provided at the conclusion of session to offload all bony prominences for the prevention and/or reduction of pressure injuries    Skin assessment: all bony prominences were assessed    Findings: known area of altered skin integrity at sacral patch     OT recommendations for therapeutic positioning throughout hospitalization:   Follow [facility] Pressure Injury Prevention Protocol  Geomat recommended for sacral protection while UIC  Specialty  Mattress      Co-Treatment: Yes, due to High complexity requires 2 sets of skilled hands    Patient Education:  Patient and home caregiver were provided with verbal education education regarding OT role/goals/POC, fall prevention, and safety awareness.  Understanding was verbalized.     Patient left right sidelying with all lines intact, call button in reach, and caregiver present.    GOALS:   Multidisciplinary Problems       Occupational Therapy Goals          Problem: Occupational Therapy    Goal Priority Disciplines Outcome Interventions   Occupational Therapy Goal     OT, PT/OT Progressing    Description: Goals to be met by: in 1 month      Patient will increase functional independence with ADLs by performing:    Feeding with Supervision.  Grooming while seated with Set-up Assistance.  Sitting at edge of bed x10 minutes with Stand-by Assistance.  Stand pivot transfers with Minimal Assistance.                         History:     Past Medical History:   Diagnosis Date    Cervical radiculitis 08/29/2022    Cervical spinal stenosis 08/29/2022    Cervicalgia 08/29/2022    Coronary arteriosclerosis     DDD (degenerative disc disease), cervical 08/29/2022    Depression     Epilepsy, unspecified, not intractable, with status epilepticus     High cholesterol     Hypokalemia     Mild renal insufficiency 07/06/2022    Myocardial infarction     Osteoporosis     Primary hypertension 1/31/2023    Recurrent UTI     Unspecified macular degeneration          Past Surgical History:   Procedure Laterality Date    APPENDECTOMY      BACK SURGERY      cardiac stents      CARPAL TUNNEL RELEASE      CATARACT EXTRACTION Left     CHOLECYSTECTOMY      crainotomy      FOOT SURGERY Left     HYSTERECTOMY      LUNG REMOVAL, PARTIAL Right     LYMPH NODE DISSECTION         Time Tracking:     OT Date of Treatment:    OT Start Time: 0933  OT Stop Time: 0953  OT Total Time (min): 20 min    Billable Minutes:Evaluation High Complexity     7/3/2025

## 2025-07-03 NOTE — PT/OT/SLP EVAL
Physical Therapy Evaluation    Patient Name:  Lisseth Guajardo   MRN:  [id number]    Recommendations:     Discharge therapy intensity: Moderate Intensity Therapy   Discharge Equipment Recommendations: to be determined by next level of care   Barriers to discharge: Impaired mobility and Ongoing medical needs    Assessment:     Lisseth Guajardo is a 84 y.o. female admitted with a medical diagnosis of AMS with a previous discharge from this facility 2 days ago. PMH of HTN, HLD, epilepsy, CHF, CAD, previous CVA, glaucoma, CKD, chronic left-sided weakness/wheelchair-bound with underlying psychiatric undiagnosed issue.  She presents with the following impairments/functional limitations: impaired endurance, weakness, impaired self care skills, impaired functional mobility, gait instability, impaired balance, decreased coordination, decreased upper extremity function, decreased lower extremity function, abnormal tone. Pt tolerated session fair. Pt is a poor historian and foes not speak much. Caregiver present at bedside to give PLOF. From caregiver pt requires 2 people to assist her to transfer to . Caregiver unsure of plan from family and family not present to discuss potential dc options. Today pt requires modAx2 for bed mobility and maxAx2 to stnd. PT currently recommending Moderate Intensity therapy after dc.    DME Justification:  No DME recommended requiring DME justifications    Rehab Prognosis: Good; patient would benefit from acute skilled PT services to address these deficits and reach maximum level of function.    Recent Surgery: * No surgery found *      Plan:     During this hospitalization, patient would benefit from acute PT services 3 x/week to address the identified rehab impairments via therapeutic activities, therapeutic exercises, wheelchair management/training and progress toward the following goals:    Plan of Care Expires:  08/03/25    Subjective     Chief Complaint: NA  Patient/Family  Comments/goals: to get better  Pain/Comfort:  Pain Rating 1: 0/10    Patients cultural, spiritual, Yazidism conflicts given the current situation: no    Living Environment:  Pt lives in an assisted living with a sitter from 2. From sitter family is working on getting 24hr care.   Prior to admission, patients level of function was wheelchair bound, does not ambulate and requires a 2 person assist.  Equipment used at home: wheelchair.  DME owned (not currently used): none.  Upon discharge, patient will have assistance from sitters.    Objective:     Communicated with nsg prior to session.  Patient found supine with peripheral IV, telemetry  upon PT entry to room.    General Precautions: Standard, fall  Orthopedic Precautions:    Braces: N/A  Respiratory Status: Room air      Exams:  RLE Strength: Unable to test 2/2 decreased cognition and command following   LLE Strength: Unable to test 2/2 decreased cognition and command following  Skin integrity: Visible skin intact      Functional Mobility:  Bed Mobility:     Supine to Sit: moderate assistance and of 2 persons  Transfers:     Sit to Stand:  maximal assistance and of 2 persons with no AD      AM-PAC 6 CLICK MOBILITY  Total Score:10     Co-Treatment: Yes, due to High complexity requires 2 sets of skilled hands    Patient and home caregiver were provided with verbal education education regarding PT role/goals/POC, safety awareness, and discharge/DME recommendations.  Understanding was verbalized.     Patient left supine with all lines intact, call button in reach, and caregiver present.      GOALS:   Multidisciplinary Problems       Physical Therapy Goals          Problem: Physical Therapy    Goal Priority Disciplines Outcome Interventions   Physical Therapy Goal     PT, PT/OT Progressing    Description: Goals to be met by: 8/3/2025     Patient will increase functional independence with mobility by performin. Supine to sit with [place]  2. Sit to  supine with [place]  3. Sit to stand transfer with Minimal Assistance  4. Bed to chair transfer with Minimal Assistance using LRAD  5. Wheelchair propulsion x50 feet with Minimal Assistance using bilateral lower extremities  6. Sitting at edge of bed x10 minutes with Contact Guard Assistance                         History:     Past Medical History:   Diagnosis Date    Cervical radiculitis 08/29/2022    Cervical spinal stenosis 08/29/2022    Cervicalgia 08/29/2022    Coronary arteriosclerosis     DDD (degenerative disc disease), cervical 08/29/2022    Depression     Epilepsy, unspecified, not intractable, with status epilepticus     High cholesterol     Hypokalemia     Mild renal insufficiency 07/06/2022    Myocardial infarction     Osteoporosis     Primary hypertension 1/31/2023    Recurrent UTI     Unspecified macular degeneration        Past Surgical History:   Procedure Laterality Date    APPENDECTOMY      BACK SURGERY      cardiac stents      CARPAL TUNNEL RELEASE      CATARACT EXTRACTION Left     CHOLECYSTECTOMY      crainotomy      FOOT SURGERY Left     HYSTERECTOMY      LUNG REMOVAL, PARTIAL Right     LYMPH NODE DISSECTION         Time Tracking:     PT Received On: 07/03/25  PT Start Time: 0934     PT Stop Time: 0950  PT Total Time (min): 16 min     Billable Minutes: Evaluation mod      07/03/2025

## 2025-07-03 NOTE — PLAN OF CARE
Problem: Physical Therapy  Goal: Physical Therapy Goal  Description: Goals to be met by: 8/3/2025     Patient will increase functional independence with mobility by performin. Supine to sit with [place]  2. Sit to supine with [place]  3. Sit to stand transfer with Minimal Assistance  4. Bed to chair transfer with Minimal Assistance using LRAD  5. Wheelchair propulsion x50 feet with Minimal Assistance using bilateral lower extremities  6. Sitting at edge of bed x10 minutes with Contact Guard Assistance    Outcome: Progressing

## 2025-07-03 NOTE — PROGRESS NOTES
Inpatient Nutrition Assessment    Admit Date: 7/2/2025   Total duration of encounter: 1 day   Patient Age: 84 y.o.    Nutrition Recommendation/Prescription     Continue regular diet as tolerated  Trial Boost VHC BID (530 kcal and 22 gm protein per serving)  Monitor PO intake, labs and weight    Communication of Recommendations: reviewed with patient    Nutrition Assessment     Malnutrition Assessment/Nutrition-Focused Physical Exam    Malnutrition Context: chronic illness (07/03/25 1259)  Malnutrition Level: moderate (07/03/25 1259)  Energy Intake (Malnutrition): less than or equal to 75% for greater than or equal to 1 month (07/03/25 1259)  Weight Loss (Malnutrition): 7.5% in 3 months (07/03/25 1259)              Muscle Mass (Malnutrition): moderate depletion (07/03/25 1259)  [place] Region (Muscle Loss): moderate depletion  Clavicle Bone Region (Muscle Loss): moderate depletion  Clavicle and Acromion Bone Region (Muscle Loss): moderate depletion                 Fluid Accumulation (Malnutrition): other (see comments) (Not present) (07/03/25 1259)        A minimum of two characteristics is recommended for diagnosis of either severe or non-severe malnutrition.    Chart Review    Reason Seen: continuous nutrition monitoring    Malnutrition Screening Tool Results   Have you recently lost weight without trying?: No  Have you been eating poorly because of a decreased appetite?: No   MST Score: 0   Diagnosis:  Dementia with behavioral changes  Acute on chronic encephalopathy   Acute uncomplicated cystitis    Relevant Medical History: HTN, HLD, epilepsy, CHF, CAD, previous CVA, glaucoma, CKD, chronic left-sided weakness/wheelchair-bound with underlying psychiatric undiagnosed issue     Scheduled Medications:  ARIPiprazole, 5 mg, QHS  aspirin, 81 mg, Daily  atorvastatin, 40 mg, Daily  brimonidine 0.15 % OPTH DROP, 1 drop, TID  enoxparin, 30 mg, Daily  fluconazole, 200 mg, Daily  latanoprost, 1 drop, QHS  metoprolol  succinate, 100 mg, Daily  mirtazapine, 30 mg, QHS  PHENobarbitaL, 32.4 mg, Daily  phenytoin, 200 mg, Daily    Continuous Infusions:   PRN Medications:  acetaminophen, 650 mg, Q4H PRN  albuterol-ipratropium, 3 mL, Q6H PRN  aluminum-magnesium hydroxide-simethicone, 30 mL, QID PRN  bisacodyL, 10 mg, Daily PRN  dextrose 50%, 12.5 g, PRN  dextrose 50%, 25 g, PRN  glucagon (human recombinant), 1 mg, PRN  glucose, 16 g, PRN  glucose, 24 g, PRN  HYDROcodone-acetaminophen, 1 tablet, Q6H PRN  melatonin, 9 mg, Nightly PRN  morphine, 2 mg, Q6H PRN  naloxone, 0.02 mg, PRN  ondansetron, 8 mg, Q8H PRN  ondansetron, 4 mg, Q8H PRN  polyethylene glycol, 17 g, TID PRN  simethicone, 1 tablet, QID PRN  sodium chloride 0.9%, 10 mL, PRN    Calorie Containing IV Medications: no significant kcals from medications at this time    Recent Labs   Lab 06/27/25  1133 06/28/25  0425 06/29/25  0705 06/30/25  0526 07/01/25  0447 07/02/25  1029 07/02/25  1318 07/03/25  0918   * 148* 145 144 145 145  --  147*   K 3.1* 2.9* 3.2* 3.4* 3.1* 3.9  --  3.2*   CALCIUM 8.9 9.3 8.5 8.2* 8.4 8.9  --  8.7   MG  --   --  1.70  --  1.60 1.70  --   --    * 108* 111* 113* 111* 108*  --  109*   CO2 23 19* 25 25 28 26  --  26   BUN 8.3* 9.9 8.3* 6.1* 5.1* 8.6*  --  8.4*   CREATININE 0.96 1.06* 1.05* 0.73 0.76 0.82  --  0.73   EGFRNORACEVR 58 52 52 >60 >60 >60  --  >60   GLU 88 72* 140* 116* 90 103  --  79*   BILITOT 0.2 0.3 0.3  --   --  0.4  --  0.5   ALKPHOS 127 141 124  --   --  140  --  136   ALT 21 23 18  --   --  19  --  18   AST 43 46* 38  --   --  36  --  37   ALBUMIN 3.0* 3.4 3.0*  --   --  3.0*  --  3.0*   WBC 4.08* 5.45 6.53  --   --   --  5.49 5.79   HGB 12.0 13.7 12.4  --   --   --  14.3 12.0   HCT 37.3 43.0 36.7*  --   --   --  45.4 36.7*     Nutrition Orders:  Diet Adult Regular Standard Tray  Dietary nutrition supplements BID; Boost Very High Calorie Nutritional Drink - Any flavor    Appetite/Oral Intake: poor/0-25% of meals  Factors  Affecting Nutritional Intake: impaired cognitive status/motor control and decreased appetite  Social Needs Impacting Access to Food: none identified  Food/Samaritan/Cultural Preferences: none reported  Food Allergies: no known food allergies  Last Bowel Movement: 25  Wound(s):  Partial thickness pressure injury sacral spine    Comments    7/3: Pt's family member reports poor appetite currently and PTA. 0-25% PO intake of meals documented in EMR. Pt agreed to trial chocolate/strawberry ONS, will trial Boost VHC BID. Pt denies n/v/d/c; LBM  noted. Reports unintentional weight loss, but unable to provide additional information. 8% weight loss in 4 months noted. Pt with moderate muscle depletion per NFPE.    Anthropometrics    Height: 5' (152.4 cm), Height Method: Stated  Last Weight: 49.9 kg (110 lb) (25), Weight Method: Standard Scale  BMI (Calculated): 21.5  BMI Classification: underweight (BMI less than 22 if >65 years of age)     Ideal Body Weight (IBW), Female: 100 lb     % Ideal Body Weight, Female (lb): 110 %                    Usual Body Weight (UBW), k.4 kg  % Usual Body Weight: 91.91     Usual Weight Provided By: EMR weight history    Wt Readings from Last 5 Encounters:   25 49.9 kg (110 lb)   25 49.9 kg (110 lb)   25 49 kg (108 lb)   25 54.4 kg (120 lb)   25 54.4 kg (120 lb)     Weight Change(s) Since Admission:   Wt Readings from Last 1 Encounters:   25 49.9 kg (110 lb)   25 49.9 kg (110 lb)   Admit Weight: 49.9 kg (110 lb) (25), Weight Method: Stated    Estimated Needs    Weight Used For Calorie Calculations: 49.9 kg (110 lb 0.2 oz)  Energy Calorie Requirements (kcal): 998-1248 kcal (20-25 kcal/kg)  Energy Need Method: Kcal/kg  Weight Used For Protein Calculations: 49.9 kg (110 lb 0.2 oz)  Protein Requirements: 40-50 gm (0.8-1.0 gm/kg)  Fluid Requirements (mL): 1248 mL (25 mL/kg)        Enteral Nutrition     Patient not  receiving enteral nutrition at this time.    Parenteral Nutrition     Patient not receiving parenteral nutrition support at this time.    Evaluation of Received Nutrient Intake    Calories: not meeting estimated needs  Protein: not meeting estimated needs    Patient Education     Not applicable.    Nutrition Diagnosis     PES: Inadequate energy intake related to suboptimal protein/energy intake as evidenced by poor PO intake. (new)     PES: Moderate chronic disease or condition related malnutrition Related to chronic illness As Evidenced by:  - weight loss: 7.5% in 3 months - energy intake: <= 75% for 1 months (meets criteria for <= 75% >= 1 month (severe - chronic)) - muscle mass depletion: 5 areas of moderate muscle loss (Trapezius, Temporalis, Acromion, Deltoid, Clavicle) new    Nutrition Interventions     Intervention(s): general/healthful diet, commercial beverage, and collaboration with other providers  Intervention(s): Oral diet/nutrient modifications;Oral nutritional supplement    Goal: Meet greater than 80% of nutritional needs by follow-up. (new)  Goal: Consume % of oral supplements by follow-up. (new)    Nutrition Goals & Monitoring     Dietitian will monitor: food and beverage intake, weight, electrolyte/renal panel, and gastrointestinal profile  Discharge planning: continue regular diet with boost or similar oral supplements  Nutrition Risk/Follow-Up: patient at increased nutrition risk; dietitian will follow-up twice weekly   Please consult if re-assessment needed sooner.

## 2025-07-03 NOTE — PLAN OF CARE
Spoke to Ximena with [facility], patient has paid through the end of the month but will be discharged from facility on 7/31. [facility] will accept patient back in meantime. Ximena reports HH called ambulance to  patient from [place]. Stated patient has been having hallucination and being combative. Patient was recently discharged from our hospital and prescribed some psych meds but refuses to take them. Patient had [facility] but they declined to take patient back.     Spoke to Bill, patient's brother, he is currently working with [facility] to get evaluation at [facility] to transfer Assisted Livings prior to discharge from [facility]. Bill is agreeable with referral to [facility] due them having psych program. Referral sent via epic. Offered Bill assisted from Ernestina with Aging with [facility], he requested she call him. Reaching out to Ernestina to provide assistance with transferring Assisted Livings.     Ximena reports they will not have transport to  patient until Monday due to holiday. Will be able to  before 4 pm only.     Patient does not have a POA. Patient has a sister in [place].        07/03/25 0868   Discharge Assessment   Assessment Type Discharge Planning Assessment   Confirmed/corrected address, phone number and insurance Yes   Confirmed Demographics Correct on Facesheet   Source of Information family   People in Home facility resident   Facility [facility]rived From: [facility]   Do you expect to return to your current living situation? Yes   Do you have help at home or someone to help you manage your care at home? Yes   Prior to hospitilization cognitive status: Alert/Oriented   Current cognitive status: Alert/Oriented   Home Accessibility wheelchair accessible   Home Layout Able to live on 1st floor   Equipment Currently Used at Home wheelchair   Readmission within 30 days? Yes   Patient currently being followed by outpatient case management? No   Do you  currently have service(s) that help you manage your care at home? Yes   How Many hours does patient receive services 25   Name and Contact number of agency [facility] ([facility])   Do you take prescription medications? Yes   How do you get to doctors appointments? agency   Discharge Plan A Assisted Living   Discharge Plan B New Nursing Home placement - [facility] facility   DME Needed Upon Discharge  none   Discharge Plan discussed with: Sibling   Transition of Care Barriers None

## 2025-07-03 NOTE — CONSULTS
Patient Name: Lisseth Guajardo   MRN: [id number]   Admission Date: 7/2/2025   Hospital Length of Stay: 0   Attending Provider: Rolando Avilez MD   Consulting Provider: Cherelle LAURENT  Reason for Consult: Goals of Care  Primary Care Physician: Melody Ren MD     Principal Problem: Psychosis     Patient information was obtained from relative(s) and ER records.      Final diagnoses:  [R00.0] Tachycardia  [R41.82] Altered mental status, unspecified altered mental status type (Primary)  [N39.0, R31.9] Urinary tract infection with hematuria, site unspecified  [R62.7] Failure to thrive in adult     Assessment/Plan:     I reviewed the patient and family's understanding of the seriousness of the illness and its expected prognosis. We discussed the patient's goals of care and treatment preferences.  I clarified current code status. I identified the surrogate decision maker or health care POA.  I answered all questions and we formulated a plan including recommendations for symptom management and how to best achieve goals of care.  Advance Care Planning     Date: 07/03/2025    [facility]  I engaged the family in a voluntary conversation about advance care planning and we specifically addressed what the goals of care would be moving forward, in light of the patient's change in clinical status, specifically current condition.  We did specifically address the patient's likely prognosis, which is poor.  We explored the patient's values and preferences for future care.  The family endorses that what is most important right now is to focus on curative/life-prolongation (regardless of treatment burdens)    Accordingly, we have decided that the best plan to meet the patient's goals includes continuing with treatment     This discussion occurred on a fully voluntary basis with the verbal consent of the patient and/or family.              I called and spoke with patient's brother, Bill, casandra prater. Bill  states that his sister is very strong willed and is a mean person. He has tried in the past to obtain POA however she refused to give POA over her finances or medical decisions to her siblings. She also does not inform them of any of her medical history. He is currently trying to get her into another assisted living. Case management is also working on getting a home health that can help with her psych issues as well.     There is mention of a possible [facility], however this is no where to be found in EMR. Case management has reached out to [facility] and they do not have one on file either. She did not want to discuss advanced directives the last time I spoke with her. I will continue attempts if she is once again able to make such decisions.     Case was discussed with Psych NP and case management.      Support provided.  Reflective listening, validation concerns, and normalization of fears provided.    Recommendations:     Palliative medicine will continue to follow    History of Present Illness:     This is an 84-year-old female with a past medical history of hypertension hyperlipidemia, epilepsy, CHF, CAD, previous CVA, glaucoma, CKD, wheelchair-bound with left-sided weakness. She is a resident of [facility] and was just recently sent to our facility due to weakness and hallucinations and then discharged back home on 07/01. During her stay, she refused jail placement. She was sent back tot he ED on 07/02 due to combative behavior and refusal of medications. Palliative medicine consulted once again for goals of care discussions.       Active Ambulatory Problems     Diagnosis Date Noted    Mild renal insufficiency 07/06/2022    Cervical radiculitis 08/29/2022    Cervicalgia 08/29/2022    DDD (degenerative disc disease), cervical 08/29/2022    Cervical spinal stenosis 08/29/2022    Epilepsy, unspecified, not intractable, with status epilepticus 01/31/2023    Primary hypertension 01/31/2023     Mixed hyperlipidemia 01/31/2023    Coronary artery disease 01/31/2023    Recurrent major depressive disorder, remission status unspecified 05/11/2023    Heart failure, unspecified HF chronicity, unspecified heart failure type 05/11/2023    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 05/11/2023    Chronic obstructive pulmonary disease with (acute) exacerbation 05/11/2023    Exudative age-related macular degeneration, bilateral, with active choroidal neovascularization 05/11/2023    Anorexia nervosa, unspecified 01/08/2024    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    Stage 3a chronic kidney disease 01/08/2024    Aortic atherosclerosis 08/12/2024    Hospital discharge follow-up 12/11/2024    Unable to walk 01/13/2025    Fall 01/13/2025    Seizures 01/13/2025    Asymptomatic microscopic hematuria 02/13/2025    Seizure disorder 03/18/2025    Acute cystitis without hematuria 06/27/2025    Dementia 07/02/2025    Altered mental status 07/02/2025     Resolved Ambulatory Problems     Diagnosis Date Noted    Recurrent UTI 01/31/2023     Past Medical History:   Diagnosis Date    Coronary arteriosclerosis     Depression     High cholesterol     Hypokalemia     Myocardial infarction     Osteoporosis     Unspecified macular degeneration         Past Surgical History:   Procedure Laterality Date    APPENDECTOMY      BACK SURGERY      cardiac stents      CARPAL TUNNEL RELEASE      CATARACT EXTRACTION Left     CHOLECYSTECTOMY      crainotomy      FOOT SURGERY Left     HYSTERECTOMY      LUNG REMOVAL, PARTIAL Right     LYMPH NODE DISSECTION          Review of patient's allergies indicates:   Allergen Reactions    Nitrofurantoin monohyd/m-cryst Hives    Penicillin Hives        Current Medications[1]       Current Facility-Administered Medications:     acetaminophen, 650 mg, Oral, Q4H PRN    albuterol-ipratropium, 3 mL, Nebulization, Q6H PRN    aluminum-magnesium  hydroxide-simethicone, 30 mL, Oral, QID PRN    bisacodyL, 10 mg, Rectal, Daily PRN    dextrose 50%, 12.5 g, Intravenous, PRN    dextrose 50%, 25 g, Intravenous, PRN    glucagon (human recombinant), 1 mg, Intramuscular, PRN    glucose, 16 g, Oral, PRN    glucose, 24 g, Oral, PRN    HYDROcodone-acetaminophen, 1 tablet, Oral, Q6H PRN    melatonin, 9 mg, Oral, Nightly PRN    morphine, 2 mg, Intravenous, Q6H PRN    naloxone, 0.02 mg, Intravenous, PRN    ondansetron, 8 mg, Oral, Q8H PRN    ondansetron, 4 mg, Intravenous, Q8H PRN    polyethylene glycol, 17 g, Oral, TID PRN    simethicone, 1 tablet, Oral, QID PRN    sodium chloride 0.9%, 10 mL, Intravenous, PRN     Family History   Family history unknown: Yes        Review of Systems         Objective:   /81   Pulse 89   Temp 97.3 °F (36.3 °C) (Axillary)   Resp 16   Ht 5' (1.524 m)   Wt 49.9 kg (110 lb)   SpO2 96%   BMI 21.48 kg/m²      Physical Exam  Constitutional:       General: She is sleeping.      Appearance: She is ill-appearing.   HENT:      Head: Normocephalic and atraumatic.   Cardiovascular:      Rate and Rhythm: Normal rate.   Pulmonary:      Effort: Pulmonary effort is normal.             Review of Symptoms      Symptom Assessment (ESAS 0-10 Scale)  Pain:  0  Dyspnea:  0  Anxiety:  0  Nausea:  0  Depression:  0  Anorexia:  0  Fatigue:  0  Insomnia:  0  Restlessness:  0  Agitation:  0         Living Arrangements:  Lives in assisted living    Psychosocial/Cultural:   See Palliative Psychosocial Note: Yes  **Primary  to Follow**  Palliative Care  Consult: No      Advance Care Planning   Advance Directives:     Decision Making:  Family answered questions  Goals of Care: What is most important right now is to focus on curative/life-prolongation (regardless of treatment burdens). Accordingly, we have decided that the best plan to meet the patient's goals includes continuing with treatment.          PAINAD: NA    Caregiver burden  formerly assessed: Yes        > 50% of 60 min of encounter was spent in chart review, face to face discussion of goals of care, symptom assessment, coordination of care and emotional support.         Cherelle Major, P  Palliative Medicine  [facility]           [1]   Current Facility-Administered Medications:     acetaminophen tablet 650 mg, 650 mg, Oral, Q4H PRN, ReyesCristóbal G, DO    albuterol-ipratropium 2.5 mg-0.5 mg/3 mL nebulizer solution 3 mL, 3 mL, Nebulization, Q6H PRN, Reyes Thaidaniel G, DO    aluminum-magnesium hydroxide-simethicone 200-200-20 mg/5 mL suspension 30 mL, 30 mL, Oral, QID PRN, Reyes, Thairy G, DO    ARIPiprazole tablet 2 mg, 2 mg, Oral, QHS, ReyesCristóbal moore G, DO, 2 mg at 07/02/25 2050    aspirin EC tablet 81 mg, 81 mg, Oral, Daily, ReyesCristóbal G, DO    atorvastatin tablet 40 mg, 40 mg, Oral, Daily, Reyes, Thairy G, DO    bisacodyL suppository 10 mg, 10 mg, Rectal, Daily PRN, ReyesCristóbal G, DO    brimonidine 0.15 % OPTH DROP ophthalmic solution 1 drop, 1 drop, Both Eyes, TID, Reyes Thairy G, DO    dextrose 50% injection 12.5 g, 12.5 g, Intravenous, PRN, Reyes, Thairy G, DO    dextrose 50% injection 25 g, 25 g, Intravenous, PRN, Reyes, Thairy G, DO    enoxaparin injection 30 mg, 30 mg, Subcutaneous, Daily, ReyesCristóbal G, DO    fluconazole tablet 200 mg, 200 mg, Oral, Daily, Ludwin Escobedo MD    glucagon (human recombinant) injection 1 mg, 1 mg, Intramuscular, PRN, ReyesCristóbal G, DO    glucose chewable tablet 16 g, 16 g, Oral, PRN, ReyesVjry G, DO    glucose chewable tablet 24 g, 24 g, Oral, PRN, Reyes, Thairy G, DO    HYDROcodone-acetaminophen 5-325 mg per tablet 1 tablet, 1 tablet, Oral, Q6H PRN, Reyes, Thairy G,     latanoprost 0.005 % ophthalmic solution 1 drop, 1 drop, Both Eyes, QHS, Reyes, Thairy G,     melatonin tablet 9 mg, 9 mg, Oral, Nightly PRN, Reyes, Thairy G,     metoprolol succinate (TOPROL-XL) 24 hr tablet 100 mg, 100 mg, Oral, Daily, Reyes,  Cristóbal [place],     mirtazapine tablet 30 mg, 30 mg, Oral, QHS, Reyes, Thairy G, , 30 mg at 07/02/25 2049    morphine injection 2 mg, 2 mg, Intravenous, Q6H PRN, Reyes, Thairy G,     naloxone 0.4 mg/mL injection 0.02 mg, 0.02 mg, Intravenous, PRN, Reyes, Thairy G,     ondansetron disintegrating tablet 8 mg, 8 mg, Oral, Q8H PRN, Reyes, Thairy G,     ondansetron injection 4 mg, 4 mg, Intravenous, Q8H PRN, Reyes, Thairy G,     PHENobarbitaL tablet 32.4 mg, 32.4 mg, Oral, Daily, Reyes, Thairy G,     phenytoin (DILANTIN) ER capsule 200 mg, 200 mg, Oral, Daily, Reyes, Thairy G,     polyethylene glycol packet 17 g, 17 g, Oral, TID PRN, Reyes, Thairy G,     simethicone chewable tablet 80 mg, 1 tablet, Oral, QID PRN, Reyes, Thairy G,     sodium chloride 0.9% flush 10 mL, 10 mL, Intravenous, PRN, Reyes, Thairy G,

## 2025-07-03 NOTE — CONSULTS
[facility] [address] Surg  Wound Care    Patient Name:  Lisseth Guajardo   MRN:  [id number]  Date: 7/3/2025  Diagnosis: Psychosis    History:     Past Medical History:   Diagnosis Date    Cervical radiculitis 08/29/2022    Cervical spinal stenosis 08/29/2022    Cervicalgia 08/29/2022    Coronary arteriosclerosis     DDD (degenerative disc disease), cervical 08/29/2022    Depression     Epilepsy, unspecified, not intractable, with status epilepticus     High cholesterol     Hypokalemia     Mild renal insufficiency 07/06/2022    Myocardial infarction     Osteoporosis     Primary hypertension 1/31/2023    Recurrent UTI     Unspecified macular degeneration        Social History[1]    Precautions:     Allergies as of 07/02/2025 - Reviewed 07/02/2025   Allergen Reaction Noted    Nitrofurantoin monohyd/m-cryst Hives 05/02/2022    Penicillin Hives 05/02/2022       WOC Assessment Details/Treatment        07/03/25 1244   WOCN Assessment   Visit Date 07/03/25   Visit Time 1244   Consult Type New   WOCN Speciality Wound   Wound pressure   Intervention chart review  (talked with RN)   Teaching on-going        Wound 06/27/25 2030 Pressure Injury  Sacral spine   Date First Assessed/Time First Assessed: 06/27/25 2030   Present on Original Admission: Yes  Primary Wound Type: Pressure Injury  Side: (c)   Location: Sacral spine   Wound Image   (used nurses media)     WOCN consulted for sacrum. Discussed POC w/ nurse Leo. Pt. Is in EEG right now, unsure of when pt. Will be back. Pt. Was just here 2 days ago. Treatment recommendations based on media and previous visit: Sacrum: clean w/ vashe, dry well, apply aquacel ag cloth to wound bed, cover with abd pad, and minimal paper tape. BID/prn if soilage. Pt. Has been refusing when it comes to turning and doing wound care. Please cont. Tx recs and preventative measures. Will follow up.     07/03/2025         [1]   Social History  Socioeconomic History    Marital  status:    Tobacco Use    Smoking status: Former     Types: Cigarettes     Passive exposure: Past    Smokeless tobacco: Never   Substance and Sexual Activity    Alcohol use: Never    Drug use: Never    Sexual activity: Not Currently   Social History Narrative    ** Merged History Encounter **          Social Drivers of Health     Financial Resource Strain: Patient Unable To Answer (7/2/2025)    Overall Financial Resource Strain (CARDIA)     Difficulty of Paying Living Expenses: Patient unable to answer   Food Insecurity: Patient Unable To Answer (7/2/2025)    Hunger Vital Sign     Worried About Running Out of Food in the Last Year: Patient unable to answer     Ran Out of Food in the Last Year: Patient unable to answer   Transportation Needs: Patient Unable To Answer (7/2/2025)    PRAPARE - Transportation     Lack of Transportation (Medical): Patient unable to answer     Lack of Transportation (Non-Medical): Patient unable to answer   Physical Activity: Inactive (1/9/2024)    Exercise Vital Sign     Days of Exercise per Week: 0 days     Minutes of Exercise per Session: 0 min   Stress: Patient Unable To Answer (7/2/2025)    [employer] - Occupational Stress Questionnaire     Feeling of Stress : Patient unable to answer   Housing Stability: Patient Unable To Answer (7/2/2025)    Housing Stability Vital Sign     Unable to Pay for Housing in the Last Year: Patient unable to answer     Homeless in the Last Year: Patient unable to answer

## 2025-07-03 NOTE — PLAN OF CARE
Problem: Occupational Therapy  Goal: Occupational Therapy Goal  Description: Goals to be met by: in 1 month      Patient will increase functional independence with ADLs by performing:    Feeding with Supervision.  Grooming while seated with Set-up Assistance.  Sitting at edge of bed x10 minutes with Stand-by Assistance.  Stand pivot transfers with Minimal Assistance.    Outcome: Progressing

## 2025-07-03 NOTE — PLAN OF CARE
Problem: Adult Inpatient Plan of Care  Goal: Plan of Care Review  Outcome: Progressing  Goal: Patient-Specific Goal (Individualized)  Outcome: Progressing  Goal: Absence of Hospital-Acquired Illness or Injury  Outcome: Progressing  Goal: Optimal Comfort and Wellbeing  Outcome: Progressing  Goal: Readiness for Transition of Care  Outcome: Progressing     Problem: Wound  Goal: Optimal Coping  Outcome: Progressing  Goal: Optimal Functional Ability  Outcome: Progressing  Goal: Absence of Infection Signs and Symptoms  Outcome: Progressing  Goal: Improved Oral Intake  Outcome: Progressing  Goal: Optimal Pain Control and Function  Outcome: Progressing  Goal: Skin Health and Integrity  Outcome: Progressing  Goal: Optimal Wound Healing  Outcome: Progressing     Problem: Infection  Goal: Absence of Infection Signs and Symptoms  Outcome: Progressing     Problem: Fall Injury Risk  Goal: Absence of Fall and Fall-Related Injury  Outcome: Progressing     Problem: Skin Injury Risk Increased  Goal: Skin Health and Integrity  Outcome: Progressing     Problem: Coping Ineffective  Goal: Effective Coping  Outcome: Progressing

## 2025-07-03 NOTE — PROCEDURES
EEG    Dx: Altered mental status    Duration: 2.5 hours    Technical: Standard digital EEG was performed at [facility]. The 10/20 international system of electrode placement was used.  Photic   stimulation and hyperventilation were performed as activating   procedures.    Description: The posterior dominant rhythm was 6 Hz.  Higher voltages were seen on the left side during the initial part of the study, felt to be breach rhythm.  The patient achieved stage 2 sleep.  There were   no epileptiform discharges or clinical seizures seen.    Impression: Moderate encephalopathy. 

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
## 2025-07-03 NOTE — PLAN OF CARE
Spoke to Shanna with [id number], willing to accept and psych nurse will see patient at [facility] next week. Will need to notify [facility] when patient discharges.
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
## 2025-07-03 NOTE — PROGRESS NOTES
[facility] [address] MEDICINE ~ PROGRESS NOTE        CHIEF COMPLAINT   Hospital follow up    HOSPITAL COURSE   Patient unable to provide a history, no family at bedside therefore information obtained from record review.     84-year-old female discharged from our facility 2 days ago with a past medical history of HTN, HLD, epilepsy, CHF, CAD, previous CVA, glaucoma, CKD, chronic left-sided weakness/wheelchair-bound with underlying psychiatric undiagnosed issue which is making her care difficult presenting from nursing home after she was reportedly aggressive with the nursing staff at [facility].       Psych evaluated her 2 days ago--> started  abilify 2 mg q hs and was discharged back to the assisted living.     Today  Saw her this morning, opens eyes to stimulus but does not really answer questions or follow commands.  Just moans.        OBJECTIVE/PHYSICAL EXAM     VITAL SIGNS (MOST RECENT):  Temp: 97.3 °F (36.3 °C) (07/03/25 0711)  Pulse: 89 (07/03/25 0711)  Resp: 16 (07/03/25 0419)  BP: 103/81 (07/03/25 0711)  SpO2: 96 % (07/03/25 0711) VITAL SIGNS (24 HOUR RANGE):  Temp:  [96 °F (35.6 °C)-97.3 °F (36.3 °C)] 97.3 °F (36.3 °C)  Pulse:  [] 89  Resp:  [13-23] 16  SpO2:  [95 %-100 %] 96 %  BP: (103-152)/() 103/81   GENERAL: In no acute distress, afebrile  HEENT:  CHEST: Clear to auscultation bilaterally  HEART: S1, S2, no appreciable murmur  ABDOMEN: Soft, nontender, BS +  MSK: Warm, no lower extremity edema, no clubbing or cyanosis  NEUROLOGIC:  Does not open her eyes, does not follow commands, just moans   INTEGUMENTARY:  PSYCHIATRY:        ASSESSMENT/PLAN   Dementia with behavioral changes  Acute on chronic encephalopathy   Acute uncomplicated cystitis    History of: As listed above      It appears that she has been diagnosed with UTI every time she goes into the hospital.  Likely not true urinary tract infections.  Palliative consulted.  Appears that she has a [place]post  from [place] records and was noted DNR?  She is definitely hospice appropriate.  She has had frequent hospitalizations recently.  Check spot EEG to make sure no seizure given she is on antiepileptics.    DVT prophylaxis:  Lovenox 30    Anticipated discharge and disposition:   __________________________________________________________________________    NUTRITIONAL STATUS     Patient meets ASPEN criteria for   malnutrition of   per RD assessment as evidenced by:                       A minimum of two characteristics is recommended for diagnosis of either severe or non-severe malnutrition.     LABS/MICRO/MEDS/DIAGNOSTICS       LABS  Recent Labs     07/02/25  1029      K 3.9   CO2 26   BUN 8.6*   CREATININE 0.82   CALCIUM 8.9   ALKPHOS 140   AST 36   ALT 19   ALBUMIN 3.0*     Recent Labs     07/02/25  1318   WBC 5.49   RBC 4.63   HCT 45.4   MCV 98.1*          MICROBIOLOGY  Microbiology Results (last 7 days)       ** No results found for the last 168 hours. **               MEDICATIONS   ARIPiprazole  2 mg Oral QHS    aspirin  81 mg Oral Daily    atorvastatin  40 mg Oral Daily    brimonidine 0.15 % OPTH DROP  1 drop Both Eyes TID    enoxparin  30 mg Subcutaneous Daily    fluconazole  200 mg Oral Daily    latanoprost  1 drop Both Eyes QHS    metoprolol succinate  100 mg Oral Daily    mirtazapine  30 mg Oral QHS    PHENobarbitaL  32.4 mg Oral Daily    phenytoin  200 mg Oral Daily         INFUSIONS         DIAGNOSTIC TESTS  CT Head Without Contrast   Final Result      1. No acute intracranial abnormality.   2. Chronic microvascular ischemic changes.         Electronically signed by: Sol Deluna   Date:    07/02/2025   Time:    11:51           No echocardiogram results found for the past 14 days.         Case related differential diagnoses have been reviewed; assessment and plan has been documented. I have personally reviewed the labs and test results that are currently available; I have reviewed the  patients medication list. I have reviewed the consulting providers recommendations. I have reviewed or attempted to review medical records based upon their availability.  All of the patient's and/or family's questions have been addressed and answered to the best of my ability.  I will continue to monitor closely and make adjustments to medical management as needed.  This document was created using [employer] Fluency Direct.  Transcription errors may have been made.  Please contact me if any questions may rise regarding documentation to clarify transcription.        Ludwin Escobedo MD   Internal Medicine  Department of [facility] Medicine  [facility] [address]

## 2025-07-04 LAB
ANION GAP SERPL CALC-SCNC: 9 MEQ/L
BASOPHILS # BLD AUTO: 0.02 X10(3)/MCL
BASOPHILS NFR BLD AUTO: 0.2 %
BUN SERPL-MCNC: 8.5 MG/DL (ref 9.8–20.1)
CALCIUM SERPL-MCNC: 8.2 MG/DL (ref 8.4–10.2)
CHLORIDE SERPL-SCNC: 105 MMOL/L (ref 98–107)
CO2 SERPL-SCNC: 26 MMOL/L (ref 23–31)
CREAT SERPL-MCNC: 0.71 MG/DL (ref 0.55–1.02)
CREAT/UREA NIT SERPL: 12
EOSINOPHIL # BLD AUTO: 0.1 X10(3)/MCL (ref 0–0.9)
EOSINOPHIL NFR BLD AUTO: 0.8 %
ERYTHROCYTE [DISTWIDTH] IN BLOOD BY AUTOMATED COUNT: 15.5 % (ref 11.5–17)
GFR SERPLBLD CREATININE-BSD FMLA CKD-EPI: >60 ML/MIN/1.73/M2
GLUCOSE SERPL-MCNC: 116 MG/DL (ref 82–115)
HCT VFR BLD AUTO: 36.5 % (ref 37–47)
HGB BLD-MCNC: 12.2 G/DL (ref 12–16)
IMM GRANULOCYTES # BLD AUTO: 0.08 X10(3)/MCL (ref 0–0.04)
IMM GRANULOCYTES NFR BLD AUTO: 0.7 %
LYMPHOCYTES # BLD AUTO: 1.02 X10(3)/MCL (ref 0.6–4.6)
LYMPHOCYTES NFR BLD AUTO: 8.3 %
MAGNESIUM SERPL-MCNC: 1.8 MG/DL (ref 1.6–2.6)
MCH RBC QN AUTO: 31 PG (ref 27–31)
MCHC RBC AUTO-ENTMCNC: 33.4 G/DL (ref 33–36)
MCV RBC AUTO: 92.9 FL (ref 80–94)
MONOCYTES # BLD AUTO: 0.71 X10(3)/MCL (ref 0.1–1.3)
MONOCYTES NFR BLD AUTO: 5.8 %
NEUTROPHILS # BLD AUTO: 10.34 X10(3)/MCL (ref 2.1–9.2)
NEUTROPHILS NFR BLD AUTO: 84.2 %
NRBC BLD AUTO-RTO: 0.2 %
PHOSPHATE SERPL-MCNC: 3.2 MG/DL (ref 2.3–4.7)
PLATELET # BLD AUTO: 154 X10(3)/MCL (ref 130–400)
PLATELETS.RETICULATED NFR BLD AUTO: 3.8 % (ref 0.9–11.2)
PMV BLD AUTO: 12.2 FL (ref 7.4–10.4)
POTASSIUM SERPL-SCNC: 3.1 MMOL/L (ref 3.5–5.1)
RBC # BLD AUTO: 3.93 X10(6)/MCL (ref 4.2–5.4)
SODIUM SERPL-SCNC: 140 MMOL/L (ref 136–145)
WBC # BLD AUTO: 12.43 X10(3)/MCL (ref 4.5–11.5)

## 2025-07-04 PROCEDURE — 63600175 PHARM REV CODE 636 W HCPCS: Performed by: STUDENT IN AN ORGANIZED HEALTH CARE EDUCATION/TRAINING PROGRAM

## 2025-07-04 PROCEDURE — 25000003 PHARM REV CODE 250: Performed by: STUDENT IN AN ORGANIZED HEALTH CARE EDUCATION/TRAINING PROGRAM

## 2025-07-04 PROCEDURE — 63600175 PHARM REV CODE 636 W HCPCS: Performed by: INTERNAL MEDICINE

## 2025-07-04 PROCEDURE — 11000001 HC ACUTE MED/SURG PRIVATE ROOM

## 2025-07-04 PROCEDURE — 80048 BASIC METABOLIC PNL TOTAL CA: CPT | Performed by: INTERNAL MEDICINE

## 2025-07-04 PROCEDURE — 25000003 PHARM REV CODE 250

## 2025-07-04 PROCEDURE — 83735 ASSAY OF MAGNESIUM: CPT | Performed by: INTERNAL MEDICINE

## 2025-07-04 PROCEDURE — 84100 ASSAY OF PHOSPHORUS: CPT | Performed by: INTERNAL MEDICINE

## 2025-07-04 PROCEDURE — 25000003 PHARM REV CODE 250: Performed by: INTERNAL MEDICINE

## 2025-07-04 PROCEDURE — 36415 COLL VENOUS BLD VENIPUNCTURE: CPT | Performed by: INTERNAL MEDICINE

## 2025-07-04 PROCEDURE — 85025 COMPLETE CBC W/AUTO DIFF WBC: CPT | Performed by: INTERNAL MEDICINE

## 2025-07-04 RX ORDER — POTASSIUM CHLORIDE 7.45 MG/ML
10 INJECTION INTRAVENOUS
Status: COMPLETED | OUTPATIENT
Start: 2025-07-04 | End: 2025-07-04

## 2025-07-04 RX ADMIN — PHENOBARBITAL 32.4 MG: 32.4 TABLET ORAL at 04:07

## 2025-07-04 RX ADMIN — BRIMONIDINE TARTRATE 1 DROP: 1.5 SOLUTION OPHTHALMIC at 04:07

## 2025-07-04 RX ADMIN — PHENYTOIN SODIUM 200 MG: 100 CAPSULE, EXTENDED RELEASE ORAL at 09:07

## 2025-07-04 RX ADMIN — ARIPIPRAZOLE 5 MG: 5 TABLET ORAL at 08:07

## 2025-07-04 RX ADMIN — LATANOPROST 1 DROP: 50 SOLUTION OPHTHALMIC at 08:07

## 2025-07-04 RX ADMIN — ENOXAPARIN SODIUM 30 MG: 30 INJECTION SUBCUTANEOUS at 04:07

## 2025-07-04 RX ADMIN — POTASSIUM CHLORIDE 10 MEQ: 7.46 INJECTION, SOLUTION INTRAVENOUS at 12:07

## 2025-07-04 RX ADMIN — POTASSIUM BICARBONATE 50 MEQ: 977.5 TABLET, EFFERVESCENT ORAL at 09:07

## 2025-07-04 RX ADMIN — BRIMONIDINE TARTRATE 1 DROP: 1.5 SOLUTION OPHTHALMIC at 09:07

## 2025-07-04 RX ADMIN — ASPIRIN 81 MG: 81 TABLET, COATED ORAL at 09:07

## 2025-07-04 RX ADMIN — ATORVASTATIN CALCIUM 40 MG: 40 TABLET, FILM COATED ORAL at 09:07

## 2025-07-04 RX ADMIN — POTASSIUM CHLORIDE 10 MEQ: 7.46 INJECTION, SOLUTION INTRAVENOUS at 01:07

## 2025-07-04 RX ADMIN — BRIMONIDINE TARTRATE 1 DROP: 1.5 SOLUTION OPHTHALMIC at 08:07

## 2025-07-04 RX ADMIN — FLUCONAZOLE 200 MG: 200 TABLET ORAL at 09:07

## 2025-07-04 RX ADMIN — METOPROLOL SUCCINATE 100 MG: 50 TABLET, EXTENDED RELEASE ORAL at 09:07

## 2025-07-04 NOTE — PLAN OF CARE
Problem: Adult Inpatient Plan of Care  Goal: Plan of Care Review  Outcome: Progressing  Flowsheets (Taken 7/4/2025 0800)  Plan of Care Reviewed With:   patient   caregiver  Goal: Patient-Specific Goal (Individualized)  Outcome: Progressing  Goal: Absence of Hospital-Acquired Illness or Injury  Outcome: Progressing  Intervention: Identify and Manage Fall Risk  Flowsheets (Taken 7/4/2025 0800)  Safety Promotion/Fall Prevention:   assistive device/personal item within reach   side rails raised x 2  Intervention: Prevent Skin Injury  Flowsheets (Taken 7/4/2025 0800)  Body Position:   turned   30 degrees   weight shifting  Intervention: Prevent and Manage VTE (Venous Thromboembolism) Risk  Flowsheets (Taken 7/4/2025 0800)  VTE Prevention/Management: ROM (passive) performed  Intervention: Prevent Infection  Flowsheets (Taken 7/4/2025 0800)  Infection Prevention:   environmental surveillance performed   hand hygiene promoted   rest/sleep promoted  Goal: Optimal Comfort and Wellbeing  Outcome: Progressing  Intervention: Monitor Pain and Promote Comfort  Flowsheets (Taken 7/4/2025 0800)  Pain Management Interventions:   care clustered   pillow support provided   position adjusted  Intervention: Provide Person-Centered Care  Flowsheets (Taken 7/4/2025 0800)  Trust Relationship/Rapport:   care explained   choices provided   emotional support provided   empathic listening provided   questions answered   questions encouraged   thoughts/feelings acknowledged   reassurance provided  Goal: Readiness for Transition of Care  Outcome: Progressing     Problem: Wound  Goal: Optimal Coping  Outcome: Progressing  Goal: Optimal Functional Ability  Outcome: Progressing  Intervention: Optimize Functional Ability  Flowsheets (Taken 7/4/2025 0800)  Activity Management:   Arm raise - L1   Rolling - L1  Activity Assistance Provided: assistance, 2 people  Goal: Absence of Infection Signs and Symptoms  Outcome: Progressing  Intervention: Prevent  or Manage Infection  Flowsheets (Taken 7/4/2025 0800)  Fever Reduction/Comfort Measures:   lightweight bedding   lightweight clothing  Goal: Improved Oral Intake  Outcome: Progressing  Goal: Optimal Pain Control and Function  Outcome: Progressing  Intervention: Prevent or Manage Pain  Flowsheets (Taken 7/4/2025 0800)  Pain Management Interventions:   care clustered   pillow support provided   position adjusted  Goal: Skin Health and Integrity  Outcome: Progressing  Intervention: Optimize Skin Protection  Flowsheets (Taken 7/4/2025 0800)  Pressure Reduction Techniques: frequent weight shift encouraged  Activity Management:   Arm raise - L1   Rolling - L1  Head of Bed (HOB) Positioning: HOB at 20-30 degrees  Goal: Optimal Wound Healing  Outcome: Progressing     Problem: Infection  Goal: Absence of Infection Signs and Symptoms  Outcome: Progressing  Intervention: Prevent or Manage Infection  Flowsheets (Taken 7/4/2025 0800)  Fever Reduction/Comfort Measures:   lightweight bedding   lightweight clothing     Problem: Fall Injury Risk  Goal: Absence of Fall and Fall-Related Injury  Outcome: Progressing  Intervention: Identify and Manage Contributors  Flowsheets (Taken 7/4/2025 0800)  Medication Review/Management: medications reviewed  Intervention: Promote Injury-Free Environment  Flowsheets (Taken 7/4/2025 0800)  Safety Promotion/Fall Prevention:   assistive device/personal item within reach   side rails raised x 2     Problem: Skin Injury Risk Increased  Goal: Skin Health and Integrity  Outcome: Progressing  Intervention: Optimize Skin Protection  Flowsheets (Taken 7/4/2025 0800)  Pressure Reduction Techniques: frequent weight shift encouraged  Activity Management:   Arm raise - L1   Rolling - L1  Head of Bed (HOB) Positioning: HOB at 20-30 degrees     Problem: Coping Ineffective  Goal: Effective Coping  Outcome: Progressing

## 2025-07-04 NOTE — PROGRESS NOTES
[facility] [address] MEDICINE ~ PROGRESS NOTE        CHIEF COMPLAINT   Hospital follow up    HOSPITAL COURSE   Patient unable to provide a history, no family at bedside therefore information obtained from record review.     84-year-old female discharged from our facility 2 days ago with a past medical history of HTN, HLD, epilepsy, CHF, CAD, previous CVA, glaucoma, CKD, chronic left-sided weakness/wheelchair-bound with underlying psychiatric undiagnosed issue which is making her care difficult presenting from nursing home after she was reportedly aggressive with the nursing staff at [facility].       Psych evaluated her 2 days ago--> started  abilify 2 mg q hs and was discharged back to the assisted living.     Today  More awake today, reported she was in the hospital but she did not know why.  Sodium 140, potassium 3.1.        OBJECTIVE/PHYSICAL EXAM     VITAL SIGNS (MOST RECENT):  Temp: 97.9 °F (36.6 °C) (07/04/25 0715)  Pulse: 99 (07/04/25 0715)  Resp: 18 (07/04/25 0510)  BP: (!) 122/90 (07/04/25 0715)  SpO2: 95 % (07/04/25 0715) VITAL SIGNS (24 HOUR RANGE):  Temp:  [96.2 °F (35.7 °C)-97.9 °F (36.6 °C)] 97.9 °F (36.6 °C)  Pulse:  [85-99] 99  Resp:  [18-20] 18  SpO2:  [93 %-99 %] 95 %  BP: ()/(64-90) 122/90   GENERAL: In no acute distress, afebrile  HEENT:  CHEST: Clear to auscultation bilaterally  HEART: S1, S2, no appreciable murmur  ABDOMEN: Soft, nontender, BS +  MSK: Warm, no lower extremity edema, no clubbing or cyanosis  NEUROLOGIC:  Awake, knew she was in the hospital but did not know why  INTEGUMENTARY:  PSYCHIATRY:        ASSESSMENT/PLAN   Dementia with behavioral changes  Acute on chronic encephalopathy   Acute uncomplicated cystitis    History of: As listed above      It appears that she has been diagnosed with UTI every time she goes into the hospital.  Likely not true urinary tract infections.  Palliative following.  Appears that she has a [place]post from [place]  records and was noted DNR?  She is definitely hospice appropriate.  She has had frequent hospitalizations recently.  Continue fluconazole times 14 days  Currently lives in a assisted living facility, therapy recommending SNF.  Probably should not be any assisted living facility and more so nursing home.    DVT prophylaxis:  Lovenox 30    Anticipated discharge and disposition:   __________________________________________________________________________    NUTRITIONAL STATUS     Patient meets ASPEN criteria for moderate malnutrition of chronic illness per RD assessment as evidenced by:  Energy Intake (Malnutrition): less than or equal to 75% for greater than or equal to 1 month  Weight Loss (Malnutrition): 7.5% in 3 months     Muscle Mass (Malnutrition): moderate depletion  Fluid Accumulation (Malnutrition): other (see comments) (Not present)        A minimum of two characteristics is recommended for diagnosis of either severe or non-severe malnutrition.     LABS/MICRO/MEDS/DIAGNOSTICS       LABS  Recent Labs     07/03/25  0918 07/04/25  0658   * 140   K 3.2* 3.1*   CO2 26 26   BUN 8.4* 8.5*   CREATININE 0.73 0.71   CALCIUM 8.7 8.2*   ALKPHOS 136  --    AST 37  --    ALT 18  --    ALBUMIN 3.0*  --      Recent Labs     07/03/25  0918   WBC 5.79   RBC 3.92*   HCT 36.7*   MCV 93.6          MICROBIOLOGY  Microbiology Results (last 7 days)       ** No results found for the last 168 hours. **               MEDICATIONS   ARIPiprazole  5 mg Oral QHS    aspirin  81 mg Oral Daily    atorvastatin  40 mg Oral Daily    brimonidine 0.15 % OPTH DROP  1 drop Both Eyes TID    enoxparin  30 mg Subcutaneous Daily    fluconazole  200 mg Oral Daily    latanoprost  1 drop Both Eyes QHS    metoprolol succinate  100 mg Oral Daily    PHENobarbitaL  32.4 mg Oral Daily    phenytoin  200 mg Oral Daily         INFUSIONS         DIAGNOSTIC TESTS  CT Head Without Contrast   Final Result      1. No acute intracranial abnormality.   2.  Chronic microvascular ischemic changes.         Electronically signed by: Sol Deluna   Date:    07/02/2025   Time:    11:51           No echocardiogram results found for the past 14 days.         Case related differential diagnoses have been reviewed; assessment and plan has been documented. I have personally reviewed the labs and test results that are currently available; I have reviewed the patients medication list. I have reviewed the consulting providers recommendations. I have reviewed or attempted to review medical records based upon their availability.  All of the patient's and/or family's questions have been addressed and answered to the best of my ability.  I will continue to monitor closely and make adjustments to medical management as needed.  This document was created using [employer].  Transcription errors may have been made.  Please contact me if any questions may rise regarding documentation to clarify transcription.        Ludwin Escobedo MD   Internal Medicine  Department of Hospital Medicine [facility] [address]

## 2025-07-05 LAB
ALBUMIN SERPL-MCNC: 2.8 G/DL (ref 3.4–4.8)
BASOPHILS # BLD AUTO: 0.06 X10(3)/MCL
BASOPHILS NFR BLD AUTO: 0.8 %
BUN SERPL-MCNC: 9.5 MG/DL (ref 9.8–20.1)
CALCIUM SERPL-MCNC: 8.8 MG/DL (ref 8.4–10.2)
CHLORIDE SERPL-SCNC: 106 MMOL/L (ref 98–107)
CO2 SERPL-SCNC: 28 MMOL/L (ref 23–31)
CREAT SERPL-MCNC: 0.82 MG/DL (ref 0.55–1.02)
EOSINOPHIL # BLD AUTO: 0.57 X10(3)/MCL (ref 0–0.9)
EOSINOPHIL NFR BLD AUTO: 7.7 %
ERYTHROCYTE [DISTWIDTH] IN BLOOD BY AUTOMATED COUNT: 15.7 % (ref 11.5–17)
GFR SERPLBLD CREATININE-BSD FMLA CKD-EPI: >60 ML/MIN/1.73/M2
GLUCOSE SERPL-MCNC: 79 MG/DL (ref 82–115)
HCT VFR BLD AUTO: 40.3 % (ref 37–47)
HGB BLD-MCNC: 13.1 G/DL (ref 12–16)
IMM GRANULOCYTES # BLD AUTO: 0.03 X10(3)/MCL (ref 0–0.04)
IMM GRANULOCYTES NFR BLD AUTO: 0.4 %
LYMPHOCYTES # BLD AUTO: 1.49 X10(3)/MCL (ref 0.6–4.6)
LYMPHOCYTES NFR BLD AUTO: 20.1 %
MCH RBC QN AUTO: 30 PG (ref 27–31)
MCHC RBC AUTO-ENTMCNC: 32.5 G/DL (ref 33–36)
MCV RBC AUTO: 92.2 FL (ref 80–94)
MONOCYTES # BLD AUTO: 0.86 X10(3)/MCL (ref 0.1–1.3)
MONOCYTES NFR BLD AUTO: 11.6 %
NEUTROPHILS # BLD AUTO: 4.39 X10(3)/MCL (ref 2.1–9.2)
NEUTROPHILS NFR BLD AUTO: 59.4 %
NRBC BLD AUTO-RTO: 0 %
PHOSPHATE SERPL-MCNC: 3.1 MG/DL (ref 2.3–4.7)
PLATELET # BLD AUTO: 227 X10(3)/MCL (ref 130–400)
PMV BLD AUTO: 11.4 FL (ref 7.4–10.4)
POTASSIUM SERPL-SCNC: 3.8 MMOL/L (ref 3.5–5.1)
RBC # BLD AUTO: 4.37 X10(6)/MCL (ref 4.2–5.4)
SODIUM SERPL-SCNC: 143 MMOL/L (ref 136–145)
WBC # BLD AUTO: 7.4 X10(3)/MCL (ref 4.5–11.5)

## 2025-07-05 PROCEDURE — 25000003 PHARM REV CODE 250

## 2025-07-05 PROCEDURE — 25000003 PHARM REV CODE 250: Performed by: INTERNAL MEDICINE

## 2025-07-05 PROCEDURE — 80069 RENAL FUNCTION PANEL: CPT | Performed by: INTERNAL MEDICINE

## 2025-07-05 PROCEDURE — 85025 COMPLETE CBC W/AUTO DIFF WBC: CPT | Performed by: INTERNAL MEDICINE

## 2025-07-05 PROCEDURE — 11000001 HC ACUTE MED/SURG PRIVATE ROOM

## 2025-07-05 PROCEDURE — 25000003 PHARM REV CODE 250: Performed by: STUDENT IN AN ORGANIZED HEALTH CARE EDUCATION/TRAINING PROGRAM

## 2025-07-05 PROCEDURE — 36415 COLL VENOUS BLD VENIPUNCTURE: CPT | Performed by: INTERNAL MEDICINE

## 2025-07-05 PROCEDURE — 63600175 PHARM REV CODE 636 W HCPCS: Performed by: STUDENT IN AN ORGANIZED HEALTH CARE EDUCATION/TRAINING PROGRAM

## 2025-07-05 RX ADMIN — ATORVASTATIN CALCIUM 40 MG: 40 TABLET, FILM COATED ORAL at 09:07

## 2025-07-05 RX ADMIN — ASPIRIN 81 MG: 81 TABLET, COATED ORAL at 09:07

## 2025-07-05 RX ADMIN — LATANOPROST 1 DROP: 50 SOLUTION OPHTHALMIC at 10:07

## 2025-07-05 RX ADMIN — PHENYTOIN SODIUM 200 MG: 100 CAPSULE, EXTENDED RELEASE ORAL at 09:07

## 2025-07-05 RX ADMIN — FLUCONAZOLE 200 MG: 200 TABLET ORAL at 09:07

## 2025-07-05 RX ADMIN — METOPROLOL SUCCINATE 100 MG: 50 TABLET, EXTENDED RELEASE ORAL at 09:07

## 2025-07-05 RX ADMIN — BRIMONIDINE TARTRATE 1 DROP: 1.5 SOLUTION OPHTHALMIC at 09:07

## 2025-07-05 RX ADMIN — ARIPIPRAZOLE 5 MG: 5 TABLET ORAL at 10:07

## 2025-07-05 RX ADMIN — BRIMONIDINE TARTRATE 1 DROP: 1.5 SOLUTION OPHTHALMIC at 10:07

## 2025-07-05 NOTE — PLAN OF CARE
Problem: Adult Inpatient Plan of Care  Goal: Plan of Care Review  Outcome: Progressing  Flowsheets (Taken 7/5/2025 0800)  Plan of Care Reviewed With: patient  Goal: Patient-Specific Goal (Individualized)  Outcome: Progressing  Goal: Absence of Hospital-Acquired Illness or Injury  Outcome: Progressing  Goal: Optimal Comfort and Wellbeing  Outcome: Progressing  Intervention: Monitor Pain and Promote Comfort  Flowsheets (Taken 7/5/2025 0800)  Pain Management Interventions:   care clustered   position adjusted   pillow support provided   quiet environment facilitated  Intervention: Provide Person-Centered Care  Flowsheets (Taken 7/5/2025 0800)  Trust Relationship/Rapport:   care explained   choices provided   emotional support provided   empathic listening provided   questions answered   questions encouraged   reassurance provided   thoughts/feelings acknowledged  Goal: Readiness for Transition of Care  Outcome: Progressing     Problem: Wound  Goal: Optimal Coping  Outcome: Progressing  Goal: Optimal Functional Ability  Outcome: Progressing  Goal: Absence of Infection Signs and Symptoms  Outcome: Progressing  Goal: Improved Oral Intake  Outcome: Progressing  Goal: Optimal Pain Control and Function  Outcome: Progressing  Intervention: Prevent or Manage Pain  Flowsheets (Taken 7/5/2025 0800)  Pain Management Interventions:   care clustered   position adjusted   pillow support provided   quiet environment facilitated  Goal: Skin Health and Integrity  Outcome: Progressing  Intervention: Optimize Skin Protection  Flowsheets (Taken 7/5/2025 0800)  Pressure Reduction Techniques: frequent weight shift encouraged  Goal: Optimal Wound Healing  Outcome: Progressing     Problem: Infection  Goal: Absence of Infection Signs and Symptoms  Outcome: Progressing     Problem: Fall Injury Risk  Goal: Absence of Fall and Fall-Related Injury  Outcome: Progressing     Problem: Skin Injury Risk Increased  Goal: Skin Health and  Integrity  Outcome: Progressing  Intervention: Optimize Skin Protection  Flowsheets (Taken 7/5/2025 0800)  Pressure Reduction Techniques: frequent weight shift encouraged     Problem: Coping Ineffective  Goal: Effective Coping  Outcome: Progressing

## 2025-07-05 NOTE — PROGRESS NOTES
[facility] [address] MEDICINE ~ PROGRESS NOTE        CHIEF COMPLAINT   Hospital follow up    HOSPITAL COURSE   Patient unable to provide a history, no family at bedside therefore information obtained from record review.     84-year-old female discharged from our facility 2 days ago with a past medical history of HTN, HLD, epilepsy, CHF, CAD, previous CVA, glaucoma, CKD, chronic left-sided weakness/wheelchair-bound with underlying psychiatric undiagnosed issue which is making her care difficult presenting from nursing home after she was reportedly aggressive with the nursing staff at [facility].       Psych evaluated her 2 days ago--> started  abilify 2 mg q hs and was discharged back to the assisted living.     Today  Appears more responsive today.  Check labs today.        OBJECTIVE/PHYSICAL EXAM     VITAL SIGNS (MOST RECENT):  Temp: 96.5 °F (35.8 °C) (07/05/25 0709)  Pulse: 83 (07/05/25 0709)  Resp: 18 (07/04/25 2322)  BP: 110/77 (07/05/25 0709)  SpO2: 97 % (07/05/25 0709) VITAL SIGNS (24 HOUR RANGE):  Temp:  [96.5 °F (35.8 °C)-98.2 °F (36.8 °C)] 96.5 °F (35.8 °C)  Pulse:  [83-99] 83  Resp:  [18] 18  SpO2:  [93 %-97 %] 97 %  BP: (106-133)/(68-90) 110/77   GENERAL: In no acute distress, afebrile  HEENT:  CHEST: Clear to auscultation bilaterally  HEART: S1, S2, no appreciable murmur  ABDOMEN: Soft, nontender, BS +  MSK: Warm, no lower extremity edema, no clubbing or cyanosis  NEUROLOGIC:  Awake, knew she was in the hospital but did not know why  INTEGUMENTARY:  PSYCHIATRY:        ASSESSMENT/PLAN   Dementia with behavioral changes  Acute on chronic encephalopathy   Acute uncomplicated cystitis    History of: As listed above      It appears that she has been diagnosed with UTI every time she goes into the hospital.  Likely not true urinary tract infections.  Palliative following.  Appears that she has a LApost from [employer] and was noted DNR?  She is definitely hospice  appropriate.  She has had frequent hospitalizations recently.  Continue fluconazole times 14 days  Currently lives in a assisted living facility, therapy recommending SNF.  Probably should not be any assisted living facility and more so nursing home.  Pending labs for today    DVT prophylaxis:  Lovenox 30    Anticipated discharge and disposition:   __________________________________________________________________________    NUTRITIONAL STATUS     Patient meets ASPEN criteria for moderate malnutrition of chronic illness per RD assessment as evidenced by:  Energy Intake (Malnutrition): less than or equal to 75% for greater than or equal to 1 month  Weight Loss (Malnutrition): 7.5% in 3 months     Muscle Mass (Malnutrition): moderate depletion  Fluid Accumulation (Malnutrition): other (see comments) (Not present)        A minimum of two characteristics is recommended for diagnosis of either severe or non-severe malnutrition.     LABS/MICRO/MEDS/DIAGNOSTICS       LABS  Recent Labs     07/03/25  0918 07/04/25  0658   * 140   K 3.2* 3.1*   CO2 26 26   BUN 8.4* 8.5*   CREATININE 0.73 0.71   CALCIUM 8.7 8.2*   ALKPHOS 136  --    AST 37  --    ALT 18  --    ALBUMIN 3.0*  --      Recent Labs     07/04/25  0747   WBC 12.43*   RBC 3.93*   HCT 36.5*   MCV 92.9          MICROBIOLOGY  Microbiology Results (last 7 days)       ** No results found for the last 168 hours. **               MEDICATIONS   ARIPiprazole  5 mg Oral QHS    aspirin  81 mg Oral Daily    atorvastatin  40 mg Oral Daily    brimonidine 0.15 % OPTH DROP  1 drop Both Eyes TID    enoxparin  30 mg Subcutaneous Daily    fluconazole  200 mg Oral Daily    latanoprost  1 drop Both Eyes QHS    metoprolol succinate  100 mg Oral Daily    PHENobarbitaL  32.4 mg Oral Daily    phenytoin  200 mg Oral Daily         INFUSIONS         DIAGNOSTIC TESTS  CT Head Without Contrast   Final Result      1. No acute intracranial abnormality.   2. Chronic microvascular  ischemic changes.         Electronically signed by: Sol Deluna   Date:    07/02/2025   Time:    11:51           No echocardiogram results found for the past 14 days.         Case related differential diagnoses have been reviewed; assessment and plan has been documented. I have personally reviewed the labs and test results that are currently available; I have reviewed the patients medication list. I have reviewed the consulting providers recommendations. I have reviewed or attempted to review medical records based upon their availability.  All of the patient's and/or family's questions have been addressed and answered to the best of my ability.  I will continue to monitor closely and make adjustments to medical management as needed.  This document was created using M*Modal Fluency Direct.  Transcription errors may have been made.  Please contact me if any questions may rise regarding documentation to clarify transcription.        Ludwin Escobedo MD   Internal Medicine  Department of Hospital Medicine [facility] - [address]

## 2025-07-05 NOTE — NURSING
"Patient refused medication and vitals. Educated on importance of meds and vitals, refused anyway. Told staff to "get out of my room."   "

## 2025-07-06 LAB
ALBUMIN SERPL-MCNC: 2.6 G/DL (ref 3.4–4.8)
BASOPHILS # BLD AUTO: 0.05 X10(3)/MCL
BASOPHILS NFR BLD AUTO: 1 %
BUN SERPL-MCNC: 10 MG/DL (ref 9.8–20.1)
CALCIUM SERPL-MCNC: 8.6 MG/DL (ref 8.4–10.2)
CHLORIDE SERPL-SCNC: 105 MMOL/L (ref 98–107)
CO2 SERPL-SCNC: 28 MMOL/L (ref 23–31)
CREAT SERPL-MCNC: 0.77 MG/DL (ref 0.55–1.02)
EOSINOPHIL # BLD AUTO: 0.16 X10(3)/MCL (ref 0–0.9)
EOSINOPHIL NFR BLD AUTO: 3.1 %
ERYTHROCYTE [DISTWIDTH] IN BLOOD BY AUTOMATED COUNT: 15.6 % (ref 11.5–17)
GFR SERPLBLD CREATININE-BSD FMLA CKD-EPI: >60 ML/MIN/1.73/M2
GLUCOSE SERPL-MCNC: 90 MG/DL (ref 82–115)
HCT VFR BLD AUTO: 32.5 % (ref 37–47)
HGB BLD-MCNC: 10.7 G/DL (ref 12–16)
IMM GRANULOCYTES # BLD AUTO: 0.02 X10(3)/MCL (ref 0–0.04)
IMM GRANULOCYTES NFR BLD AUTO: 0.4 %
LYMPHOCYTES # BLD AUTO: 1.11 X10(3)/MCL (ref 0.6–4.6)
LYMPHOCYTES NFR BLD AUTO: 21.4 %
MCH RBC QN AUTO: 30.7 PG (ref 27–31)
MCHC RBC AUTO-ENTMCNC: 32.9 G/DL (ref 33–36)
MCV RBC AUTO: 93.1 FL (ref 80–94)
MONOCYTES # BLD AUTO: 0.65 X10(3)/MCL (ref 0.1–1.3)
MONOCYTES NFR BLD AUTO: 12.5 %
NEUTROPHILS # BLD AUTO: 3.2 X10(3)/MCL (ref 2.1–9.2)
NEUTROPHILS NFR BLD AUTO: 61.6 %
NRBC BLD AUTO-RTO: 0 %
PHOSPHATE SERPL-MCNC: 3.4 MG/DL (ref 2.3–4.7)
PLATELET # BLD AUTO: 233 X10(3)/MCL (ref 130–400)
PMV BLD AUTO: 11.6 FL (ref 7.4–10.4)
POTASSIUM SERPL-SCNC: 3.6 MMOL/L (ref 3.5–5.1)
RBC # BLD AUTO: 3.49 X10(6)/MCL (ref 4.2–5.4)
SODIUM SERPL-SCNC: 143 MMOL/L (ref 136–145)
WBC # BLD AUTO: 5.19 X10(3)/MCL (ref 4.5–11.5)

## 2025-07-06 PROCEDURE — 25000003 PHARM REV CODE 250: Performed by: INTERNAL MEDICINE

## 2025-07-06 PROCEDURE — 36415 COLL VENOUS BLD VENIPUNCTURE: CPT | Performed by: INTERNAL MEDICINE

## 2025-07-06 PROCEDURE — 25000003 PHARM REV CODE 250: Performed by: STUDENT IN AN ORGANIZED HEALTH CARE EDUCATION/TRAINING PROGRAM

## 2025-07-06 PROCEDURE — 85025 COMPLETE CBC W/AUTO DIFF WBC: CPT | Performed by: INTERNAL MEDICINE

## 2025-07-06 PROCEDURE — 80069 RENAL FUNCTION PANEL: CPT | Performed by: INTERNAL MEDICINE

## 2025-07-06 PROCEDURE — 11000001 HC ACUTE MED/SURG PRIVATE ROOM

## 2025-07-06 RX ADMIN — ATORVASTATIN CALCIUM 40 MG: 40 TABLET, FILM COATED ORAL at 09:07

## 2025-07-06 RX ADMIN — PHENYTOIN SODIUM 200 MG: 100 CAPSULE, EXTENDED RELEASE ORAL at 09:07

## 2025-07-06 RX ADMIN — BRIMONIDINE TARTRATE 1 DROP: 1.5 SOLUTION OPHTHALMIC at 03:07

## 2025-07-06 RX ADMIN — BRIMONIDINE TARTRATE 1 DROP: 1.5 SOLUTION OPHTHALMIC at 09:07

## 2025-07-06 RX ADMIN — FLUCONAZOLE 200 MG: 200 TABLET ORAL at 09:07

## 2025-07-06 RX ADMIN — ASPIRIN 81 MG: 81 TABLET, COATED ORAL at 09:07

## 2025-07-06 RX ADMIN — METOPROLOL SUCCINATE 100 MG: 50 TABLET, EXTENDED RELEASE ORAL at 09:07

## 2025-07-06 NOTE — PROGRESS NOTES
[facility] [address] MEDICINE ~ PROGRESS NOTE        CHIEF COMPLAINT   Hospital follow up    HOSPITAL COURSE   Patient unable to provide a history, no family at bedside therefore information obtained from record review.     84-year-old female discharged from our facility 2 days ago with a past medical history of HTN, HLD, epilepsy, CHF, CAD, previous CVA, glaucoma, CKD, chronic left-sided weakness/wheelchair-bound with underlying psychiatric undiagnosed issue which is making her care difficult presenting from nursing home after she was reportedly aggressive with the nursing staff at [facility].       Psych evaluated her 2 days ago--> started  abilify 2 mg q hs and was discharged back to the assisted living.     Today  Push me away this morning.  Otherwise no acute changes.  Unable to go back to assisted living facility as she needs more nursing care.        OBJECTIVE/PHYSICAL EXAM     VITAL SIGNS (MOST RECENT):  Temp: 97.5 °F (36.4 °C) (07/06/25 0726)  Pulse: 91 (07/06/25 0726)  Resp: 20 (07/05/25 1950)  BP: 118/81 (07/06/25 0726)  SpO2: 96 % (07/06/25 0726) VITAL SIGNS (24 HOUR RANGE):  Temp:  [97.2 °F (36.2 °C)-97.5 °F (36.4 °C)] 97.5 °F (36.4 °C)  Pulse:  [83-94] 91  Resp:  [20] 20  SpO2:  [94 %-97 %] 96 %  BP: ()/(57-89) 118/81   GENERAL: In no acute distress, afebrile  HEENT:  CHEST: Clear to auscultation bilaterally  HEART: S1, S2, no appreciable murmur  ABDOMEN: Soft, nontender, BS +  MSK: Warm, no lower extremity edema, no clubbing or cyanosis  NEUROLOGIC:  Awake, knew she was in the hospital but did not know why  INTEGUMENTARY:  PSYCHIATRY:        ASSESSMENT/PLAN   Dementia with behavioral changes  Acute on chronic encephalopathy   Acute uncomplicated cystitis    History of: As listed above      It appears that she has been diagnosed with UTI every time she goes into the hospital.  Likely not true urinary tract infections.  Palliative following.  Appears that she  has a LApost from [employer] records and was noted DNR?  She is definitely hospice appropriate.  She has had frequent hospitalizations recently.  Continue fluconazole times 14 days  Currently lives in a assisted living facility, therapy recommending SNF.  Probably should not be any assisted living facility and more so nursing home.  Case management consulted for SNF placement.    DVT prophylaxis:  Lovenox 30    Anticipated discharge and disposition:   __________________________________________________________________________    NUTRITIONAL STATUS     Patient meets ASPEN criteria for moderate malnutrition of chronic illness per RD assessment as evidenced by:  Energy Intake (Malnutrition): less than or equal to 75% for greater than or equal to 1 month  Weight Loss (Malnutrition): 7.5% in 3 months     Muscle Mass (Malnutrition): moderate depletion  Fluid Accumulation (Malnutrition): other (see comments) (Not present)        A minimum of two characteristics is recommended for diagnosis of either severe or non-severe malnutrition.     LABS/MICRO/MEDS/DIAGNOSTICS       LABS  Recent Labs     07/03/25  0918 07/04/25  0658 07/06/25  0442   *   < > 143   K 3.2*   < > 3.6   CO2 26   < > 28   BUN 8.4*   < > 10.0   CREATININE 0.73   < > 0.77   CALCIUM 8.7   < > 8.6   ALKPHOS 136  --   --    AST 37  --   --    ALT 18  --   --    ALBUMIN 3.0*   < > 2.6*    < > = values in this interval not displayed.     Recent Labs     07/05/25  0905   WBC 7.40   RBC 4.37   HCT 40.3   MCV 92.2          MICROBIOLOGY  Microbiology Results (last 7 days)       ** No results found for the last 168 hours. **               MEDICATIONS   ARIPiprazole  5 mg Oral QHS    aspirin  81 mg Oral Daily    atorvastatin  40 mg Oral Daily    brimonidine 0.15 % OPTH DROP  1 drop Both Eyes TID    enoxparin  30 mg Subcutaneous Daily    fluconazole  200 mg Oral Daily    latanoprost  1 drop Both Eyes QHS    metoprolol succinate  100 mg Oral Daily    PHENobarbitaL   32.4 mg Oral Daily    phenytoin  200 mg Oral Daily         INFUSIONS         DIAGNOSTIC TESTS  CT Head Without Contrast   Final Result      1. No acute intracranial abnormality.   2. Chronic microvascular ischemic changes.         Electronically signed by: Sol Deluna   Date:    07/02/2025   Time:    11:51           No echocardiogram results found for the past 14 days.         Case related differential diagnoses have been reviewed; assessment and plan has been documented. I have personally reviewed the labs and test results that are currently available; I have reviewed the patients medication list. I have reviewed the consulting providers recommendations. I have reviewed or attempted to review medical records based upon their availability.  All of the patient's and/or family's questions have been addressed and answered to the best of my ability.  I will continue to monitor closely and make adjustments to medical management as needed.  This document was created using M*Modal Fluency Direct.  Transcription errors may have been made.  Please contact me if any questions may rise regarding documentation to clarify transcription.        Ludwin Escobedo MD   Internal Medicine  Department of Hospital Medicine [facility] - [address]

## 2025-07-06 NOTE — NURSING
"Pt refused to allow staff to do her 0000 & 0400 vital signs or turn her with wedge, yelling at staff to "get out". Pt educated on the importance of turning and monitoring her vital signs. Pt continues to refuse.   "

## 2025-07-07 VITALS
HEART RATE: 88 BPM | BODY MASS INDEX: 21.6 KG/M2 | SYSTOLIC BLOOD PRESSURE: 110 MMHG | OXYGEN SATURATION: 93 % | RESPIRATION RATE: 16 BRPM | HEIGHT: 60 IN | TEMPERATURE: 98 F | WEIGHT: 110 LBS | DIASTOLIC BLOOD PRESSURE: 78 MMHG

## 2025-07-07 PROCEDURE — 25000003 PHARM REV CODE 250: Performed by: INTERNAL MEDICINE

## 2025-07-07 PROCEDURE — 25000003 PHARM REV CODE 250: Performed by: STUDENT IN AN ORGANIZED HEALTH CARE EDUCATION/TRAINING PROGRAM

## 2025-07-07 PROCEDURE — 63600175 PHARM REV CODE 636 W HCPCS: Performed by: STUDENT IN AN ORGANIZED HEALTH CARE EDUCATION/TRAINING PROGRAM

## 2025-07-07 RX ORDER — ARIPIPRAZOLE 5 MG/1
5 TABLET ORAL NIGHTLY
Qty: 30 TABLET | Refills: 0 | Status: SHIPPED | OUTPATIENT
Start: 2025-07-07 | End: 2025-08-06

## 2025-07-07 RX ORDER — PHENYTOIN SODIUM 100 MG/1
200 CAPSULE, EXTENDED RELEASE ORAL DAILY
COMMUNITY
Start: 2025-07-07

## 2025-07-07 RX ORDER — METOPROLOL SUCCINATE 100 MG/1
100 TABLET, EXTENDED RELEASE ORAL DAILY
Qty: 30 TABLET | Refills: 0 | Status: SHIPPED | OUTPATIENT
Start: 2025-07-08 | End: 2025-08-07

## 2025-07-07 RX ORDER — FLUCONAZOLE 200 MG/1
200 TABLET ORAL DAILY
Qty: 10 TABLET | Refills: 0 | Status: SHIPPED | OUTPATIENT
Start: 2025-07-08 | End: 2025-07-18

## 2025-07-07 RX ADMIN — MORPHINE SULFATE 2 MG: 4 INJECTION, SOLUTION INTRAMUSCULAR; INTRAVENOUS at 03:07

## 2025-07-07 RX ADMIN — PHENYTOIN SODIUM 200 MG: 100 CAPSULE, EXTENDED RELEASE ORAL at 09:07

## 2025-07-07 RX ADMIN — ATORVASTATIN CALCIUM 40 MG: 40 TABLET, FILM COATED ORAL at 09:07

## 2025-07-07 RX ADMIN — BRIMONIDINE TARTRATE 1 DROP: 1.5 SOLUTION OPHTHALMIC at 09:07

## 2025-07-07 RX ADMIN — FLUCONAZOLE 200 MG: 200 TABLET ORAL at 09:07

## 2025-07-07 RX ADMIN — ASPIRIN 81 MG: 81 TABLET, COATED ORAL at 09:07

## 2025-07-07 NOTE — DISCHARGE SUMMARY
[facility] [address] MEDICINE - DISCHARGE SUMMARY    Patient Name: Lisseth Guajardo  MRN: [id number]  Admission Date: 7/2/2025  Discharge Date: 07/07/2025  Hospital Length of Stay: 4 days  Discharge Provider: Ludwin Escobedo MD  Primary Care Provider: Melody Ren MD      HOSPITAL COURSE   Patient unable to provide a history, no family at bedside therefore information obtained from record review.     84-year-old female discharged from our facility 2 days ago with a past medical history of HTN, HLD, epilepsy, CHF, CAD, previous CVA, glaucoma, CKD, chronic left-sided weakness/wheelchair-bound with underlying psychiatric undiagnosed issue which is making her care difficult presenting from nursing home after she was reportedly aggressive with the nursing staff at [facility].       Psych evaluated her 2 days ago--> started  abilify 2 mg q hs and was discharged back to the assisted living.     At this time she was diagnosed with candidal urinary tract infection started on fluconazole.  Her Abilify was increased as well.  She seems to be at baseline however ultimately may end up needing to be placed into nursing home if she continues to require more assistance.  Discuss with  and assisted living facility would like to take her back, she has sitters during the daytime.        PHYSICAL EXAM     Most Recent Vital Signs:  Temp: 97.7 °F (36.5 °C) (07/07/25 0746)  Pulse: 109 (07/07/25 0958)  Resp: 20 (07/07/25 0321)  BP: 103/70 (07/07/25 0958)  SpO2: (!) 93 % (07/07/25 0746)   GENERAL: In no acute distress, afebrile  HEENT:  CHEST: Clear to auscultation bilaterally  HEART: S1, S2, no appreciable murmur  ABDOMEN: Soft, nontender, BS +  MSK: Warm, no lower extremity edema, no clubbing or cyanosis  NEUROLOGIC:  Awake, knew she was in the hospital but did not know why          DISCHARGE DIAGNOSIS   Dementia with behavioral changes  Acute on chronic encephalopathy   Acute  uncomplicated cystitis     History of: As listed above        _____________________________________________________________________________      DISCHARGE MED REC     Current Discharge Medication List        START taking these medications    Details   fluconazole (DIFLUCAN) 200 MG Tab Take 1 tablet (200 mg total) by mouth once daily. for 10 days  Qty: 10 tablet, Refills: 0           CONTINUE these medications which have CHANGED    Details   ARIPiprazole (ABILIFY) 5 MG Tab Take 1 tablet (5 mg total) by mouth every evening.  Qty: 30 tablet, Refills: 0      metoprolol succinate (TOPROL-XL) 100 MG 24 hr tablet Take 1 tablet (100 mg total) by mouth once daily. Hold if HR <60  Qty: 30 tablet, Refills: 0    Comments: .      phenytoin (DILANTIN) 100 MG ER capsule Take 2 capsules (200 mg total) by mouth once daily.    Associated Diagnoses: Unspecified convulsions           CONTINUE these medications which have NOT CHANGED    Details   aspirin (ECOTRIN) 81 MG EC tablet Take 81 mg by mouth once daily.      atorvastatin (LIPITOR) 40 MG tablet TAKE 1 TABLET DAILY  Qty: 90 tablet, Refills: 3      brimonidine 0.2% (ALPHAGAN) 0.2 % Drop Place 1 drop into both eyes 3 (three) times daily. 8am- 12nn- 7pm      calcium carbonate/vitamin D3 (CALTRATE 600 + D ORAL) Take 1 tablet by mouth 2 (two) times a day.      dorzolamide-timolol 2-0.5% (COSOPT) 22.3-6.8 mg/mL ophthalmic solution Place 1 drop into both eyes 2 (two) times daily. 8am-7pm      mv-mn/folic ac/calcium/vit K1 (WOMEN'S 50 PLUS MULTIVITAMIN ORAL) Take 50 mg by mouth Daily.      pantoprazole (PROTONIX) 40 MG tablet Take 40 mg by mouth once daily.      PHENobarbitaL 32.4 MG tablet Take 1 tablet (32.4 mg total) by mouth every evening.  Qty: 90 tablet, Refills: 3    Comments: Pt out of medication and needs refill until meds come in from [facility]  Associated Diagnoses: Seizures      albuterol sulfate 90 mcg/actuation aebs Inhale 90 mcg into the lungs every 4 (four) hours as  Fractions / Week Rx 4: 5 needed (2 puffs for wheezing).      benzonatate (TESSALON) 100 MG capsule Take 100 mg by mouth 3 (three) times daily as needed for Cough.      betamethasone valerate 0.1% (VALISONE) 0.1 % Crea Apply 1 Application topically 2 (two) times daily.      latanoprost 0.005 % ophthalmic solution 1 drop every evening.      melatonin (MELATIN) 3 mg tablet Take 1 tablet (3 mg total) by mouth nightly as needed for Insomnia.  Qty: 90 tablet, Refills: 3    Associated Diagnoses: Insomnia, unspecified type      mirtazapine (REMERON SOL-TAB) 30 MG disintegrating tablet Take 30 mg by mouth nightly as needed.      OLANZapine (ZYPREXA) 2.5 MG tablet Take 2.5 mg by mouth 2 (two) times daily as needed.      ondansetron (ZOFRAN-ODT) 4 MG TbDL Take 1 tablet (4 mg total) by mouth every 8 (eight) hours as needed (nausea/vomiting).  Qty: 30 tablet, Refills: 5    Associated Diagnoses: Chronic nausea           STOP taking these medications       potassium bicarbonate (K-LYTE) disintegrating tablet Comments:   Reason for Stopping:                  CONSULTS     Consults (From admission, onward)          Status Ordering Provider     Inpatient consult to Palliative Care  Once        Provider:  Russell Deluna MD    Completed REYES, THAIRY G     Inpatient consult to Psychiatry  Once        Provider:  Wilmar Gomez NP    Completed REYES, THAIRY G              FOLLOW UP      Contact information for follow-up providers       Melody Ren MD Follow up in 1 week(s).    Specialty: Family Medicine  Contact information:  [address]  [address]  [facility] [address] [phone]                       Contact information for after-discharge care       Home Medical Care       [facility] [place] .    Service: Home Health Services  Contact information:  [address], [address]  [facility] [address] [phone]                                       DISCHARGE INSTRUCTIONS     Explained in detail to  the patient about the discharge plan, medications, and follow-up visits. Pt understands and agrees with the treatment plan.  Discharged Condition: stable  Diet as tolerated  Activities as tolerated  Discharge to: Assisted living facility with sitter service    TIME SPENT ON DISCHARGE   35 minutes        Ludwin Escobedo MD  Internal Medicine  Department of [facility] Medicine  [facility] - [address] [facility]      This document was created using electronic dictation services.  Please excuse any errors that may have been made.  Contact me if any questions regarding documentation to clarify verbiage.

## 2025-07-07 NOTE — PLAN OF CARE
07/07/25 1156   Final Note   Assessment Type Final Discharge Note   Anticipated Discharge Disposition Home-Health   Post-Acute Status   Post-Acute Authorization Placement;Home Health   Post-Acute Placement Status Set-up Complete/Auth obtained  (Return to [facility])   Home Health Status Set-up Complete/Auth obtained  ([facility])     Spoke to Bushra with [facility], will have transport pick patient up around 2. Will send patient with discharge packet. Spoke to Shanna with [facility], notified of DC plan for today. Patient's brother called and notified of DC plan.

## 2025-07-07 NOTE — PROGRESS NOTES
Inpatient Nutrition Assessment    Admit Date: 7/2/2025   Total duration of encounter: 5 days   Patient Age: 84 y.o.    Nutrition Recommendation/Prescription     Continue regular diet as tolerated; continue SWM as needed  Continue Boost VHC BID (530 kcal and 22 gm protein per serving)  Encourage adequate PO intake  Monitor PO intake, labs and weight    Communication of Recommendations: reviewed with patient    Nutrition Assessment     Malnutrition Assessment/Nutrition-Focused Physical Exam    Malnutrition Context: chronic illness (07/03/25 1259)  Malnutrition Level: moderate (07/03/25 1259)  Energy Intake (Malnutrition): less than or equal to 75% for greater than or equal to 1 month (07/03/25 1259)  Weight Loss (Malnutrition): 7.5% in 3 months (07/03/25 1259)              Muscle Mass (Malnutrition): moderate depletion (07/03/25 1259)  [place]uther[place] Region (Muscle Loss): moderate depletion  Clavicle Bone Region (Muscle Loss): moderate depletion  Clavicle and Acromion Bone Region (Muscle Loss): moderate depletion                 Fluid Accumulation (Malnutrition): other (see comments) (Not present) (07/03/25 1259)        A minimum of two characteristics is recommended for diagnosis of either severe or non-severe malnutrition.    Chart Review    Reason Seen: follow-up    Malnutrition Screening Tool Results   Have you recently lost weight without trying?: No  Have you been eating poorly because of a decreased appetite?: No   MST Score: 0   Diagnosis:  Dementia with behavioral changes  Acute on chronic encephalopathy   Acute uncomplicated cystitis    Relevant Medical History: HTN, HLD, epilepsy, CHF, CAD, previous CVA, glaucoma, CKD, chronic left-sided weakness/wheelchair-bound with underlying psychiatric undiagnosed issue     Scheduled Medications:  ARIPiprazole, 5 mg, QHS  aspirin, 81 mg, Daily  atorvastatin, 40 mg, Daily  brimonidine 0.15 % OPTH DROP, 1 drop, TID  enoxparin, 30 mg, Daily  fluconazole, 200 mg,  Daily  latanoprost, 1 drop, QHS  metoprolol succinate, 100 mg, Daily  PHENobarbitaL, 32.4 mg, Daily  phenytoin, 200 mg, Daily    Continuous Infusions:   PRN Medications:  acetaminophen, 650 mg, Q4H PRN  albuterol-ipratropium, 3 mL, Q6H PRN  aluminum-magnesium hydroxide-simethicone, 30 mL, QID PRN  bisacodyL, 10 mg, Daily PRN  dextrose 50%, 12.5 g, PRN  dextrose 50%, 25 g, PRN  glucagon (human recombinant), 1 mg, PRN  glucose, 16 g, PRN  glucose, 24 g, PRN  HYDROcodone-acetaminophen, 1 tablet, Q6H PRN  melatonin, 9 mg, Nightly PRN  morphine, 2 mg, Q6H PRN  naloxone, 0.02 mg, PRN  ondansetron, 8 mg, Q8H PRN  ondansetron, 4 mg, Q8H PRN  polyethylene glycol, 17 g, TID PRN  simethicone, 1 tablet, QID PRN  sodium chloride 0.9%, 10 mL, PRN    Calorie Containing IV Medications: no significant kcals from medications at this time    Recent Labs   Lab 07/01/25  0447 07/02/25  1029 07/02/25  1318 07/03/25  0918 07/04/25  0658 07/04/25  0747 07/05/25  0839 07/05/25  0905 07/06/25  0442 07/06/25  1055    145  --  147* 140  --  143  --  143  --    K 3.1* 3.9  --  3.2* 3.1*  --  3.8  --  3.6  --    CALCIUM 8.4 8.9  --  8.7 8.2*  --  8.8  --  8.6  --    PHOS  --   --   --   --  3.2  --  3.1  --  3.4  --    MG 1.60 1.70  --   --  1.80  --   --   --   --   --    * 108*  --  109* 105  --  106  --  105  --    CO2 28 26  --  26 26  --  28  --  28  --    BUN 5.1* 8.6*  --  8.4* 8.5*  --  9.5*  --  10.0  --    CREATININE 0.76 0.82  --  0.73 0.71  --  0.82  --  0.77  --    EGFRNORACEVR >60 >60  --  >60 >60  --  >60  --  >60  --    GLU 90 103  --  79* 116*  --  79*  --  90  --    BILITOT  --  0.4  --  0.5  --   --   --   --   --   --    ALKPHOS  --  140  --  136  --   --   --   --   --   --    ALT  --  19  --  18  --   --   --   --   --   --    AST  --  36  --  37  --   --   --   --   --   --    ALBUMIN  --  3.0*  --  3.0*  --   --  2.8*  --  2.6*  --    WBC  --   --  5.49 5.79  --  12.43*  --  7.40  --  5.19   HGB  --   --  14.3  12.0  --  12.2  --  13.1  --  10.7*   HCT  --   --  45.4 36.7*  --  36.5*  --  40.3  --  32.5*     Nutrition Orders:  Diet Adult Regular Standard Tray  Diet Cardiac  Dietary nutrition supplements BID; Boost Very High Calorie Nutritional Drink - Any flavor    Appetite/Oral Intake: poor/0-25% of meals  Factors Affecting Nutritional Intake: impaired cognitive status/motor control and decreased appetite  Social Needs Impacting Access to Food: none identified  Food/Quaker/Cultural Preferences: none reported  Food Allergies: no known food allergies  Last Bowel Movement: 25  Wound(s):     Wound 25 2030 Pressure Injury  Sacral spine-Tissue loss description: Partial thickness     Comments    7/3: Pt's family member reports poor appetite currently and PTA. 0-25% PO intake of meals documented in EMR. Pt agreed to trial chocolate/strawberry ONS, will trial Boost VHC BID. Pt denies n/v/d/c; LBM  noted. Reports unintentional weight loss, but unable to provide additional information. 8% weight loss in 4 months noted. Pt with moderate muscle depletion per NFPE.     : Pt reports continued poor PO intake, still eating 0-25% of meals. A few unopened cartons of Boost noted in room, will continue BID at this time and encouraged intake. No reports of n/v/d/c; LBM  noted. Will continue to monitor.    Anthropometrics    Height: 5' (152.4 cm), Height Method: Stated  Last Weight: 49.9 kg (110 lb) (25 1834), Weight Method: Standard Scale  BMI (Calculated): 21.5  BMI Classification: underweight (BMI less than 22 if >65 years of age)     Ideal Body Weight (IBW), Female: 100 lb     % Ideal Body Weight, Female (lb): 110 %                    Usual Body Weight (UBW), k.4 kg  % Usual Body Weight: 91.91     Usual Weight Provided By: EMR weight history    Wt Readings from Last 5 Encounters:   25 49.9 kg (110 lb)   25 49.9 kg (110 lb)   25 49 kg (108 lb)   25 54.4 kg (120 lb)   25 54.4 kg  (120 lb)     Weight Change(s) Since Admission:   Wt Readings from Last 1 Encounters:   07/02/25 1834 49.9 kg (110 lb)   07/02/25 0958 49.9 kg (110 lb)   Admit Weight: 49.9 kg (110 lb) (07/02/25 0958), Weight Method: Stated    Estimated Needs    Weight Used For Calorie Calculations: 49.9 kg (110 lb 0.2 oz)  Energy Calorie Requirements (kcal): 998-1248 kcal (20-25 kcal/kg)  Energy Need Method: Kcal/kg  Weight Used For Protein Calculations: 49.9 kg (110 lb 0.2 oz)  Protein Requirements: 40-50 gm (0.8-1.0 gm/kg)  Fluid Requirements (mL): 1248 mL (25 mL/kg)        Enteral Nutrition     Patient not receiving enteral nutrition at this time.    Parenteral Nutrition     Patient not receiving parenteral nutrition support at this time.    Evaluation of Received Nutrient Intake    Calories: not meeting estimated needs  Protein: not meeting estimated needs    Patient Education     Not applicable.    Nutrition Diagnosis     PES: Inadequate energy intake related to suboptimal protein/energy intake as evidenced by poor PO intake. (active)     PES: Moderate chronic disease or condition related malnutrition Related to chronic illness As Evidenced by:  - weight loss: 7.5% in 3 months - energy intake: <= 75% for 1 months (meets criteria for <= 75% >= 1 month (severe - chronic)) - muscle mass depletion: 5 areas of moderate muscle loss (Trapezius, Temporalis, Acromion, Deltoid, Clavicle) new    Nutrition Interventions     Intervention(s): general/healthful diet, commercial beverage, and collaboration with other providers  Intervention(s): Oral diet/nutrient modifications;Oral nutritional supplement    Goal: Meet greater than 80% of nutritional needs by follow-up. (goal not met)  Goal: Consume % of oral supplements by follow-up. (goal progressing)    Nutrition Goals & Monitoring     Dietitian will monitor: food and beverage intake, weight, electrolyte/renal panel, and gastrointestinal profile  Discharge planning: continue regular  diet with boost or similar oral supplements  Nutrition Risk/Follow-Up: patient at increased nutrition risk; dietitian will follow-up twice weekly   Please consult if re-assessment needed sooner.

## 2025-07-07 NOTE — DISCHARGE INSTRUCTIONS
Patient discharged to [facility] in wheelchair via van. Discharge folder given to transporter of [facility]. Patient IV removed prior to discharge.

## 2025-07-07 NOTE — PROGRESS NOTES
[facility] [address] Surg  Wound Care    Patient Name:  Lisseth Guajardo   MRN:  [id number]  Date: 7/7/2025  Diagnosis: Psychosis    History:     Past Medical History:   Diagnosis Date    Cervical radiculitis 08/29/2022    Cervical spinal stenosis 08/29/2022    Cervicalgia 08/29/2022    Coronary arteriosclerosis     DDD (degenerative disc disease), cervical 08/29/2022    Depression     Epilepsy, unspecified, not intractable, with status epilepticus     High cholesterol     Hypokalemia     Mild renal insufficiency 07/06/2022    Myocardial infarction     Osteoporosis     Primary hypertension 1/31/2023    Recurrent UTI     Unspecified macular degeneration        Social History[1]    Precautions:     Allergies as of 07/02/2025 - Reviewed 07/02/2025   Allergen Reaction Noted    Nitrofurantoin monohyd/m-cryst Hives 05/02/2022    Penicillin Hives 05/02/2022       WOC Assessment Details/Treatment        07/07/25 0832   WOCN Assessment   Visit Date 07/07/25   Visit Time 0832   Consult Type Follow Up   WOCN Speciality Wound   Wound pressure   Intervention chart review;applied   Teaching on-going        Wound 06/27/25 2030 Pressure Injury  Sacral spine   Date First Assessed/Time First Assessed: 06/27/25 2030   Present on Original Admission: Yes  Primary Wound Type: Pressure Injury  Side: (c)   Location: Sacral spine   Wound Image    Pressure Injury Stage 2   Dressing Appearance Moist drainage   Drainage Amount Scant   Drainage Characteristics/Odor Serosanguineous   Appearance Moist   Tissue loss description Partial thickness   Black (%), Wound Tissue Color 0 %   Red (%), Wound Tissue Color 100 %   Yellow (%), Wound Tissue Color 0 %   Periwound Area Macerated;Pink;Moist   Wound Edges Irregular;Jagged   Wound Length (cm) 8 cm   Wound Width (cm) 7 cm   Wound Depth (cm) 0.1 cm   Wound Volume (cm^3) 2.932 cm^3   Wound Surface Area (cm^2) 43.98 cm^2   Supply Surface Area (L x W) 56 sq cm   Care Cleansed  with:;Antimicrobial agent;Other (see comments)  (remedy)   Dressing Applied;Other (comment)  (Aqquacel Ag, abd, tape)   Off Loading Pillow;Wedge     WOCN follow up for sacrum. No family present at bedside. Continue current treatment recommendations in place. Sacrum: clean w/ vashe, dry well, apply aquacel ag cloth to wound bed, cover with abd pad, and minimal paper tape. BID/prn if soilage. Nursing to continue with all preventative measures. Wound care will follow up.    07/07/2025         [1]   Social History  Socioeconomic History    Marital status:    Tobacco Use    Smoking status: Former     Types: Cigarettes     Passive exposure: Past    Smokeless tobacco: Never   Substance and Sexual Activity    Alcohol use: Never    Drug use: Never    Sexual activity: Not Currently   Social History Narrative    ** Merged History Encounter **          Social Drivers of Health     Financial Resource Strain: Patient Unable To Answer (7/2/2025)    Overall Financial Resource Strain (CARDIA)     Difficulty of Paying Living Expenses: Patient unable to answer   Food Insecurity: Patient Unable To Answer (7/2/2025)    Hunger Vital Sign     Worried About Running Out of Food in the Last Year: Patient unable to answer     Ran Out of Food in the Last Year: Patient unable to answer   Transportation Needs: Patient Unable To Answer (7/2/2025)    PRAPARE - Transportation     Lack of Transportation (Medical): Patient unable to answer     Lack of Transportation (Non-Medical): Patient unable to answer   Physical Activity: Inactive (1/9/2024)    Exercise Vital Sign     Days of Exercise per Week: 0 days     Minutes of Exercise per Session: 0 min   Stress: Patient Unable To Answer (7/2/2025)    [place] citizen [place] [employer] - Occupational Stress Questionnaire     Feeling of Stress : Patient unable to answer   Housing Stability: Patient Unable To Answer (7/2/2025)    Housing Stability Vital Sign     Unable to Pay for Housing in the  Last Year: Patient unable to answer     Homeless in the Last Year: Patient unable to answer

## 2025-07-16 ENCOUNTER — TELEPHONE (OUTPATIENT)
Dept: FAMILY MEDICINE | Facility: CLINIC | Age: 85
End: 2025-07-16
Payer: MEDICARE

## 2025-07-16 NOTE — TELEPHONE ENCOUNTER
Below are suggestions for symptomatic relief:   -Tylenol every 4 hours OR ibuprofen every 6 hours as needed for pain/fever.   -Salt water gargles to soothe throat pain.   -Chloroseptic spray also helps to numb throat pain.   -Nasal saline spray reduces inflammation and dryness.   -Warm face compresses to help with facial sinus pain/pressure.   -Vicks vapor rub at night.   -Flonase OTC or Nasacort OTC for nasal congestion.   -Simple foods like chicken noodle soup.   -Delsym helps with coughing at night   -Zyrtec/Claritin during the day & Benadryl at night may help with allergies.   -Mahogany seltzer cold and sinus - honey tea to help with sore throat/sinus congestion.     If you DO NOT have Hypertension or any history of palpitations, it is ok to take over the counter Sudafed or Mucinex D or Allegra-D or Claritin-D or Zyrtec-D.  If you do take one of the above, it is ok to combine that with plain over the counter Mucinex or Allegra or Claritin or Zyrtec. If, for example, you are taking Zyrtec -D, you can combine that with Mucinex, but not Mucinex-D.  If you are taking Mucinex-D, you can combine that with plain Allegra or Claritin or Zyrtec.   If you DO have Hypertension or palpitations, it is safe to take Coricidin HBP for relief of sinus symptoms.    Please follow up with your primary care provider within 2-5 days if your signs and symptoms have not resolved or worsen.     If your condition worsens or fails to improve we recommend that you receive another evaluation at the emergency room immediately or contact your primary medical clinic to discuss your concerns.   You must understand that you have received an Urgent Care treatment only and that you may be released before all of your medical problems are known or treated. You, the patient, will arrange for follow up care as instructed.     Viral Upper Respiratory Illness (Adult)  You have a viral upper respiratory illness (URI), which is another term for the common  Copied from CRM #[id number]. Topic: Appointments - Amb Referral  >> Jul 16, 2025  3:35 PM Kristine wrote:  .Type:  Patient Returning Call    Who Called:Negar with [facility]   Who Left Message for Patient:Negar  Does the patient know what this is regarding?:Wound Orders   Would the patient rather a call back or a response via [facility]?   Best Call Back Number:[phone]  Additional Information: Please call back about getting some orders for the patient wounds. Patient was transported there on yesterday from [place]   cold. This illness is contagious during the first few days. It is spread through the air by coughing and sneezing. It may also be spread by direct contact (touching the sick person and then touching your own eyes, nose, or mouth). Frequent handwashing will decrease risk of spread. Most viral illnesses go away within 7 to 10 days with rest and simple home remedies. Sometimes the illness may last for several weeks. Antibiotics will not kill a virus, and they are generally not prescribed for this condition.    Home care  · If symptoms are severe, rest at home for the first 2 to 3 days. When you resume activity, don't let yourself get too tired.  · Avoid being exposed to cigarette smoke (yours or others).  · You may use acetaminophen or ibuprofen to control pain and fever, unless another medicine was prescribed. (Note: If you have chronic liver or kidney disease, have ever had a stomach ulcer or gastrointestinal bleeding, or are taking blood-thinning medicines, talk with your healthcare provider before using these medicines.) Aspirin should never be given to anyone under 18 years of age who is ill with a viral infection or fever. It may cause severe liver or brain damage.  · Your appetite may be poor, so a light diet is fine. Avoid dehydration by drinking 6 to 8 glasses of fluids per day (water, soft drinks, juices, tea, or soup). Extra fluids will help loosen secretions in the nose and lungs.  · Over-the-counter cold medicines will not shorten the length of time youre sick, but they may be helpful for the following symptoms: cough, sore throat, and nasal and sinus congestion. (Note: Do not use decongestants if you have high blood pressure.)  Follow-up care  Follow up with your healthcare provider, or as advised.  When to seek medical advice  Call your healthcare provider right away if any of these occur:  · Cough with lots of colored sputum (mucus)  · Severe headache; face, neck, or ear  pain  · Difficulty swallowing due to throat pain  · Fever of 100.4°F (38°C)  Call 911, or get immediate medical care  Call emergency services right away if any of these occur:  · Chest pain, shortness of breath, wheezing, or difficulty breathing  · Coughing up blood  · Inability to swallow due to throat pain  Date Last Reviewed: 9/13/2015  © 7328-8839 [employer]. [address] [place] [address], [place] [address]. All rights reserved. This information is not intended as a substitute for professional medical care. Always follow your healthcare professional's instructions.

## 2025-07-18 ENCOUNTER — OFFICE VISIT (OUTPATIENT)
Dept: FAMILY MEDICINE | Facility: CLINIC | Age: 85
End: 2025-07-18
Payer: MEDICARE

## 2025-07-18 VITALS
WEIGHT: 108 LBS | HEART RATE: 80 BPM | OXYGEN SATURATION: 86 % | DIASTOLIC BLOOD PRESSURE: 68 MMHG | SYSTOLIC BLOOD PRESSURE: 102 MMHG | BODY MASS INDEX: 21.09 KG/M2

## 2025-07-18 DIAGNOSIS — Z09 HOSPITAL DISCHARGE FOLLOW-UP: Primary | ICD-10-CM

## 2025-07-18 DIAGNOSIS — R31.9 URINARY TRACT INFECTION WITH HEMATURIA, SITE UNSPECIFIED: ICD-10-CM

## 2025-07-18 DIAGNOSIS — S31.000A WOUND OF SACRAL REGION, INITIAL ENCOUNTER: ICD-10-CM

## 2025-07-18 DIAGNOSIS — I63.9 CEREBROVASCULAR ACCIDENT (CVA), UNSPECIFIED MECHANISM: ICD-10-CM

## 2025-07-18 DIAGNOSIS — G80.9 CEREBRAL PALSY, UNSPECIFIED TYPE: ICD-10-CM

## 2025-07-18 DIAGNOSIS — K52.9 COLITIS: ICD-10-CM

## 2025-07-18 DIAGNOSIS — I50.9 HEART FAILURE, UNSPECIFIED HF CHRONICITY, UNSPECIFIED HEART FAILURE TYPE: ICD-10-CM

## 2025-07-18 DIAGNOSIS — N39.0 URINARY TRACT INFECTION WITH HEMATURIA, SITE UNSPECIFIED: ICD-10-CM

## 2025-07-18 NOTE — PROGRESS NOTES
Family Medicine  Transitional Care Visit      Subjective:   Patient ID: Lisseth Guajardo is a 84 y.o. female.    Chief Complaint: Follow-up (Wounds )      Date of Hospital Discharge: 7/7/25   Family and/or Caretaker present at visit?  Yes  Diagnostic tests reviewed/disposition: I have reviewed all completed as well as pending diagnostic tests at the time of discharge.  Home health/community services discussion/referrals: Patient does not have home health established from hospital visit.  They do need home health.  If needed, we will set up home health for the patient.   Establishment or re-establishment of referral orders for community resources: Patient needs establishment of home health and DME for new facility of hospital bed, wheeklchair cusion and bedside commode.   Discussion with other health care providers: Will discuss admission to new facility with PCP.     HPI: Lisseth Guajardo is a 84 y.o. female here today for a hospital follow up.     83-year-old female with history of cerebral palsy, CHF, MI, CVA with left-sided paralysis presents to the ER via EMS from [facility] for evaluation of neck pain, back pain, think she may have a UTI. No reports of any injury preceding her symptoms. Unable to determine if these pains are chronic. Patient was discharged yesterday from [facility] and being treated for candidiasis in urine with Diflucan. Nursing reported that she is noncompliant with her medications and was refusing to take them so she was sent to the ER. Patient is awake and alert and answers questions appropriately. No other family members are available to assist with history taking. No reports of fever. No other complaints   Patient given Rocephin in the ER as well as Diflucan and discharged home on oral cefdinir    Todays concerns:  Patient presents with nursing staff from new assisted living facility [facility] assisted [facility]. They report that patient was admitted on  Tuesday 7/15/25.  She reports that upon admission during intake they noticed that the patient had a large sacral stage II sacral pressure ulcer on her bottom.  The director of the facility as well as the the head nurse are concerned and are requesting wound care orders.  Patient previously had home health with [facility] but was discharged from their services.  They are requesting new home health orders with a different home health company that can provide patient with wound care, physical therapy, speech therapy, occupational therapy, assistance with med management, as well as appropriate DME while she is living with them.  They are requesting a hospital bed, gel cushion for wheelchair, bedside commode, and oxygen for patient as needed.  The facility also needs medical intake forms completed within 14 days of admission as well as baseline chest x-ray. The staff report patient's nutrition has improved since admission to their facility.  The patient also appears to be in good spirits about her new residence.  No further complaints or concerns at this time.     Health maintenance reviewed with the patient.  Past Medical History:   Diagnosis Date    Cervical radiculitis 08/29/2022    Cervical spinal stenosis 08/29/2022    Cervicalgia 08/29/2022    Coronary arteriosclerosis     DDD (degenerative disc disease), cervical 08/29/2022    Depression     Epilepsy, unspecified, not intractable, with status epilepticus     High cholesterol     Hypokalemia     Mild renal insufficiency 07/06/2022    Myocardial infarction     Osteoporosis     Primary hypertension 1/31/2023    Recurrent UTI     Unspecified macular degeneration         Past Surgical History:   Procedure Laterality Date    APPENDECTOMY      BACK SURGERY      cardiac stents      CARPAL TUNNEL RELEASE      CATARACT EXTRACTION Left     CHOLECYSTECTOMY      crainotomy      FOOT SURGERY Left     HYSTERECTOMY      LUNG REMOVAL, PARTIAL Right     LYMPH NODE DISSECTION           Social History     Tobacco Use    Smoking status: Former     Types: Cigarettes     Passive exposure: Past    Smokeless tobacco: Never   Substance and Sexual Activity    Alcohol use: Never    Drug use: Never    Sexual activity: Not Currently        Current Outpatient Medications   Medication Instructions    albuterol sulfate 90 mcg, Every 4 hours PRN    ARIPiprazole (ABILIFY) 5 mg, Oral, Nightly    aspirin (ECOTRIN) 81 mg, Daily    atorvastatin (LIPITOR) 40 mg, Oral    benzonatate (TESSALON) 100 mg, 3 times daily PRN    betamethasone valerate 0.1% (VALISONE) 0.1 % Crea 1 Application, 2 times daily    brimonidine 0.2% (ALPHAGAN) 0.2 % Drop 1 drop, 3 times daily    calcium carbonate/vitamin D3 (CALTRATE 600 + D ORAL) 1 tablet, 2 times daily    dorzolamide-timolol 2-0.5% (COSOPT) 22.3-6.8 mg/mL ophthalmic solution 1 drop, 2 times daily    fluconazole (DIFLUCAN) 200 mg, Oral, Daily    latanoprost 0.005 % ophthalmic solution 1 drop, Nightly    melatonin (MELATIN) 3 mg, Oral, Nightly PRN    metoprolol succinate (TOPROL-XL) 100 mg, Oral, Daily, Hold if HR <60    mirtazapine (REMERON SOL-TAB) 30 mg, Nightly PRN    mv-mn/folic ac/calcium/vit K1 (WOMEN'S 50 PLUS MULTIVITAMIN ORAL) 50 mg, Daily    OLANZapine (ZYPREXA) 2.5 mg, 2 times daily PRN    ondansetron (ZOFRAN-ODT) 4 mg, Oral, Every 8 hours PRN    pantoprazole (PROTONIX) 40 mg, Daily    PHENobarbitaL 32.4 mg, Oral, Nightly    phenytoin (DILANTIN) 200 mg, Daily       Review of patient's allergies indicates:   Allergen Reactions    Nitrofurantoin monohyd/m-cryst Hives    Penicillin Hives        Patient Care Team:  Melody Ren MD as PCP - General (Family Medicine)  Melody Ren MD (Family Medicine)  Melody Ren MD (Family Medicine)         Subjective:     Review of Systems   Constitutional:  Negative for chills, fatigue, fever and unexpected weight change.   HENT: Negative.     Eyes: Negative.    Respiratory:  Negative for chest  tightness and shortness of breath.    Cardiovascular:  Negative for chest pain, palpitations and leg swelling.   Gastrointestinal:  Negative for abdominal pain, blood in stool, constipation, diarrhea and nausea.   Endocrine: Negative.    Genitourinary: Negative.  Negative for dysuria and pelvic pain.   Musculoskeletal:  Positive for gait problem. Negative for back pain.   Skin:  Positive for wound.        Sacral wound   Allergic/Immunologic: Negative.    Neurological:  Negative for seizures, syncope and headaches.   Hematological: Negative.    Psychiatric/Behavioral: Negative.  Negative for agitation, confusion and dysphoric mood. The patient is not nervous/anxious.        12 point review of systems conducted, negative except as stated in the history of present illness. See HPI for details.      Objective:     Visit Vitals  /68 (BP Location: Right arm, Patient Position: Sitting)   Pulse 80   Wt 49 kg (108 lb)   SpO2 (!) 86%   BMI 21.09 kg/m²         Physical Examination:   Physical Exam  Vitals and nursing note reviewed.   Constitutional:       General: She is not in acute distress.     Appearance: She is not ill-appearing.   HENT:      Head: Normocephalic and atraumatic.   Neck:      Vascular: No carotid bruit.   Cardiovascular:      Rate and Rhythm: Normal rate and regular rhythm.      Heart sounds: No murmur heard.     No friction rub. No gallop.   Pulmonary:      Effort: Pulmonary effort is normal. No respiratory distress.      Breath sounds: Normal breath sounds. No wheezing, rhonchi or rales.   Abdominal:      General: Abdomen is flat. Bowel sounds are normal. There is no distension.      Palpations: Abdomen is soft. There is no mass.      Tenderness: There is no abdominal tenderness.   Musculoskeletal:      Cervical back: Normal range of motion and neck supple.      Comments: Contracture of right upper arm   Skin:     General: Skin is warm and dry.      Capillary Refill: Capillary refill takes less than  2 seconds.      Comments: Stage II sacral ulcer   Neurological:      Mental Status: She is alert and oriented to person, place, and time. Mental status is at baseline.      Motor: Weakness present.      Gait: Gait abnormal.      Comments: Patient at baseline in wheelchair   Psychiatric:         Mood and Affect: Mood normal.         Diagnostic Tests:  Significant Imaging: I have reviewed and interpreted all pertinent imaging results/findings.  CT Head Without Contrast  Narrative: EXAMINATION:  CT HEAD WITHOUT CONTRAST    CLINICAL HISTORY:  Mental status change, unknown cause;    TECHNIQUE:  Axial scans were obtained from skull base to the vertex.    Coronal and sagittal reconstructions obtained from the axial data.    Automatic exposure control was utilized to limit radiation dose.    Contrast: None    Radiation Dose:    Total DLP: 975 mGy*cm    COMPARISON:  CT head dated 03/18/2025    FINDINGS:  There is no acute intracranial hemorrhage or edema. The gray-white matter differentiation is preserved.  Patchy hypodensities in the subcortical and periventricular white matter and cerebellum likely represent chronic microvascular ischemic changes.  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    There is no mass effect or midline shift.  There is diffuse parenchymal volume loss.  The basal cisterns are patent. There is no abnormal extra-axial fluid collection.  Carotid artery calcifications are noted.    There are postoperative changes of the left calvarium.  The mastoid air cells are clear.  Impression: 1. No acute intracranial abnormality.  2. Chronic microvascular ischemic changes.    Electronically signed by: Sol Deluna  Date:    07/02/2025  Time:    11:51        Labs Reviewed:     Chemistry:  Lab Results   Component Value Date     07/06/2025    K 3.6 07/06/2025    BUN 10.0 07/06/2025    CREATININE 0.77 07/06/2025    EGFRNORACEVR >60 07/06/2025    CALCIUM 8.6 07/06/2025    ALKPHOS 136  07/03/2025    LABPROT 12.9 12/27/2024    ALBUMIN 2.6 (L) 07/06/2025    BILIDIR 0.1 10/15/2021    IBILI 0.10 10/15/2021    AST 37 07/03/2025    ALT 18 07/03/2025    MG 1.80 07/04/2025    PHOS 3.4 07/06/2025    ACZPKIOW76RK 37 08/14/2024    TSH 1.358 07/02/2025        Lab Results   Component Value Date    HGBA1C 5.3 08/14/2024        Hematology:  Lab Results   Component Value Date    WBC 5.19 07/06/2025    HGB 10.7 (L) 07/06/2025    HCT 32.5 (L) 07/06/2025     07/06/2025       Lipid Panel:  Lab Results   Component Value Date    CHOL 174 09/25/2024    HDL 55 09/25/2024    LDL 86.00 09/25/2024    TRIG 165 (H) 09/25/2024    TOTALCHOLEST 3 09/25/2024        Urine:  Lab Results   Component Value Date    APPEARANCEUA Clear 07/02/2025    SGUA 1.010 07/02/2025    PROTEINUA Negative 07/02/2025    KETONESUA 1+ (A) 07/02/2025    LEUKOCYTESUR Negative 07/02/2025    RBCUA 0-5 07/02/2025    WBCUA 0-5 07/02/2025    BACTERIA Trace 07/02/2025    SQEPUA None Seen 07/02/2025    HYALINECASTS 11-20 (A) 03/18/2025       *Lab results from 07/02/25 were reviewed and discussed with patient and patient voices understanding.*    Laboratory Data:  All pertinent labs have been reviewed.  I have reviewed the following records today:     Details:   [x] Labs [] Internal  [] External    [] Micro [] Internal  [] External    [] Pathology [] Internal  [] External    [x] Imaging [] Internal  [] External    [x] Cardiology Procedures [] Internal  [] External    [x] Provider Records [] Internal  [] External    [] Other [] Internal  [] External      Assessment:       ICD-10-CM ICD-9-CM   1. Hospital discharge follow-up  Z09 V67.59   2. Wound of sacral region, initial encounter  S31.000A 959.19   3. Urinary tract infection with hematuria, site unspecified  N39.0 599.0    R31.9 599.70   4. Colitis  K52.9 558.9   5. Cerebral palsy, unspecified type  G80.9 343.9   6. Cerebrovascular accident (CVA), unspecified mechanism  I63.9 434.91   7. Heart failure,  unspecified HF chronicity, unspecified heart failure type  I50.9 428.9            Plan:     1. Hospital discharge follow-up  -     WHEELCHAIR GEL CUSHION FOR HOME USE  -     HOSPITAL BED FOR HOME USE  -     OXYGEN FOR HOME USE  Patient appears to be at her baseline  She appears to be in a safe environment at [facility] assisted living facility  Coordinated with nursing staff at  of set up of home health services and necessary DME.  Routine labs, as well as any change in patient status to be monitored and reported to PCP.    2. Wound of sacral region, initial encounter  -     WHEELCHAIR GEL CUSHION FOR HOME USE  -     HOSPITAL BED FOR HOME USE  -     COMMODE FOR HOME USE  -     Ambulatory referral/consult to Home Health; Future; Expected date: 07/19/2025  Appropriate DME ordered for use at assisted living facility  This will allow patient frequent position changes as well as alternating of pressure points to allow for healing of sacral ulcer and prevent additional skin breakdown.  Home health consulted to assess patient mobility needs and assist in wound care    3. Urinary tract infection with hematuria, site unspecified   Resolved  Patient completed full course of antibiotics  Report any continuing symptoms after antibiotic completion such as nausea/vomiting, low back pain, blood in urine, burning with urination, or fever/body aches/chills.  Drink plenty of water daily. Avoid soda.  Women wipe front to back, urinate after sexual intercourse, and avoid irritating feminine products.  Urinate frequently. Do not hold urine for extended periods of time.    Wear cotton underwear and avoid tight fitting pants.     .4. Colitis  Resolved  Patient with no complaints of abdominal pain or blood in stool.    5. Cerebral palsy, unspecified type  -     WHEELCHAIR GEL CUSHION FOR HOME USE  -     HOSPITAL BED FOR HOME USE  -     COMMODE FOR HOME USE  -     Ambulatory referral/consult to Home Health; Future; Expected date:  07/19/2025  Appropriate DME ordered for use at assisted living facility  This will allow patient frequent position changes as well as alternating of pressure points to allow for healing of sacral ulcer and prevent additional skin breakdown.  Home health consulted to assess patient mobility needs    6. Cerebrovascular accident (CVA), unspecified mechanism  -     WHEELCHAIR GEL CUSHION FOR HOME USE  -     HOSPITAL BED FOR HOME USE  -     COMMODE FOR HOME USE  -     Ambulatory referral/consult to Home Health; Future; Expected date: 07/19/2025  -     OXYGEN FOR HOME USE  Same as #5    7. Heart failure, unspecified HF chronicity, unspecified heart failure type  -     OXYGEN FOR HOME USE  -     X-Ray Chest PA And Lateral; Future; Expected date: 07/18/2025   Patient O2 on room air <90%  Patent asymptomatic, no chest pain or SOB  Home O2 ordered to titirate oxygen to maintain at least at 92%   CXR ordered to further evaluate  Notify the clinic if you gain 3 or more pounds in one day or 5 or more pounds in one week.  Stressed importance of daily morning weights after urination but prior to breakfast on the same scale.  Low Sodium Diet (Dash Diet - less than 2 grams of sodium per day).  Follow a low cholesterol, low saturated fat diet with less that 200mg of cholesterol a day.  Cut down on alcohol if you have more than 1 drink a day (for women) or 2 drinks a day (for men).  Maintain healthy weight with goal BMI <30. Perform 30 minutes of daily physical activity 5 days per week.  Report to ER for chest pain, SOB, difficulty breathing, abdominal distention or significant edema to lower extremities.           Medications:     Medication List with Changes/Refills   Current Medications    ALBUTEROL SULFATE 90 MCG/ACTUATION AEBS    Inhale 90 mcg into the lungs every 4 (four) hours as needed (2 puffs for wheezing).    ARIPIPRAZOLE (ABILIFY) 5 MG TAB    Take 1 tablet (5 mg total) by mouth every evening.    ASPIRIN (ECOTRIN) 81 MG EC  TABLET    Take 81 mg by mouth once daily.    ATORVASTATIN (LIPITOR) 40 MG TABLET    TAKE 1 TABLET DAILY    BENZONATATE (TESSALON) 100 MG CAPSULE    Take 100 mg by mouth 3 (three) times daily as needed for Cough.    BETAMETHASONE VALERATE 0.1% (VALISONE) 0.1 % CREA    Apply 1 Application topically 2 (two) times daily.    BRIMONIDINE 0.2% (ALPHAGAN) 0.2 % DROP    Place 1 drop into both eyes 3 (three) times daily. 8am- 12nn- 7pm    CALCIUM CARBONATE/VITAMIN D3 (CALTRATE 600 + D ORAL)    Take 1 tablet by mouth 2 (two) times a day.    DORZOLAMIDE-TIMOLOL 2-0.5% (COSOPT) 22.3-6.8 MG/ML OPHTHALMIC SOLUTION    Place 1 drop into both eyes 2 (two) times daily. 8am-7pm    FLUCONAZOLE (DIFLUCAN) 200 MG TAB    Take 1 tablet (200 mg total) by mouth once daily. for 10 days    LATANOPROST 0.005 % OPHTHALMIC SOLUTION    1 drop every evening.    MELATONIN (MELATIN) 3 MG TABLET    Take 1 tablet (3 mg total) by mouth nightly as needed for Insomnia.    METOPROLOL SUCCINATE (TOPROL-XL) 100 MG 24 HR TABLET    Take 1 tablet (100 mg total) by mouth once daily. Hold if HR <60    MIRTAZAPINE (REMERON SOL-TAB) 30 MG DISINTEGRATING TABLET    Take 30 mg by mouth nightly as needed.    MV-MN/FOLIC AC/CALCIUM/VIT K1 (WOMEN'S 50 PLUS MULTIVITAMIN ORAL)    Take 50 mg by mouth Daily.    OLANZAPINE (ZYPREXA) 2.5 MG TABLET    Take 2.5 mg by mouth 2 (two) times daily as needed.    ONDANSETRON (ZOFRAN-ODT) 4 MG TBDL    Take 1 tablet (4 mg total) by mouth every 8 (eight) hours as needed (nausea/vomiting).    PANTOPRAZOLE (PROTONIX) 40 MG TABLET    Take 40 mg by mouth once daily.    PHENOBARBITAL 32.4 MG TABLET    Take 1 tablet (32.4 mg total) by mouth every evening.    PHENYTOIN (DILANTIN) 100 MG ER CAPSULE    Take 2 capsules (200 mg total) by mouth once daily.       Follow Up:   Follow up [facility] with MD in 4 weeks. In addition to their scheduled follow up, the patient has also been instructed to follow up on as needed basis.           Future Appointments    Date Time Provider Department [place]   8/14/2025 10:30 AM [facility] Ren [facility]

## 2025-07-22 ENCOUNTER — TELEPHONE (OUTPATIENT)
Dept: FAMILY MEDICINE | Facility: CLINIC | Age: 85
End: 2025-07-22
Payer: MEDICARE

## 2025-07-22 DIAGNOSIS — Z78.9 HEALTH CARE HOME, ACTIVE CARE COORDINATION: Primary | ICD-10-CM

## 2025-07-22 NOTE — TELEPHONE ENCOUNTER
Copied from CRM #[id number]. Topic: General Inquiry - Information Request  >> Jul 22, 2025  9:33 AM Damien wrote:  Who Called: Ashley with [facility]    Caller is requesting assistance/information from provider's office.    Symptoms (please be specific): N/A   How long has patient had these symptoms:  N/A  List of preferred pharmacies on file (remove unneeded): [unfilled]  If different, enter pharmacy into here including location and phone number: N/A      Preferred Method of Contact: Phone Call    Patient's Preferred Phone Number on File: [phone]     Best Call Back Number, if different: [phone] (Ashley)    Additional Information: Ashley have questions regarding the referral that was sent. Please advise, thank you

## 2025-07-22 NOTE — TELEPHONE ENCOUNTER
Order for Home Health with [facility] need discharge order so patient can proceed with the referral sent [facility].

## 2025-07-23 ENCOUNTER — TELEPHONE (OUTPATIENT)
Dept: FAMILY MEDICINE | Facility: CLINIC | Age: 85
End: 2025-07-23
Payer: MEDICARE

## 2025-07-23 NOTE — TELEPHONE ENCOUNTER
Copied from CRM #[id number]. Topic: General Inquiry - Patient Advice  >> Jul 23, 2025  1:23 PM Renetta wrote:  Chelsie w/[facility] requesting a call back from [facility] falcon refused [facility] and requesting [facility] call back number [phone]

## 2025-07-23 NOTE — TELEPHONE ENCOUNTER
Ashley with [facility] call to report that patient is refusing their services and is wanting [facility] which has discharged her and not wanting to readmit patient.  [facility] will try to admit her tomorrow.

## 2025-07-24 PROCEDURE — G0180 MD CERTIFICATION HHA PATIENT: HCPCS | Mod: ,,,

## 2025-07-30 DIAGNOSIS — J44.9 CHRONIC OBSTRUCTIVE PULMONARY DISEASE, UNSPECIFIED COPD TYPE: ICD-10-CM

## 2025-07-30 DIAGNOSIS — I50.9 HEART FAILURE, UNSPECIFIED HF CHRONICITY, UNSPECIFIED HEART FAILURE TYPE: ICD-10-CM

## 2025-08-08 ENCOUNTER — TELEPHONE (OUTPATIENT)
Dept: FAMILY MEDICINE | Facility: CLINIC | Age: 85
End: 2025-08-08
Payer: MEDICARE

## 2025-08-11 PROBLEM — N30.00 ACUTE CYSTITIS WITHOUT HEMATURIA: Status: RESOLVED | Noted: 2025-06-27 | Resolved: 2025-08-11

## 2025-08-18 PROBLEM — R41.82 ALTERED MENTAL STATUS: Status: RESOLVED | Noted: 2025-07-02 | Resolved: 2025-08-18

## 2025-08-20 ENCOUNTER — DOCUMENT SCAN (OUTPATIENT)
Dept: HOME HEALTH SERVICES | Facility: HOSPITAL | Age: 85
End: 2025-08-20
Payer: MEDICARE

## 2025-08-21 ENCOUNTER — EXTERNAL HOME HEALTH (OUTPATIENT)
Dept: HOME HEALTH SERVICES | Facility: HOSPITAL | Age: 85
End: 2025-08-21
Payer: MEDICARE

## 2025-08-28 DIAGNOSIS — R56.9 UNSPECIFIED CONVULSIONS: ICD-10-CM

## 2025-08-28 RX ORDER — PHENYTOIN SODIUM 100 MG/1
200 CAPSULE, EXTENDED RELEASE ORAL DAILY
Qty: 60 CAPSULE | Refills: 0 | Status: SHIPPED | OUTPATIENT
Start: 2025-08-28

## 2025-09-02 RX ORDER — ATORVASTATIN CALCIUM 40 MG/1
40 TABLET, FILM COATED ORAL NIGHTLY
Qty: 90 TABLET | Refills: 3 | Status: SHIPPED | OUTPATIENT
Start: 2025-09-02

## 2025-09-03 RX ORDER — METOPROLOL SUCCINATE 100 MG/1
100 TABLET, EXTENDED RELEASE ORAL DAILY
Qty: 90 TABLET | Refills: 3 | Status: SHIPPED | OUTPATIENT
Start: 2025-09-03 | End: 2026-09-03

## 2025-09-04 DIAGNOSIS — R56.9 UNSPECIFIED CONVULSIONS: ICD-10-CM

## 2025-09-04 RX ORDER — PHENYTOIN SODIUM 100 MG/1
200 CAPSULE, EXTENDED RELEASE ORAL DAILY
Qty: 60 CAPSULE | Refills: 0 | Status: SHIPPED | OUTPATIENT
Start: 2025-09-04